# Patient Record
Sex: FEMALE | Race: WHITE | NOT HISPANIC OR LATINO | Employment: FULL TIME | URBAN - METROPOLITAN AREA
[De-identification: names, ages, dates, MRNs, and addresses within clinical notes are randomized per-mention and may not be internally consistent; named-entity substitution may affect disease eponyms.]

---

## 2017-01-11 ENCOUNTER — TELEPHONE (OUTPATIENT)
Dept: NURSING | Facility: CLINIC | Age: 42
End: 2017-01-11

## 2017-01-11 ENCOUNTER — APPOINTMENT (OUTPATIENT)
Dept: GENERAL RADIOLOGY | Facility: CLINIC | Age: 42
End: 2017-01-11
Attending: EMERGENCY MEDICINE
Payer: COMMERCIAL

## 2017-01-11 ENCOUNTER — HOSPITAL ENCOUNTER (EMERGENCY)
Facility: CLINIC | Age: 42
Discharge: HOME | End: 2017-01-12
Attending: EMERGENCY MEDICINE | Admitting: EMERGENCY MEDICINE
Payer: COMMERCIAL

## 2017-01-11 VITALS
TEMPERATURE: 98.3 F | DIASTOLIC BLOOD PRESSURE: 77 MMHG | RESPIRATION RATE: 17 BRPM | SYSTOLIC BLOOD PRESSURE: 137 MMHG | HEART RATE: 66 BPM | OXYGEN SATURATION: 95 %

## 2017-01-11 DIAGNOSIS — M54.6 ACUTE BILATERAL THORACIC BACK PAIN: ICD-10-CM

## 2017-01-11 LAB
ALBUMIN SERPL-MCNC: 3.7 G/DL (ref 3.4–5)
ALBUMIN UR-MCNC: NEGATIVE MG/DL
ALP SERPL-CCNC: 96 U/L (ref 40–150)
ALT SERPL W P-5'-P-CCNC: 59 U/L (ref 0–50)
ANION GAP SERPL CALCULATED.3IONS-SCNC: 10 MMOL/L (ref 3–14)
APPEARANCE UR: CLEAR
AST SERPL W P-5'-P-CCNC: 41 U/L (ref 0–45)
BACTERIA #/AREA URNS HPF: ABNORMAL /HPF
BASOPHILS # BLD AUTO: 0.1 10E9/L (ref 0–0.2)
BASOPHILS NFR BLD AUTO: 0.9 %
BILIRUB SERPL-MCNC: 0.3 MG/DL (ref 0.2–1.3)
BILIRUB UR QL STRIP: NEGATIVE
BUN SERPL-MCNC: 14 MG/DL (ref 7–30)
CALCIUM SERPL-MCNC: 9.8 MG/DL (ref 8.5–10.1)
CHLORIDE SERPL-SCNC: 105 MMOL/L (ref 94–109)
CO2 SERPL-SCNC: 26 MMOL/L (ref 20–32)
COLOR UR AUTO: ABNORMAL
CREAT SERPL-MCNC: 0.94 MG/DL (ref 0.52–1.04)
D DIMER PPP FEU-MCNC: NORMAL UG/ML FEU (ref 0–0.5)
DIFFERENTIAL METHOD BLD: ABNORMAL
EOSINOPHIL # BLD AUTO: 0.5 10E9/L (ref 0–0.7)
EOSINOPHIL NFR BLD AUTO: 3.6 %
ERYTHROCYTE [DISTWIDTH] IN BLOOD BY AUTOMATED COUNT: 13.1 % (ref 10–15)
GFR SERPL CREATININE-BSD FRML MDRD: 66 ML/MIN/1.7M2
GLUCOSE SERPL-MCNC: 80 MG/DL (ref 70–99)
GLUCOSE UR STRIP-MCNC: NEGATIVE MG/DL
HCG UR QL: NEGATIVE
HCT VFR BLD AUTO: 43.2 % (ref 35–47)
HGB BLD-MCNC: 14.6 G/DL (ref 11.7–15.7)
HGB UR QL STRIP: NEGATIVE
IMM GRANULOCYTES # BLD: 0 10E9/L (ref 0–0.4)
IMM GRANULOCYTES NFR BLD: 0.2 %
KETONES UR STRIP-MCNC: NEGATIVE MG/DL
LEUKOCYTE ESTERASE UR QL STRIP: NEGATIVE
LYMPHOCYTES # BLD AUTO: 4.6 10E9/L (ref 0.8–5.3)
LYMPHOCYTES NFR BLD AUTO: 34.3 %
MCH RBC QN AUTO: 30.3 PG (ref 26.5–33)
MCHC RBC AUTO-ENTMCNC: 33.8 G/DL (ref 31.5–36.5)
MCV RBC AUTO: 90 FL (ref 78–100)
MONOCYTES # BLD AUTO: 0.9 10E9/L (ref 0–1.3)
MONOCYTES NFR BLD AUTO: 6.4 %
NEUTROPHILS # BLD AUTO: 7.3 10E9/L (ref 1.6–8.3)
NEUTROPHILS NFR BLD AUTO: 54.6 %
NITRATE UR QL: NEGATIVE
NRBC # BLD AUTO: 0 10*3/UL
NRBC BLD AUTO-RTO: 0 /100
PH UR STRIP: 7 PH (ref 5–7)
PLATELET # BLD AUTO: 312 10E9/L (ref 150–450)
POTASSIUM SERPL-SCNC: 3.2 MMOL/L (ref 3.4–5.3)
PROT SERPL-MCNC: 7.7 G/DL (ref 6.8–8.8)
RBC # BLD AUTO: 4.82 10E12/L (ref 3.8–5.2)
RBC #/AREA URNS AUTO: 0 /HPF (ref 0–2)
SODIUM SERPL-SCNC: 141 MMOL/L (ref 133–144)
SP GR UR STRIP: 1 (ref 1–1.03)
SQUAMOUS #/AREA URNS AUTO: <1 /HPF (ref 0–1)
TROPONIN I SERPL-MCNC: NORMAL UG/L (ref 0–0.04)
URN SPEC COLLECT METH UR: ABNORMAL
UROBILINOGEN UR STRIP-MCNC: NORMAL MG/DL (ref 0–2)
WBC # BLD AUTO: 13.3 10E9/L (ref 4–11)
WBC #/AREA URNS AUTO: <1 /HPF (ref 0–2)

## 2017-01-11 PROCEDURE — 85025 COMPLETE CBC W/AUTO DIFF WBC: CPT | Performed by: EMERGENCY MEDICINE

## 2017-01-11 PROCEDURE — 25000125 ZZHC RX 250: Performed by: EMERGENCY MEDICINE

## 2017-01-11 PROCEDURE — 84484 ASSAY OF TROPONIN QUANT: CPT | Performed by: EMERGENCY MEDICINE

## 2017-01-11 PROCEDURE — 96361 HYDRATE IV INFUSION ADD-ON: CPT

## 2017-01-11 PROCEDURE — 99285 EMERGENCY DEPT VISIT HI MDM: CPT | Mod: 25

## 2017-01-11 PROCEDURE — 96375 TX/PRO/DX INJ NEW DRUG ADDON: CPT

## 2017-01-11 PROCEDURE — 85379 FIBRIN DEGRADATION QUANT: CPT | Performed by: EMERGENCY MEDICINE

## 2017-01-11 PROCEDURE — 25000128 H RX IP 250 OP 636: Performed by: EMERGENCY MEDICINE

## 2017-01-11 PROCEDURE — 71020 XR CHEST 2 VW: CPT

## 2017-01-11 PROCEDURE — 80053 COMPREHEN METABOLIC PANEL: CPT | Performed by: EMERGENCY MEDICINE

## 2017-01-11 PROCEDURE — 81025 URINE PREGNANCY TEST: CPT | Performed by: EMERGENCY MEDICINE

## 2017-01-11 PROCEDURE — 96374 THER/PROPH/DIAG INJ IV PUSH: CPT

## 2017-01-11 PROCEDURE — 93005 ELECTROCARDIOGRAM TRACING: CPT

## 2017-01-11 PROCEDURE — 81001 URINALYSIS AUTO W/SCOPE: CPT | Performed by: EMERGENCY MEDICINE

## 2017-01-11 RX ORDER — DIAZEPAM 10 MG/2ML
2.5 INJECTION, SOLUTION INTRAMUSCULAR; INTRAVENOUS EVERY 4 HOURS PRN
Status: DISCONTINUED | OUTPATIENT
Start: 2017-01-11 | End: 2017-01-12 | Stop reason: HOSPADM

## 2017-01-11 RX ORDER — HYDROCODONE BITARTRATE AND ACETAMINOPHEN 5; 325 MG/1; MG/1
1-2 TABLET ORAL EVERY 4 HOURS PRN
Qty: 15 TABLET | Refills: 0 | Status: SHIPPED | OUTPATIENT
Start: 2017-01-11 | End: 2017-11-20

## 2017-01-11 RX ORDER — KETOROLAC TROMETHAMINE 30 MG/ML
30 INJECTION, SOLUTION INTRAMUSCULAR; INTRAVENOUS ONCE
Status: COMPLETED | OUTPATIENT
Start: 2017-01-11 | End: 2017-01-11

## 2017-01-11 RX ORDER — CALCIUM CARBONATE 500 MG/1
1 TABLET, CHEWABLE ORAL DAILY
COMMUNITY
End: 2018-03-16

## 2017-01-11 RX ORDER — DIAZEPAM 5 MG
5 TABLET ORAL EVERY 6 HOURS PRN
Qty: 20 TABLET | Refills: 0 | Status: SHIPPED | OUTPATIENT
Start: 2017-01-11 | End: 2017-01-18

## 2017-01-11 RX ADMIN — KETOROLAC TROMETHAMINE 30 MG: 30 INJECTION, SOLUTION INTRAMUSCULAR at 21:42

## 2017-01-11 RX ADMIN — DIAZEPAM 2.5 MG: 5 INJECTION, SOLUTION INTRAMUSCULAR; INTRAVENOUS at 23:12

## 2017-01-11 RX ADMIN — SODIUM CHLORIDE 1000 ML: 9 INJECTION, SOLUTION INTRAVENOUS at 21:43

## 2017-01-11 ASSESSMENT — ENCOUNTER SYMPTOMS
FREQUENCY: 0
DIFFICULTY URINATING: 0
NUMBNESS: 0
DYSURIA: 0
HEMATURIA: 0
SHORTNESS OF BREATH: 1
BACK PAIN: 1

## 2017-01-11 NOTE — ED AVS SNAPSHOT
[facility] Emergency Department    [address]    [facility] [address]    Phone:  [phone]    Fax:  [phone]                                       Machelle Weiner   MRN: [id number]    Department:  [facility] Emergency Department   Date of Visit:  1/11/2017           After Visit Summary Signature Page     I have received my discharge instructions, and my questions have been answered. I have discussed any challenges I see with this plan with the nurse or doctor.    ..........................................................................................................................................  Patient/Patient Representative Signature      ..........................................................................................................................................  Patient Representative Print Name and Relationship to Patient    ..................................................               ................................................  Date                                            Time    ..........................................................................................................................................  Reviewed by Signature/Title    ...................................................              ..............................................  Date                                                            Time

## 2017-01-11 NOTE — ED AVS SNAPSHOT
[facility] Emergency Department    [address] [place] [address]    Phone:  [phone]    Fax:  [phone]                                       Machelle Weiner   MRN: [id number]    Department:  [facility] Emergency Department   Date of Visit:  1/11/2017           Patient Information     Date Of Birth          1975        Your diagnoses for this visit were:     Acute bilateral thoracic back pain        You were seen by Sarai Rodriguez MD.      Follow-up Information     Follow up with Jean Sauceda PA-C.    Specialty:  Physician Assistant    Contact information:    [facility]  [address]  [facility] [address] [phone]          Discharge Instructions         You will be sore!    Ice to the area.  Motrin (ie ibuprofen) or tylenol for mild pain.  Norco (ie tylenol with narcotic) for severe pain.  Followup with your doctor in the next few days.    Back Care Tips    Caring for your back  These are things you can do to prevent a recurrence of acute back pain and to reduce symptoms from chronic back pain:    Maintain a healthy weight. If you are overweight, losing weight will help most types of back pain.    Exercise is an important part of recovery from most types of back pain. The back is supported by the muscles behind and in front of the spine. This means both the back muscles and the abdominal muscles must be strengthened to provide better support for your spine.     Swimming and brisk walking are good overall exercises to improve your fitness level.    Practice safe lifting methods (below).    Practice good posture when sitting, standing and walking. Avoid prolonged sitting. This puts more stress on the lower back than standing or walking.    Wear quality shoes with sufficient arch support. Foot and ankle alignment can affect back symptoms. Women should avoid high heels.    Therapeutic massage can help  relax the back  muscles without stretching them.    During the first 24 to 72 hours after an acute injury or flare-up of chronic back pain, apply an ice pack to the painful area for 20 minutes and then remove it for 20 minutes over a period of 60 to 90 minutes or several times a day. As a safety precaution, do not use a heating pad at bedtime. Sleeping on a heating pad can lead to skin burns or tissue damage.    Ice and heat therapies can be alternated.  Medications  Talk to your doctor before using medications, especially if you have other medical problems or are taking other medicines.    You may use acetaminophen or ibuprofen to control pain, unless other pain medicine was prescribed. If you have chronic conditions like diabetes, liver or kidney disease, stomach ulcers or gastrointestinal bleeding, or are taking blood thinners, talk with your doctor before taking any meidcations.    Be careful if you are given prescription pain medicines, narcotics, or medication for muscle spasm. They can cause drowsiness, affect your coordination, reflexes and judgment. Do not drive or operate heavy machinery.  Lumbar stretch  Here is a simple stretching exercise that will help relax muscle spasm and keep your back more limber. If exercise makes your back pain worse, don t do it.    Lie on your back with your knees bent and both feet on the ground.    Slowly raise your left knee to your chest as you flatten your lower back against the floor. Hold for 5 seconds.    Relax and repeat the exercise with your right knee.    Do 10 of these exercises for each leg.  Safe lifting method    Don t bend over at the waist to lift an object off the floor.  Instead, bend your knees and hips in a squat.     Keep your back and head upright    Hold the object close to your body, directly in front of you.    Straighten your legs to lift the object.     Lower the object to the floor in the reverse fashion.    If you must slide something across the floor, push  it.  Posture tips  Sitting  Sit in chairs with straight backs or low-back support. rKeep your k nees lower than your hip, with your feet flat on the floor.  When driving, sit up straight. Adjust the seat forward so you are not leaning toward the steering wheel.  A small pillow or rolled towel behind your lower back may help if you are driving long distances.   Standing  When standing for long periods, shift most of your weight to one leg at a time. Alternate legs every few minutes.   Sleeping  The best way to sleep is on your side with your knees bent. Put a low pillow under your head to support your neck in a neutral spine position. Avoid thick pillows that bend your neck to one side. Put a pillow between your legs to further relax your lower back. If you sleep on your back, put pillows under your knees to support your legs in a slightly flexed position. Use a firm mattress. If your mattress sags, replace it, or use a 1/2-inch plywood board under the mattress to add support.  Follow-up care  Follow up with your health care provider or as directed by our staff.  If X-rays, a CT scan or an MRI scan were taken, they will be reviewed by a radiologist. You will be notified of any new findings that may affect your care.  Call 911  Seek emergency medical care if any of the following occur:    Trouble breathing    Confusion    Very drowsy or trouble breathing    Fainting or loss of consciousness    Rapid or very slow heart rate    Loss of  bwel or bladder control  When to seek medical care  Call your health care provider if any of the following occur:    Pain becomes worse or spreads to your arms or legs    Weakness or numbness in one or both arms or legs    Numbness in the groin area    [id number]. [address] [place] [address], [place] [address]. All rights reserved. This information is not intended as a substitute for professional medical care. Always follow your healthcare professional's  instructions.          Discharge References/Attachments     BACK SPASM, NO TRAUMA (ENGLISH)      24 Hour Appointment Hotline       To make an appointment at any [facility], call [phone] ([phone]). If you don't have a family doctor or clinic, we will help you find one. [place] clinics are conveniently located to serve the needs of you and your family.             Review of your medicines      START taking        Dose / Directions Last dose taken    diazepam 5 MG tablet   Commonly known as:  VALIUM   Dose:  5 mg   Quantity:  20 tablet        Take 1 tablet (5 mg) by mouth every 6 hours as needed for anxiety or sleep (MUSCLE SPASM)   Refills:  0        HYDROcodone-acetaminophen 5-325 MG per tablet   Commonly known as:  NORCO   Dose:  1-2 tablet   Quantity:  15 tablet        Take 1-2 tablets by mouth every 4 hours as needed for moderate to severe pain   Refills:  0          Our records show that you are taking the medicines listed below. If these are incorrect, please call your family doctor or clinic.        Dose / Directions Last dose taken    albuterol 108 (90 BASE) MCG/ACT Inhaler   Commonly known as:  PROAIR HFA/PROVENTIL HFA/VENTOLIN HFA   Dose:  2 puff   Quantity:  3 Inhaler        Inhale 2 puffs into the lungs every 6 hours as needed for shortness of breath / dyspnea   Refills:  1        calcium carbonate 500 MG chewable tablet   Commonly known as:  TUMS   Dose:  1 chew tab        Take 1 chew tab by mouth daily   Refills:  0        chlorthalidone 25 MG tablet   Commonly known as:  HYGROTON   Dose:  12.5 mg   Quantity:  45 tablet        Take 0.5 tablets (12.5 mg) by mouth daily   Refills:  1        ibuprofen 600 MG tablet   Commonly known as:  ADVIL/MOTRIN   Dose:  600 mg   Quantity:  30 tablet        Take 1 tablet (600 mg) by mouth every 6 hours as needed for moderate pain   Refills:  1        levothyroxine 50 MCG tablet   Commonly known as:  SYNTHROID/LEVOTHROID   Dose:  50 mcg   Quantity:   90 tablet        Take 1 tablet (50 mcg) by mouth daily   Refills:  2        priLOSEC 10 MG CR capsule   Dose:  10 mg   Generic drug:  omeprazole        Take 10 mg by mouth as needed   Refills:  0        PRILOSEC PO        Refills:  0                Prescriptions were sent or printed at these locations (2 Prescriptions)                   Other Prescriptions                Printed at Department/Unit printer (2 of 2)         HYDROcodone-acetaminophen (NORCO) 5-325 MG per tablet               diazepam (VALIUM) 5 MG tablet                Procedures and tests performed during your visit     CBC with platelets differential    Cardiac Continuous Monitoring    Chest XR,  PA & LAT    Comprehensive metabolic panel    D dimer quantitative    EKG 12-lead, tracing only    HCG qualitative urine    Troponin I    UA with Microscopic      Orders Needing Specimen Collection     None      Pending Results     No orders found from 1/10/2017 to 1/12/2017.            Pending Culture Results     No orders found from 1/10/2017 to 1/12/2017.       Test Results from your hospital stay           1/11/2017  9:27 PM - Interface, Flexilab Results      Component Results     Component Value Ref Range & Units Status    Color Urine Straw  Final    Appearance Urine Clear  Final    Glucose Urine Negative NEG mg/dL Final    Bilirubin Urine Negative NEG Final    Ketones Urine Negative NEG mg/dL Final    Specific Gravity Urine 1.002 (L) 1.003 - 1.035 Final    Blood Urine Negative NEG Final    pH Urine 7.0 5.0 - 7.0 pH Final    Protein Albumin Urine Negative NEG mg/dL Final    Urobilinogen mg/dL Normal 0.0 - 2.0 mg/dL Final    Nitrite Urine Negative NEG Final    Leukocyte Esterase Urine Negative NEG Final    Source Midstream Urine  Final    WBC Urine <1 0 - 2 /HPF Final    RBC Urine 0 0 - 2 /HPF Final    Bacteria Urine Few (A) NEG /HPF Final    Squamous Epithelial /HPF Urine <1 0 - 1 /HPF Final         1/11/2017  9:33 PM - Interface, Flexilab Results       Component Results     Component Value Ref Range & Units Status    HCG Qual Urine Negative NEG Final         1/11/2017  9:49 PM - Interface, Flexilab Results      Component Results     Component Value Ref Range & Units Status    WBC 13.3 (H) 4.0 - 11.0 10e9/L Final    RBC Count 4.82 3.8 - 5.2 10e12/L Final    Hemoglobin 14.6 11.7 - 15.7 g/dL Final    Hematocrit 43.2 35.0 - 47.0 % Final    MCV 90 78 - 100 fl Final    MCH 30.3 26.5 - 33.0 pg Final    MCHC 33.8 31.5 - 36.5 g/dL Final    RDW 13.1 10.0 - 15.0 % Final    Platelet Count 312 150 - 450 10e9/L Final    Diff Method Automated Method  Final    % Neutrophils 54.6 % Final    % Lymphocytes 34.3 % Final    % Monocytes 6.4 % Final    % Eosinophils 3.6 % Final    % Basophils 0.9 % Final    % Immature Granulocytes 0.2 % Final    Nucleated RBCs 0 0 /100 Final    Absolute Neutrophil 7.3 1.6 - 8.3 10e9/L Final    Absolute Lymphocytes 4.6 0.8 - 5.3 10e9/L Final    Absolute Monocytes 0.9 0.0 - 1.3 10e9/L Final    Absolute Eosinophils 0.5 0.0 - 0.7 10e9/L Final    Absolute Basophils 0.1 0.0 - 0.2 10e9/L Final    Abs Immature Granulocytes 0.0 0 - 0.4 10e9/L Final    Absolute Nucleated RBC 0.0  Final         1/11/2017 10:12 PM - Interface, Flexilab Results      Component Results     Component Value Ref Range & Units Status    Sodium 141 133 - 144 mmol/L Final    Potassium 3.2 (L) 3.4 - 5.3 mmol/L Final    Chloride 105 94 - 109 mmol/L Final    Carbon Dioxide 26 20 - 32 mmol/L Final    Anion Gap 10 3 - 14 mmol/L Final    Glucose 80 70 - 99 mg/dL Final    Urea Nitrogen 14 7 - 30 mg/dL Final    Creatinine 0.94 0.52 - 1.04 mg/dL Final    GFR Estimate 66 >60 mL/min/1.7m2 Final    Non  GFR Calc    GFR Estimate If Black 80 >60 mL/min/1.7m2 Final    African American GFR Calc    Calcium 9.8 8.5 - 10.1 mg/dL Final    Bilirubin Total 0.3 0.2 - 1.3 mg/dL Final    Albumin 3.7 3.4 - 5.0 g/dL Final    Protein Total 7.7 6.8 - 8.8 g/dL Final    Alkaline Phosphatase 96 40 - 150  U/L Final    ALT 59 (H) 0 - 50 U/L Final    AST 41 0 - 45 U/L Final         1/11/2017 10:04 PM - Interface, Flexilab Results      Component Results     Component Value Ref Range & Units Status    D Dimer  0.0 - 0.50 ug/ml FEU Final    <0.3  This D-dimer assay is intended for use in conjuntion with a clinical pretest   probability assessment model to exclude pulmonary embolism (PE) and as an aid   in the diagnosis of deep venous thrombosis (DVT) in outpatients suspected of PE   or DVT. The cut-off value is 0.5 g/mL FEU.           1/11/2017 10:13 PM - Interface, Flexilab Results      Component Results     Component Value Ref Range & Units Status    Troponin I ES  0.000 - 0.045 ug/L Final    <0.015  The 99th percentile for upper reference range is 0.045 ug/L.  Troponin values in   the range of 0.045 - 0.120 ug/L may be associated with risks of adverse   clinical events.           1/11/2017 10:51 PM - Interface, Radiant Ib      Narrative     XR CHEST 2 VW   1/11/2017 10:43 PM     HISTORY: cough    COMPARISON: None.    FINDINGS: The heart is negative.  The lungs are clear. The pulmonary  vasculature is normal.  The bones and soft tissues are unremarkable.        Impression     IMPRESSION: No active infiltrate.        RODNEY PELLETIER MD                Clinical Quality Measure: Blood Pressure Screening     Your blood pressure was checked while you were in the emergency department today. The last reading we obtained was  BP: 137/77 mmHg . Please read the guidelines below about what these numbers mean and what you should do about them.  If your systolic blood pressure (the top number) is less than 120 and your diastolic blood pressure (the bottom number) is less than 80, then your blood pressure is normal. There is nothing more that you need to do about it.  If your systolic blood pressure (the top number) is 120-139 or your diastolic blood pressure (the bottom number) is 80-89, your blood pressure may be higher than it should  be. You should have your blood pressure rechecked within a year by a primary care provider.  If your systolic blood pressure (the top number) is 140 or greater or your diastolic blood pressure (the bottom number) is 90 or greater, you may have high blood pressure. High blood pressure is treatable, but if left untreated over time it can put you at risk for heart attack, stroke, or kidney failure. You should have your blood pressure rechecked by a primary care provider within the next 4 weeks.  If your provider in the emergency department today gave you specific instructions to follow-up with your doctor or provider even sooner than that, you should follow that instruction and not wait for up to 4 weeks for your follow-up visit.        Thank you for choosing [facility]       Thank you for choosing [facility] for your care. Our goal is always to provide you with excellent care. Hearing back from our patients is one way we can continue to improve our services. Please take a few minutes to complete the written survey that you may receive in the mail after you visit with us. Thank you!        [employer] Information     [email or web address] gives you secure access to your electronic health record. If you see a primary care provider, you can also send messages to your care team and make appointments. If you have questions, please call your primary care clinic.  If you do not have a primary care provider, please call [phone] and they will assist you.        Care EveryWhere ID     This is your Care EveryWhere ID. This could be used by other organizations to access your [facility] medical records  [id number]        After Visit Summary       This is your record. Keep this with you and show to your community pharmacist(s) and doctor(s) at your next visit.

## 2017-01-12 LAB — INTERPRETATION ECG - MUSE: NORMAL

## 2017-01-12 NOTE — ED NOTES
"Patient with call light on. Patient states that she doesn't know what is going on and wants to know what is causing her pain. I did inform patient that in the ED, we rule out the \"bad\" things that can happen, assist patient in feeling better and recommend follow up with PCP. Patient informed that MRI's are not typically performed in the ED unless there is an indication that patient cannot wait to f/u with PCP. MD informed of patients concerns and agrees with this RNs reply to patients concerns. Patient is A&O x's4, does not appear to be in any distress at this time.   "

## 2017-01-12 NOTE — ED PROVIDER NOTES
History     Chief Complaint:  Back pain     HPI   Machelle Weiner is a 41 year old female who presents complaining of an acute onset of sharp left mid thoracic back pain associated with mild shortness of breath that started at 1920 today. She denies any history of back pain but states she has a strong family history of heart disease and her sister passed yesterday due to a pulmonary embolism. Her pain worsens with deep inspiration and movement. The patient does not report any chest pain, falls, trauma, numbness, tingling, dysuria, difficulty urinating, hematuria, frequency, or other related symptoms. She voices no other concerns at this time.  Pain is worse with movement but patient cannot identify any injury or recent heavy lifting.  No cough or fevers.  No nausea/vomiting.  No international travel.  No leg swelling.  No recent trips.    Allergies:  Codeine   Penicillin      Medications:    Prilosec   Hygroton   Synthroid   Prilosec   Proair       Past Medical History:    HTN  Hypothyroidism     Hyperlipidemia   GERD    Past Surgical History:    Gyn surgery   Cholecystectomy      Family History:    CAD (maternal grandmother)  Hyperlipidemia (maternal grandmother)     Social History:  The patient is a former smoker and quit in 2015. The patient does not use alcohol.   Marital Status:  Single [1]     Review of Systems   Respiratory: Positive for shortness of breath.    Cardiovascular: Negative for chest pain.   Genitourinary: Negative for dysuria, frequency, hematuria and difficulty urinating.   Musculoskeletal: Positive for back pain.   Neurological: Negative for numbness.   All other systems reviewed and are negative.  Pt c/o sharp pain in back moving to chest worse to breathing and moving.     Physical Exam     Patient Vitals for the past 24 hrs:   BP Temp Temp src Pulse Heart Rate Resp SpO2   01/11/17 2345 137/77 mmHg - - - - - 95 %   01/11/17 2330 - - - - - - 93 %   01/11/17 2315 153/88 mmHg - - - - - -    01/11/17 2312 - - - - - - 96 %   01/11/17 2311 144/90 mmHg - - - - - -   01/11/17 2310 - - - - - - 95 %   01/11/17 2230 (!) 150/91 mmHg - - - 57 17 94 %   01/11/17 2220 (!) 152/99 mmHg - - - 67 12 95 %   01/11/17 2210 (!) 182/104 mmHg - - - - - -   01/11/17 2200 (!) 172/101 mmHg - - - 67 17 95 %   01/11/17 2150 (!) 157/98 mmHg - - - 50 11 97 %   01/11/17 2145 - - - - 50 12 95 %   01/11/17 2140 (!) 162/98 mmHg - - - 54 17 93 %   01/11/17 2130 (!) 159/94 mmHg - - - - - -   01/11/17 2120 (!) 150/104 mmHg - - - 62 10 93 %   01/11/17 2115 - - - - 59 14 95 %   01/11/17 2024 (!) 155/107 mmHg 98.3  F (36.8  C) Temporal 66 - 16 97 %      Physical Exam   Musculoskeletal:        Back:      GEN: patient smiling, no distress  HEAD: atraumatic, normocephalic  EYES: pupils reactive, extraocular muscles intact, conjunctivae normal  ENT: TMs flat and white bilaterally, oropharynx normal with no erythema or exudate, mucus membranes dry  NECK: no cervical LAD  RESPIRATORY: no tachypnea, breath sounds clear to auscultation (no rales, wheezes, rhonchi), chest wall nontender, normal phonation  CVS: normal S1/S2, no murmurs/rubs/gallops  ABDOMEN: soft, nontender, no masses or organomegaly, no rebound, positive bowel sounds  BACK: no costovertebral angle tenderness, no spinal tenderness, left upper thoracic tenderness to palpation (spasm)  EXTREMITIES: intact pulses x 2 (radial pulses intact) no edema, negative Ulises's signs  MUSCULOSKELETAL: no deformities  SKIN: warm and dry, no acute rashes or ulceration, no erythema or fluctuance  NEURO: GCS 15, cranial nerves intact.  Motor- moves all 4 extremities with 5/5 strength,  5/5.  Arm adduction and abduction 5/5.  DF and PF 5/5.  Sensation- intact all dermatomes to pinprick and light touch.  Reflexes- DTRs 2plus at the knee.  Coordination- ambulatory.  Overall symmetrical exam  HEME: no bruising or petechiae/contusions  LYMPH: no lymphadenopathy      Emergency Department Course   ECG  (21:16:37):  Rate 56 bpm. VT interval 166. QRS duration 92. QT/QTc 426/411. P-R-T axes 38/26/34. Sinus bradycardia, inverted T wave in III otherwise normal ECG. Interpreted at 2115 by Sraai Rodriguez MD.   Normal axis.  No acute ST or T wave changes.    Imaging:  XR Chest PA and Lateral: No active infiltrate. Readings per radiology.     Laboratory:  UA: specific gravity 1.002 (L), bacteria few, otherwise within normal limits   HCG qualitative: negative   CBC: WNL (HGB 14.6, ), WBC 13.3 (H), otherwise within normal limits    CMP: WNL (Creatinine 0.94)  D-Dimer: <0.3 (WNL)  2110 Troponin: <0.015 (WNL)    Interventions:  2304 Valium 2.5 mg IV  Normal Saline Bolus 1L IV  2108 Toradol 30 mg IV  [place]  Cardiac/Sp02 monitoring  Oxygen by nasal cannula at 2L/min    Emergency Department Course:  Past medical records, nursing notes, and vitals reviewed. I performed an exam of the patient and obtained history, as documented above. A urine sample was provided. Blood was obtained. A peripheral IV was established.    10pm recheck     Findings and plan explained to the Patient. Patient discharged home with instructions regarding supportive care, medications, and reasons to return. The importance of close follow-up was reviewed.     11:47 PM  Recheck    /77 mmHg  Pulse 66  Temp(Src) 98.3  F (36.8  C) (Temporal)  Resp 17  SpO2 95%  LMP 02/01/2016 (Within Days)      12:17 AM Updated patient and gave copies of results.    Patient asking for MRI, but not emergent and will have to be done as outpatient    Discussed results with patient.  Gave patient copies of results (applicable labs, CT scans and/or ultrasound).  Answered questions.  Asked patient to followup with PCP.    /77 mmHg  Pulse 66  Temp(Src) 98.3  F (36.8  C) (Temporal)  Resp 17  SpO2 95%  LMP 02/01/2016 (Within Days)  Patient concerned for etiology of pain, so will try to get her into PCP ASAP.    Impression & Plan    The differential  diagnosis of chest pain is broad and includes life threatening etiologies such as acute coronary syndrome/angina, myocardial infarction, pulmonary embolism, acute aortic dissection, amongst others.  Other causes may include pneumoniae, pneumothorax, pericarditis, pleurisy, esophageal spasm, chest wall spasm.    PERC CRITERIA    [employer]line 2004 and 2008 (J Thromb Haemostat):    Age < 50 yrs  Pulse < 100  Sa02 > 94%  No unilateral leg swelling  No hemoptysis  No recent trauma or surgery  No prior PE or DVT  No hormone usage    If all of the above criteria are negative, do not need to assess risk by d dimer      Medical Decision Making:  Machelle Weiner is a very pleasant 41 year old female who presents with left thoracic back pain that is worse when she moves her arm (and with breathing). She has not had any neck pain and no heavy lifting. The patient does not have a history of blood clots but does have family members that are positive for blood clots/pulmonary embolism.  The patient was brought back immediately in case this was a cardiac issue.  The patient does not hve a history of cardiac disorder.. An EKG was obtained and demonstrates sinus rhythm. There are no concerning acute ischemic changes.  EKG did not show ST elevation myocardial infarction. Chest x-ray showed no evidence of an infiltrate or a pneumothorax. There was no widened mediastinum to suggest aortic dissection (ie no evidence of an acute cardiopulmonary process)  The initial D dimer was negative.  Initial troponin does not show any sign of ischemia.    Patient was placed on cardiac monitor and given oxygen by nasal cannula.    Patient was given IV toradol and valium, which helped her symptoms.  I suspect musculoskeletal etiology (tenderness and spasm to palpation).  Urinalysis is negative, pregnancy test is negative. White blood cell count is a little elevated at 13.3. Her potassium was also a little low at 3.2, as it has been in the past which is  chronic and otherwise electrolytes are normal. The patient's chest x-ray did not show any pneumonia or pneumothorax and her troponin was normal. D-Dimer was negative ruling out pulmonary embolism as the patient is at low risk by PERC criteria. She did not have any tachycardia or hypoxia. Her blood pressure was noted to be elevated but later it was re-checked and found to be 144/90.     We did try IV pain medicine and she has feeling significantly better.  Patient requests MRI, but will need close outpatient followup.    Diagnosis:    ICD-10-CM    1. Acute bilateral thoracic back pain M54.6     2. Chest pain    Disposition:  The patient was discharged to home     Instructions to patient:  You will be sore!  Ice to the area.  Motrin (ie ibuprofen) or tylenol for mild pain.  Norco (ie tylenol with narcotic) for severe pain.  Follow up with your doctor in the next 1-2 days.  Come back if worse.  Copies of studies.      I, Cheryl Jones, am serving as a scribe at 8:29 PM on 1/11/2017 to document services personally performed by Sarai Rodriguez MD based on my observations and the provider's statements to me.              Sarai Rodriguez MD  01/13/17 [id number]

## 2017-01-12 NOTE — ED NOTES
Pt c/o sharp pain in back moving to chest worse to breathing and moving.     Pt A&O x 3, CMS x 3, ABCD's adequate in triage

## 2017-01-12 NOTE — DISCHARGE INSTRUCTIONS
You will be sore!    Ice to the area.  Motrin (ie ibuprofen) or tylenol for mild pain.  Norco (ie tylenol with narcotic) for severe pain.  Followup with your doctor in the next few days.    Back Care Tips    Caring for your back  These are things you can do to prevent a recurrence of acute back pain and to reduce symptoms from chronic back pain:    Maintain a healthy weight. If you are overweight, losing weight will help most types of back pain.    Exercise is an important part of recovery from most types of back pain. The back is supported by the muscles behind and in front of the spine. This means both the back muscles and the abdominal muscles must be strengthened to provide better support for your spine.     Swimming and brisk walking are good overall exercises to improve your fitness level.    Practice safe lifting methods (below).    Practice good posture when sitting, standing and walking. Avoid prolonged sitting. This puts more stress on the lower back than standing or walking.    Wear quality shoes with sufficient arch support. Foot and ankle alignment can affect back symptoms. Women should avoid high heels.    Therapeutic massage can help  relax the back muscles without stretching them.    During the first 24 to 72 hours after an acute injury or flare-up of chronic back pain, apply an ice pack to the painful area for 20 minutes and then remove it for 20 minutes over a period of 60 to 90 minutes or several times a day. As a safety precaution, do not use a heating pad at bedtime. Sleeping on a heating pad can lead to skin burns or tissue damage.    Ice and heat therapies can be alternated.  Medications  Talk to your doctor before using medications, especially if you have other medical problems or are taking other medicines.    You may use acetaminophen or ibuprofen to control pain, unless other pain medicine was prescribed. If you have chronic conditions like diabetes, liver or kidney disease, stomach  ulcers or gastrointestinal bleeding, or are taking blood thinners, talk with your doctor before taking any meidcations.    Be careful if you are given prescription pain medicines, narcotics, or medication for muscle spasm. They can cause drowsiness, affect your coordination, reflexes and judgment. Do not drive or operate heavy machinery.  Lumbar stretch  Here is a simple stretching exercise that will help relax muscle spasm and keep your back more limber. If exercise makes your back pain worse, don t do it.    Lie on your back with your knees bent and both feet on the ground.    Slowly raise your left knee to your chest as you flatten your lower back against the floor. Hold for 5 seconds.    Relax and repeat the exercise with your right knee.    Do 10 of these exercises for each leg.  Safe lifting method    Don t bend over at the waist to lift an object off the floor.  Instead, bend your knees and hips in a squat.     Keep your back and head upright    Hold the object close to your body, directly in front of you.    Straighten your legs to lift the object.     Lower the object to the floor in the reverse fashion.    If you must slide something across the floor, push it.  Posture tips  Sitting  Sit in chairs with straight backs or low-back support. rKeep your k nees lower than your hip, with your feet flat on the floor.  When driving, sit up straight. Adjust the seat forward so you are not leaning toward the steering wheel.  A small pillow or rolled towel behind your lower back may help if you are driving long distances.   Standing  When standing for long periods, shift most of your weight to one leg at a time. Alternate legs every few minutes.   Sleeping  The best way to sleep is on your side with your knees bent. Put a low pillow under your head to support your neck in a neutral spine position. Avoid thick pillows that bend your neck to one side. Put a pillow between your legs to further relax your lower back. If  you sleep on your back, put pillows under your knees to support your legs in a slightly flexed position. Use a firm mattress. If your mattress sags, replace it, or use a 1/2-inch plywood board under the mattress to add support.  Follow-up care  Follow up with your health care provider or as directed by our staff.  If X-rays, a CT scan or an MRI scan were taken, they will be reviewed by a radiologist. You will be notified of any new findings that may affect your care.  Call 911  Seek emergency medical care if any of the following occur:    Trouble breathing    Confusion    Very drowsy or trouble breathing    Fainting or loss of consciousness    Rapid or very slow heart rate    Loss of  bwel or bladder control  When to seek medical care  Call your health care provider if any of the following occur:    Pain becomes worse or spreads to your arms or legs    Weakness or numbness in one or both arms or legs    Numbness in the groin area    [id number] [employer]. [address] [place] [address], [place] [address]. All rights reserved. This information is not intended as a substitute for professional medical care. Always follow your healthcare professional's instructions.

## 2017-01-12 NOTE — TELEPHONE ENCOUNTER
"Call Type: Triage Call    Presenting Problem: \"About 15 minutes ago I got a very sharp,  stabbing pain in my upper back, but below my shoulder blade. At the  same time I got really bad reflux. I took Tums. The pain has gotten  worse, comes around into the front when I lay down. I feel light  headed and some nausea. I can't take a full deep breathe in. I've  never had this before, I was just sitting here talking to my son and   and got the sharp pain.\" Denies other sx. rates pain 8/10.  Triaged and advised 911.  Triage Note:  Guideline Title: Chest Pain  Recommended Disposition: Activate   Original Inclination: Wanted to speak with a nurse  Override Disposition:  Intended Action: Follow advice given  Physician Contacted: No  Chest discomfort associated with shortness of breath, sweating, odd heartbeats or  different heart rate, nausea, vomiting, lightheadedness, or fainting lasting 5 or  more minutes now or within the last hour ?  YES  Loss of consciousness for any period of time ? NO  New or worsening signs and symptoms that may indicate shock ? NO  Chest pain spreading to the shoulders, neck, jaw, in one or both arms, stomach or  back lasting 5 or more minutes now or within the last hour. Pain is NOT  associated with taking a deep breath or a productive cough, movement, or touch to  a localized area. ? NO  Pressure, fullness, squeezing sensation or pain anywhere in the chest lasting 5 or  more minutes now or within the last hour. Pain is NOT associated with taking a  deep breath or a productive cough, movement, or touch to a localized area on the  chest. ? NO  Physician Instructions:  Care Advice: IMMEDIATE ACTION  After calling , have the person chew one aspirin tablet (325 mg), or  4 baby aspirin (81mg) with a small amount of water now if conscious, not  allergic to aspirin, or if has not been told to avoid taking aspirin by  their provider.  It is important to use aspirin, not " acetaminophen.

## 2017-01-13 ENCOUNTER — OFFICE VISIT (OUTPATIENT)
Dept: FAMILY MEDICINE | Facility: CLINIC | Age: 42
End: 2017-01-13
Payer: COMMERCIAL

## 2017-01-13 VITALS
OXYGEN SATURATION: 97 % | TEMPERATURE: 97.5 F | HEIGHT: 64 IN | BODY MASS INDEX: 38.58 KG/M2 | RESPIRATION RATE: 16 BRPM | DIASTOLIC BLOOD PRESSURE: 91 MMHG | WEIGHT: 226 LBS | HEART RATE: 59 BPM | SYSTOLIC BLOOD PRESSURE: 142 MMHG

## 2017-01-13 DIAGNOSIS — J06.9 VIRAL UPPER RESPIRATORY TRACT INFECTION: Primary | ICD-10-CM

## 2017-01-13 LAB
FLUAV+FLUBV AG SPEC QL: NORMAL
FLUAV+FLUBV AG SPEC QL: NORMAL
SPECIMEN SOURCE: NORMAL

## 2017-01-13 PROCEDURE — 87804 INFLUENZA ASSAY W/OPTIC: CPT | Performed by: FAMILY MEDICINE

## 2017-01-13 PROCEDURE — 99213 OFFICE O/P EST LOW 20 MIN: CPT | Performed by: FAMILY MEDICINE

## 2017-01-13 NOTE — PROGRESS NOTES
SUBJECTIVE:                                                    Machelle Weiner is a 41 year old female who presents to clinic today for the following health issues:      Acute Illness   Acute illness concerns: cough  Onset: 1 day     Fever: no    Chills/Sweats: no    Headache (location?): YES    Sinus Pressure:no    Conjunctivitis:  no    Ear Pain: no    Rhinorrhea: no    Congestion: YES    Sore Throat: YES- deep throat pain     Cough: YES-non-productive    Wheeze: no    Decreased Appetite: YES    Nausea: no    Vomiting: no    Diarrhea:  YES    Dysuria/Freq.: no    Fatigue/Achiness: YES    Sick/Strep Exposure: YES     Therapies Tried and outcome: none.          Problem list and histories reviewed & adjusted, as indicated.  Additional history: pt was exposed to influenza on Monday by her friend, and she is worried about getting it.    Patient Active Problem List   Diagnosis     Asthma, mild intermittent     Hyperlipidemia LDL goal <160     Essential hypertension, benign     Acquired hypothyroidism     S/P laparoscopic cholecystectomy     Past Surgical History   Procedure Laterality Date     Gyn surgery       cryosurgery of cervix at age 16     Laparoscopic cholecystectomy with cholangiograms N/A 10/14/2016     Procedure: LAPAROSCOPIC CHOLECYSTECTOMY WITH CHOLANGIOGRAMS;  Surgeon: Cornelius Mueller MD;  Location:  OR       Social History   Substance Use Topics     Smoking status: Former Smoker -- 0.30 packs/day     Types: Cigarettes     Quit date: 02/13/2015     Smokeless tobacco: Never Used     Alcohol Use: No     Family History   Problem Relation Age of Onset     Coronary Artery Disease Maternal Grandmother      Hyperlipidemia Maternal Grandmother      DIABETES No family hx of      Hypertension No family hx of      Hyperlipidemia Maternal Grandfather            ROS:      OBJECTIVE:                                                    /91 mmHg  Pulse 59  Temp(Src) 97.5  F (36.4  C) (Oral)  Resp 16  Ht  "5' 4\" (1.626 m)  Wt 226 lb (102.513 kg)  BMI 38.77 kg/m2  SpO2 97%  LMP 02/01/2016 (Within Days)  Body mass index is 38.77 kg/(m^2).  Head: Normocephalic, atraumatic.  Eyes: Conjunctiva clear, non icteric. PERRLA.  Ears: External ears nl, TM is nl   Nose: Septum midline, nasal mucosa congested. No discharge.  There is no tenderness over the maxillary sinuses, there is no tenderness over the frontal sinuses  Mouth / Throat: Normal dentition.  No oral lesions. Pharynx mild erythematous, tonsils no exudate/hypertrophy.  Neck: Supple, no enlarged LN, trachea midline.  LUNGS:  CTA B/L, no wheezing or crackles.  CVS : RRR, no murmur, no rub.             ASSESSMENT/PLAN:                                                            1. Viral upper respiratory tract infection, I don't think this is influenza.   Rest, fluids, OTC medications  - Influenza A/B antigen    Follow up in 7 days if symptoms persist, sooner if symptoms worsen or new ones develops, pt may contact us over the phone for any questions or concerns.      Jamar Flores MD  [facility]"

## 2017-01-13 NOTE — NURSING NOTE
"Chief Complaint   Patient presents with     Throat Problem     deep throat pain, cough, dizzy, body aches 1 day     Initial /91 mmHg  Pulse 59  Temp(Src) 97.5  F (36.4  C) (Oral)  Resp 16  Ht 5' 4\" (1.626 m)  Wt 226 lb (102.513 kg)  BMI 38.77 kg/m2  SpO2 97%  LMP 02/01/2016 (Within Days) Estimated body mass index is 38.77 kg/(m^2) as calculated from the following:    Height as of this encounter: 5' 4\" (1.626 m).    Weight as of this encounter: 226 lb (102.513 kg)..  BP completed using cuff size large  Brenna Tripathi CMA    "

## 2017-01-24 ENCOUNTER — OFFICE VISIT (OUTPATIENT)
Dept: FAMILY MEDICINE | Facility: CLINIC | Age: 42
End: 2017-01-24
Payer: COMMERCIAL

## 2017-01-24 VITALS
OXYGEN SATURATION: 98 % | WEIGHT: 227 LBS | DIASTOLIC BLOOD PRESSURE: 98 MMHG | BODY MASS INDEX: 38.76 KG/M2 | HEART RATE: 69 BPM | TEMPERATURE: 98.3 F | HEIGHT: 64 IN | SYSTOLIC BLOOD PRESSURE: 134 MMHG | RESPIRATION RATE: 16 BRPM

## 2017-01-24 DIAGNOSIS — J45.20 MILD INTERMITTENT ASTHMA WITHOUT COMPLICATION: ICD-10-CM

## 2017-01-24 DIAGNOSIS — J20.9 ACUTE BRONCHITIS, UNSPECIFIED ORGANISM: Primary | ICD-10-CM

## 2017-01-24 PROCEDURE — 99214 OFFICE O/P EST MOD 30 MIN: CPT | Performed by: FAMILY MEDICINE

## 2017-01-24 RX ORDER — AZITHROMYCIN 250 MG/1
TABLET, FILM COATED ORAL
Qty: 6 TABLET | Refills: 0 | Status: SHIPPED | OUTPATIENT
Start: 2017-01-24 | End: 2017-11-20

## 2017-01-24 RX ORDER — PREDNISONE 20 MG/1
40 TABLET ORAL DAILY
Qty: 10 TABLET | Refills: 0 | Status: SHIPPED | OUTPATIENT
Start: 2017-01-24 | End: 2017-01-29

## 2017-01-24 NOTE — MR AVS SNAPSHOT
"              After Visit Summary   1/24/2017    Machelle Weiner    MRN: [id number]           Patient Information     Date Of Birth          1975        Visit Information        Provider Department      1/24/2017 10:45 AM Jamar Flores MD [facility]        Today's Diagnoses     Acute bronchitis, unspecified organism    -  1     Mild intermittent asthma without complication            Follow-ups after your visit        Who to contact     If you have questions or need follow up information about today's clinic visit or your schedule please contact [facility] directly at [phone].  Normal or non-critical lab and imaging results will be communicated to you by [employer], letter or phone within 4 business days after the clinic has received the results. If you do not hear from us within 7 days, please contact the clinic through [employer] or phone. If you have a critical or abnormal lab result, we will notify you by phone as soon as possible.  Submit refill requests through [employer] or call your pharmacy and they will forward the refill request to us. Please allow 3 business days for your refill to be completed.          Additional Information About Your Visit        MyChart Information     [employer] gives you secure access to your electronic health record. If you see a primary care provider, you can also send messages to your care team and make appointments. If you have questions, please call your primary care clinic.  If you do not have a primary care provider, please call [phone] and they will assist you.        Care EveryWhere ID     This is your Care EveryWhere ID. This could be used by other organizations to access your [place] medical records  [id number]        Your Vitals Were     Pulse Temperature Respirations    69 98.3  F (36.8  C) (Oral) 16    Height BMI (Body Mass Index) Pulse Oximetry    5' 4\" (1.626 m) 38.95 kg/m2 98%    Last Period          02/01/2016 (Within " Days)         Blood Pressure from Last 3 Encounters:   01/24/17 134/98   01/13/17 142/91   01/11/17 137/77    Weight from Last 3 Encounters:   01/24/17 227 lb (102.967 kg)   01/13/17 226 lb (102.513 kg)   10/14/16 223 lb 12.8 oz (101.515 kg)              Today, you had the following     No orders found for display         Today's Medication Changes          These changes are accurate as of: 1/24/17 11:17 AM.  If you have any questions, ask your nurse or doctor.               Start taking these medicines.        Dose/Directions    azithromycin 250 MG tablet   Commonly known as:  ZITHROMAX   Used for:  Acute bronchitis, unspecified organism   Started by:  Jamar Flores MD        Take 2 pills today, then 1 pill for 4 days.   Quantity:  6 tablet   Refills:  0       predniSONE 20 MG tablet   Commonly known as:  DELTASONE   Used for:  Mild intermittent asthma without complication   Started by:  Jamar Flores MD        Dose:  40 mg   Take 2 tablets (40 mg) by mouth daily for 5 days   Quantity:  10 tablet   Refills:  0            Where to get your medicines      These medications were sent to [facility] - [address] [address]    Hours:  24-hours Phone:  [phone]    - azithromycin 250 MG tablet  - predniSONE 20 MG tablet             Primary Care Provider Office Phone # Fax #    Jean Sauceda PA-C [phone]       [facility] [address] [facility] [address]        Thank you!     Thank you for choosing [facility]  for your care. Our goal is always to provide you with excellent care. Hearing back from our patients is one way we can continue to improve our services. Please take a few minutes to complete the written survey that you may receive in the mail after your visit with us. Thank you!             Your Updated Medication List - Protect others around you:  Learn how to safely use, store and throw away your medicines at www.disposemymeds.org.          This list is accurate as of: 1/24/17 11:17 AM.  Always use your most recent med list.                   Brand Name Dispense Instructions for use    albuterol 108 (90 BASE) MCG/ACT Inhaler    PROAIR HFA/PROVENTIL HFA/VENTOLIN HFA    3 Inhaler    Inhale 2 puffs into the lungs every 6 hours as needed for shortness of breath / dyspnea       azithromycin 250 MG tablet    ZITHROMAX    6 tablet    Take 2 pills today, then 1 pill for 4 days.       calcium carbonate 500 MG chewable tablet    TUMS     Take 1 chew tab by mouth daily       chlorthalidone 25 MG tablet    HYGROTON    45 tablet    Take 0.5 tablets (12.5 mg) by mouth daily       HYDROcodone-acetaminophen 5-325 MG per tablet    NORCO    15 tablet    Take 1-2 tablets by mouth every 4 hours as needed for moderate to severe pain       ibuprofen 600 MG tablet    ADVIL/MOTRIN    30 tablet    Take 1 tablet (600 mg) by mouth every 6 hours as needed for moderate pain       levothyroxine 50 MCG tablet    SYNTHROID/LEVOTHROID    90 tablet    Take 1 tablet (50 mcg) by mouth daily       predniSONE 20 MG tablet    DELTASONE    10 tablet    Take 2 tablets (40 mg) by mouth daily for 5 days       priLOSEC 10 MG CR capsule   Generic drug:  omeprazole      Take 10 mg by mouth as needed       PRILOSEC PO

## 2017-01-24 NOTE — Clinical Note
My Asthma Action Plan  Name: Machelle Weiner   YOB: 1975  Date: 1/24/2017   My doctor: Jean Sauceda   My clinic: [facility]      My Control Medicine:         Dose:   My Rescue Medicine: Albuterol (Proair/Ventolin/Proventil) HFA        Dose:   My Oral Steroid Medicine: Prednisone My Asthma Severity: intermittent  Avoid your asthma triggers: smoke, upper respiratory infections, dust mites, pollens, animal dander, insects/rodents, mold, humidity, aspirin, strong odors and fumes, occupational exposure, exercise or sports, emotions, cold air and Gastric Reflux        GREEN ZONE   Good Control    I feel good    No cough or wheeze    Can work, sleep and play without asthma symptoms       Take your asthma control medicine every day.     1. If exercise triggers your asthma, take your rescue medication    15 minutes before exercise or sports, and    During exercise if you have asthma symptoms  2. Spacer to use with inhaler: If you have a spacer, make sure to use it with your inhaler             YELLOW ZONE Getting Worse  I have ANY of these:    I do not feel good    Cough or wheeze    Chest feels tight    Wake up at night   1. Keep taking your Green Zone medications  2. Start taking your rescue medicine:    every 20 minutes for up to 1 hour. Then every 4 hours for 24-48 hours.  3. If you stay in the Yellow Zone for more than 12-24 hours, contact your doctor.  4. If you do not return to the Green Zone in 12-24 hours or you get worse, start taking your oral steroid medicine if prescribed by your provider.           RED ZONE Medical Alert - Get Help  I have ANY of these:    I feel awful    Medicine is not helping    Breathing getting harder    Trouble walking or talking    Nose opens wide to breathe       1. Take your rescue medicine NOW  2. If your provider has prescribed an oral steroid medicine, start taking it NOW  3. Call your doctor NOW  4. If you are still in the Red Zone  after 20 minutes and you have not reached your doctor:    Take your rescue medicine again and    Call 911 or go to the emergency room right away    See your regular doctor within 2 weeks of an Emergency Room or Urgent Care visit for follow-up treatment.        The above medication may be given at school or day care?: N/A (Adult Patient)  Child can carry and use inhaler(s) at school with approval of school nurse?: N/A (Adult Patient)    Electronically signed by: Brenna Tripathi, January 24, 2017    Annual Reminders:  Meet with Asthma Educator,  Flu Shot in the Fall, consider Pneumonia Vaccination for patients with asthma (aged 19 and older).    Pharmacy: [facility] [address] [place] [address] AT [place] [address]                    Asthma Triggers  How To Control Things That Make Your Asthma Worse    Triggers are things that make your asthma worse.  Look at the list below to help you find your triggers and what you can do about them.  You can help prevent asthma flare-ups by staying away from your triggers.      Trigger                                                          What you can do   Cigarette Smoke  Tobacco smoke can make asthma worse. Do not allow smoking in your home, car or around you.  Be sure no one smokes at a child s day care or school.  If you smoke, ask your health care provider for ways to help you quit.  Ask family members to quit too.  Ask your health care provider for a referral to Quit Plan to help you quit smoking, or call 6-353-140-PLAN.     Colds, Flu, Bronchitis  These are common triggers of asthma. Wash your hands often.  Don t touch your eyes, nose or mouth.  Get a flu shot every year.     Dust Mites  These are tiny bugs that live in cloth or carpet. They are too small to see. Wash sheets and blankets in hot water every week.   Encase pillows and mattress in dust mite proof covers.  Avoid having carpet if you can. If you have carpet, vacuum weekly.    Use a dust mask and HEPA vacuum.   Pollen and Outdoor Mold  Some people are allergic to trees, grass, or weed pollen, or molds. Try to keep your windows closed.  Limit time out doors when pollen count is high.   Ask you health care provider about taking medicine during allergy season.     Animal Dander  Some people are allergic to skin flakes, urine or saliva from pets with fur or feathers. Keep pets with fur or feathers out of your home.    If you can t keep the pet outdoors, then keep the pet out of your bedroom.  Keep the bedroom door closed.  Keep pets off cloth furniture and away from stuffed toys.     Mice, Rats, and Cockroaches  Some people are allergic to the waste from these pests.   Cover food and garbage.  Clean up spills and food crumbs.  Store grease in the refrigerator.   Keep food out of the bedroom.   Indoor Mold  This can be a trigger if your home has high moisture. Fix leaking faucets, pipes, or other sources of water.   Clean moldy surfaces.  Dehumidify basement if it is damp and smelly.   Smoke, Strong Odors, and Sprays  These can reduce air quality. Stay away from strong odors and sprays, such as perfume, powder, hair spray, paints, smoke incense, paint, cleaning products, candles and new carpet.   Exercise or Sports  Some people with asthma have this trigger. Be active!  Ask your doctor about taking medicine before sports or exercise to prevent symptoms.    Warm up for 5-10 minutes before and after sports or exercise.     Other Triggers of Asthma  Cold air:  Cover your nose and mouth with a scarf.  Sometimes laughing or crying can be a trigger.  Some medicines and food can trigger asthma.

## 2017-01-24 NOTE — NURSING NOTE
"Chief Complaint   Patient presents with     RECHECK     uri - getting worse     Initial /98 mmHg  Pulse 69  Temp(Src) 98.3  F (36.8  C) (Oral)  Resp 16  Ht 5' 4\" (1.626 m)  Wt 227 lb (102.967 kg)  BMI 38.95 kg/m2  SpO2 98%  LMP 02/01/2016 (Within Days) Estimated body mass index is 38.95 kg/(m^2) as calculated from the following:    Height as of this encounter: 5' 4\" (1.626 m).    Weight as of this encounter: 227 lb (102.967 kg)..  BP completed using cuff size large  Brenna Tripathi CMA    "

## 2017-01-24 NOTE — PROGRESS NOTES
SUBJECTIVE:                                                    Machelle Weiner is a 41 year old female who presents to clinic today for the following health issues:      F/u - uri getting worse    RESPIRATORY SYMPTOMS      Duration: 10 days ago.    Description  High fever, at the beginning, cough that is productive, fatigue, body aches especially in the back. Sore throat, runny nose and decrease in appetite.    Severity: severe    Accompanying signs and symptoms: fatigue and body ache.    History (predisposing factors):  none    Precipitating or alleviating factors: OTC medicine.    Therapies tried and outcome:  rest and fluids OTC NSAID       Problem list and histories reviewed & adjusted, as indicated.  Additional history: as documented    Patient Active Problem List   Diagnosis     Asthma, mild intermittent     Hyperlipidemia LDL goal <160     Essential hypertension, benign     Acquired hypothyroidism     S/P laparoscopic cholecystectomy     Past Surgical History   Procedure Laterality Date     Gyn surgery       cryosurgery of cervix at age 16     Laparoscopic cholecystectomy with cholangiograms N/A 10/14/2016     Procedure: LAPAROSCOPIC CHOLECYSTECTOMY WITH CHOLANGIOGRAMS;  Surgeon: Cornelius Mueller MD;  Location:  OR       Social History   Substance Use Topics     Smoking status: Former Smoker -- 0.30 packs/day     Types: Cigarettes     Quit date: 02/13/2015     Smokeless tobacco: Never Used     Alcohol Use: No     Family History   Problem Relation Age of Onset     Coronary Artery Disease Maternal Grandmother      Hyperlipidemia Maternal Grandmother      DIABETES No family hx of      Hypertension No family hx of      Hyperlipidemia Maternal Grandfather          Current Outpatient Prescriptions   Medication Sig Dispense Refill     azithromycin (ZITHROMAX) 250 MG tablet Take 2 pills today, then 1 pill for 4 days. 6 tablet 0     predniSONE (DELTASONE) 20 MG tablet Take 2 tablets (40 mg) by mouth daily for 5  "days 10 tablet 0     Omeprazole (PRILOSEC PO)        calcium carbonate (TUMS) 500 MG chewable tablet Take 1 chew tab by mouth daily       HYDROcodone-acetaminophen (NORCO) 5-325 MG per tablet Take 1-2 tablets by mouth every 4 hours as needed for moderate to severe pain 15 tablet 0     chlorthalidone (HYGROTON) 25 MG tablet Take 0.5 tablets (12.5 mg) by mouth daily 45 tablet 1     levothyroxine (SYNTHROID, LEVOTHROID) 50 MCG tablet Take 1 tablet (50 mcg) by mouth daily 90 tablet 2     ibuprofen (ADVIL,MOTRIN) 600 MG tablet Take 1 tablet (600 mg) by mouth every 6 hours as needed for moderate pain 30 tablet 1     omeprazole (PRILOSEC) 10 MG capsule Take 10 mg by mouth as needed       albuterol (PROAIR HFA, PROVENTIL HFA, VENTOLIN HFA) 108 (90 BASE) MCG/ACT inhaler Inhale 2 puffs into the lungs every 6 hours as needed for shortness of breath / dyspnea 3 Inhaler 1     Allergies   Allergen Reactions     Codeine      Penicillins        ROS:  REVIEW OF SYSTEMS  General: fever, chills  Skin: negative  ENT: as above      OBJECTIVE:                                                    /98 mmHg  Pulse 69  Temp(Src) 98.3  F (36.8  C) (Oral)  Resp 16  Ht 5' 4\" (1.626 m)  Wt 227 lb (102.967 kg)  BMI 38.95 kg/m2  SpO2 98%  LMP 02/01/2016 (Within Days)  Body mass index is 38.95 kg/(m^2).  Head: Normocephalic, atraumatic.  Eyes: Conjunctiva clear, non icteric. PERRLA.  Ears: External ears nl, TM is nl   Nose: Septum midline, nasal mucosa congested. No discharge.  There is no tenderness over the maxillary sinuses, there is no tenderness over the frontal sinuses  Mouth / Throat: Normal dentition.  No oral lesions. Pharynx no erythematous, tonsils no exudate/hypertrophy.  Neck: Supple, no enlarged LN, trachea midline.  LUNGS:  CTA B/L, no wheezing or crackles.  CVS : RRR, no murmur, no rub.             ASSESSMENT/PLAN:                                                            1. Acute bronchitis, unspecified organism    - " azithromycin (ZITHROMAX) 250 MG tablet; Take 2 pills today, then 1 pill for 4 days.  Dispense: 6 tablet; Refill: 0    2. Mild intermittent asthma without complication  Given the severity of symptoms last night with SOB that required using albuterol, will start on   - predniSONE (DELTASONE) 20 MG tablet; Take 2 tablets (40 mg) by mouth daily for 5 days  Dispense: 10 tablet; Refill: 0  - Asthma Action Plan (AAP)        Jamar Flores MD  [facility]

## 2017-01-25 ASSESSMENT — ASTHMA QUESTIONNAIRES: ACT_TOTALSCORE: 25

## 2017-01-26 ENCOUNTER — TELEPHONE (OUTPATIENT)
Dept: FAMILY MEDICINE | Facility: CLINIC | Age: 42
End: 2017-01-26

## 2017-01-26 NOTE — Clinical Note
January 26, 2017      Machelle Weiner  [address] [place] [id number]        Dear Ms. Machelle Weiner,    Our records show that you are currently due for a PAP test to screen for cervical cancer. Please call our clinic at [phone] to schedule this appointment or to update your chart if you have already had this completed.    If you have any further questions or concerns please feel free to contact us via [employer] or by calling our clinic at [phone].    Thank you,  Ke King [facility]

## 2017-01-26 NOTE — TELEPHONE ENCOUNTER
Panel Management Review      Patient has the following on her problem list: None      Composite cancer screening  Chart review shows that this patient is due/due soon for the following Pap Smear  Summary:    Patient is due/failing the following:   PAP    Action needed:   Patient needs office visit for PAP.    Type of outreach:    Sent letter.    Questions for provider review:    None                                                                                                                                    Ke King [facility]       Chart routed to Care Team .

## 2017-02-09 ENCOUNTER — TELEPHONE (OUTPATIENT)
Dept: FAMILY MEDICINE | Facility: CLINIC | Age: 42
End: 2017-02-09

## 2017-02-09 DIAGNOSIS — R10.12 ABDOMINAL PAIN, LEFT UPPER QUADRANT: Primary | ICD-10-CM

## 2017-02-09 NOTE — TELEPHONE ENCOUNTER
"Patient c/o episodes of severe abdominal pain after eating some foods. Has happened several times since cholecystectomy. Pain lasts for 1 and 1/2 days. Has taken leftover Norco for episodes and it is effective. Ate steak last night and had another \"attack\" of severe ULQ pain. Took last Norco early am and it is \"wearing off\".     Huddled with DM.    Advised patient that provider would not prescribe that type of med for abdominal pain. Discussed use of Tylenol, avoiding NSAIDS, to try warmth to area. Follow up with pcp or GI. Patient does not have GI provider at this time.  Patient states understands not prescribing for Norco. States Tylenol does not help.    Patient states \"I'm just going to take a bunch of advil because Tylenol does not work\".     Advised on Advil dosage if decides to take and to take with food. May also use Tylenol.     Advised if pain is that severe may go to ER.  Patient states cannot afford another ER visit. Unable to go to clinic today as is at work.    Recommended that would be more comfortable at home and would not advise narcotic medication at work.    Patient requests referral to GI be faxed to [phone].    Verbal ok for GI referral.     Faxed referral to patient and [facility].    Cathy العلي RN        "

## 2017-04-28 ENCOUNTER — TELEPHONE (OUTPATIENT)
Dept: FAMILY MEDICINE | Facility: CLINIC | Age: 42
End: 2017-04-28

## 2017-04-28 NOTE — LETTER
[address] [address] [phone]  May 5, 2017    Machelle Weiner  [address] [facility] [address]    Dear Machelle,    I care about your health and have reviewed your health plan. I have reviewed your medical conditions, medication list, and lab results and am making recommendations based on this review, to better manage your health.    You are in particular need of attention regarding:  -High Blood Pressure  -Cervical Cancer Screening  -Wellness (Physical) Visit     I am recommending that you:  {recommendations:-schedule a WELLNESS (Physical) APPOINTMENT with me.   I will check fasting labs the same day - nothing to eat except water and meds for 8-10 hours prior.  -schedule a PAP SMEAR EXAM which is due.  Please disregard this reminder if you have had this exam elsewhere within the last year.  It would be helpful for us to have a copy of your recent pap smear report in our file so that we can best coordinate your care.    Here is a list of Health Maintenance topics that are due now or due soon:  Health Maintenance Due   Topic Date Due     PAP SCREENING Q3 YR (SYSTEM ASSIGNED)  01/25/2016       Please call us at [phone] (or use [employer]) to address the above recommendations.     Thank you for trusting [facility] and we appreciate the opportunity to serve you.  We look forward to supporting your healthcare needs in the future.    Healthy Regards,    Chaitanya Harvey PA-C

## 2017-04-28 NOTE — TELEPHONE ENCOUNTER
Panel Management Review      Patient has the following on her problem list:     Asthma review     ACT Total Scores 1/24/2017   ACT TOTAL SCORE -   ASTHMA ER VISITS -   ASTHMA HOSPITALIZATIONS -   ACT TOTAL SCORE (Goal Greater than or Equal to 20) 25   In the past 12 months, how many times did you visit the emergency room for your asthma without being admitted to the hospital? 0   In the past 12 months, how many times were you hospitalized overnight because of your asthma? 0      1. Is Asthma diagnosis on the Problem List? Yes    2. Is Asthma listed on Health Maintenance? Yes    3. Patient is due for: none    Hypertension   Last three blood pressure readings:  BP Readings from Last 3 Encounters:   01/24/17 (!) 134/98   01/13/17 (!) 142/91   01/11/17 137/77     Blood pressure: FAILED    HTN Guidelines:  Age 18-59 BP range:  Less than 140/90  Age 60-85 with Diabetes:  Less than 140/90  Age 60-85 without Diabetes:  less than 150/90      Composite cancer screening  Chart review shows that this patient is due/due soon for the following Pap Smear  Summary:    Patient is due/failing the following:   BP CHECK and PAP    Action needed:   Patient needs office visit for physical with pap and BP check.    Type of outreach:    Phone, left message for patient to call back.     Questions for provider review:    None                                                                                                                                    [facility]      Chart routed to Care Team .

## 2017-05-15 DIAGNOSIS — I10 ESSENTIAL HYPERTENSION, BENIGN: ICD-10-CM

## 2017-05-15 NOTE — TELEPHONE ENCOUNTER
chlorthalidone (HYGROTON) 25 MG      Last Written Prescription Date: 11/22/16  Last Fill Quantity: 45, # refills: 1  Last Office Visit with G, P or [facility] prescribing provider: 1/24/17       Potassium   Date Value Ref Range Status   01/11/2017 3.2 (L) 3.4 - 5.3 mmol/L Final     Creatinine   Date Value Ref Range Status   01/11/2017 0.94 0.52 - 1.04 mg/dL Final     BP Readings from Last 3 Encounters:   01/24/17 (!) 134/98   01/13/17 (!) 142/91   01/11/17 137/77

## 2017-05-17 RX ORDER — CHLORTHALIDONE 25 MG/1
12.5 TABLET ORAL DAILY
Qty: 10 TABLET | Refills: 0 | Status: SHIPPED | OUTPATIENT
Start: 2017-05-17 | End: 2017-11-20

## 2017-08-12 ENCOUNTER — HEALTH MAINTENANCE LETTER (OUTPATIENT)
Age: 42
End: 2017-08-12

## 2017-08-22 ENCOUNTER — TELEPHONE (OUTPATIENT)
Dept: FAMILY MEDICINE | Facility: CLINIC | Age: 42
End: 2017-08-22

## 2017-08-22 NOTE — TELEPHONE ENCOUNTER
Patient is calling to ask for lab tests to be ordered.  She is having more fatigue lately, and is also due for thyroid labs   At this time. Missed her appointment on Friday due to work conflict.  She is aware that she needs to be seen.   Lives near the [facility], and she will go there today to have labs done.  She is having work conflicts and may not be able to come in to see you.  She is also out of her blood pressure medications for a couple of months.  She can stop in for a blood pressure check as well.   Also needs a pap. Encouraged her to make an appointment to be seen.  She is scheduled for Friday.    Norma Bravo, RN  Triage Nurse

## 2017-11-20 ENCOUNTER — OFFICE VISIT (OUTPATIENT)
Dept: FAMILY MEDICINE | Facility: CLINIC | Age: 42
End: 2017-11-20
Payer: COMMERCIAL

## 2017-11-20 VITALS
SYSTOLIC BLOOD PRESSURE: 166 MMHG | HEART RATE: 66 BPM | DIASTOLIC BLOOD PRESSURE: 102 MMHG | HEIGHT: 64 IN | TEMPERATURE: 98.5 F | BODY MASS INDEX: 40.97 KG/M2 | WEIGHT: 240 LBS | OXYGEN SATURATION: 96 %

## 2017-11-20 DIAGNOSIS — I10 ESSENTIAL HYPERTENSION, BENIGN: ICD-10-CM

## 2017-11-20 DIAGNOSIS — E03.9 ACQUIRED HYPOTHYROIDISM: Primary | ICD-10-CM

## 2017-11-20 DIAGNOSIS — E66.01 MORBID OBESITY (H): ICD-10-CM

## 2017-11-20 DIAGNOSIS — N92.6 MENSTRUAL CHANGES: ICD-10-CM

## 2017-11-20 PROCEDURE — 99214 OFFICE O/P EST MOD 30 MIN: CPT | Performed by: PHYSICIAN ASSISTANT

## 2017-11-20 PROCEDURE — 84443 ASSAY THYROID STIM HORMONE: CPT | Performed by: PHYSICIAN ASSISTANT

## 2017-11-20 PROCEDURE — 36415 COLL VENOUS BLD VENIPUNCTURE: CPT | Performed by: PHYSICIAN ASSISTANT

## 2017-11-20 PROCEDURE — 84439 ASSAY OF FREE THYROXINE: CPT | Performed by: PHYSICIAN ASSISTANT

## 2017-11-20 PROCEDURE — 84146 ASSAY OF PROLACTIN: CPT | Performed by: PHYSICIAN ASSISTANT

## 2017-11-20 PROCEDURE — 83001 ASSAY OF GONADOTROPIN (FSH): CPT | Performed by: PHYSICIAN ASSISTANT

## 2017-11-20 PROCEDURE — 80048 BASIC METABOLIC PNL TOTAL CA: CPT | Performed by: PHYSICIAN ASSISTANT

## 2017-11-20 RX ORDER — LISINOPRIL 10 MG/1
10 TABLET ORAL DAILY
Qty: 30 TABLET | Refills: 2 | Status: SHIPPED | OUTPATIENT
Start: 2017-11-20 | End: 2018-02-24

## 2017-11-20 NOTE — PROGRESS NOTES
"  SUBJECTIVE:   Machelle Weiner is a 42 year old female who presents to clinic today for the following health issues:      Hypertension Follow-up      Outpatient blood pressures are being checked at home.  Results are 160/105   145/100.    Low Salt Diet: not monitoring salt    Amount of exercise or physical activity: 6-7 days/week for an average of greater than 60 minutes    Problems taking medications regularly: No    Medication side effects: none    Diet: regular (no restrictions)    -Patient presents to follow up on high blood pressure  -She has not been seen in around 1.5 years by me; off of medication for 6 months or so  -She has been working a lot more, difficulty with staffing; not able to come in or get labs  -she's had some headaches and felt off last week; felt like head was detached from her body  -feels like some of these are migraine-like; had a few migraines as a teenager but none since   -has now had improvement of these symptoms  -no monitoring salt, little exercise      Medication Followup of Levothyroxine, Chlorthalidone     Taking Medication as prescribed: yes    Side Effects:  None    Medication Helping Symptoms:  yes     -Patient notes that she ran out of medication for 2 days  -And she notes that \"she could really tell\" she hadnt had her supplement   -She has otherwise been taking everyday since last seen  -BMs are stable  -She is complaining of weight gain as well   -does not think she is eating that much; watching what she eats  -Wondering about if starting menopause?   -periods are very irregular, and have been  -Recently went 16 months, then had period twice    Problem list and histories reviewed & adjusted, as indicated.  Additional history: as documented    Patient Active Problem List   Diagnosis     Asthma, mild intermittent     Hyperlipidemia LDL goal <160     Essential hypertension, benign     Acquired hypothyroidism     S/P laparoscopic cholecystectomy     Past Surgical History: " "  Procedure Laterality Date     GYN SURGERY      cryosurgery of cervix at age 16     LAPAROSCOPIC CHOLECYSTECTOMY WITH CHOLANGIOGRAMS N/A 10/14/2016    Procedure: LAPAROSCOPIC CHOLECYSTECTOMY WITH CHOLANGIOGRAMS;  Surgeon: Cornelius Mueller MD;  Location:  OR       Social History   Substance Use Topics     Smoking status: Former Smoker     Packs/day: 0.30     Types: Cigarettes     Quit date: 2/13/2015     Smokeless tobacco: Never Used     Alcohol use No     Family History   Problem Relation Age of Onset     Coronary Artery Disease Maternal Grandmother      Hyperlipidemia Maternal Grandmother      Hyperlipidemia Maternal Grandfather      DIABETES No family hx of      Hypertension No family hx of              Reviewed and updated as needed this visit by clinical staffTobacco  Allergies  Med Hx  Surg Hx  Fam Hx  Soc Hx      Reviewed and updated as needed this visit by Provider         ROS:  Constitutional, HEENT, cardiovascular, pulmonary, gi and gu systems are negative, except as otherwise noted.      OBJECTIVE:   BP (!) 166/102 (BP Location: Right arm, Cuff Size: Adult Large)  Pulse 66  Temp 98.5  F (36.9  C) (Oral)  Ht 5' 4\" (1.626 m)  Wt 240 lb (108.9 kg)  LMP 02/01/2016 (Within Days)  SpO2 96%  BMI 41.2 kg/m2  Body mass index is 41.2 kg/(m^2).  GENERAL: healthy, alert and no distress  NECK: no carotid bruits  RESP: lungs clear to auscultation - no rales, rhonchi or wheezes  CV: regular rates and rhythm, normal S1 S2, no S3 or S4 and no murmur, click or rub  MS: no peripheral edema  NEURO: Normal strength and tone, mentation intact and speech normal  PSYCH: mentation appears normal, affect normal/bright    Diagnostic Test Results:  See A/P    ASSESSMENT/PLAN:   1. Acquired hypothyroidism  Rechecking labs. Update Rx per results.   - TSH with free T4 reflex    2. Essential hypertension, benign  Reviewed risks of poor control. She will really need close monitoring given her hx of poor compliance. Needs " return visits to recheck at pharmacy as well. Will need follow up in 3 months for recheck in clinic and repeat labs. Follow up if symptoms return.  - Basic metabolic panel  - lisinopril (PRINIVIL/ZESTRIL) 10 MG tablet; Take 1 tablet (10 mg) by mouth daily  Dispense: 30 tablet; Refill: 2    3. Menstrual changes  Patient with a hx of chronically abnormal menstrual cycles. Most recently went 16 months without, now two back to back within 6 months. 2/2 thyroid? Reviewed labs are often not fruitful for this but she did desire testing.   - Follicle stimulating hormone  - Prolactin    4. Morbid obesity (H)  Patient wondering if this is due to perimenopause? Thyroid? She tries to watch diet, but does not exercise. Is taking her thyroid supplement regularly. We reviewed diet and exercise recommendations today. We can consider see endocrine as well, something she'd be open too down the road.       RETURN FOR MABLE Sauceda PA-C  [facility]

## 2017-11-20 NOTE — NURSING NOTE
"Chief Complaint   Patient presents with     Hypertension     Recheck Medication       Initial BP (!) 166/102 (BP Location: Right arm, Cuff Size: Adult Large)  Pulse 66  Temp 98.5  F (36.9  C) (Oral)  Ht 5' 4\" (1.626 m)  Wt 240 lb (108.9 kg)  LMP 02/01/2016 (Within Days)  SpO2 96%  BMI 41.2 kg/m2 Estimated body mass index is 41.2 kg/(m^2) as calculated from the following:    Height as of this encounter: 5' 4\" (1.626 m).    Weight as of this encounter: 240 lb (108.9 kg).  Medication Reconciliation: complete   Ke King CMA      "

## 2017-11-20 NOTE — MR AVS SNAPSHOT
"              After Visit Summary   11/20/2017    Machelle Weiner    MRN: [id number]           Patient Information     Date Of Birth          1975        Visit Information        Provider Department      11/20/2017 3:20 PM Jean Sauceda PA-C [facility]        Today's Diagnoses     Acquired hypothyroidism    -  1    Essential hypertension, benign        Menstrual changes           Follow-ups after your visit        Who to contact     If you have questions or need follow up information about today's clinic visit or your schedule please contact [facility] directly at [phone].  Normal or non-critical lab and imaging results will be communicated to you by [employer], letter or phone within 4 business days after the clinic has received the results. If you do not hear from us within 7 days, please contact the clinic through [employer]Bettert or phone. If you have a critical or abnormal lab result, we will notify you by phone as soon as possible.  Submit refill requests through [employer]Coder or call your pharmacy and they will forward the refill request to us. Please allow 3 business days for your refill to be completed.          Additional Information About Your Visit        MyChart Information     [employer] gives you secure access to your electronic health record. If you see a primary care provider, you can also send messages to your care team and make appointments. If you have questions, please call your primary care clinic.  If you do not have a primary care provider, please call [phone] and they will assist you.        Care EveryWhere ID     This is your Care EveryWhere ID. This could be used by other organizations to access your [place] medical records  [id number]        Your Vitals Were     Pulse Temperature Height Last Period Pulse Oximetry BMI (Body Mass Index)    66 98.5  F (36.9  C) (Oral) 5' 4\" (1.626 m) 02/01/2016 (Within Days) 96% 41.2 kg/m2       Blood Pressure from " Last 3 Encounters:   11/20/17 (!) 166/102   01/24/17 (!) 134/98   01/13/17 (!) 142/91    Weight from Last 3 Encounters:   11/20/17 240 lb (108.9 kg)   01/24/17 227 lb (103 kg)   01/13/17 226 lb (102.5 kg)              We Performed the Following     Basic metabolic panel     Follicle stimulating hormone     Prolactin     TSH with free T4 reflex          Today's Medication Changes          These changes are accurate as of: 11/20/17  4:17 PM.  If you have any questions, ask your nurse or doctor.               Start taking these medicines.        Dose/Directions    lisinopril 10 MG tablet   Commonly known as:  PRINIVIL/ZESTRIL   Used for:  Essential hypertension, benign   Started by:  Jean Sauceda PA-C        Dose:  10 mg   Take 1 tablet (10 mg) by mouth daily   Quantity:  30 tablet   Refills:  2         Stop taking these medicines if you haven't already. Please contact your care team if you have questions.     azithromycin 250 MG tablet   Commonly known as:  ZITHROMAX   Stopped by:  Jean Sauceda PA-C           chlorthalidone 25 MG tablet   Commonly known as:  HYGROTON   Stopped by:  Jean Sauceda PA-C           HYDROcodone-acetaminophen 5-325 MG per tablet   Commonly known as:  NORCO   Stopped by:  Jean Sauceda PA-C                Where to get your medicines      These medications were sent to [facility] [address] [place] [address] [id number] [place] [address] AT [address] [address]    Hours:  24-hours Phone:  [phone]     lisinopril 10 MG tablet                Primary Care Provider Office Phone # Fax #    Jean Sauceda PA-C [phone] [address] [facility] [address]        Equal Access to Services     MORE MARROQUIN AH: Hadii aad ku hadchidio Sogayleali, waaxda luqadaha, qaybta kaalmada adeegyada, jean-claude stone. So [facility] [phone].    ATENCIÓN: reema Macias  a feliciano disposición servicios gratuitos de asistencia lingüística. Igor arrington [phone].    We comply with applicable federal civil rights laws and [place] laws. We do not discriminate on the basis of race, color, national origin, age, disability, sex, sexual orientation, or gender identity.            Thank you!     Thank you for choosing [facility]  for your care. Our goal is always to provide you with excellent care. Hearing back from our patients is one way we can continue to improve our services. Please take a few minutes to complete the written survey that you may receive in the mail after your visit with us. Thank you!             Your Updated Medication List - Protect others around you: Learn how to safely use, store and throw away your medicines at www.disposemymeds.org.          This list is accurate as of: 11/20/17  4:17 PM.  Always use your most recent med list.                   Brand Name Dispense Instructions for use Diagnosis    albuterol 108 (90 BASE) MCG/ACT Inhaler    PROAIR HFA/PROVENTIL HFA/VENTOLIN HFA    3 Inhaler    Inhale 2 puffs into the lungs every 6 hours as needed for shortness of breath / dyspnea    Asthma, mild intermittent, uncomplicated       calcium carbonate 500 MG chewable tablet    TUMS     Take 1 chew tab by mouth daily        ibuprofen 600 MG tablet    ADVIL/MOTRIN    30 tablet    Take 1 tablet (600 mg) by mouth every 6 hours as needed for moderate pain        levothyroxine 50 MCG tablet    SYNTHROID/LEVOTHROID    90 tablet    TAKE 1 TABLET(50 MCG) BY MOUTH DAILY    Acquired hypothyroidism       lisinopril 10 MG tablet    PRINIVIL/ZESTRIL    30 tablet    Take 1 tablet (10 mg) by mouth daily    Essential hypertension, benign       NEXIUM PO      Take 20 mg by mouth        priLOSEC 10 MG CR capsule   Generic drug:  omeprazole      Take 10 mg by mouth as needed        PRILOSEC PO

## 2017-11-21 DIAGNOSIS — E03.9 ACQUIRED HYPOTHYROIDISM: Primary | ICD-10-CM

## 2017-11-21 LAB
ANION GAP SERPL CALCULATED.3IONS-SCNC: 11 MMOL/L (ref 3–14)
BUN SERPL-MCNC: 9 MG/DL (ref 7–30)
CALCIUM SERPL-MCNC: 9 MG/DL (ref 8.5–10.1)
CHLORIDE SERPL-SCNC: 105 MMOL/L (ref 94–109)
CO2 SERPL-SCNC: 23 MMOL/L (ref 20–32)
CREAT SERPL-MCNC: 0.79 MG/DL (ref 0.52–1.04)
FSH SERPL-ACNC: 3.6 IU/L
GFR SERPL CREATININE-BSD FRML MDRD: 80 ML/MIN/1.7M2
GLUCOSE SERPL-MCNC: 75 MG/DL (ref 70–99)
POTASSIUM SERPL-SCNC: 3.9 MMOL/L (ref 3.4–5.3)
PROLACTIN SERPL-MCNC: 14 UG/L (ref 3–27)
SODIUM SERPL-SCNC: 139 MMOL/L (ref 133–144)
T4 FREE SERPL-MCNC: 0.78 NG/DL (ref 0.76–1.46)
TSH SERPL DL<=0.005 MIU/L-ACNC: 12.15 MU/L (ref 0.4–4)

## 2017-11-21 RX ORDER — LEVOTHYROXINE SODIUM 75 UG/1
75 TABLET ORAL DAILY
Qty: 60 TABLET | Refills: 0 | Status: SHIPPED | OUTPATIENT
Start: 2017-11-21 | End: 2018-01-19

## 2017-12-08 ENCOUNTER — ALLIED HEALTH/NURSE VISIT (OUTPATIENT)
Dept: FAMILY MEDICINE | Facility: CLINIC | Age: 42
End: 2017-12-08
Payer: COMMERCIAL

## 2017-12-08 VITALS — SYSTOLIC BLOOD PRESSURE: 150 MMHG | DIASTOLIC BLOOD PRESSURE: 100 MMHG

## 2017-12-08 DIAGNOSIS — Z01.30 BP CHECK: Primary | ICD-10-CM

## 2017-12-08 PROCEDURE — 99207 ZZC NO CHARGE NURSE ONLY: CPT | Performed by: FAMILY MEDICINE

## 2017-12-08 NOTE — MR AVS SNAPSHOT
After Visit Summary   12/8/2017    Machelle Weiner    MRN: [id number]           Patient Information     Date Of Birth          1975        Visit Information        Provider Department      12/8/2017 5:05 PM Jean Sauceda PA-C [facility]        Today's Diagnoses     BP check    -  1       Follow-ups after your visit        Who to contact     If you have questions or need follow up information about today's clinic visit or your schedule please contact [facility] directly at [phone].  Normal or non-critical lab and imaging results will be communicated to you by Curoversehart, letter or phone within 4 business days after the clinic has received the results. If you do not hear from us within 7 days, please contact the clinic through [employer] or phone. If you have a critical or abnormal lab result, we will notify you by phone as soon as possible.  Submit refill requests through Onefeat or call your pharmacy and they will forward the refill request to us. Please allow 3 business days for your refill to be completed.          Additional Information About Your Visit        MyChart Information     One[employer] gives you secure access to your electronic health record. If you see a primary care provider, you can also send messages to your care team and make appointments. If you have questions, please call your primary care clinic.  If you do not have a primary care provider, please call [phone] and they will assist you.        Care EveryWhere ID     This is your Care EveryWhere ID. This could be used by other organizations to access your [place] medical records  [id number]        Your Vitals Were     Last Period                   02/01/2016 (Within Days)            Blood Pressure from Last 3 Encounters:   12/16/17 117/76   12/08/17 (!) 150/100   11/20/17 (!) 166/102    Weight from Last 3 Encounters:   11/20/17 240 lb (108.9 kg)   01/24/17 227 lb (103 kg)    01/13/17 226 lb (102.5 kg)              Today, you had the following     No orders found for display       Primary Care Provider Office Phone # Fax #    Jean Sauceda PA-C [phone] [address] [place] [address]        Equal Access to Services     GUADALUPE MARROQUIN : Hadii aad ku hadasho Soomaali, waaxda luqadaha, qaybta kaalmada adeegyada, waxay idiin hayaan adeeg dusty lasonnyn ah. So [facility] [phone].    ATENCIÓN: Si habla español, tiene a feliciano disposición servicios gratuitos de asistencia lingüística. [facility] [phone].    We comply with applicable federal civil rights laws and [place] laws. We do not discriminate on the basis of race, color, national origin, age, disability, sex, sexual orientation, or gender identity.            Thank you!     Thank you for choosing [facility]  for your care. Our goal is always to provide you with excellent care. Hearing back from our patients is one way we can continue to improve our services. Please take a few minutes to complete the written survey that you may receive in the mail after your visit with us. Thank you!             Your Updated Medication List - Protect others around you: Learn how to safely use, store and throw away your medicines at www.disposemymeds.org.          This list is accurate as of: 12/8/17 11:59 PM.  Always use your most recent med list.                   Brand Name Dispense Instructions for use Diagnosis    albuterol 108 (90 BASE) MCG/ACT Inhaler    PROAIR HFA/PROVENTIL HFA/VENTOLIN HFA    3 Inhaler    Inhale 2 puffs into the lungs every 6 hours as needed for shortness of breath / dyspnea    Asthma, mild intermittent, uncomplicated       calcium carbonate 500 MG chewable tablet    TUMS     Take 1 chew tab by mouth daily        ibuprofen 600 MG tablet    ADVIL/MOTRIN    30 tablet    Take 1 tablet (600 mg) by mouth every 6 hours as needed for moderate pain        levothyroxine 75 MCG tablet     SYNTHROID/LEVOTHROID    60 tablet    Take 1 tablet (75 mcg) by mouth daily    Acquired hypothyroidism       lisinopril 10 MG tablet    PRINIVIL/ZESTRIL    30 tablet    Take 1 tablet (10 mg) by mouth daily    Essential hypertension, benign       NEXIUM PO      Take 20 mg by mouth        priLOSEC 10 MG CR capsule   Generic drug:  omeprazole      Take 10 mg by mouth as needed        PRILOSEC PO

## 2017-12-08 NOTE — PROGRESS NOTES
Machelle Weiner is enrolled/participating in the retail pharmacy Blood Pressure Goals Achievement Program (BPGAP).  Machelle Weiner was evaluated at [facility] on December 8, 2017 at which time her blood pressure was:    BP Readings from Last 3 Encounters:   12/08/17 (!) 150/100   11/20/17 (!) 166/102   01/24/17 (!) 134/98     Reviewed lifestyle modifications for blood pressure control and reduction: including making healthy food choices, managing weight, getting regular exercise, smoking cessation, reducing alcohol consumption, monitoring blood pressure regularly.     Machelle Weiner is not experiencing symptoms.    Follow-Up: BP is not at goal of < 140/90mmHg (patient 18+ years of age with or without diabetes), Recommended follow-up with PCP.  Routing to PCP for further review.    Recommendation to Provider: none    Machelle Weiner was evaluated for enrollment into the PGEN study today.    Patient eligible for enrollment:  No  Patient interested in enrollment:  No    Completed by: Cristian Lewis, PharmD  [facility] Pharmacist    [facility] pharmacist on behalf of: [facility].

## 2017-12-14 ENCOUNTER — TELEPHONE (OUTPATIENT)
Dept: FAMILY MEDICINE | Facility: CLINIC | Age: 42
End: 2017-12-14

## 2017-12-14 NOTE — TELEPHONE ENCOUNTER
Panel Management Review      Patient has the following on her problem list:     Asthma review     ACT Total Scores 11/21/2017   ACT TOTAL SCORE -   ASTHMA ER VISITS -   ASTHMA HOSPITALIZATIONS -   ACT TOTAL SCORE (Goal Greater than or Equal to 20) 23   In the past 12 months, how many times did you visit the emergency room for your asthma without being admitted to the hospital? 0   In the past 12 months, how many times were you hospitalized overnight because of your asthma? -      1. Is Asthma diagnosis on the Problem List? Yes    2. Is Asthma listed on Health Maintenance? Yes    3. Patient is due for:  ACT    Hypertension   Last three blood pressure readings:  BP Readings from Last 3 Encounters:   12/08/17 (!) 150/100   11/20/17 (!) 166/102   01/24/17 (!) 134/98     Blood pressure: FAILED    HTN Guidelines:  Age 18-59 BP range:  Less than 140/90  Age 60-85 with Diabetes:  Less than 140/90  Age 60-85 without Diabetes:  less than 150/90          Composite cancer screening  Chart review shows that this patient is due/due soon for the following Pap Smear  Summary:    Patient is due/failing the following:   ACT, BP CHECK and PAP    Action needed:   Patient needs to do ACT. and Patient needs nurse only appointment.    Type of outreach:    Pt was seen at pharmacy for BP check and it was elevated.  Per PCP, pt will f/u in 3 months    Questions for provider review:    None                                                                                                                                    Brenna Tripathi CMA       Chart routed to Care Team .

## 2017-12-16 ENCOUNTER — NURSE TRIAGE (OUTPATIENT)
Dept: NURSING | Facility: CLINIC | Age: 42
End: 2017-12-16

## 2017-12-16 ENCOUNTER — APPOINTMENT (OUTPATIENT)
Dept: CT IMAGING | Facility: CLINIC | Age: 42
End: 2017-12-16
Attending: EMERGENCY MEDICINE
Payer: COMMERCIAL

## 2017-12-16 ENCOUNTER — HOSPITAL ENCOUNTER (EMERGENCY)
Facility: CLINIC | Age: 42
Discharge: HOME | End: 2017-12-16
Attending: EMERGENCY MEDICINE | Admitting: EMERGENCY MEDICINE
Payer: COMMERCIAL

## 2017-12-16 VITALS
SYSTOLIC BLOOD PRESSURE: 117 MMHG | TEMPERATURE: 98.3 F | DIASTOLIC BLOOD PRESSURE: 76 MMHG | OXYGEN SATURATION: 98 % | RESPIRATION RATE: 16 BRPM

## 2017-12-16 DIAGNOSIS — H53.9 VISION CHANGES: ICD-10-CM

## 2017-12-16 DIAGNOSIS — R51.9 ACUTE NONINTRACTABLE HEADACHE, UNSPECIFIED HEADACHE TYPE: ICD-10-CM

## 2017-12-16 LAB
ANION GAP SERPL CALCULATED.3IONS-SCNC: 10 MMOL/L (ref 3–14)
BASOPHILS # BLD AUTO: 0.1 10E9/L (ref 0–0.2)
BASOPHILS NFR BLD AUTO: 0.8 %
BUN SERPL-MCNC: 12 MG/DL (ref 7–30)
CALCIUM SERPL-MCNC: 9 MG/DL (ref 8.5–10.1)
CHLORIDE SERPL-SCNC: 106 MMOL/L (ref 94–109)
CO2 SERPL-SCNC: 22 MMOL/L (ref 20–32)
CREAT SERPL-MCNC: 0.79 MG/DL (ref 0.52–1.04)
DIFFERENTIAL METHOD BLD: ABNORMAL
EOSINOPHIL # BLD AUTO: 0.4 10E9/L (ref 0–0.7)
EOSINOPHIL NFR BLD AUTO: 3.3 %
ERYTHROCYTE [DISTWIDTH] IN BLOOD BY AUTOMATED COUNT: 13.2 % (ref 10–15)
GFR SERPL CREATININE-BSD FRML MDRD: 80 ML/MIN/1.7M2
GLUCOSE SERPL-MCNC: 79 MG/DL (ref 70–99)
HCT VFR BLD AUTO: 41.9 % (ref 35–47)
HGB BLD-MCNC: 14 G/DL (ref 11.7–15.7)
IMM GRANULOCYTES # BLD: 0 10E9/L (ref 0–0.4)
IMM GRANULOCYTES NFR BLD: 0.3 %
INR PPP: 0.95 (ref 0.86–1.14)
LYMPHOCYTES # BLD AUTO: 3.5 10E9/L (ref 0.8–5.3)
LYMPHOCYTES NFR BLD AUTO: 29.3 %
MCH RBC QN AUTO: 30.3 PG (ref 26.5–33)
MCHC RBC AUTO-ENTMCNC: 33.4 G/DL (ref 31.5–36.5)
MCV RBC AUTO: 91 FL (ref 78–100)
MONOCYTES # BLD AUTO: 0.7 10E9/L (ref 0–1.3)
MONOCYTES NFR BLD AUTO: 6.2 %
NEUTROPHILS # BLD AUTO: 7.1 10E9/L (ref 1.6–8.3)
NEUTROPHILS NFR BLD AUTO: 60.1 %
NRBC # BLD AUTO: 0 10*3/UL
NRBC BLD AUTO-RTO: 0 /100
PLATELET # BLD AUTO: 283 10E9/L (ref 150–450)
POTASSIUM SERPL-SCNC: 4 MMOL/L (ref 3.4–5.3)
RBC # BLD AUTO: 4.62 10E12/L (ref 3.8–5.2)
SODIUM SERPL-SCNC: 138 MMOL/L (ref 133–144)
WBC # BLD AUTO: 11.8 10E9/L (ref 4–11)

## 2017-12-16 PROCEDURE — 80048 BASIC METABOLIC PNL TOTAL CA: CPT | Performed by: EMERGENCY MEDICINE

## 2017-12-16 PROCEDURE — 85610 PROTHROMBIN TIME: CPT | Performed by: EMERGENCY MEDICINE

## 2017-12-16 PROCEDURE — 70450 CT HEAD/BRAIN W/O DYE: CPT

## 2017-12-16 PROCEDURE — 96374 THER/PROPH/DIAG INJ IV PUSH: CPT

## 2017-12-16 PROCEDURE — 99284 EMERGENCY DEPT VISIT MOD MDM: CPT | Mod: 25

## 2017-12-16 PROCEDURE — 96375 TX/PRO/DX INJ NEW DRUG ADDON: CPT

## 2017-12-16 PROCEDURE — 96361 HYDRATE IV INFUSION ADD-ON: CPT

## 2017-12-16 PROCEDURE — 85025 COMPLETE CBC W/AUTO DIFF WBC: CPT | Performed by: EMERGENCY MEDICINE

## 2017-12-16 PROCEDURE — 25000128 H RX IP 250 OP 636: Performed by: EMERGENCY MEDICINE

## 2017-12-16 RX ORDER — DIPHENHYDRAMINE HYDROCHLORIDE 50 MG/ML
25 INJECTION INTRAMUSCULAR; INTRAVENOUS ONCE
Status: COMPLETED | OUTPATIENT
Start: 2017-12-16 | End: 2017-12-16

## 2017-12-16 RX ORDER — KETOROLAC TROMETHAMINE 30 MG/ML
30 INJECTION, SOLUTION INTRAMUSCULAR; INTRAVENOUS ONCE
Status: COMPLETED | OUTPATIENT
Start: 2017-12-16 | End: 2017-12-16

## 2017-12-16 RX ORDER — SODIUM CHLORIDE 9 MG/ML
1000 INJECTION, SOLUTION INTRAVENOUS CONTINUOUS
Status: DISCONTINUED | OUTPATIENT
Start: 2017-12-16 | End: 2017-12-16 | Stop reason: HOSPADM

## 2017-12-16 RX ORDER — DEXAMETHASONE SODIUM PHOSPHATE 10 MG/ML
10 INJECTION, SOLUTION INTRAMUSCULAR; INTRAVENOUS ONCE
Status: COMPLETED | OUTPATIENT
Start: 2017-12-16 | End: 2017-12-16

## 2017-12-16 RX ADMIN — SODIUM CHLORIDE 1000 ML: 9 INJECTION, SOLUTION INTRAVENOUS at 17:32

## 2017-12-16 RX ADMIN — KETOROLAC TROMETHAMINE 30 MG: 30 INJECTION, SOLUTION INTRAMUSCULAR at 17:36

## 2017-12-16 RX ADMIN — SODIUM CHLORIDE 1000 ML: 9 INJECTION, SOLUTION INTRAVENOUS at 18:36

## 2017-12-16 RX ADMIN — DEXAMETHASONE SODIUM PHOSPHATE 10 MG: 10 INJECTION, SOLUTION INTRAMUSCULAR; INTRAVENOUS at 17:32

## 2017-12-16 RX ADMIN — DIPHENHYDRAMINE HYDROCHLORIDE 25 MG: 50 INJECTION, SOLUTION INTRAMUSCULAR; INTRAVENOUS at 17:34

## 2017-12-16 ASSESSMENT — ENCOUNTER SYMPTOMS
SPEECH DIFFICULTY: 0
FEVER: 0
NUMBNESS: 0
NECK PAIN: 0
CHILLS: 0
VOMITING: 0
WEAKNESS: 0
DIZZINESS: 0
NAUSEA: 0
HEADACHES: 1
COUGH: 0

## 2017-12-16 NOTE — TELEPHONE ENCOUNTER
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 Took her BP and it was elevated slightly so she took BP medicine and now  Right eye is better but not returned to normal , as it it still has flashes.  Advised to proceed to nearest  ED  Kelley Meade RN  FNA      Reason for Disposition    [1] Blurred vision or visual changes AND [2] present now AND [3] sudden onset or new (e.g., minutes, hours, days)  (Exception: previously diagnosed migraine headaches with same symptoms)    Protocols used: VISION LOSS OR CHANGE-ADULT-  Kelley Meade RN  FNA

## 2017-12-16 NOTE — ED PROVIDER NOTES
History     Chief Complaint:  Vision Changes and Headache    HPI   Machelle Weiner is a 42 year old female with a history of hypertension and migraines who presents for evaluation of a headache and vision changes. The patient reports she was sitting in her car texting her friend at 3pm today, about 2 hours ago, when she developed sudden onset vision changes. She describes movement and blurry vision in her peripheral vision that made it difficult for her to read her phone. Of note, the patient is blind in her left eye and only has peripheral vision in her left eye at baseline. The patient also notes that she developed pressure pain in her forehead shortly after the blurry vision started. The patient has a history of migraines but has never had an aura with them before. She took Lisinopril because her blood pressure was high and symptoms improved slightly but were still persistent, prompting her to come to the ED for evaluation. On arrival, the patient notes blurry peripheral vision and a headache, but otherwise denies any fever, chills, nausea, vomiting, speech changes, focal numbness or weakness, or other concerns. She denies any vision loss or vision cuts. No head injury or trauma. She is not anticoagulated.     Allergies:  Codeine  Penicillins      Medications:    levothyroxine (SYNTHROID/LEVOTHROID) 75 MCG tablet  Esomeprazole Magnesium (NEXIUM PO)  lisinopril (PRINIVIL/ZESTRIL) 10 MG tablet  Omeprazole (PRILOSEC PO)  calcium carbonate (TUMS) 500 MG chewable tablet  omeprazole (PRILOSEC) 10 MG capsule  albuterol (PROAIR HFA, PROVENTIL HFA, VENTOLIN HFA) 108 (90 BASE) MCG/ACT inhaler  ibuprofen (ADVIL,MOTRIN) 600 MG tablet    Past Medical History:    Asthma  Hypertension  Thyroid disease  Migraines    Past Surgical History:    Cryosurgery of cervix  Cholecystectomy     Family History:    History reviewed. No pertinent family history.     Social History:  Smoking status: Former smoker, quit 2015  Alcohol use:  No  Marital Status:  Single [1]     Review of Systems   Constitutional: Negative for chills and fever.   Eyes: Positive for visual disturbance.   Respiratory: Negative for cough.    Gastrointestinal: Negative for nausea and vomiting.   Musculoskeletal: Negative for neck pain.   Neurological: Positive for headaches. Negative for dizziness, syncope, speech difficulty, weakness and numbness.   All other systems reviewed and are negative.      Physical Exam   Patient Vitals for the past 24 hrs:   BP Temp Temp src Heart Rate Resp SpO2   12/16/17 1746 131/90 - - - - -   12/16/17 1641 (!) 166/112 - - - - -   12/16/17 1638 - 98.3  F (36.8  C) Oral 75 16 98 %       Physical Exam  General: The patient is alert, in no respiratory distress.    HENT: Mucous membranes moist.    Cardiovascular: Regular rate and rhythm. Good pulses in all four extremities. Normal capillary refill and skin turgor.     Respiratory: Lungs are clear. No nasal flaring. No retractions. No wheezing, no crackles.    Gastrointestinal: Abdomen soft. No guarding, no rebound. No palpable hernias.     Musculoskeletal: No gross deformity.     Skin: No rashes or petechiae.     Neurologic:  Dysconjugate gaze and reports blurred vision at baseline in the left eye. Fundoscopic exam within normal limits in both eyes. The patient is alert and oriented x3. GCS 15.  Follows commands with clear and appropriate speech. Gives appropriate answers. Good strength in all extremities. No gross neurologic deficit. Gross sensation intact. Pupils are round and reactive. No meningismus.     Lymphatic: No cervical adenopathy. No lower extremity swelling.    Psychiatric: The patient is non-tearful.    Emergency Department Course   Imaging:  Radiographic findings were communicated with the patient who voiced understanding of the findings.    CT Head w/o contrast  In process    Laboratory:  CBC: WBC 11.8(H), o/w WNL (HGB 14.0, )   BMP: WNL (Creatinine 0.79)  INR:  0.95    Interventions:  1732: NS 1L IV Bolus  1732: Decadron 10 mg IV  1734: Benadryl 25 mg IV  1736: Toradol 30 mg IV    Emergency Department Course:  Past medical records, nursing notes, and vitals reviewed.  1649: I performed an exam of the patient and obtained history, as documented above.  IV inserted and blood drawn.  The patient was sent for a head CT while in the emergency department, findings above.    1800: I rechecked the patient. Explained findings to the patient.    1815: I discussed the patient with the oncoming physician, Dr. Kc, who will follow up with CT results. Assuming this is negative, I anticipate the patient being discharged to home.    Impression & Plan      Medical Decision Making:  The fact that the patient had abrupt onset of vision changes with movement and only positive symptoms would indicate this is unlikely to be a stroke. I did consider seizures as a possibility but this would not cause pain. The patient is quite generous in size but I did not feel that this indicated pseudotumor cerebri and a careful fundoscopic exam did not show any cupping of the optic disc. The patient otherwise here is stable with no neurologic deficit. I did reassure her that I think this is likely a migraine. She was treated with anti migrainous medication and assuming the CT scan of the head is negative, I do anticipate the patient going home. She did seem reassured and is aware of the need for follow up.     Disposition: Signed out to Dr. De Ferreira  12/16/2017   [facility] EMERGENCY DEPARTMENT    I, Angie Ferreira, am serving as a scribe at 4:49 PM on 12/16/2017 to document services personally performed by Luis Swain MD based on my observations and the provider's statements to me.        Luis Swain MD  12/17/17 1111

## 2017-12-16 NOTE — ED AVS SNAPSHOT
[facility]    [address]    [facility] [address]    Phone:  [phone]    Fax:  [phone]                                       Machelle Weiner   MRN: [id number]    Department:  [facility] Emergency [facility]   Date of Visit:  12/16/2017           Patient Information     Date Of Birth          1975        Your diagnoses for this visit were:     Vision changes     Acute nonintractable headache, unspecified headache type        You were seen by Luis Swain MD and Kylie Kc MD.      Follow-up Information     Follow up with Jean Sauceda PA-C.    Specialty:  Physician Assistant    Why:  within 3 days    Contact information:    [address] [place] [address]  [phone]          Follow up with Ophthalmologist.    Why:  within 1-2 days for reassessment        Follow up with [facility].    Specialty:  EMERGENCY MEDICINE    Why:  As needed, If symptoms worsen    Contact information:    [address] [facility] [id number] [phone]        Discharge Instructions       Discharge Instructions  Headache    You were seen today for a headache. Headaches may be caused by many different things such as muscle tension, sinus inflammation, anxiety and stress, having too little sleep, too much alcohol, some medical conditions or injury. You may have a migraine, which is caused by changes in the blood vessels in your head.  At this time your provider does not find that your headache is a sign of anything dangerous or life-threatening.  However, sometimes the signs of serious illness do not show up right away.      Generally, every Emergency Department visit should have a follow-up clinic visit with either a primary or a specialty clinic/provider. Please follow-up as instructed by your emergency provider today.    Return to the Emergency Department if:    You get a  new fever of 100.4 F or higher.    Your headache gets much worse.    You get a stiff neck with your headache.    You get a new headache that is significantly different or worse than headaches you have had before.    You are vomiting (throwing up) and cannot keep food or water down.    You have blurry or double vision or other problems with your eyes.    You have a new weakness on one side of your body.    You have difficulty with balance which is new.    You or your family thinks you are confused.    You have a seizure.    What can I do to help myself?    Pain medications - You may take a pain medication such as Tylenol  (acetaminophen), Advil , Motrin  (ibuprofen) or Aleve  (naproxen).    Take a pain reliever as soon as you notice symptoms.  Starting medications as soon as you start to have symptoms may lessen the amount of pain you have.    Relaxing in a quiet, dark room may help.    Get enough sleep and eat meals regularly.    You may need to watch for certain foods or other things which may trigger your headaches.  Keeping a journal of your headaches and possible triggers may help you and your primary provider to identify things which you should avoid which may be causing your headaches.  If you were given a prescription for medicine here today, be sure to read all of the information (including the package insert) that comes with your prescription.  This will include important information about the medicine, its side effects, and any warnings that you need to know about.  The pharmacist who fills the prescription can provide more information and answer questions you may have about the medicine.  If you have questions or concerns that the pharmacist cannot address, please call or return to the Emergency Department.   Remember that you can always come back to the Emergency Department if you are not able to see your regular provider in the amount of time listed above, if you get any new symptoms, or if there is  anything that worries you.          Discharge References/Attachments     VISION, BLURRED (ENGLISH)      24 Hour Appointment Hotline       To make an appointment at any [facility], call [phone] ([phone]). If you don't have a family doctor or clinic, we will help you find one. [place] clinics are conveniently located to serve the needs of you and your family.             Review of your medicines      Our records show that you are taking the medicines listed below. If these are incorrect, please call your family doctor or clinic.        Dose / Directions Last dose taken    albuterol 108 (90 BASE) MCG/ACT Inhaler   Commonly known as:  PROAIR HFA/PROVENTIL HFA/VENTOLIN HFA   Dose:  2 puff   Quantity:  3 Inhaler        Inhale 2 puffs into the lungs every 6 hours as needed for shortness of breath / dyspnea   Refills:  1        calcium carbonate 500 MG chewable tablet   Commonly known as:  TUMS   Dose:  1 chew tab        Take 1 chew tab by mouth daily   Refills:  0        ibuprofen 600 MG tablet   Commonly known as:  ADVIL/MOTRIN   Dose:  600 mg   Quantity:  30 tablet        Take 1 tablet (600 mg) by mouth every 6 hours as needed for moderate pain   Refills:  1        levothyroxine 75 MCG tablet   Commonly known as:  SYNTHROID/LEVOTHROID   Dose:  75 mcg   Quantity:  60 tablet        Take 1 tablet (75 mcg) by mouth daily   Refills:  0        lisinopril 10 MG tablet   Commonly known as:  PRINIVIL/ZESTRIL   Dose:  10 mg   Quantity:  30 tablet        Take 1 tablet (10 mg) by mouth daily   Refills:  2        NEXIUM PO   Dose:  20 mg        Take 20 mg by mouth   Refills:  0        priLOSEC 10 MG CR capsule   Dose:  10 mg   Generic drug:  omeprazole        Take 10 mg by mouth as needed   Refills:  0        PRILOSEC PO        Refills:  0                Procedures and tests performed during your visit     Basic metabolic panel    CBC with platelets differential    CT Head w/o Contrast    INR    Peripheral IV:  Standard    Pulse oximetry nursing    Vital signs      Orders Needing Specimen Collection     None      Pending Results     No orders found from 12/14/2017 to 12/17/2017.            Pending Culture Results     No orders found from 12/14/2017 to 12/17/2017.            Pending Results Instructions     If you had any lab results that were not finalized at the time of your Discharge, you can call the ED Lab Result RN at [phone]. You will be contacted by this team for any positive Lab results or changes in treatment. The nurses are available 7 days a week from 10A to 6:30P.  You can leave a message 24 hours per day and they will return your call.        Test Results From Your Hospital Stay        12/16/2017  5:53 PM      Component Results     Component Value Ref Range & Units Status    WBC 11.8 (H) 4.0 - 11.0 10e9/L Final    RBC Count 4.62 3.8 - 5.2 10e12/L Final    Hemoglobin 14.0 11.7 - 15.7 g/dL Final    Hematocrit 41.9 35.0 - 47.0 % Final    MCV 91 78 - 100 fl Final    MCH 30.3 26.5 - 33.0 pg Final    MCHC 33.4 31.5 - 36.5 g/dL Final    RDW 13.2 10.0 - 15.0 % Final    Platelet Count 283 150 - 450 10e9/L Final    Diff Method Automated Method  Final    % Neutrophils 60.1 % Final    % Lymphocytes 29.3 % Final    % Monocytes 6.2 % Final    % Eosinophils 3.3 % Final    % Basophils 0.8 % Final    % Immature Granulocytes 0.3 % Final    Nucleated RBCs 0 0 /100 Final    Absolute Neutrophil 7.1 1.6 - 8.3 10e9/L Final    Absolute Lymphocytes 3.5 0.8 - 5.3 10e9/L Final    Absolute Monocytes 0.7 0.0 - 1.3 10e9/L Final    Absolute Eosinophils 0.4 0.0 - 0.7 10e9/L Final    Absolute Basophils 0.1 0.0 - 0.2 10e9/L Final    Abs Immature Granulocytes 0.0 0 - 0.4 10e9/L Final    Absolute Nucleated RBC 0.0  Final         12/16/2017  6:08 PM      Component Results     Component Value Ref Range & Units Status    Sodium 138 133 - 144 mmol/L Final    Potassium 4.0 3.4 - 5.3 mmol/L Final    Chloride 106 94 - 109 mmol/L Final    Carbon  Dioxide 22 20 - 32 mmol/L Final    Anion Gap 10 3 - 14 mmol/L Final    Glucose 79 70 - 99 mg/dL Final    Urea Nitrogen 12 7 - 30 mg/dL Final    Creatinine 0.79 0.52 - 1.04 mg/dL Final    GFR Estimate 80 >60 mL/min/1.7m2 Final    Non  GFR Calc    GFR Estimate If Black >90 >60 mL/min/1.7m2 Final    African American GFR Calc    Calcium 9.0 8.5 - 10.1 mg/dL Final         12/16/2017  6:09 PM      Component Results     Component Value Ref Range & Units Status    INR 0.95 0.86 - 1.14 Final         12/16/2017  6:51 PM      Narrative     CT OF THE HEAD WITHOUT CONTRAST 12/16/2017 6:23 PM     COMPARISON: Brain MRI 8/19/2015.    HISTORY:  Headache.     TECHNIQUE: Axial CT images of the head from the skull base to the  vertex were acquired without IV contrast.    FINDINGS: The ventricles and basal cisterns are within normal limits  in configuration. There is no midline shift. There are no extra-axial  fluid collections.  Gray-white differentiation is well maintained.    No intracranial hemorrhage, mass or recent infarct.    The visualized paranasal sinuses are well-aerated. There is no  mastoiditis. There are no fractures of the visualized bones.        Impression     IMPRESSION:  Normal head CT.      Radiation dose for this scan was reduced using automated exposure  control, adjustment of the mA and/or kV according to patient size, or  iterative reconstruction technique    KATARINA JOHN MD                Clinical Quality Measure: Blood Pressure Screening     Your blood pressure was checked while you were in the emergency department today. The last reading we obtained was  BP: 117/76 . Please read the guidelines below about what these numbers mean and what you should do about them.  If your systolic blood pressure (the top number) is less than 120 and your diastolic blood pressure (the bottom number) is less than 80, then your blood pressure is normal. There is nothing more that you need to do about it.  If  your systolic blood pressure (the top number) is 120-139 or your diastolic blood pressure (the bottom number) is 80-89, your blood pressure may be higher than it should be. You should have your blood pressure rechecked within a year by a primary care provider.  If your systolic blood pressure (the top number) is 140 or greater or your diastolic blood pressure (the bottom number) is 90 or greater, you may have high blood pressure. High blood pressure is treatable, but if left untreated over time it can put you at risk for heart attack, stroke, or kidney failure. You should have your blood pressure rechecked by a primary care provider within the next 4 weeks.  If your provider in the emergency department today gave you specific instructions to follow-up with your doctor or provider even sooner than that, you should follow that instruction and not wait for up to 4 weeks for your follow-up visit.        Thank you for choosing [facility]       Thank you for choosing [facility] for your care. Our goal is always to provide you with excellent care. Hearing back from our patients is one way we can continue to improve our services. Please take a few minutes to complete the written survey that you may receive in the mail after you visit with us. Thank you!        [facility] Information     [facility] gives you secure access to your electronic health record. If you see a primary care provider, you can also send messages to your care team and make appointments. If you have questions, please call your primary care clinic.  If you do not have a primary care provider, please call [phone] and they will assist you.        Care EveryWhere ID     This is your Care EveryWhere ID. This could be used by other organizations to access your [facility] medical records  [id number]        Equal Access to Services     MORE MARROQUIN : Lisa Jones, eve kwong, jean-claude baltazar .  So [facility] [phone].    ATENCIÓN: Si habla español, tiene a feliciano disposición servicios gratuitos de asistencia lingüística. Llame al [phone].    We comply with applicable federal civil rights laws and [place] laws. We do not discriminate on the basis of race, color, national origin, age, disability, sex, sexual orientation, or gender identity.            After Visit Summary       This is your record. Keep this with you and show to your community pharmacist(s) and doctor(s) at your next visit.

## 2017-12-16 NOTE — ED AVS SNAPSHOT
[facility] Emergency Department    [address]    [facility] [address]    Phone:  [phone]    Fax:  [phone]                                       Machelle Weiner   MRN: [id number]    Department:  [facility] Emergency Department   Date of Visit:  12/16/2017           After Visit Summary Signature Page     I have received my discharge instructions, and my questions have been answered. I have discussed any challenges I see with this plan with the nurse or doctor.    ..........................................................................................................................................  Patient/Patient Representative Signature      ..........................................................................................................................................  Patient Representative Print Name and Relationship to Patient    ..................................................               ................................................  Date                                            Time    ..........................................................................................................................................  Reviewed by Signature/Title    ...................................................              ..............................................  Date                                                            Time

## 2017-12-17 NOTE — DISCHARGE INSTRUCTIONS
Discharge Instructions  Headache    You were seen today for a headache. Headaches may be caused by many different things such as muscle tension, sinus inflammation, anxiety and stress, having too little sleep, too much alcohol, some medical conditions or injury. You may have a migraine, which is caused by changes in the blood vessels in your head.  At this time your provider does not find that your headache is a sign of anything dangerous or life-threatening.  However, sometimes the signs of serious illness do not show up right away.      Generally, every Emergency Department visit should have a follow-up clinic visit with either a primary or a specialty clinic/provider. Please follow-up as instructed by your emergency provider today.    Return to the Emergency Department if:    You get a new fever of 100.4 F or higher.    Your headache gets much worse.    You get a stiff neck with your headache.    You get a new headache that is significantly different or worse than headaches you have had before.    You are vomiting (throwing up) and cannot keep food or water down.    You have blurry or double vision or other problems with your eyes.    You have a new weakness on one side of your body.    You have difficulty with balance which is new.    You or your family thinks you are confused.    You have a seizure.    What can I do to help myself?    Pain medications - You may take a pain medication such as Tylenol  (acetaminophen), Advil , Motrin  (ibuprofen) or Aleve  (naproxen).    Take a pain reliever as soon as you notice symptoms.  Starting medications as soon as you start to have symptoms may lessen the amount of pain you have.    Relaxing in a quiet, dark room may help.    Get enough sleep and eat meals regularly.    You may need to watch for certain foods or other things which may trigger your headaches.  Keeping a journal of your headaches and possible triggers may help you and your primary provider to identify  things which you should avoid which may be causing your headaches.  If you were given a prescription for medicine here today, be sure to read all of the information (including the package insert) that comes with your prescription.  This will include important information about the medicine, its side effects, and any warnings that you need to know about.  The pharmacist who fills the prescription can provide more information and answer questions you may have about the medicine.  If you have questions or concerns that the pharmacist cannot address, please call or return to the Emergency Department.   Remember that you can always come back to the Emergency Department if you are not able to see your regular provider in the amount of time listed above, if you get any new symptoms, or if there is anything that worries you.

## 2018-01-02 NOTE — ED PROVIDER NOTES
Patient signed out to me by Dr. Swain awaiting CT head results with plan for discharge home if improved and no worrisome findings on CT scan. Please see his note for details. On reassessment after reassuring CT scan, the patient had improvement in symptoms, was upright, well appearing, and felt comfortable with plan for discharge home. She is aware of the need for follow-up within 3 days, consideration of neurologist and ophthalmologist as well. Return immediately with worsening. All questions answered prior to discharge.     Kylie Kc MD  01/02/18 4129

## 2018-01-19 DIAGNOSIS — E03.9 ACQUIRED HYPOTHYROIDISM: ICD-10-CM

## 2018-01-20 NOTE — TELEPHONE ENCOUNTER
"Requested Prescriptions   Pending Prescriptions Disp Refills     levothyroxine (SYNTHROID/LEVOTHROID) 75 MCG tablet [Pharmacy Med Name: LEVOTHYROXINE 0.075MG (75MCG) TABS]  Last Written Prescription Date:  11/21/2017  Last Fill Quantity: 60,  # refills: 0   [place] [facility] or [facility] prescribing provider:  11/20/2017  Future Office Visit:      60 tablet 0     Sig: TAKE 1 TABLET(75 MCG) BY MOUTH DAILY    Thyroid Protocol Failed    1/19/2018  6:15 PM       Failed - Normal TSH on file in past 12 months    Recent Labs   Lab Test  11/20/17   1620   TSH  12.15*             Passed - Patient is 12 years or older       Passed - Recent or future visit with authorizing provider's specialty    Patient had office visit in the last year or has a visit in the next 30 days with authorizing provider.  See \"Patient Info\" tab in inbasket, or \"Choose Columns\" in Meds & Orders section of the refill encounter.            Passed - No active pregnancy on record    If patient is pregnant or has had a positive pregnancy test, please check TSH.         Passed - No positive pregnancy test in past 12 months    If patient is pregnant or has had a positive pregnancy test, please check TSH.            "

## 2018-01-22 ENCOUNTER — TELEPHONE (OUTPATIENT)
Dept: FAMILY MEDICINE | Facility: CLINIC | Age: 43
End: 2018-01-22

## 2018-01-23 RX ORDER — LEVOTHYROXINE SODIUM 75 UG/1
TABLET ORAL
Qty: 30 TABLET | Refills: 0 | Status: SHIPPED | OUTPATIENT
Start: 2018-01-23 | End: 2018-02-21

## 2018-01-24 NOTE — TELEPHONE ENCOUNTER
Medication is being filled for 1 time refill only due to:  Patient needs labs thyroid.  Please call to schedule.   Luh Gonzales RN

## 2018-02-21 ENCOUNTER — ALLIED HEALTH/NURSE VISIT (OUTPATIENT)
Dept: NURSING | Facility: CLINIC | Age: 43
End: 2018-02-21
Payer: COMMERCIAL

## 2018-02-21 VITALS — DIASTOLIC BLOOD PRESSURE: 84 MMHG | SYSTOLIC BLOOD PRESSURE: 122 MMHG

## 2018-02-21 DIAGNOSIS — E03.9 ACQUIRED HYPOTHYROIDISM: ICD-10-CM

## 2018-02-21 DIAGNOSIS — I10 ESSENTIAL HYPERTENSION, BENIGN: Primary | ICD-10-CM

## 2018-02-21 PROCEDURE — 36415 COLL VENOUS BLD VENIPUNCTURE: CPT | Performed by: FAMILY MEDICINE

## 2018-02-21 PROCEDURE — 84443 ASSAY THYROID STIM HORMONE: CPT | Performed by: FAMILY MEDICINE

## 2018-02-21 PROCEDURE — 99207 ZZC NO CHARGE NURSE ONLY: CPT

## 2018-02-21 NOTE — TELEPHONE ENCOUNTER
Pt calls.      She was just seen at [facility] for her labs and for her bp check.  She works over there.      She says she only has 2 days left of her thyroid med.  Labs not back yet.      She was given lisinopril in December.  She was to have her bp checked to see that it is effective.  The last time she was told to take 20 mg.  She is only taking 10 mg and would add 10 mg when she felt her blood pressure was high.  She is wondering if she can stay on 10 mg.  She says she gets a severe migraine when her bp is high, nauseas and dizzy.      She says it says not to take NSAIDS with the lisinopril on the sheet from the pharmacy.  When she gets migraines, it subsides with 600 mg with ibuprofen.  Otherwise she was taking 4 ES tylenol.  Advised not to do that as it is over the recommended amounts.  She is getting migraines about 3 times a week, but sometimes gets it daily.  Advised she should be seen for the migraines.       Advised she should be seen for the migraines and the lisinopril dosing.  Appt scheduled.      Will forward to the refill pool for her thyroid med.  Labs pending.

## 2018-02-22 LAB — TSH SERPL DL<=0.005 MIU/L-ACNC: 1.53 MU/L (ref 0.4–4)

## 2018-02-22 NOTE — TELEPHONE ENCOUNTER
"Requested Prescriptions   Pending Prescriptions Disp Refills     levothyroxine (SYNTHROID/LEVOTHROID) 75 MCG tablet  Last Written Prescription Date:  1/23/2018  Last Fill Quantity: 30 tablet,  # refills: 0   Last office visit: 11/20/2017 with prescribing provider:  Jean Sauceda   Future Office Visit:   Next 5 appointments (look out 90 days)     Mar 02, 2018 11:40 AM CST   Office Visit with Jean Sauceda PA-C   [place] [id number] [facility] [address]   [phone]                  30 tablet 0    Thyroid Protocol Failed    2/21/2018  3:51 PM       Failed - Normal TSH on file in past 12 months    Recent Labs   Lab Test  11/20/17   1620   TSH  12.15*             Passed - Patient is 12 years or older       Passed - Recent or future visit with authorizing provider's specialty    Patient had office visit in the last year or has a visit in the next 30 days with authorizing provider.  See \"Patient Info\" tab in inbasket, or \"Choose Columns\" in Meds & Orders section of the refill encounter.            Passed - No active pregnancy on record    If patient is pregnant or has had a positive pregnancy test, please check TSH.         Passed - No positive pregnancy test in past 12 months    If patient is pregnant or has had a positive pregnancy test, please check TSH.            "

## 2018-02-23 RX ORDER — LEVOTHYROXINE SODIUM 75 UG/1
TABLET ORAL
Qty: 90 TABLET | Refills: 3 | Status: SHIPPED | OUTPATIENT
Start: 2018-02-23 | End: 2018-11-22

## 2018-02-24 DIAGNOSIS — I10 ESSENTIAL HYPERTENSION, BENIGN: ICD-10-CM

## 2018-02-25 NOTE — TELEPHONE ENCOUNTER
"Requested Prescriptions   Pending Prescriptions Disp Refills     lisinopril (PRINIVIL/ZESTRIL) 10 MG tablet [Pharmacy Med Name: LISINOPRIL 10MG TABLETS]  Last Written Prescription Date:  11/20/2017  Last Fill Quantity: 30 tablet,  # refills: 2   Last office visit: 11/20/2017 with prescribing provider:  Jean Sauceda PA-C   Future Office Visit:   Next 5 appointments (look out 90 days)     Mar 02, 2018 11:40 AM CST   Office Visit with Jean Sauceda PA-C   [facility]    [address] [facility] [address] [phone]                  30 tablet 0     Sig: TAKE 1 TABLET(10 MG) BY MOUTH DAILY    ACE Inhibitors (Including Combos) Protocol Failed    2/24/2018  1:41 PM       Failed - No positive pregnancy test in past 12 months       Passed - Blood pressure under 140/90 in past 12 months    BP Readings from Last 3 Encounters:   02/21/18 122/84   12/16/17 117/76   12/08/17 (!) 150/100                Passed - Recent or future visit with authorizing provider's specialty    Patient had office visit in the last year or has a visit in the next 30 days with authorizing provider.  See \"Patient Info\" tab in inbasket, or \"Choose Columns\" in Meds & Orders section of the refill encounter.            Passed - Patient is age 18 or older       Passed - No active pregnancy on record       Passed - Normal serum creatinine on file in past 12 months    Recent Labs   Lab Test  12/16/17   1725   CR  0.79            Passed - Normal serum potassium on file in past 12 months    Recent Labs   Lab Test  12/16/17   1725   POTASSIUM  4.0               "

## 2018-02-27 RX ORDER — LISINOPRIL 10 MG/1
TABLET ORAL
Qty: 90 TABLET | Refills: 1 | Status: SHIPPED | OUTPATIENT
Start: 2018-02-27 | End: 2018-03-16

## 2018-02-28 ENCOUNTER — APPOINTMENT (OUTPATIENT)
Dept: ULTRASOUND IMAGING | Facility: CLINIC | Age: 43
End: 2018-02-28
Attending: EMERGENCY MEDICINE
Payer: COMMERCIAL

## 2018-02-28 ENCOUNTER — HOSPITAL ENCOUNTER (EMERGENCY)
Facility: CLINIC | Age: 43
Discharge: HOME | End: 2018-02-28
Attending: EMERGENCY MEDICINE | Admitting: EMERGENCY MEDICINE
Payer: COMMERCIAL

## 2018-02-28 ENCOUNTER — APPOINTMENT (OUTPATIENT)
Dept: GENERAL RADIOLOGY | Facility: CLINIC | Age: 43
End: 2018-02-28
Attending: EMERGENCY MEDICINE
Payer: COMMERCIAL

## 2018-02-28 VITALS
RESPIRATION RATE: 28 BRPM | BODY MASS INDEX: 40.97 KG/M2 | TEMPERATURE: 98.3 F | HEIGHT: 64 IN | WEIGHT: 240 LBS | SYSTOLIC BLOOD PRESSURE: 125 MMHG | HEART RATE: 75 BPM | DIASTOLIC BLOOD PRESSURE: 77 MMHG | OXYGEN SATURATION: 95 %

## 2018-02-28 DIAGNOSIS — R10.9 ABDOMINAL PAIN OF UNKNOWN ETIOLOGY: ICD-10-CM

## 2018-02-28 LAB
ALBUMIN SERPL-MCNC: 3.5 G/DL (ref 3.4–5)
ALP SERPL-CCNC: 127 U/L (ref 40–150)
ALT SERPL W P-5'-P-CCNC: 87 U/L (ref 0–50)
ANION GAP SERPL CALCULATED.3IONS-SCNC: 8 MMOL/L (ref 3–14)
AST SERPL W P-5'-P-CCNC: 60 U/L (ref 0–45)
BASOPHILS # BLD AUTO: 0.1 10E9/L (ref 0–0.2)
BASOPHILS NFR BLD AUTO: 0.9 %
BILIRUB SERPL-MCNC: 0.4 MG/DL (ref 0.2–1.3)
BUN SERPL-MCNC: 13 MG/DL (ref 7–30)
CALCIUM SERPL-MCNC: 9.4 MG/DL (ref 8.5–10.1)
CHLORIDE SERPL-SCNC: 106 MMOL/L (ref 94–109)
CO2 SERPL-SCNC: 25 MMOL/L (ref 20–32)
CREAT SERPL-MCNC: 0.83 MG/DL (ref 0.52–1.04)
DIFFERENTIAL METHOD BLD: ABNORMAL
EOSINOPHIL # BLD AUTO: 0.3 10E9/L (ref 0–0.7)
EOSINOPHIL NFR BLD AUTO: 2.7 %
ERYTHROCYTE [DISTWIDTH] IN BLOOD BY AUTOMATED COUNT: 13.1 % (ref 10–15)
GFR SERPL CREATININE-BSD FRML MDRD: 75 ML/MIN/1.7M2
GLUCOSE SERPL-MCNC: 82 MG/DL (ref 70–99)
HCG SERPL QL: NEGATIVE
HCT VFR BLD AUTO: 43.8 % (ref 35–47)
HGB BLD-MCNC: 14.2 G/DL (ref 11.7–15.7)
IMM GRANULOCYTES # BLD: 0.1 10E9/L (ref 0–0.4)
IMM GRANULOCYTES NFR BLD: 0.5 %
INTERPRETATION ECG - MUSE: NORMAL
LIPASE SERPL-CCNC: 192 U/L (ref 73–393)
LYMPHOCYTES # BLD AUTO: 2.2 10E9/L (ref 0.8–5.3)
LYMPHOCYTES NFR BLD AUTO: 18.9 %
MCH RBC QN AUTO: 29.7 PG (ref 26.5–33)
MCHC RBC AUTO-ENTMCNC: 32.4 G/DL (ref 31.5–36.5)
MCV RBC AUTO: 92 FL (ref 78–100)
MONOCYTES # BLD AUTO: 0.8 10E9/L (ref 0–1.3)
MONOCYTES NFR BLD AUTO: 6.6 %
NEUTROPHILS # BLD AUTO: 8.2 10E9/L (ref 1.6–8.3)
NEUTROPHILS NFR BLD AUTO: 70.4 %
NRBC # BLD AUTO: 0 10*3/UL
NRBC BLD AUTO-RTO: 0 /100
PLATELET # BLD AUTO: 294 10E9/L (ref 150–450)
POTASSIUM SERPL-SCNC: 4 MMOL/L (ref 3.4–5.3)
PROT SERPL-MCNC: 7.7 G/DL (ref 6.8–8.8)
RBC # BLD AUTO: 4.78 10E12/L (ref 3.8–5.2)
SODIUM SERPL-SCNC: 139 MMOL/L (ref 133–144)
WBC # BLD AUTO: 11.6 10E9/L (ref 4–11)

## 2018-02-28 PROCEDURE — 25000125 ZZHC RX 250: Performed by: EMERGENCY MEDICINE

## 2018-02-28 PROCEDURE — 85025 COMPLETE CBC W/AUTO DIFF WBC: CPT | Performed by: PHYSICIAN ASSISTANT

## 2018-02-28 PROCEDURE — 74019 RADEX ABDOMEN 2 VIEWS: CPT

## 2018-02-28 PROCEDURE — 83690 ASSAY OF LIPASE: CPT | Performed by: PHYSICIAN ASSISTANT

## 2018-02-28 PROCEDURE — 76705 ECHO EXAM OF ABDOMEN: CPT

## 2018-02-28 PROCEDURE — 80053 COMPREHEN METABOLIC PANEL: CPT | Performed by: PHYSICIAN ASSISTANT

## 2018-02-28 PROCEDURE — 93005 ELECTROCARDIOGRAM TRACING: CPT

## 2018-02-28 PROCEDURE — 84703 CHORIONIC GONADOTROPIN ASSAY: CPT | Performed by: PHYSICIAN ASSISTANT

## 2018-02-28 PROCEDURE — 99285 EMERGENCY DEPT VISIT HI MDM: CPT | Mod: 25

## 2018-02-28 RX ORDER — SUCRALFATE 1 G/1
1 TABLET ORAL 4 TIMES DAILY
Qty: 120 TABLET | Refills: 0 | Status: SHIPPED | OUTPATIENT
Start: 2018-02-28 | End: 2018-05-15

## 2018-02-28 RX ORDER — OXYCODONE HYDROCHLORIDE 5 MG/1
5 TABLET ORAL EVERY 6 HOURS PRN
Qty: 8 TABLET | Refills: 0 | Status: SHIPPED | OUTPATIENT
Start: 2018-02-28 | End: 2018-05-15

## 2018-02-28 RX ORDER — ONDANSETRON 4 MG/1
4 TABLET, ORALLY DISINTEGRATING ORAL ONCE
Status: COMPLETED | OUTPATIENT
Start: 2018-02-28 | End: 2018-02-28

## 2018-02-28 RX ADMIN — ONDANSETRON 4 MG: 4 TABLET, ORALLY DISINTEGRATING ORAL at 13:04

## 2018-02-28 ASSESSMENT — ENCOUNTER SYMPTOMS
ABDOMINAL PAIN: 1
NAUSEA: 1
DIZZINESS: 1
VOMITING: 0

## 2018-02-28 NOTE — DISCHARGE INSTRUCTIONS
*Get plenty of rest and avoid spicy or fatty foods.  *Take medications as prescribed.  Avoid NSAIDs.  Continue nexium.  Oxycodone for severe pain. Carafate.  *Follow-up with your doctor for a recheck as scheduled on Friday.   *Return to the ER if you develop fever, worsening pain, pain that moves to the right lower abdomen, faint or feel like you will faint or become worse in any way.      Gastritis or Ulcer (No Antibiotic Treatment)    Gastritis is irritation and inflammation of the stomach lining. This means the lining is red and swollen. An ulcer is an open sore in the lining of the stomach. It may also occur in the duodenum (first part of the small intestine). The causes and symptoms of gastritis and ulcers are very similar.  Causes and risk factors for both problems can include:    Long-term use of nonsteroidal anti-inflammatory drugs (NSAIDs), such as aspirin and ibuprofen    H. pylori bacteria infection    Tobacco use    Alcohol use  Symptoms for both problems can include:    Dull or burning pain in the upper part of the belly    Loss of appetite    Heartburn or upset stomach    Frequent burping    Bloated feeling    Nausea with or without vomiting  You likely had an evaluation to help determine the exact cause and extent of your problem. This may have included a health history, exam, and certain tests.  Results showed that your problem is not due to H. pylori infection. For this reason, no antibiotics are needed as part of your treatment.  Whether your problem is found to be gastritis or an ulcer, you will still need to take other medicines, however. You will also need to follow instructions to help reduce stomach irritation so your stomach can heal.   Home care    Take any medicines you re prescribed exactly as directed. Common medicines used to treat gastritis include:    Antacids. These help neutralize the normal acids in your stomach.    Proton pump inhibitors. These block your stomach from making any  acid.    H2 blockers.These reduce the amount of acid your stomach makes.    Bismuth subsalicylate. This helps protect the lining of your stomach from acid.    Avoid taking any NSAIDS during your treatment. If you take NSAID to help treat other health problems, tell your healthcare provider. He or she may need to adjust your medicine plan or change the dosage.    Don t use tobacco. Also don t drink alcohol. These products can increase the amount of acid your stomach makes. This can delay healing. It can also worsen symptoms.  Follow-up care  Follow up with your healthcare provider, or as advised. In some cases, further testing may be needed.  When to seek medical advice  Call your healthcare provider right away if any of these occur:    Fever of 100.4 F (38 C) or higher or as directed by your provider    Stomach pain that worsens or moves to the lower right part of abdomen    Extreme fatigue    Weakness or dizziness    Continued weight loss    Frequent vomiting, blood in your vomit, or coffee ground-like substance in your vomit    Black, tarry, or bloody stools  Call 911  Call 911 right away if any of these occur:    Chest pain appears or worsens, or spreads to the back, neck, shoulder, or arm    Unusually fast heart rate    Trouble breathing or swallowing    Confusion    Extreme drowsiness or trouble waking up    Fainting    Large amounts of blood present in vomit or stool  Date Last Reviewed: 6/16/2015 2000-2017 [employer]. [address] [place] [address]. All rights reserved. This information is not intended as a substitute for professional medical care. Always follow your healthcare professional's instructions.        Discharge Instructions  Abdominal Pain    Abdominal pain (belly pain) can be caused by many things. Your evaluation today does not show the exact cause for your pain. Your provider today has decided that it is unlikely your pain is due to a life threatening problem, or a  problem requiring surgery or hospital admission. Sometimes those problems cannot be found right away, so it is very important that you follow up as directed.  Sometimes only the changes which occur over time allow the cause of your pain to be found.    Generally, every Emergency Department visit should have a follow-up clinic visit with either a primary or a specialty clinic/provider. Please follow-up as instructed by your emergency provider today. With abdominal pain, we often recommend very close follow-up, such as the following day.    ADULTS:  Return to the Emergency Department right away if:      You get an oral temperature above 102oF or as directed by your provider.    You have blood in your stools. This may be bright red or appear as black, tarry stools.      You keep vomiting (throwing up) or cannot drink liquids.    You see blood when you vomit.     You cannot have a bowel movement or you cannot pass gas.    Your stomach gets bloated or bigger.    Your skin or the whites of your eyes look yellow.    You faint.    You have bloody, frequent or painful urination (peeing).    You have new symptoms or anything that worries you.    CHILDREN:  Return to the Emergency Department right away if your child has any of the above-listed symptoms or the following:      Pushes your hand away or screams/cries when his/her belly is touched.    You notice your child is very fussy or weak.    Your child is very tired and is too tired to eat or drink.    Your child is dehydrated.  Signs of dehydration can be:  o Significant change in the amount of wet diapers/urine.  o Your infant or child starts to have dry mouth and lips, or no saliva (spit) or tears.    PREGNANT WOMEN:  Return to the Emergency Department right away if you have any of the above-listed symptoms or the following:      You have bleeding, leaking fluid or passing tissue from the vagina.    You have worse pain or cramping, or pain in your shoulder or back.    You  have vomiting that will not stop.    You have a temperature of 100oF or more.    Your baby is not moving as much as usual.    You faint.    You get a bad headache with or without eye problems and abdominal pain.    You have a seizure.    You have unusual discharge from your vagina and abdominal pain.    Abdominal pain is pretty common during pregnancy.  Your pain may or may not be related to your pregnancy. You should follow-up closely with your OB provider so they can evaluate you and your baby.  Until you follow-up with your regular provider, do the following:       Avoid sex and do not put anything in your vagina.    Drink clear fluids.    Only take medications approved by your provider.    MORE INFORMATION:    Appendicitis:  A possible cause of abdominal pain in any person who still has their appendix is acute appendicitis. Appendicitis is often hard to diagnose.  Testing does not always rule out early appendicitis or other causes of abdominal pain. Close follow-up with your provider and re-evaluations may be needed to figure out the reason for your abdominal pain.    Follow-up:  It is very important that you make an appointment with your clinic and go to the appointment.  If you do not follow-up with your primary provider, it may result in missing an important development which could result in permanent injury or disability and/or lasting pain.  If there is any problem keeping your appointment, call your provider or return to the Emergency Department.    Medications:  Take your medications as directed by your provider today.  Before using over-the-counter medications, ask your provider and make sure to take the medications as directed.  If you have any questions about medications, ask your provider.    Diet:  Resume your normal diet as much as possible, but do not eat fried, fatty or spicy foods while you have pain.  Do not drink alcohol or have caffeine.  Do not smoke tobacco.    Probiotics: If you have been  "given an antibiotic, you may want to also take a probiotic pill or eat yogurt with live cultures. Probiotics have \"good bacteria\" to help your intestines stay healthy. Studies have shown that probiotics help prevent diarrhea (loose stools) and other intestine problems (including C. diff infection) when you take antibiotics. You can buy these without a prescription in the pharmacy section of the store.     If you were given a prescription for medicine here today, be sure to read all of the information (including the package insert) that comes with your prescription.  This will include important information about the medicine, its side effects, and any warnings that you need to know about.  The pharmacist who fills the prescription can provide more information and answer questions you may have about the medicine.  If you have questions or concerns that the pharmacist cannot address, please call or return to the Emergency Department.       Remember that you can always come back to the Emergency Department if you are not able to see your regular provider in the amount of time listed above, if you get any new symptoms, or if there is anything that worries you.    "

## 2018-02-28 NOTE — ED AVS SNAPSHOT
[facility] Emergency Department    [address]    [facility] [address]    Phone:  [phone]    Fax:  [phone]                                       Machelle Weiner   MRN: [id number]    Department:  [facility] Emergency Department   Date of Visit:  2/28/2018           After Visit Summary Signature Page     I have received my discharge instructions, and my questions have been answered. I have discussed any challenges I see with this plan with the nurse or doctor.    ..........................................................................................................................................  Patient/Patient Representative Signature      ..........................................................................................................................................  Patient Representative Print Name and Relationship to Patient    ..................................................               ................................................  Date                                            Time    ..........................................................................................................................................  Reviewed by Signature/Title    ...................................................              ..............................................  Date                                                            Time

## 2018-02-28 NOTE — ED NOTES
Patient complaining of mid upper abdominal pain.  States it feels the same as when she had her gallbladder symptoms but has had her gallbladder removed.  States she has also been vomiting.      Patient also hyperventilating, complaining of dizziness and tingling on arrival.  Instructed on slow regular breathing.      ABCs intact.  Alert and oriented x 3.

## 2018-02-28 NOTE — ED PROVIDER NOTES
History     Chief Complaint:  Abdominal Pain    HPI   Machelle Weiner is a 42 year old female, with history of asthma, hypertension, hyperlipidemia and thyroid disease, who presents alone to the emergency department for evaluation of mid abdominal pain radiating across her upper abdomen that began 30 minutes prior to arrival with associated emesis and nausea. She notes this pain feels similar to her gallbladder symptoms, but notes she has had a cholecystectomy here at [facility] in October of 2016. On arrival, patient was hyperventilating, secondary to the pain and began complaining of tingling, cramping and dizziness. On my arrival into the room, patient is no longer hyperventilating, but continues to endorse some dizziness which is improving.  No numbness, weakness, problems with speech or vision. She denies any vomiting. Of note, patient reports that since she started Lisinopril in December, it causes migraines, in which she has been treating NSAIDs and notes she tries to avoid taking more than 600 mg a day.   She does also note she had a bleeding ulcer when she was 12 and she does note she has had some similar episodes of pain since her gallbladder surgery and that her primary doctor has recommended she see GI but she has not yet been able.  She has an appointment with her doctor in 2 days to discuss her migraines.     Allergies:  Codeine  Penicillins     Medications:    Lisinopril  Levothyroxine  Omeprazole  Tums  Ibuprofen  Albuterol    Past Medical History:    Asthma, mild intermittent  Hypertension  Thyroid disease  Hyperlipidemia    Past Surgical History:    Gyn surgery - cryosurgery of cervix  Laparoscopic cholecystectomy with cholangiongrams    Family History:    The patient denies any relevant family medical history.     Social History:  The patient was accompanied to the ED alone.  Smoking Status: Former  Smokeless Tobacco: No  Alcohol Use: No   Marital Status:  Single [1]     Review of Systems  "  Gastrointestinal: Positive for abdominal pain and nausea. Negative for vomiting.   Neurological: Positive for dizziness.   All other systems reviewed and are negative.    Physical Exam   Vitals:  Patient Vitals for the past 24 hrs:   BP Temp Temp src Pulse Resp SpO2 Height Weight   02/28/18 1600 125/77 - - - - 95 % - -   02/28/18 1515 123/77 - - - - - - -   02/28/18 1500 114/62 - - - - 100 % - -   02/28/18 1445 125/81 - - - - 99 % - -   02/28/18 1430 - - - - - 98 % - -   02/28/18 1259 (!) 144/101 98.3  F (36.8  C) Temporal 75 28 100 % 1.626 m (5' 4\") 108.9 kg (240 lb)      Physical Exam  General: Well-nourished  Eyes: PERRL, conjunctivae pink no scleral icterus or conjunctival injection  ENT:  Moist mucus membranes, posterior oropharynx clear without erythema or exudates  Respiratory:  Lungs clear to auscultation bilaterally, no crackles/rubs/wheezes.  Good air movement  CV: Normal rate and rhythm, no murmurs/rubs/gallops  GI:  Abdomen soft and non-distended.  Normoactive BS.  Epigastric and RUQ tenderness, no guarding or rebound  Skin: Warm, dry.  No rashes or petechiae  Musculoskeletal: No peripheral edema or calf tenderness  Neuro: Alert and oriented to person/place/time.  PERRL, EOMI no nystagmus, no aphasia/facial droop/dysarthria, tongue midline, symmetric palatal elevation, normal strength at SCM/trapezius/BUE/BLE, normal coordination to FNF at BUE, gait deferred, negative romberg, sensation intact to LT over face/BUE/BLE  Psychiatric: Normal affect    Emergency Department Course     ECG:  ECG taken at 1418, ECG read at 1420  Normal sinus rhythm  Normal ECG  Rate 65 bpm. SC interval 158. QRS duration 88. QT/QTc 412/428. P-R-T axes 47 26 31.     Imaging:  Radiology findings were communicated with the patient who voiced understanding of the findings.  Abdomen US, limited (RUQ only):  Final result by Duane Fernandez MD (02/28/18 15:58:28)     Impression:     IMPRESSION:   1. Cholecystectomy.  2. Diffuse " fatty infiltration of the liver.       Reading per radiology.     Abdomen XR:  Final result by Kevin Beebe MD (02/28/18 16:32:33)     Impression:     IMPRESSION: The bowel gas pattern is unremarkable. No evidence of  obstruction. No pneumoperitoneum. The lung bases are clear.  Cholecystectomy clips in place.       Reading per radiology.     Laboratory:  Laboratory findings were communicated with the patient who voiced understanding of the findings.  CBC: WBC 11.6 (H) o/w WNL (HGB 14.2, )  CMP: Alt 87 (H), Ast 60 (H) o/w WNL (Creatinine 0.83)  Lipase: 192  HCG Qualitative Blood: Negative    Interventions:  1304 Zofran 4 mg PO     Emergency Department Course:  Nursing notes and vitals reviewed.  EKG obtained in the ED, see results above.   IV was inserted and blood was drawn for laboratory testing, results above.  The patient was sent for a Abdomen US, limited (RUQ only), Abdomen XR while in the emergency department, results above.      2:16 PM: I performed an exam of the patient as documented above. History was obtained.     I personally reviewed the laboratory results with the Patient and answered all related questions prior to transferring care and signing out to my partner, Dr. Kc.    Impression & Plan      Medical Decision Making:  Machelle Weiner is a 42 year old woman who comes here today with epigastric abdominal pain and is actually status-post cholecystectomy. Her gallbladder and liver enzymes show that she does have a mild elevation of her LFTs and so abdominal ultrasound was obtained and showed no biliary ductal dilatation.  It did show some fatty infiltration which I discussed with her and may contribute to her LFT elevations. Abdominal xryay showed no free air or obstruction. She clearly had hyperventilatory type symptoms on arrival. She did complain of some dizziness, but she has a completely normal neurologic examination and these symptoms seemed to resolve at this point. I suspect this  could be gastritis vs ulcer vs gastroparesis.  I'd like her to avoid NSAIDs, continue nexium and start carafate.  I made a referral to GI for further evaluation.  I gave her a small number of oxycodone to help her avoid NSAIDs.  She has follow-up already scheduled in 2 days with her PMD. She was in agreement with the plan and I answered her questions.    Diagnosis:    ICD-10-CM    1. Abdominal pain of unknown etiology R10.9 GASTROENTEROLOGY ADULT REF CONSULT ONLY        Disposition:   Signed out to Dr. Kc.    Scribe Disclosure:  I, Chelsea Browne, am serving as a scribe at 2:16 PM on 2/28/2018 to document services personally performed by Lisa Barbour MD, based on my observations and the provider's statements to me.  2/28/2018   [facility] EMERGENCY DEPARTMENT       Lisa Barbour MD  02/28/18 [id number]

## 2018-02-28 NOTE — ED AVS SNAPSHOT
[facility] Emergency Department    [address] [place] [address]    Phone:  [phone]    Fax:  [phone]                                       Machelle Weiner   MRN: [id number]    Department:  [facility] Emergency Department   Date of Visit:  2/28/2018           Patient Information     Date Of Birth          1975        Your diagnoses for this visit were:     Abdominal pain of unknown etiology        You were seen by Lisa Barbour MD.      Follow-up Information     Follow up with Jean Sauceda PA-C. Schedule an appointment as soon as possible for a visit in 2 days.    Specialty:  Physician Assistant    Contact information:    [address] [place] [address] [phone]          Discharge Instructions       *Get plenty of rest and avoid spicy or fatty foods.  *Take medications as prescribed.  Avoid NSAIDs.  Continue nexium.  Oxycodone for severe pain. Carafate.  *Follow-up with your doctor for a recheck as scheduled on Friday.   *Return to the ER if you develop fever, worsening pain, pain that moves to the right lower abdomen, faint or feel like you will faint or become worse in any way.      Gastritis or Ulcer (No Antibiotic Treatment)    Gastritis is irritation and inflammation of the stomach lining. This means the lining is red and swollen. An ulcer is an open sore in the lining of the stomach. It may also occur in the duodenum (first part of the small intestine). The causes and symptoms of gastritis and ulcers are very similar.  Causes and risk factors for both problems can include:    Long-term use of nonsteroidal anti-inflammatory drugs (NSAIDs), such as aspirin and ibuprofen    H. pylori bacteria infection    Tobacco use    Alcohol use  Symptoms for both problems can include:    Dull or burning pain in the upper part of the belly    Loss of appetite    Heartburn or upset stomach    Frequent burping    Bloated feeling    Nausea with or  without vomiting  You likely had an evaluation to help determine the exact cause and extent of your problem. This may have included a health history, exam, and certain tests.  Results showed that your problem is not due to H. pylori infection. For this reason, no antibiotics are needed as part of your treatment.  Whether your problem is found to be gastritis or an ulcer, you will still need to take other medicines, however. You will also need to follow instructions to help reduce stomach irritation so your stomach can heal.   Home care    Take any medicines you re prescribed exactly as directed. Common medicines used to treat gastritis include:    Antacids. These help neutralize the normal acids in your stomach.    Proton pump inhibitors. These block your stomach from making any acid.    H2 blockers.These reduce the amount of acid your stomach makes.    Bismuth subsalicylate. This helps protect the lining of your stomach from acid.    Avoid taking any NSAIDS during your treatment. If you take NSAID to help treat other health problems, tell your healthcare provider. He or she may need to adjust your medicine plan or change the dosage.    Don t use tobacco. Also don t drink alcohol. These products can increase the amount of acid your stomach makes. This can delay healing. It can also worsen symptoms.  Follow-up care  Follow up with your healthcare provider, or as advised. In some cases, further testing may be needed.  When to seek medical advice  Call your healthcare provider right away if any of these occur:    Fever of 100.4 F (38 C) or higher or as directed by your provider    Stomach pain that worsens or moves to the lower right part of abdomen    Extreme fatigue    Weakness or dizziness    Continued weight loss    Frequent vomiting, blood in your vomit, or coffee ground-like substance in your vomit    Black, tarry, or bloody stools  Call 911  Call 911 right away if any of these occur:    Chest pain appears or  worsens, or spreads to the back, neck, shoulder, or arm    Unusually fast heart rate    Trouble breathing or swallowing    Confusion    Extreme drowsiness or trouble waking up    Fainting    Large amounts of blood present in vomit or stool  Date Last Reviewed: 6/16/2015 2000-2017 [employer]. [address] [place] [address]. All rights reserved. This information is not intended as a substitute for professional medical care. Always follow your healthcare professional's instructions.        Discharge Instructions  Abdominal Pain    Abdominal pain (belly pain) can be caused by many things. Your evaluation today does not show the exact cause for your pain. Your provider today has decided that it is unlikely your pain is due to a life threatening problem, or a problem requiring surgery or hospital admission. Sometimes those problems cannot be found right away, so it is very important that you follow up as directed.  Sometimes only the changes which occur over time allow the cause of your pain to be found.    Generally, every Emergency Department visit should have a follow-up clinic visit with either a primary or a specialty clinic/provider. Please follow-up as instructed by your emergency provider today. With abdominal pain, we often recommend very close follow-up, such as the following day.    ADULTS:  Return to the Emergency Department right away if:      You get an oral temperature above 102oF or as directed by your provider.    You have blood in your stools. This may be bright red or appear as black, tarry stools.      You keep vomiting (throwing up) or cannot drink liquids.    You see blood when you vomit.     You cannot have a bowel movement or you cannot pass gas.    Your stomach gets bloated or bigger.    Your skin or the whites of your eyes look yellow.    You faint.    You have bloody, frequent or painful urination (peeing).    You have new symptoms or anything that worries  you.    CHILDREN:  Return to the Emergency Department right away if your child has any of the above-listed symptoms or the following:      Pushes your hand away or screams/cries when his/her belly is touched.    You notice your child is very fussy or weak.    Your child is very tired and is too tired to eat or drink.    Your child is dehydrated.  Signs of dehydration can be:  o Significant change in the amount of wet diapers/urine.  o Your infant or child starts to have dry mouth and lips, or no saliva (spit) or tears.    PREGNANT WOMEN:  Return to the Emergency Department right away if you have any of the above-listed symptoms or the following:      You have bleeding, leaking fluid or passing tissue from the vagina.    You have worse pain or cramping, or pain in your shoulder or back.    You have vomiting that will not stop.    You have a temperature of 100oF or more.    Your baby is not moving as much as usual.    You faint.    You get a bad headache with or without eye problems and abdominal pain.    You have a seizure.    You have unusual discharge from your vagina and abdominal pain.    Abdominal pain is pretty common during pregnancy.  Your pain may or may not be related to your pregnancy. You should follow-up closely with your OB provider so they can evaluate you and your baby.  Until you follow-up with your regular provider, do the following:       Avoid sex and do not put anything in your vagina.    Drink clear fluids.    Only take medications approved by your provider.    MORE INFORMATION:    Appendicitis:  A possible cause of abdominal pain in any person who still has their appendix is acute appendicitis. Appendicitis is often hard to diagnose.  Testing does not always rule out early appendicitis or other causes of abdominal pain. Close follow-up with your provider and re-evaluations may be needed to figure out the reason for your abdominal pain.    Follow-up:  It is very important that you make an  "appointment with your clinic and go to the appointment.  If you do not follow-up with your primary provider, it may result in missing an important development which could result in permanent injury or disability and/or lasting pain.  If there is any problem keeping your appointment, call your provider or return to the Emergency Department.    Medications:  Take your medications as directed by your provider today.  Before using over-the-counter medications, ask your provider and make sure to take the medications as directed.  If you have any questions about medications, ask your provider.    Diet:  Resume your normal diet as much as possible, but do not eat fried, fatty or spicy foods while you have pain.  Do not drink alcohol or have caffeine.  Do not smoke tobacco.    Probiotics: If you have been given an antibiotic, you may want to also take a probiotic pill or eat yogurt with live cultures. Probiotics have \"good bacteria\" to help your intestines stay healthy. Studies have shown that probiotics help prevent diarrhea (loose stools) and other intestine problems (including C. diff infection) when you take antibiotics. You can buy these without a prescription in the pharmacy section of the store.     If you were given a prescription for medicine here today, be sure to read all of the information (including the package insert) that comes with your prescription.  This will include important information about the medicine, its side effects, and any warnings that you need to know about.  The pharmacist who fills the prescription can provide more information and answer questions you may have about the medicine.  If you have questions or concerns that the pharmacist cannot address, please call or return to the Emergency Department.       Remember that you can always come back to the Emergency Department if you are not able to see your regular provider in the amount of time listed above, if you get any new symptoms, or if " there is anything that worries you.      Future Appointments        Provider Department Dept Phone Center    3/2/2018 11:40 AM Jean Sauceda PA-C [facility] [phone] [facility]      24 Hour Appointment Hotline       To make an appointment at any [facility], call [phone] ([phone]). If you don't have a family doctor or clinic, we will help you find one. [facility] are conveniently located to serve the needs of you and your family.          ED Discharge Orders     GASTROENTEROLOGY ADULT REF CONSULT ONLY       Preferred Location: [facility] [phone]      Please be aware that coverage of these services is subject to the terms and limitations of your health insurance plan.  Call member services at your health plan with any benefit or coverage questions.  Any procedures must be performed at a [facility] facility OR coordinated by your clinic's referral office.    Please bring the following with you to your appointment:    (1) Any X-Rays, CTs or MRIs which have been performed.  Contact the facility where they were done to arrange for  prior to your scheduled appointment.    (2) List of current medications   (3) This referral request   (4) Any documents/labs given to you for this referral                     Review of your medicines      START taking        Dose / Directions Last dose taken    oxyCODONE IR 5 MG tablet   Commonly known as:  ROXICODONE   Dose:  5 mg   Quantity:  8 tablet        Take 1 tablet (5 mg) by mouth every 6 hours as needed for pain   Refills:  0        sucralfate 1 GM tablet   Commonly known as:  CARAFATE   Dose:  1 g   Quantity:  120 tablet        Take 1 tablet (1 g) by mouth 4 times daily   Refills:  0          Our records show that you are taking the medicines listed below. If these are incorrect, please call your family doctor or clinic.        Dose / Directions Last dose taken    albuterol 108 (90 BASE) MCG/ACT Inhaler   Commonly known  as:  PROAIR HFA/PROVENTIL HFA/VENTOLIN HFA   Dose:  2 puff   Quantity:  3 Inhaler        Inhale 2 puffs into the lungs every 6 hours as needed for shortness of breath / dyspnea   Refills:  1        calcium carbonate 500 MG chewable tablet   Commonly known as:  TUMS   Dose:  1 chew tab        Take 1 chew tab by mouth daily   Refills:  0        ibuprofen 600 MG tablet   Commonly known as:  ADVIL/MOTRIN   Dose:  600 mg   Quantity:  30 tablet        Take 1 tablet (600 mg) by mouth every 6 hours as needed for moderate pain   Refills:  1        levothyroxine 75 MCG tablet   Commonly known as:  SYNTHROID/LEVOTHROID   Quantity:  90 tablet        TAKE 1 TABLET(75 MCG) BY MOUTH DAILY   Refills:  3        lisinopril 10 MG tablet   Commonly known as:  PRINIVIL/ZESTRIL   Quantity:  90 tablet        TAKE 1 TABLET(10 MG) BY MOUTH DAILY   Refills:  1        NEXIUM PO   Dose:  20 mg        Take 20 mg by mouth   Refills:  0        priLOSEC 10 MG CR capsule   Dose:  10 mg   Generic drug:  omeprazole        Take 10 mg by mouth as needed   Refills:  0        PRILOSEC PO        Refills:  0                Prescriptions were sent or printed at these locations (2 Prescriptions)                   Other Prescriptions                Printed at Department/Unit printer (2 of 2)         sucralfate (CARAFATE) 1 GM tablet               oxyCODONE IR (ROXICODONE) 5 MG tablet                Procedures and tests performed during your visit     Procedure/Test Number of Times Performed    Abdomen US, limited (RUQ only) 1    Abdomen XR, 2 vw, flat and upright 1    CBC with platelets differential 1    Comprehensive metabolic panel 1    EKG 12 lead 1    HCG qualitative Blood 1    Lipase 1    Peripheral IV catheter 2    Pulse oximetry nursing 1    Vital signs 1      Orders Needing Specimen Collection     None      Pending Results     Date and Time Order Name Status Description    2/28/2018 1512 Abdomen XR, 2 vw, flat and upright In process              Pending Culture Results     No orders found from 2/26/2018 to 3/1/2018.            Pending Results Instructions     If you had any lab results that were not finalized at the time of your Discharge, you can call the ED Lab Result RN at [phone]. You will be contacted by this team for any positive Lab results or changes in treatment. The nurses are available 7 days a week from 10A to 6:30P.  You can leave a message 24 hours per day and they will return your call.        Test Results From Your Hospital Stay        2/28/2018  2:43 PM      Component Results     Component Value Ref Range & Units Status    WBC 11.6 (H) 4.0 - 11.0 10e9/L Final    RBC Count 4.78 3.8 - 5.2 10e12/L Final    Hemoglobin 14.2 11.7 - 15.7 g/dL Final    Hematocrit 43.8 35.0 - 47.0 % Final    MCV 92 78 - 100 fl Final    MCH 29.7 26.5 - 33.0 pg Final    MCHC 32.4 31.5 - 36.5 g/dL Final    RDW 13.1 10.0 - 15.0 % Final    Platelet Count 294 150 - 450 10e9/L Final    Diff Method Automated Method  Final    % Neutrophils 70.4 % Final    % Lymphocytes 18.9 % Final    % Monocytes 6.6 % Final    % Eosinophils 2.7 % Final    % Basophils 0.9 % Final    % Immature Granulocytes 0.5 % Final    Nucleated RBCs 0 0 /100 Final    Absolute Neutrophil 8.2 1.6 - 8.3 10e9/L Final    Absolute Lymphocytes 2.2 0.8 - 5.3 10e9/L Final    Absolute Monocytes 0.8 0.0 - 1.3 10e9/L Final    Absolute Eosinophils 0.3 0.0 - 0.7 10e9/L Final    Absolute Basophils 0.1 0.0 - 0.2 10e9/L Final    Abs Immature Granulocytes 0.1 0 - 0.4 10e9/L Final    Absolute Nucleated RBC 0.0  Final         2/28/2018  3:01 PM      Component Results     Component Value Ref Range & Units Status    Sodium 139 133 - 144 mmol/L Final    Potassium 4.0 3.4 - 5.3 mmol/L Final    Chloride 106 94 - 109 mmol/L Final    Carbon Dioxide 25 20 - 32 mmol/L Final    Anion Gap 8 3 - 14 mmol/L Final    Glucose 82 70 - 99 mg/dL Final    Urea Nitrogen 13 7 - 30 mg/dL Final    Creatinine 0.83 0.52 - 1.04 mg/dL Final     GFR Estimate 75 >60 mL/min/1.7m2 Final    Non  GFR Calc    GFR Estimate If Black >90 >60 mL/min/1.7m2 Final    African American GFR Calc    Calcium 9.4 8.5 - 10.1 mg/dL Final    Bilirubin Total 0.4 0.2 - 1.3 mg/dL Final    Albumin 3.5 3.4 - 5.0 g/dL Final    Protein Total 7.7 6.8 - 8.8 g/dL Final    Alkaline Phosphatase 127 40 - 150 U/L Final    ALT 87 (H) 0 - 50 U/L Final    AST 60 (H) 0 - 45 U/L Final         2/28/2018  3:01 PM      Component Results     Component Value Ref Range & Units Status    Lipase 192 73 - 393 U/L Final         2/28/2018  3:13 PM      Component Results     Component Value Ref Range & Units Status    HCG Qualitative Serum Negative NEG^Negative Final    This test is for screening purposes.  Results should be interpreted along with   the clinical picture.  Confirmation testing is available if warranted by   ordering QSM023, HCG Quantitative Pregnancy.           2/28/2018  3:55 PM      Result not yet available     Exam Ended         2/28/2018  3:59 PM      Narrative     LIMITED ABDOMINAL (RIGHT UPPER QUADRANT) ULTRASOUND  2/28/2018 3:46 PM    HISTORY: Right upper quadrant pain.    COMPARISON: An ultrasound on 10/3/2016.    FINDINGS: The liver is normal in size. It demonstrates diffuse  increased echogenicity without focal lesions. There has been an  interval cholecystectomy. The common bile duct is normal measuring 0.3  cm in diameter. The visualized portion of the pancreas is normal. The  right kidney is normal with no hydronephrosis or abnormal mass.        Impression     IMPRESSION:   1. Cholecystectomy.  2. Diffuse fatty infiltration of the liver.    LEON WARE MD                Clinical Quality Measure: Blood Pressure Screening     Your blood pressure was checked while you were in the emergency department today. The last reading we obtained was  BP: 125/77 . Please read the guidelines below about what these numbers mean and what you should do about them.  If your  systolic blood pressure (the top number) is less than 120 and your diastolic blood pressure (the bottom number) is less than 80, then your blood pressure is normal. There is nothing more that you need to do about it.  If your systolic blood pressure (the top number) is 120-139 or your diastolic blood pressure (the bottom number) is 80-89, your blood pressure may be higher than it should be. You should have your blood pressure rechecked within a year by a primary care provider.  If your systolic blood pressure (the top number) is 140 or greater or your diastolic blood pressure (the bottom number) is 90 or greater, you may have high blood pressure. High blood pressure is treatable, but if left untreated over time it can put you at risk for heart attack, stroke, or kidney failure. You should have your blood pressure rechecked by a primary care provider within the next 4 weeks.  If your provider in the emergency department today gave you specific instructions to follow-up with your doctor or provider even sooner than that, you should follow that instruction and not wait for up to 4 weeks for your follow-up visit.        Thank you for choosing [facility]       Thank you for choosing [facility] for your care. Our goal is always to provide you with excellent care. Hearing back from our patients is one way we can continue to improve our services. Please take a few minutes to complete the written survey that you may receive in the mail after you visit with us. Thank you!        [employer] Information     [employer] gives you secure access to your electronic health record. If you see a primary care provider, you can also send messages to your care team and make appointments. If you have questions, please call your primary care clinic.  If you do not have a primary care provider, please call [phone] and they will assist you.        Care EveryWhere ID     This is your Care EveryWhere ID. This could be used by other organizations to  access your [facility] medical records  [id number]        Equal Access to Services     MORE MARROQUIN : Hadii last Jones, eve kwong, drea ribeiro, jean-claude stone. So [facility] [phone].    ATENCIÓN: Si habla español, tiene a feliciano disposición servicios gratuitos de asistencia lingüística. Llame al [phone].    We comply with applicable federal civil rights laws and [place] laws. We do not discriminate on the basis of race, color, national origin, age, disability, sex, sexual orientation, or gender identity.            After Visit Summary       This is your record. Keep this with you and show to your community pharmacist(s) and doctor(s) at your next visit.

## 2018-03-02 ENCOUNTER — OFFICE VISIT (OUTPATIENT)
Dept: FAMILY MEDICINE | Facility: CLINIC | Age: 43
End: 2018-03-02
Payer: COMMERCIAL

## 2018-03-02 VITALS
HEIGHT: 64 IN | OXYGEN SATURATION: 97 % | TEMPERATURE: 98.2 F | RESPIRATION RATE: 18 BRPM | BODY MASS INDEX: 42.12 KG/M2 | SYSTOLIC BLOOD PRESSURE: 122 MMHG | HEART RATE: 72 BPM | DIASTOLIC BLOOD PRESSURE: 84 MMHG | WEIGHT: 246.7 LBS

## 2018-03-02 DIAGNOSIS — J45.20 MILD INTERMITTENT ASTHMA WITHOUT COMPLICATION: ICD-10-CM

## 2018-03-02 DIAGNOSIS — R10.13 ABDOMINAL PAIN, EPIGASTRIC: Primary | ICD-10-CM

## 2018-03-02 DIAGNOSIS — I10 ESSENTIAL HYPERTENSION, BENIGN: ICD-10-CM

## 2018-03-02 DIAGNOSIS — G43.819 OTHER MIGRAINE WITHOUT STATUS MIGRAINOSUS, INTRACTABLE: ICD-10-CM

## 2018-03-02 PROBLEM — G43.909 MIGRAINE: Status: ACTIVE | Noted: 2018-03-02

## 2018-03-02 PROCEDURE — 99214 OFFICE O/P EST MOD 30 MIN: CPT | Performed by: PHYSICIAN ASSISTANT

## 2018-03-02 RX ORDER — LOSARTAN POTASSIUM 50 MG/1
50 TABLET ORAL DAILY
Qty: 30 TABLET | Refills: 2 | Status: SHIPPED | OUTPATIENT
Start: 2018-03-02 | End: 2018-06-04

## 2018-03-02 NOTE — MR AVS SNAPSHOT
After Visit Summary   3/2/2018    Machelle Weiner    MRN: [id number]           Patient Information     Date Of Birth          1975        Visit Information        Provider Department      3/2/2018 11:40 AM Jean Sauceda PA-C [facility]        Today's Diagnoses     Abdominal pain, epigastric    -  1    Essential hypertension, benign        Mild intermittent asthma without complication           Follow-ups after your visit        Additional Services     GASTROENTEROLOGY ADULT REF PROCEDURE ONLY       Last Lab Result: Creatinine (mg/dL)       Date                     Value                 02/28/2018               0.83             ----------  Body mass index is 42.35 kg/(m^2).     Needed:  No  Language:  English    Patient will be contacted to schedule procedure.     Please be aware that coverage of these services is subject to the terms and limitations of your health insurance plan.  Call member services at your health plan with any benefit or coverage questions.  Any procedures must be performed at a [facility] facility OR coordinated by your clinic's referral office.    Please bring the following with you to your appointment:    (1) Any X-Rays, CTs or MRIs which have been performed.  Contact the facility where they were done to arrange for  prior to your scheduled appointment.    (2) List of current medications   (3) This referral request   (4) Any documents/labs given to you for this referral                  Future tests that were ordered for you today     Open Future Orders        Priority Expected Expires Ordered    H Pylori antigen stool Routine  4/1/2018 3/2/2018            Who to contact     If you have questions or need follow up information about today's clinic visit or your schedule please contact [facility] directly at [phone].  Normal or non-critical lab and imaging results will be communicated to you by MyChart, letter or  "phone within 4 business days after the clinic has received the results. If you do not hear from us within 7 days, please contact the clinic through Gist or phone. If you have a critical or abnormal lab result, we will notify you by phone as soon as possible.  Submit refill requests through Gist or call your pharmacy and they will forward the refill request to us. Please allow 3 business days for your refill to be completed.          Additional Information About Your Visit        Happy Inspector[employer] Information     Gist gives you secure access to your electronic health record. If you see a primary care provider, you can also send messages to your care team and make appointments. If you have questions, please call your primary care clinic.  If you do not have a primary care provider, please call [phone] and they will assist you.        Care EveryWhere ID     This is your Care EveryWhere ID. This could be used by other organizations to access your [facility] medical records  [id number]        Your Vitals Were     Pulse Temperature Respirations Height Last Period Pulse Oximetry    72 98.2  F (36.8  C) (Tympanic) 18 5' 4\" (1.626 m) 02/01/2016 (Within Days) 97%    BMI (Body Mass Index)                   42.35 kg/m2            Blood Pressure from Last 3 Encounters:   03/02/18 122/84   02/28/18 125/77   02/21/18 122/84    Weight from Last 3 Encounters:   03/02/18 246 lb 11.2 oz (111.9 kg)   02/28/18 240 lb (108.9 kg)   11/20/17 240 lb (108.9 kg)              We Performed the Following     Asthma Action Plan (AAP)     GASTROENTEROLOGY ADULT REF PROCEDURE ONLY          Today's Medication Changes          These changes are accurate as of 3/2/18 12:26 PM.  If you have any questions, ask your nurse or doctor.               Start taking these medicines.        Dose/Directions    losartan 50 MG tablet   Commonly known as:  COZAAR   Used for:  Essential hypertension, benign   Started by:  Jean Sauceda PA-C        Dose:  " 50 mg   Take 1 tablet (50 mg) by mouth daily   Quantity:  30 tablet   Refills:  2            Where to get your medicines      [facility] [address] [place] [address] [id number] AT [place] [address]  [address] [facility] [address]     Phone:  [phone]     losartan 50 MG tablet                Primary Care Provider Office Phone # Fax #    Jean Sauceda PA-C [phone] [address]  [facility] [address]        Equal Access to Services     [facility]: Hadii aad ku hadasho Soomaali, waaxda luqadaha, qaybta kaalmada adeegyada, waxay idiin hayaan adedash pittman . So [facility] [phone].    ATENCIÓN: Si habla español, tiene a feliciano disposición servicios gratuitos de asistencia lingüística. [facility] [phone].    We comply with applicable federal civil rights laws and [place] laws. We do not discriminate on the basis of race, color, national origin, age, disability, sex, sexual orientation, or gender identity.            Thank you!     Thank you for choosing [facility]  for your care. Our goal is always to provide you with excellent care. Hearing back from our patients is one way we can continue to improve our services. Please take a few minutes to complete the written survey that you may receive in the mail after your visit with us. Thank you!             Your Updated Medication List - Protect others around you: Learn how to safely use, store and throw away your medicines at www.disposemymeds.org.          This list is accurate as of 3/2/18 12:26 PM.  Always use your most recent med list.                   Brand Name Dispense Instructions for use Diagnosis    albuterol 108 (90 BASE) MCG/ACT Inhaler    PROAIR HFA/PROVENTIL HFA/VENTOLIN HFA    3 Inhaler    Inhale 2 puffs into the lungs every 6 hours as needed for shortness of breath / dyspnea    Asthma, mild intermittent, uncomplicated        calcium carbonate 500 MG chewable tablet    TUMS     Take 1 chew tab by mouth daily        ibuprofen 600 MG tablet    ADVIL/MOTRIN    30 tablet    Take 1 tablet (600 mg) by mouth every 6 hours as needed for moderate pain        levothyroxine 75 MCG tablet    SYNTHROID/LEVOTHROID    90 tablet    TAKE 1 TABLET(75 MCG) BY MOUTH DAILY    Acquired hypothyroidism       lisinopril 10 MG tablet    PRINIVIL/ZESTRIL    90 tablet    TAKE 1 TABLET(10 MG) BY MOUTH DAILY    Essential hypertension, benign       losartan 50 MG tablet    COZAAR    30 tablet    Take 1 tablet (50 mg) by mouth daily    Essential hypertension, benign       NEXIUM PO      Take 20 mg by mouth        oxyCODONE IR 5 MG tablet    ROXICODONE    8 tablet    Take 1 tablet (5 mg) by mouth every 6 hours as needed for pain        priLOSEC 10 MG CR capsule   Generic drug:  omeprazole      Take 10 mg by mouth as needed        PRILOSEC PO           sucralfate 1 GM tablet    CARAFATE    120 tablet    Take 1 tablet (1 g) by mouth 4 times daily

## 2018-03-02 NOTE — PROGRESS NOTES
SUBJECTIVE:   Machelle Weiner is a 42 year old female who presents to clinic today for the following health issues:    ED/UC Followup:    Facility:  [facility]   Date of visit: 2/28/18  Reason for visit: Abdominal pain, migraines   Current Status: Patient states that she needs to discuss migraines, and F/U on abdominal pain.     Patient also needs to F/U on labs she recently had done, and for BP. Also states that she is having cold symptoms that started today.      -Patient presents to follow up after ED visit 2/28  -she presented initially with abd pain and HA    -ABD pain: this is chronic  -usually a dull irritating pain  -then can get exacerbations  -these are often up in the epigastric area, less so in the perumbilical/lower abdomen  -does note worsened with steak; unsure about other flares; eats spicy food which used to bother her until she started taking daily nexium  -has some nausea on occasion as well  -will also feel in the back  -she has also developed increased headaches/migraines   -taking more nsaids    -headaches have increased over the past few months  -does think this correlate with starting lisinopril  -causing her to use nsaids more for head pain        Problem list and histories reviewed & adjusted, as indicated.  Additional history: as documented    Patient Active Problem List   Diagnosis     Asthma, mild intermittent     Hyperlipidemia LDL goal <160     Essential hypertension, benign     Acquired hypothyroidism     S/P laparoscopic cholecystectomy     Morbid obesity (H)     Past Surgical History:   Procedure Laterality Date     GYN SURGERY      cryosurgery of cervix at age 16     LAPAROSCOPIC CHOLECYSTECTOMY WITH CHOLANGIOGRAMS N/A 10/14/2016    Procedure: LAPAROSCOPIC CHOLECYSTECTOMY WITH CHOLANGIOGRAMS;  Surgeon: Cornelius Mueller MD;  Location:  OR       Social History   Substance Use Topics     Smoking status: Former Smoker     Packs/day: 0.30     Types: Cigarettes     Quit date:  "2/13/2015     Smokeless tobacco: Never Used     Alcohol use No     Family History   Problem Relation Age of Onset     Coronary Artery Disease Maternal Grandmother      Hyperlipidemia Maternal Grandmother      Hyperlipidemia Maternal Grandfather      DIABETES No family hx of      Hypertension No family hx of            Reviewed and updated as needed this visit by clinical staff       Reviewed and updated as needed this visit by Provider         ROS:  Constitutional, HEENT, cardiovascular, pulmonary, gi and gu systems are negative, except as otherwise noted.    OBJECTIVE:     /84 (BP Location: Right arm, Patient Position: Chair, Cuff Size: Adult Large)  Pulse 72  Temp 98.2  F (36.8  C) (Tympanic)  Resp 18  Ht 5' 4\" (1.626 m)  Wt 246 lb 11.2 oz (111.9 kg)  LMP 02/01/2016 (Within Days)  SpO2 97%  BMI 42.35 kg/m2  Body mass index is 42.35 kg/(m^2).  GENERAL: healthy, alert and no distress  HENT: ear canals and TM's normal, nose and mouth without ulcers or lesions  NECK: no adenopathy, no asymmetry, masses, or scars and thyroid normal to palpation  RESP: lungs clear to auscultation - no rales, rhonchi or wheezes  CV: regular rates and rhythm, normal S1 S2, no S3 or S4 and no murmur, click or rub  ABDOMEN: tenderness epigastric and bowel sounds normal  NEURO: Normal strength and tone, mentation intact and speech normal  PSYCH: mentation appears normal, affect normal/bright    Diagnostic Test Results:  See A/P    ASSESSMENT/PLAN:   1. Abdominal pain, epigastric  This has been a chronic issue. U/S in ER showed no gross abnormality. Consider ulcer given her nsaid use and the hx. We'll screen h pylori and set up EGD  - GASTROENTEROLOGY ADULT REF PROCEDURE ONLY  - H Pylori antigen stool; Future    2. Essential hypertension, benign  Well controlled however she notes that since being on lisinopril she has had chronic HA. Trial of losartan. Needs BP check at our pharmacy to confirm continue control  - losartan " (COZAAR) 50 MG tablet; Take 1 tablet (50 mg) by mouth daily  Dispense: 30 tablet; Refill: 2    3. Other migraine without status migrainosus, intractable  See above. We'll need to consider BB if headaches continue despite Rx change for above.    4. Mild intermittent asthma without complication  Well controlled. ACT completed.   - Asthma Action Plan (AAP)    Jean Sauceda PA-C  [facility]

## 2018-03-02 NOTE — LETTER
My Asthma Action Plan  Name: Machelle Weiner   YOB: 1975  Date: 3/2/2018   My doctor: Jean Sauceda PA-C   My clinic: [facility]        My Control Medicine: None  My Rescue Medicine: Albuterol (Proair/Ventolin/Proventil) inhaler prn   My Asthma Severity: intermittent  Avoid your asthma triggers: upper respiratory infections               GREEN ZONE   Good Control    I feel good    No cough or wheeze    Can work, sleep and play without asthma symptoms       Take your asthma control medicine every day.     1. If exercise triggers your asthma, take your rescue medication    15 minutes before exercise or sports, and    During exercise if you have asthma symptoms  2. Spacer to use with inhaler: If you have a spacer, make sure to use it with your inhaler             YELLOW ZONE Getting Worse  I have ANY of these:    I do not feel good    Cough or wheeze    Chest feels tight    Wake up at night   1. Keep taking your Green Zone medications  2. Start taking your rescue medicine:    every 20 minutes for up to 1 hour. Then every 4 hours for 24-48 hours.  3. If you stay in the Yellow Zone for more than 12-24 hours, contact your doctor.  4. If you do not return to the Green Zone in 12-24 hours or you get worse, start taking your oral steroid medicine if prescribed by your provider.           RED ZONE Medical Alert - Get Help  I have ANY of these:    I feel awful    Medicine is not helping    Breathing getting harder    Trouble walking or talking    Nose opens wide to breathe       1. Take your rescue medicine NOW  2. If your provider has prescribed an oral steroid medicine, start taking it NOW  3. Call your doctor NOW  4. If you are still in the Red Zone after 20 minutes and you have not reached your doctor:    Take your rescue medicine again and    Call 911 or go to the emergency room right away    See your regular doctor within 2 weeks of an Emergency Room or Urgent Care visit for  follow-up treatment.        Electronically signed by: Jean Sauceda, March 2, 2018    Annual Reminders:  Meet with Asthma Educator,  Flu Shot in the Fall, consider Pneumonia Vaccination for patients with asthma (aged 19 and older).    [facility] [address] [place] [address] [id number] AT [place] [address]                    Asthma Triggers  How To Control Things That Make Your Asthma Worse    Triggers are things that make your asthma worse.  Look at the list below to help you find your triggers and what you can do about them.  You can help prevent asthma flare-ups by staying away from your triggers.      Trigger                                                          What you can do   Cigarette Smoke  Tobacco smoke can make asthma worse. Do not allow smoking in your home, car or around you.  Be sure no one smokes at a child s day care or school.  If you smoke, ask your health care provider for ways to help you quit.  Ask family members to quit too.  Ask your health care provider for a referral to Quit Plan to help you quit smoking, or call 6-518-960-PLAN.     Colds, Flu, Bronchitis  These are common triggers of asthma. Wash your hands often.  Don t touch your eyes, nose or mouth.  Get a flu shot every year.     Dust Mites  These are tiny bugs that live in cloth or carpet. They are too small to see. Wash sheets and blankets in hot water every week.   Encase pillows and mattress in dust mite proof covers.  Avoid having carpet if you can. If you have carpet, vacuum weekly.   Use a dust mask and HEPA vacuum.   Pollen and Outdoor Mold  Some people are allergic to trees, grass, or weed pollen, or molds. Try to keep your windows closed.  Limit time out doors when pollen count is high.   Ask you health care provider about taking medicine during allergy season.     Animal Dander  Some people are allergic to skin flakes, urine or saliva from pets with fur or feathers. Keep pets with  fur or feathers out of your home.    If you can t keep the pet outdoors, then keep the pet out of your bedroom.  Keep the bedroom door closed.  Keep pets off cloth furniture and away from stuffed toys.     Mice, Rats, and Cockroaches  Some people are allergic to the waste from these pests.   Cover food and garbage.  Clean up spills and food crumbs.  Store grease in the refrigerator.   Keep food out of the bedroom.   Indoor Mold  This can be a trigger if your home has high moisture. Fix leaking faucets, pipes, or other sources of water.   Clean moldy surfaces.  Dehumidify basement if it is damp and smelly.   Smoke, Strong Odors, and Sprays  These can reduce air quality. Stay away from strong odors and sprays, such as perfume, powder, hair spray, paints, smoke incense, paint, cleaning products, candles and new carpet.   Exercise or Sports  Some people with asthma have this trigger. Be active!  Ask your doctor about taking medicine before sports or exercise to prevent symptoms.    Warm up for 5-10 minutes before and after sports or exercise.     Other Triggers of Asthma  Cold air:  Cover your nose and mouth with a scarf.  Sometimes laughing or crying can be a trigger.  Some medicines and food can trigger asthma.

## 2018-03-02 NOTE — PROGRESS NOTES
"  SUBJECTIVE:   Machelle Weiner is a 42 year old female who presents to clinic today for the following health issues:    Medication Followup of Lisinopril     Taking Medication as prescribed: {.:761871::\"yes\"}    Side Effects:  {NONEORCHOOSE:820734::\"None\"}    Medication Helping Symptoms:  {.:192140::\"yes\"}     Headaches      Duration: ***    Description  Location: {headaceh description:226998}   Character: {headaceh description:309760}  Frequency:  ***  Duration:  ***    Intensity:  {mild,moderate,severe:521908}    Accompanying signs and symptoms:    Precipitating or Alleviating factors:  Nausea/vomiting: {YES NO SOMETIMES:215899::\"no\"}  Dizziness: {YES NO SOMETIMES:027541::\"no\"}  Weakness or numbness: {YES NO SOMETIMES:061822::\"no\"}  Visual changes: {VISUAL STIGMATA:622135::\"none\"}  Fever: { :949019}  Sinus or URI symptoms {please list if present:833085}    History  Head trauma: { :148362::\"no\"}  Family history of migraines: { :755643::\"no\"}  Previous tests for headaches: { :812326::\"no\"}  Neurologist evaluations: { :802455::\"no\"}  Able to do daily activities when headache present: { :100727::\"no\"}  Wake with headaches: { :805256::\"no\"}  Daily pain medication use: { :274874::\"no\"}  Any changes in: {STRESSOR:792255}    Precipitating or Alleviating factors (light/sound/sleep/caffeine): ***    Therapies tried and outcome: {.:832845}    Outcome - {HELPFUL:002657}  Frequent/daily pain medication use: { :668615::\"no\"}        {additional problems for provider to add:437950}    Problem list and histories reviewed & adjusted, as indicated.  Additional history: {NONE - AS DOCUMENTED:329991::\"as documented\"}    {HIST REVIEW/ LINKS 2:735250}    Reviewed and updated as needed this visit by clinical staff       Reviewed and updated as needed this visit by Provider         {PROVIDER CHARTING PREFERENCE:103203}  "

## 2018-03-03 ASSESSMENT — ASTHMA QUESTIONNAIRES: ACT_TOTALSCORE: 21

## 2018-03-09 DIAGNOSIS — R10.13 ABDOMINAL PAIN, EPIGASTRIC: ICD-10-CM

## 2018-03-09 PROCEDURE — 87338 HPYLORI STOOL AG IA: CPT | Performed by: PHYSICIAN ASSISTANT

## 2018-03-12 LAB
H PYLORI AG STL QL IA: NORMAL
SPECIMEN SOURCE: NORMAL

## 2018-03-16 ENCOUNTER — OFFICE VISIT (OUTPATIENT)
Dept: FAMILY MEDICINE | Facility: CLINIC | Age: 43
End: 2018-03-16
Payer: COMMERCIAL

## 2018-03-16 VITALS
TEMPERATURE: 99.3 F | SYSTOLIC BLOOD PRESSURE: 127 MMHG | OXYGEN SATURATION: 97 % | DIASTOLIC BLOOD PRESSURE: 77 MMHG | HEART RATE: 65 BPM | RESPIRATION RATE: 16 BRPM | WEIGHT: 248.2 LBS | BODY MASS INDEX: 42.6 KG/M2

## 2018-03-16 DIAGNOSIS — J45.20 MILD INTERMITTENT ASTHMA WITHOUT COMPLICATION: Primary | ICD-10-CM

## 2018-03-16 DIAGNOSIS — J01.00 ACUTE NON-RECURRENT MAXILLARY SINUSITIS: ICD-10-CM

## 2018-03-16 PROCEDURE — 99214 OFFICE O/P EST MOD 30 MIN: CPT | Performed by: PHYSICIAN ASSISTANT

## 2018-03-16 RX ORDER — ALBUTEROL SULFATE 0.83 MG/ML
1 SOLUTION RESPIRATORY (INHALATION) EVERY 6 HOURS PRN
Qty: 25 VIAL | Refills: 0 | Status: SHIPPED | OUTPATIENT
Start: 2018-03-16 | End: 2022-04-19

## 2018-03-16 RX ORDER — ALBUTEROL SULFATE 90 UG/1
2 AEROSOL, METERED RESPIRATORY (INHALATION) EVERY 6 HOURS PRN
Qty: 2 INHALER | Refills: 1 | Status: SHIPPED | OUTPATIENT
Start: 2018-03-16 | End: 2019-04-05

## 2018-03-16 RX ORDER — DOXYCYCLINE 100 MG/1
100 CAPSULE ORAL 2 TIMES DAILY
Qty: 20 CAPSULE | Refills: 0 | Status: SHIPPED | OUTPATIENT
Start: 2018-03-16 | End: 2018-03-26

## 2018-03-16 NOTE — PROGRESS NOTES
HPI    SUBJECTIVE:   Machelle Weiner is a 42 year old female who presents to clinic today for the following health issues:    Acute Illness   Acute illness concerns: URI  Onset: ongoing for the past week; has increased in severity since Wednesday    Fever: no    Chills/Sweats: no    Headache (location?): YES- Frontal    Sinus Pressure:YES    Conjunctivitis:  no    Ear Pain: no    Rhinorrhea: YES    Congestion: YES    Sore Throat: YES     Cough: YES-non-productive    Wheeze: YES- minimal    Decreased Appetite: YES    Nausea: YES    Vomiting: no    Diarrhea:  no    Dysuria/Freq.: no    Fatigue/Achiness: YES- fatigue    Sick/Strep Exposure: YES- child has been sick     Therapies Tried and outcome: Albuterol inhaler, ibuprofen      Asthma Follow-Up    Was ACT completed today?    Yes    ACT Total Scores 3/2/2018   ACT TOTAL SCORE -   ASTHMA ER VISITS -   ASTHMA HOSPITALIZATIONS -   ACT TOTAL SCORE (Goal Greater than or Equal to 20) 21   In the past 12 months, how many times did you visit the emergency room for your asthma without being admitted to the hospital? 0   In the past 12 months, how many times were you hospitalized overnight because of your asthma? 0       Recent asthma triggers that patient is dealing with: upper respiratory infections        Problem list and histories reviewed & adjusted, as indicated.  Additional history: as documented    Patient Active Problem List   Diagnosis     Asthma, mild intermittent     Hyperlipidemia LDL goal <160     Essential hypertension, benign     Acquired hypothyroidism     S/P laparoscopic cholecystectomy     Morbid obesity (H)     Migraine     Past Surgical History:   Procedure Laterality Date     GYN SURGERY      cryosurgery of cervix at age 16     LAPAROSCOPIC CHOLECYSTECTOMY WITH CHOLANGIOGRAMS N/A 10/14/2016    Procedure: LAPAROSCOPIC CHOLECYSTECTOMY WITH CHOLANGIOGRAMS;  Surgeon: Cornelius Mueller MD;  Location:  OR       Social History   Substance Use Topics      Smoking status: Former Smoker     Packs/day: 0.30     Types: Cigarettes     Quit date: 2/13/2015     Smokeless tobacco: Never Used     Alcohol use No     Family History   Problem Relation Age of Onset     Coronary Artery Disease Maternal Grandmother      Hyperlipidemia Maternal Grandmother      Hyperlipidemia Maternal Grandfather      DIABETES No family hx of      Hypertension No family hx of            Reviewed and updated as needed this visit by clinical staff       Reviewed and updated as needed this visit by Provider         ROS:  Constitutional, HEENT, cardiovascular, pulmonary, gi and gu systems are negative, except as otherwise noted.    OBJECTIVE:     /77 (BP Location: Right arm, Patient Position: Chair, Cuff Size: Adult Large)  Pulse 65  Temp 99.3  F (37.4  C) (Oral)  Resp 16  Wt 248 lb 3.2 oz (112.6 kg)  LMP 02/01/2016 (Within Days)  SpO2 97%  Breastfeeding? No  BMI 42.6 kg/m2  Body mass index is 42.6 kg/(m^2).  GENERAL APPEARANCE: healthy, alert and no distress  HENT: ear canals and TM's normal, nasal mucosa edematous without rhinorrhea and maxillary sinus tenderness bilateral  RESP: lungs clear to auscultation - no rales, rhonchi or wheezes  CV: regular rates and rhythm, normal S1 S2, no S3 or S4 and no murmur, click or rub        ASSESSMENT/PLAN:             1. Mild intermittent asthma without complication  Stable. Use inhalers/nebs as needed   - albuterol (PROAIR HFA/PROVENTIL HFA/VENTOLIN HFA) 108 (90 BASE) MCG/ACT Inhaler; Inhale 2 puffs into the lungs every 6 hours as needed for shortness of breath / dyspnea  Dispense: 2 Inhaler; Refill: 1  - albuterol (2.5 MG/3ML) 0.083% neb solution; Take 1 vial (2.5 mg) by nebulization every 6 hours as needed for shortness of breath / dyspnea or wheezing  Dispense: 25 vial; Refill: 0    2. Acute non-recurrent maxillary sinusitis  Nasal saline spray.    - doxycycline monohydrate 100 MG capsule; Take 1 capsule (100 mg) by mouth 2 times daily for 10  days  Dispense: 20 capsule; Refill: 0    F/u if symptoms persist or worsening for sinus.  Every 6 months for asthma    Annie Chris PA-C  [facility]      Physical Exam

## 2018-03-16 NOTE — NURSING NOTE
"Chief Complaint   Patient presents with     URI       Initial /77 (BP Location: Right arm, Patient Position: Chair, Cuff Size: Adult Large)  Pulse 65  Temp 99.3  F (37.4  C) (Oral)  Resp 16  Wt 248 lb 3.2 oz (112.6 kg)  LMP 02/01/2016 (Within Days)  SpO2 97%  Breastfeeding? No  BMI 42.6 kg/m2 Estimated body mass index is 42.6 kg/(m^2) as calculated from the following:    Height as of 3/2/18: 5' 4\" (1.626 m).    Weight as of this encounter: 248 lb 3.2 oz (112.6 kg).  Medication Reconciliation: complete   Valerie Herrera CMA (AAMA)      "

## 2018-03-16 NOTE — MR AVS SNAPSHOT
After Visit Summary   3/16/2018    Machelle Weiner    MRN: [id number]           Patient Information     Date Of Birth          1975        Visit Information        Provider Department      3/16/2018 11:00 AM Annie Chris PA-C [facility]        Today's Diagnoses     Mild intermittent asthma without complication    -  1    Acute non-recurrent maxillary sinusitis           Follow-ups after your visit        Follow-up notes from your care team     Return in about 6 months (around 9/16/2018).      Who to contact     If you have questions or need follow up information about today's clinic visit or your schedule please contact [facility] directly at [phone].  Normal or non-critical lab and imaging results will be communicated to you by MyChart, letter or phone within 4 business days after the clinic has received the results. If you do not hear from us within 7 days, please contact the clinic through Eventohart or phone. If you have a critical or abnormal lab result, we will notify you by phone as soon as possible.  Submit refill requests through StyleTread or call your pharmacy and they will forward the refill request to us. Please allow 3 business days for your refill to be completed.          Additional Information About Your Visit        MyChart Information     StyleTread gives you secure access to your electronic health record. If you see a primary care provider, you can also send messages to your care team and make appointments. If you have questions, please call your primary care clinic.  If you do not have a primary care provider, please call [phone] and they will assist you.        Care EveryWhere ID     This is your Care EveryWhere ID. This could be used by other organizations to access your [place] medical records  [id number]        Your Vitals Were     Pulse Temperature Respirations Last Period Pulse Oximetry Breastfeeding?    65 99.3  F (37.4   C) (Oral) 16 02/01/2016 (Within Days) 97% No    BMI (Body Mass Index)                   42.6 kg/m2            Blood Pressure from Last 3 Encounters:   03/16/18 127/77   03/02/18 122/84   02/28/18 125/77    Weight from Last 3 Encounters:   03/16/18 248 lb 3.2 oz (112.6 kg)   03/02/18 246 lb 11.2 oz (111.9 kg)   02/28/18 240 lb (108.9 kg)              Today, you had the following     No orders found for display         Today's Medication Changes          These changes are accurate as of 3/16/18 12:10 PM.  If you have any questions, ask your nurse or doctor.               Start taking these medicines.        Dose/Directions    doxycycline monohydrate 100 MG capsule   Used for:  Acute non-recurrent maxillary sinusitis   Started by:  Annie Chris PA-C        Dose:  100 mg   Take 1 capsule (100 mg) by mouth 2 times daily for 10 days   Quantity:  20 capsule   Refills:  0         These medicines have changed or have updated prescriptions.        Dose/Directions    * albuterol 108 (90 BASE) MCG/ACT Inhaler   Commonly known as:  PROAIR HFA/PROVENTIL HFA/VENTOLIN HFA   This may have changed:  Another medication with the same name was added. Make sure you understand how and when to take each.   Used for:  Mild intermittent asthma without complication   Changed by:  Annie Chris PA-C        Dose:  2 puff   Inhale 2 puffs into the lungs every 6 hours as needed for shortness of breath / dyspnea   Quantity:  2 Inhaler   Refills:  1       * albuterol (2.5 MG/3ML) 0.083% neb solution   This may have changed:  You were already taking a medication with the same name, and this prescription was added. Make sure you understand how and when to take each.   Used for:  Mild intermittent asthma without complication   Changed by:  Annie Chris PA-C        Dose:  1 vial   Take 1 vial (2.5 mg) by nebulization every 6 hours as needed for shortness of breath / dyspnea or wheezing   Quantity:  25 vial   Refills:  0       *  Notice:  This list has 2 medication(s) that are the same as other medications prescribed for you. Read the directions carefully, and ask your doctor or other care provider to review them with you.      Stop taking these medicines if you haven't already. Please contact your care team if you have questions.     lisinopril 10 MG tablet   Commonly known as:  PRINIVIL/ZESTRIL   Stopped by:  Annie Chris PA-C                Where to get your medicines      These medications were sent to [facility] [address] [place] [address] [address] AT [place] [address]  [address] [facility] [address]    Hours:  24-hours Phone:  [phone]     albuterol (2.5 MG/3ML) 0.083% neb solution    albuterol 108 (90 BASE) MCG/ACT Inhaler    doxycycline monohydrate 100 MG capsule                Primary Care Provider Office Phone # Fax #    Jean Sauceda PA-C [phone]       [address] [facility] [address]        Equal Access to Services     [facility]: Hadii aad ku hadasho Soomaali, waaxda luqadaha, qaybta kaalmada adeegyada, waxay sigrid pittman . So [facility] [phone].    ATENCIÓN: Si habla español, tiene a feliciano disposición servicios gratuitos de asistencia lingüística. [facility] [phone].    We comply with applicable federal civil rights laws and [place] laws. We do not discriminate on the basis of race, color, national origin, age, disability, sex, sexual orientation, or gender identity.            Thank you!     Thank you for choosing [facility]  for your care. Our goal is always to provide you with excellent care. Hearing back from our patients is one way we can continue to improve our services. Please take a few minutes to complete the written survey that you may receive in the mail after your visit with us. Thank you!             Your Updated Medication List - Protect others around you: Learn how to safely use, store  and throw away your medicines at www.disposemymeds.org.          This list is accurate as of 3/16/18 12:10 PM.  Always use your most recent med list.                   Brand Name Dispense Instructions for use Diagnosis    * albuterol 108 (90 BASE) MCG/ACT Inhaler    PROAIR HFA/PROVENTIL HFA/VENTOLIN HFA    2 Inhaler    Inhale 2 puffs into the lungs every 6 hours as needed for shortness of breath / dyspnea    Mild intermittent asthma without complication       * albuterol (2.5 MG/3ML) 0.083% neb solution     25 vial    Take 1 vial (2.5 mg) by nebulization every 6 hours as needed for shortness of breath / dyspnea or wheezing    Mild intermittent asthma without complication       doxycycline monohydrate 100 MG capsule     20 capsule    Take 1 capsule (100 mg) by mouth 2 times daily for 10 days    Acute non-recurrent maxillary sinusitis       ibuprofen 600 MG tablet    ADVIL/MOTRIN    30 tablet    Take 1 tablet (600 mg) by mouth every 6 hours as needed for moderate pain        levothyroxine 75 MCG tablet    SYNTHROID/LEVOTHROID    90 tablet    TAKE 1 TABLET(75 MCG) BY MOUTH DAILY    Acquired hypothyroidism       losartan 50 MG tablet    COZAAR    30 tablet    Take 1 tablet (50 mg) by mouth daily    Essential hypertension, benign       NEXIUM PO      Take 20 mg by mouth        oxyCODONE IR 5 MG tablet    ROXICODONE    8 tablet    Take 1 tablet (5 mg) by mouth every 6 hours as needed for pain        sucralfate 1 GM tablet    CARAFATE    120 tablet    Take 1 tablet (1 g) by mouth 4 times daily        * Notice:  This list has 2 medication(s) that are the same as other medications prescribed for you. Read the directions carefully, and ask your doctor or other care provider to review them with you.

## 2018-05-03 DIAGNOSIS — I10 ESSENTIAL HYPERTENSION, BENIGN: ICD-10-CM

## 2018-05-03 RX ORDER — LOSARTAN POTASSIUM 50 MG/1
50 TABLET ORAL DAILY
Qty: 30 TABLET | Refills: 2 | Status: CANCELLED | OUTPATIENT
Start: 2018-05-03

## 2018-05-15 ENCOUNTER — OFFICE VISIT (OUTPATIENT)
Dept: FAMILY MEDICINE | Facility: CLINIC | Age: 43
End: 2018-05-15
Payer: COMMERCIAL

## 2018-05-15 VITALS
SYSTOLIC BLOOD PRESSURE: 130 MMHG | TEMPERATURE: 99 F | OXYGEN SATURATION: 98 % | BODY MASS INDEX: 42.74 KG/M2 | DIASTOLIC BLOOD PRESSURE: 80 MMHG | WEIGHT: 249 LBS | HEART RATE: 70 BPM | RESPIRATION RATE: 12 BRPM

## 2018-05-15 DIAGNOSIS — R07.0 THROAT PAIN: Primary | ICD-10-CM

## 2018-05-15 DIAGNOSIS — J45.20 MILD INTERMITTENT ASTHMA WITHOUT COMPLICATION: ICD-10-CM

## 2018-05-15 LAB
DEPRECATED S PYO AG THROAT QL EIA: NORMAL
SPECIMEN SOURCE: NORMAL

## 2018-05-15 PROCEDURE — 87880 STREP A ASSAY W/OPTIC: CPT | Performed by: NURSE PRACTITIONER

## 2018-05-15 PROCEDURE — 87081 CULTURE SCREEN ONLY: CPT | Performed by: NURSE PRACTITIONER

## 2018-05-15 PROCEDURE — 99213 OFFICE O/P EST LOW 20 MIN: CPT | Performed by: NURSE PRACTITIONER

## 2018-05-15 RX ORDER — OXYCODONE HYDROCHLORIDE 5 MG/1
TABLET ORAL
Refills: 0 | COMMUNITY
Start: 2018-02-28 | End: 2019-04-29

## 2018-05-15 NOTE — PATIENT INSTRUCTIONS
Self-Care for Sore Throats    Sore throats happen for many reasons, such as colds, allergies, and infections caused by viruses or bacteria. In any case, your throat becomes red and sore. Your goal for self-care is to reduce your discomfort while giving your throat a chance to heal.  Moisten and soothe your throat  Tips include the following:    Try a sip of water first thing after waking up.    Keep your throat moist by drinking 6 or more glasses of clear liquids every day.    Run a cool-air humidifier in your room overnight.    Avoid cigarette smoke.     Suck on throat lozenges, cough drops, hard candy, ice chips, or frozen fruit-juice bars. Use the sugar-free versions if your diet or medical condition requires them.  Gargle to ease irritation  Gargling every hour or 2 can ease irritation. Try gargling with 1 of these solutions:    1/4 teaspoon of salt in 1/2 cup of warm water    An over-the-counter anesthetic gargle  Use medicine for more relief  Over-the-counter medicine can reduce sore throat symptoms. Ask your pharmacist if you have questions about which medicine to use:    Ease pain with anesthetic sprays. Aspirin or an aspirin substitute also helps. Remember, never give aspirin to anyone 18 or younger, or if you are already taking blood thinners.     For sore throats caused by allergies, try antihistamines to block the allergic reaction.    Remember: unless a sore throat is caused by a bacterial infection, antibiotics won t help you.  Prevent future sore throats  Prevention tips include the following:    Stop smoking or reduce contact with secondhand smoke. Smoke irritates the tender throat lining.    Limit contact with pets and with allergy-causing substances, such as pollen and mold.    When you re around someone with a sore throat or cold, wash your hands often to keep viruses or bacteria from spreading.    Don t strain your vocal cords.  Call your healthcare provider  Contact your healthcare provider if  you have:    A temperature over 101 F (38.3 C)    White spots on the throat    Great difficulty swallowing    Trouble breathing    A skin rash    Recent exposure to someone else with strep bacteria    Severe hoarseness and swollen glands in the neck or jaw   Date Last Reviewed: 8/1/2016 2000-2017 [employer]. [address] [place], [place] [address]. All rights reserved. This information is not intended as a substitute for professional medical care. Always follow your healthcare professional's instructions.

## 2018-05-15 NOTE — PROGRESS NOTES
SUBJECTIVE:   Machelle Weiner is a 43 year old female who presents to clinic today for the following health issues:    RESPIRATORY SYMPTOMS      Duration: 1 day    Description  sore throat    Severity: mild    Accompanying signs and symptoms: throat feels tight    History (predisposing factors):  strep exposure and asthma    Precipitating or alleviating factors: None    Therapies tried and outcome:  none     Machelle reports that she noticed a sore throat yesterday. She does not think that she has a fever but she has a headache and feels like she is getting sick. Her son was diagnosed with strep throat on 5/11/2018. She has not taken any ibuprofen or tylenol. History of asthma, denies symptoms.       Problem list and histories reviewed & adjusted, as indicated.  Additional history: as documented    Reviewed and updated as needed this visit by clinical staff  Tobacco  Allergies  Meds  Med Hx  Surg Hx  Fam Hx  Soc Hx      Reviewed and updated as needed this visit by Provider       ROS:  Constitutional, HEENT, cardiovascular, pulmonary and psych systems are negative, except as otherwise noted.    This document serves as a record of the services and decisions personally performed and made by Susan Haase, CNP. It was created on her behalf by Martin Mario, a trained medical scribe. The creation of this document is based the provider's statements to the medical scribe.  Martin Mario May 15, 2018 1:14 PM      OBJECTIVE:   /80 (BP Location: Left arm, Patient Position: Chair, Cuff Size: Adult Large)  Pulse 70  Temp 99  F (37.2  C) (Oral)  Resp 12  Wt 112.9 kg (249 lb)  LMP 02/01/2016 (Within Days)  SpO2 98%  BMI 42.74 kg/m2  Body mass index is 42.74 kg/(m^2).  GENERAL: healthy, alert and no distress  HENT: erythema to posterior oropharynx, no difficulty swallowing, uvula midline, ear canals and TM's normal, nose and mouth without ulcers or lesions  NECK: no adenopathy, no asymmetry, masses, or scars and  thyroid normal to palpation  RESP: lungs clear to auscultation - no rales, rhonchi or wheezes  CV: regular rate and rhythm, normal S1 S2, no S3 or S4, no murmur, click or rub, no peripheral edema   PSYCH: mentation appears normal, affect normal/bright    Diagnostic Test Results:  Results for orders placed or performed in visit on 05/15/18 (from the past 24 hour(s))   Strep, Rapid Screen   Result Value Ref Range    Specimen Description Throat     Rapid Strep A Screen       NEGATIVE: No Group A streptococcal antigen detected by immunoassay, await culture report.       ASSESSMENT/PLAN:   Machelle was seen today for pharyngitis.    Diagnoses and all orders for this visit:    Throat pain: rapid strep negative, patient informed. Discussed gargling with warm salt water at least tid, may take tylenol on a prn basis for pain.    -     Strep, Rapid Screen  -     Beta strep group A culture    Mild intermittent asthma without complication:well controlled, asymptomatic.     Follow up in 9/2018 with PCP, sooner as needed.   The information in this document, created by the medical scribe for me, accurately reflects the services I personally performed and the decisions made by me. I have reviewed and approved this document for accuracy.   Susan Haase, CNP Susan Haase, BLANCA [facility]

## 2018-05-15 NOTE — MR AVS SNAPSHOT
After Visit Summary   5/15/2018    Machelle Weiner    MRN: [id number]           Patient Information     Date Of Birth          1975        Visit Information        Provider Department      5/15/2018 1:00 PM Haase, Susan Rachele, APRN [facility]        Today's Diagnoses     Throat pain    -  1      Care Instructions      Self-Care for Sore Throats    Sore throats happen for many reasons, such as colds, allergies, and infections caused by viruses or bacteria. In any case, your throat becomes red and sore. Your goal for self-care is to reduce your discomfort while giving your throat a chance to heal.  Moisten and soothe your throat  Tips include the following:    Try a sip of water first thing after waking up.    Keep your throat moist by drinking 6 or more glasses of clear liquids every day.    Run a cool-air humidifier in your room overnight.    Avoid cigarette smoke.     Suck on throat lozenges, cough drops, hard candy, ice chips, or frozen fruit-juice bars. Use the sugar-free versions if your diet or medical condition requires them.  Gargle to ease irritation  Gargling every hour or 2 can ease irritation. Try gargling with 1 of these solutions:    1/4 teaspoon of salt in 1/2 cup of warm water    An over-the-counter anesthetic gargle  Use medicine for more relief  Over-the-counter medicine can reduce sore throat symptoms. Ask your pharmacist if you have questions about which medicine to use:    Ease pain with anesthetic sprays. Aspirin or an aspirin substitute also helps. Remember, never give aspirin to anyone 18 or younger, or if you are already taking blood thinners.     For sore throats caused by allergies, try antihistamines to block the allergic reaction.    Remember: unless a sore throat is caused by a bacterial infection, antibiotics won t help you.  Prevent future sore throats  Prevention tips include the following:    Stop smoking or reduce contact with  secondhand smoke. Smoke irritates the tender throat lining.    Limit contact with pets and with allergy-causing substances, such as pollen and mold.    When you re around someone with a sore throat or cold, wash your hands often to keep viruses or bacteria from spreading.    Don t strain your vocal cords.  Call your healthcare provider  Contact your healthcare provider if you have:    A temperature over 101 F (38.3 C)    White spots on the throat    Great difficulty swallowing    Trouble breathing    A skin rash    Recent exposure to someone else with strep bacteria    Severe hoarseness and swollen glands in the neck or jaw   Date Last Reviewed: 8/1/2016 2000-2017 [employer]. [address] [place], [place] [address]. All rights reserved. This information is not intended as a substitute for professional medical care. Always follow your healthcare professional's instructions.                Follow-ups after your visit        Who to contact     If you have questions or need follow up information about today's clinic visit or your schedule please contact [facility] directly at [phone].  Normal or non-critical lab and imaging results will be communicated to you by Filtr8hart, letter or phone within 4 business days after the clinic has received the results. If you do not hear from us within 7 days, please contact the clinic through Filtr8hart or phone. If you have a critical or abnormal lab result, we will notify you by phone as soon as possible.  Submit refill requests through eBioscience or call your pharmacy and they will forward the refill request to us. Please allow 3 business days for your refill to be completed.          Additional Information About Your Visit        eBioscience Information     eBioscience gives you secure access to your electronic health record. If you see a primary care provider, you can also send messages to your care team and make appointments. If you have questions,  please call your primary care clinic.  If you do not have a primary care provider, please call [phone] and they will assist you.        Care EveryWhere ID     This is your Care EveryWhere ID. This could be used by other organizations to access your [place] medical records  [id number]        Your Vitals Were     Pulse Temperature Respirations Last Period Pulse Oximetry BMI (Body Mass Index)    70 99  F (37.2  C) (Oral) 12 02/01/2016 (Within Days) 98% 42.74 kg/m2       Blood Pressure from Last 3 Encounters:   05/15/18 130/80   03/16/18 127/77   03/02/18 122/84    Weight from Last 3 Encounters:   05/15/18 249 lb (112.9 kg)   03/16/18 248 lb 3.2 oz (112.6 kg)   03/02/18 246 lb 11.2 oz (111.9 kg)              We Performed the Following     Beta strep group A culture     Strep, Rapid Screen          Today's Medication Changes          These changes are accurate as of 5/15/18  1:26 PM.  If you have any questions, ask your nurse or doctor.               Stop taking these medicines if you haven't already. Please contact your care team if you have questions.     sucralfate 1 GM tablet   Commonly known as:  CARAFATE   Stopped by:  Haase, Susan Rachele, APRN CNP                   Information about OPIOIDS     PRESCRIPTION OPIOIDS: WHAT YOU NEED TO KNOW   You have a prescription for an opioid (narcotic) pain medicine. Opioids can cause addiction. If you have a history of chemical dependency of any type, you are at a higher risk of becoming addicted to opioids. Only take this medicine after all other options have been tried. Take it for as short a time and as few doses as possible.     Do not:    Drive. If you drive while taking these medicines, you could be arrested for driving under the influence (DUI).    Operate heavy machinery    Do any other dangerous activities while taking these medicines.     Drink any alcohol while taking these medicines.      Take with any other medicines that contain acetaminophen. Read all  labels carefully. Look for the word  acetaminophen  or  Tylenol.  Ask your pharmacist if you have questions or are unsure.    Store your pills in a secure place, locked if possible. We will not replace any lost or stolen medicine. If you don t finish your medicine, please throw away (dispose) as directed by your pharmacist. The [place] [employer]ution [employer] Agency has more information about safe disposal: https://www.pca.[facility].[place]/living-green/managing-unwanted-medications    All opioids tend to cause constipation. Drink plenty of water and eat foods that have a lot of fiber, such as fruits, vegetables, prune juice, apple juice and high-fiber cereal. Take a laxative (Miralax, milk of magnesia, Colace, Senna) if you don t move your bowels at least every other day.          Primary Care Provider Office Phone # Fax #    Jean Sauceda PA-C [phone] [address]  [facility] [address]        Equal Access to Services     [facility] : Hadii aad ku hadasho Soomaali, waaxda luqadaha, qaybta kaalmada adeegyada, waxay idiin hayaan karmen macearamaye pittman . So [facility] [phone].    ATENCIÓN: Si habla español, tiene a feliciano disposición servicios gratuitos de asistencia lingüística. Llame al [phone].    We comply with applicable federal civil rights laws and [place] laws. We do not discriminate on the basis of race, color, national origin, age, disability, sex, sexual orientation, or gender identity.            Thank you!     Thank you for choosing [facility]  for your care. Our goal is always to provide you with excellent care. Hearing back from our patients is one way we can continue to improve our services. Please take a few minutes to complete the written survey that you may receive in the mail after your visit with us. Thank you!             Your Updated Medication List - Protect others around you: Learn how to safely use, store and throw away your medicines at  www.disposemymeds.org.          This list is accurate as of 5/15/18  1:26 PM.  Always use your most recent med list.                   Brand Name Dispense Instructions for use Diagnosis    * albuterol 108 (90 Base) MCG/ACT Inhaler    PROAIR HFA/PROVENTIL HFA/VENTOLIN HFA    2 Inhaler    Inhale 2 puffs into the lungs every 6 hours as needed for shortness of breath / dyspnea    Mild intermittent asthma without complication       * albuterol (2.5 MG/3ML) 0.083% neb solution     25 vial    Take 1 vial (2.5 mg) by nebulization every 6 hours as needed for shortness of breath / dyspnea or wheezing    Mild intermittent asthma without complication       ibuprofen 600 MG tablet    ADVIL/MOTRIN    30 tablet    Take 1 tablet (600 mg) by mouth every 6 hours as needed for moderate pain        levothyroxine 75 MCG tablet    SYNTHROID/LEVOTHROID    90 tablet    TAKE 1 TABLET(75 MCG) BY MOUTH DAILY    Acquired hypothyroidism       losartan 50 MG tablet    COZAAR    30 tablet    Take 1 tablet (50 mg) by mouth daily    Essential hypertension, benign       NEXIUM PO      Take 20 mg by mouth        oxyCODONE IR 5 MG tablet    ROXICODONE     TK 1 T PO  Q 6 H PRN FOR PAIN        * Notice:  This list has 2 medication(s) that are the same as other medications prescribed for you. Read the directions carefully, and ask your doctor or other care provider to review them with you.

## 2018-05-16 LAB
BACTERIA SPEC CULT: NORMAL
SPECIMEN SOURCE: NORMAL

## 2018-06-04 DIAGNOSIS — I10 ESSENTIAL HYPERTENSION, BENIGN: ICD-10-CM

## 2018-06-04 NOTE — TELEPHONE ENCOUNTER
"Requested Prescriptions   Pending Prescriptions Disp Refills     losartan (COZAAR) 50 MG tablet [Pharmacy Med Name: LOSARTAN 50MG TABLETS]  Last Written Prescription Date:  3/2/18  Last Fill Quantity: 30,  # refills: 2   Last office visit: 5/15/2018 with prescribing provider:  5/15/2018     Future Office Visit:     30 tablet 0     Sig: TAKE 1 TABLET(50 MG) BY MOUTH DAILY    Angiotensin-II Receptors Passed    6/4/2018 11:18 AM       Passed - Blood pressure under 140/90 in past 12 months    BP Readings from Last 3 Encounters:   05/15/18 130/80   03/16/18 127/77   03/02/18 122/84                Passed - Recent (12 mo) or future (30 days) visit within the authorizing provider's specialty    Patient had office visit in the last 12 months or has a visit in the next 30 days with authorizing provider or within the authorizing provider's specialty.  See \"Patient Info\" tab in inbasket, or \"Choose Columns\" in Meds & Orders section of the refill encounter.           Passed - Patient is age 18 or older       Passed - No active pregnancy on record       Passed - Normal serum creatinine on file in past 12 months    Recent Labs   Lab Test  02/28/18   1415   CR  0.83            Passed - Normal serum potassium on file in past 12 months    Recent Labs   Lab Test  02/28/18   1415   POTASSIUM  4.0                   Passed - No positive pregnancy test in past 12 months          "

## 2018-06-06 RX ORDER — LOSARTAN POTASSIUM 50 MG/1
TABLET ORAL
Qty: 90 TABLET | Refills: 0 | Status: SHIPPED | OUTPATIENT
Start: 2018-06-06 | End: 2018-08-28

## 2018-08-28 DIAGNOSIS — I10 ESSENTIAL HYPERTENSION, BENIGN: ICD-10-CM

## 2018-08-28 NOTE — TELEPHONE ENCOUNTER
"Requested Prescriptions   Pending Prescriptions Disp Refills     losartan (COZAAR) 50 MG tablet [Pharmacy Med Name: LOSARTAN 50MG TABLETS]  Last Written Prescription Date:  6/6/18  Last Fill Quantity: 90,  # refills: 0   Last office visit: 5/15/2018 with prescribing provider:  Haase, Susan Rachele, APRN CNP    Future Office Visit:     90 tablet 0     Sig: TAKE 1 TABLET(50 MG) BY MOUTH DAILY    Angiotensin-II Receptors Passed    8/28/2018  9:14 AM       Passed - Blood pressure under 140/90 in past 12 months    BP Readings from Last 3 Encounters:   05/15/18 130/80   03/16/18 127/77   03/02/18 122/84                Passed - Recent (12 mo) or future (30 days) visit within the authorizing provider's specialty    Patient had office visit in the last 12 months or has a visit in the next 30 days with authorizing provider or within the authorizing provider's specialty.  See \"Patient Info\" tab in inbasket, or \"Choose Columns\" in Meds & Orders section of the refill encounter.           Passed - Patient is age 18 or older       Passed - No active pregnancy on record       Passed - Normal serum creatinine on file in past 12 months    Recent Labs   Lab Test  02/28/18   1415   CR  0.83            Passed - Normal serum potassium on file in past 12 months    Recent Labs   Lab Test  02/28/18   1415   POTASSIUM  4.0                   Passed - No positive pregnancy test in past 12 months          "

## 2018-08-29 RX ORDER — LOSARTAN POTASSIUM 50 MG/1
TABLET ORAL
Qty: 90 TABLET | Refills: 2 | Status: SHIPPED | OUTPATIENT
Start: 2018-08-29 | End: 2019-04-29

## 2018-09-27 ENCOUNTER — TELEPHONE (OUTPATIENT)
Dept: FAMILY MEDICINE | Facility: CLINIC | Age: 43
End: 2018-09-27

## 2018-09-27 NOTE — TELEPHONE ENCOUNTER
Type of outreach:  Sent [employer] message.  Health Maintenance Due   Topic Date Due     HIV SCREEN (SYSTEM ASSIGNED)  04/11/1994     PAP SCREENING Q3 YR (SYSTEM ASSIGNED)  01/25/2016     INFLUENZA VACCINE (1) 09/01/2018     ASTHMA CONTROL TEST Q6 MOS  09/02/2018     Due for physical and pap.  Nirmala Osborne MA

## 2018-11-21 ENCOUNTER — TELEPHONE (OUTPATIENT)
Dept: FAMILY MEDICINE | Facility: CLINIC | Age: 43
End: 2018-11-21

## 2018-11-21 DIAGNOSIS — E03.9 ACQUIRED HYPOTHYROIDISM: ICD-10-CM

## 2018-11-21 NOTE — TELEPHONE ENCOUNTER
Patient's prescription for levothyroxine good until 2/23. Recently lost insurance and will be starting new job in a few weeks. Insurance benefits start in April. Labs and OV due in March. Is asking if can have 90 day refill to cover until benefits kick in and will schedule appt. Not due for refill until 2/2019.    Did you want to send in additional refill now or have patient contact clinic when needed.    Cathy العلي RN

## 2018-11-22 RX ORDER — LEVOTHYROXINE SODIUM 75 UG/1
TABLET ORAL
Qty: 90 TABLET | Refills: 0 | Status: SHIPPED | OUTPATIENT
Start: 2018-11-22 | End: 2019-02-18

## 2019-02-18 DIAGNOSIS — E03.9 ACQUIRED HYPOTHYROIDISM: ICD-10-CM

## 2019-02-18 RX ORDER — LEVOTHYROXINE SODIUM 75 UG/1
TABLET ORAL
Qty: 90 TABLET | Refills: 0 | Status: SHIPPED | OUTPATIENT
Start: 2019-02-18 | End: 2019-04-30

## 2019-02-18 NOTE — TELEPHONE ENCOUNTER
"Requested Prescriptions   Pending Prescriptions Disp Refills     levothyroxine (SYNTHROID/LEVOTHROID) 75 MCG tablet [Pharmacy Med Name: LEVOTHYROXINE 0.075MG (75MCG) TABS]  Last Written Prescription Date:  11/22/18  Last Fill Quantity: 90,  # refills: 0   Last office visit: 5/15/2018 with prescribing provider:  Haase, Susan Rachele, APRN CNP    Future Office Visit:     90 tablet 0     Sig: TAKE ONE TABLET BY MOUTH EVERY DAY    Thyroid Protocol Passed - 2/18/2019  1:17 PM       Passed - Patient is 12 years or older       Passed - Recent (12 mo) or future (30 days) visit within the authorizing provider's specialty    Patient had office visit in the last 12 months or has a visit in the next 30 days with authorizing provider or within the authorizing provider's specialty.  See \"Patient Info\" tab in inbasket, or \"Choose Columns\" in Meds & Orders section of the refill encounter.             Passed - Medication is active on med list       Passed - Normal TSH on file in past 12 months    Recent Labs   Lab Test 02/21/18  1501   TSH 1.53             Passed - No active pregnancy on record    If patient is pregnant or has had a positive pregnancy test, please check TSH.         Passed - No positive pregnancy test in past 12 months    If patient is pregnant or has had a positive pregnancy test, please check TSH.            "

## 2019-04-05 DIAGNOSIS — J45.20 MILD INTERMITTENT ASTHMA WITHOUT COMPLICATION: ICD-10-CM

## 2019-04-05 RX ORDER — ALBUTEROL SULFATE 90 UG/1
AEROSOL, METERED RESPIRATORY (INHALATION)
Qty: 36 G | Refills: 0 | Status: SHIPPED | OUTPATIENT
Start: 2019-04-05 | End: 2019-05-30

## 2019-04-05 NOTE — TELEPHONE ENCOUNTER
Medication is being filled for 1 time refill only due to:  Pt over due for office visit.  Pt informed.     Janette Castanon RN

## 2019-04-18 ENCOUNTER — TELEPHONE (OUTPATIENT)
Dept: FAMILY MEDICINE | Facility: CLINIC | Age: 44
End: 2019-04-18

## 2019-04-18 NOTE — TELEPHONE ENCOUNTER
Reason for call:  Other   Patient called regarding (reason for call): call back  Additional comments:Would like to know if she should stop taking BP medication due to weight loss and at times low BP.     Refusing call back.  Sent to nurse triage.

## 2019-04-18 NOTE — TELEPHONE ENCOUNTER
Patient calling: Originally had appt set up with Juice for tomorrow. Was without insurance for a brief period of time d/t changing jobs. LOV: 5/15/18 for respiratory symptoms    Patient states she needs to have BP med refilled. Took last one today. (checked med list - notified she should have refills at pharmacy)     Patient questions whether or not she should still take med at this dose - she has lost a significant amount of weight and sometimes she feels BP is too low. Sometimes she feels dizzy or lightheaded (random times) - these feelings started last few months. Total weight loss since starting keto diet: about 74 lbs.     Patient reschedule appt for morning of 29th d/t new work schedule. She is wondering if she should go this next week without the losartan to see what bp will be when she comes in on 29th.     BP Readings from Last 3 Encounters:   05/15/18 130/80   03/16/18 127/77   03/02/18 122/84     Has been taking losartan as prescribed except for a short time a couple of months ago. Also wondering if thyroid med is not high enough as she has been feeling foggy and extra tired lately.     There has also been a handful of times when out of nowhere she experiences anxiety - noticed last year but getting more frequent. No noticable triggers. Has a family history of depression/bipolar disorder.     Asked patient is she would have time to come into any [facility] to have BP checked as walk in, provided hours of [facility] and [facility]. Patient stated that would not work for her. Advised patient there could be any number of reasons for her symptoms as she has not had a clinic visit/labs for over 1 year and does not know what her BP actually is, she has drastically changed her diet, has lost weight and now the anxiety is ramping up. Advised to continue with medications as prescribed until her appt on the 29th with her PCP, unless she could come in earlier (patient states she cannot). Also informed of UC hours  and locations if she feels she needs evaluation sooner or symptoms worsen. Patient stated understanding and is in agreement with plan - she feels she can wait to see Juice on 29th and will  prescription refill at pharmacy.

## 2019-04-29 ENCOUNTER — OFFICE VISIT (OUTPATIENT)
Dept: FAMILY MEDICINE | Facility: CLINIC | Age: 44
End: 2019-04-29
Payer: COMMERCIAL

## 2019-04-29 ENCOUNTER — TELEPHONE (OUTPATIENT)
Dept: FAMILY MEDICINE | Facility: CLINIC | Age: 44
End: 2019-04-29

## 2019-04-29 VITALS
WEIGHT: 193 LBS | BODY MASS INDEX: 32.95 KG/M2 | SYSTOLIC BLOOD PRESSURE: 142 MMHG | HEART RATE: 70 BPM | HEIGHT: 64 IN | TEMPERATURE: 97.8 F | DIASTOLIC BLOOD PRESSURE: 90 MMHG | OXYGEN SATURATION: 99 % | RESPIRATION RATE: 16 BRPM

## 2019-04-29 DIAGNOSIS — L71.9 ROSACEA: ICD-10-CM

## 2019-04-29 DIAGNOSIS — I10 ESSENTIAL HYPERTENSION, BENIGN: Primary | ICD-10-CM

## 2019-04-29 DIAGNOSIS — J45.20 MILD INTERMITTENT ASTHMA WITHOUT COMPLICATION: ICD-10-CM

## 2019-04-29 DIAGNOSIS — E03.9 ACQUIRED HYPOTHYROIDISM: ICD-10-CM

## 2019-04-29 DIAGNOSIS — Z12.31 VISIT FOR SCREENING MAMMOGRAM: ICD-10-CM

## 2019-04-29 LAB
ERYTHROCYTE [DISTWIDTH] IN BLOOD BY AUTOMATED COUNT: 14.4 % (ref 10–15)
HCT VFR BLD AUTO: 44.3 % (ref 35–47)
HGB BLD-MCNC: 14.5 G/DL (ref 11.7–15.7)
IRON SATN MFR SERPL: 14 % (ref 15–46)
IRON SERPL-MCNC: 47 UG/DL (ref 35–180)
MCH RBC QN AUTO: 30.1 PG (ref 26.5–33)
MCHC RBC AUTO-ENTMCNC: 32.7 G/DL (ref 31.5–36.5)
MCV RBC AUTO: 92 FL (ref 78–100)
PLATELET # BLD AUTO: 281 10E9/L (ref 150–450)
RBC # BLD AUTO: 4.81 10E12/L (ref 3.8–5.2)
T4 FREE SERPL-MCNC: 0.94 NG/DL (ref 0.76–1.46)
TIBC SERPL-MCNC: 343 UG/DL (ref 240–430)
TSH SERPL DL<=0.005 MIU/L-ACNC: 4.21 MU/L (ref 0.4–4)
WBC # BLD AUTO: 10.4 10E9/L (ref 4–11)

## 2019-04-29 PROCEDURE — 85027 COMPLETE CBC AUTOMATED: CPT | Performed by: PHYSICIAN ASSISTANT

## 2019-04-29 PROCEDURE — 99214 OFFICE O/P EST MOD 30 MIN: CPT | Performed by: PHYSICIAN ASSISTANT

## 2019-04-29 PROCEDURE — 84443 ASSAY THYROID STIM HORMONE: CPT | Performed by: PHYSICIAN ASSISTANT

## 2019-04-29 PROCEDURE — 83540 ASSAY OF IRON: CPT | Performed by: PHYSICIAN ASSISTANT

## 2019-04-29 PROCEDURE — 83550 IRON BINDING TEST: CPT | Performed by: PHYSICIAN ASSISTANT

## 2019-04-29 PROCEDURE — 84439 ASSAY OF FREE THYROXINE: CPT | Performed by: PHYSICIAN ASSISTANT

## 2019-04-29 PROCEDURE — 36415 COLL VENOUS BLD VENIPUNCTURE: CPT | Performed by: PHYSICIAN ASSISTANT

## 2019-04-29 RX ORDER — CLINDAMYCIN PHOSPHATE 10 MG/G
GEL TOPICAL 2 TIMES DAILY
Qty: 30 G | Refills: 2 | Status: ON HOLD | OUTPATIENT
Start: 2019-04-29 | End: 2024-04-19

## 2019-04-29 RX ORDER — LOSARTAN POTASSIUM 50 MG/1
50 TABLET ORAL DAILY
Qty: 90 TABLET | Refills: 0 | Status: SHIPPED | OUTPATIENT
Start: 2019-04-29 | End: 2019-09-18

## 2019-04-29 ASSESSMENT — MIFFLIN-ST. JEOR: SCORE: 1511.47

## 2019-04-29 NOTE — PROGRESS NOTES
"  SUBJECTIVE:   Machelle Weiner is a 44 year old female who presents to clinic today for the following   health issues:    Patient is here with multiple concerns. Patient has concerns of weight loss and would also like to follow up on blood pressure and thyroid.     {additional problems for provider to add:160393}    Additional history: {NONE - AS DOCUMENTED:555156::\"as documented\"}    Reviewed  and updated as needed this visit by clinical staff         Reviewed and updated as needed this visit by Provider         {HIST REVIEW/ LINKS 2:710079}    {PROVIDER CHARTING PREFERENCE:101160}      "

## 2019-04-29 NOTE — PROGRESS NOTES
SUBJECTIVE:   Machelle Weiner is a 44 year old female who presents to clinic today for the following   health issues:    Hypertension Follow-up    Outpatient blood pressures are not being checked.    Low Salt Diet: no added salt    Machelle notes that she has had a few episodes of feeling like her blood pressure was low with weight loss  She has not had checked regularly  Today it is high, but she is irritated and on her period and feeling gross  Does have a cuff  Continues to take meds      Hypothyroidism Follow-up    Since last visit, patient describes the following symptoms: Weight stable, no hair loss, no skin changes, no constipation, no loose stools and weight loss due to Keto diet.     Amount of exercise or physical activity: Some    Problems taking medications regularly: No    Medication side effects: none    Diet: regular (no restrictions)  She has been compliant overall with medications  Does note at times that she feels a little slower (cognitively)  Recalls similar symptoms when thyroid was abnormal in the past  -Occasionally noting changes of sleep with period, but sleeping is worse overall   -both initiation and maintenance      -Machelle presents for check in on BP and thyroid  -She has lost a significant amount of weight since last seen via keto diet   -feeling much of the benefits   -more energy  -no true exercise regiment; some resistance training  -her periods have been much more regular in the past 6 months      Additional history: as documented    Reviewed  and updated as needed this visit by clinical staff         Reviewed and updated as needed this visit by Provider         Patient Active Problem List   Diagnosis     Asthma, mild intermittent     Hyperlipidemia LDL goal <160     Essential hypertension, benign     Acquired hypothyroidism     S/P laparoscopic cholecystectomy     Morbid obesity (H)     Migraine     Past Surgical History:   Procedure Laterality Date     GYN SURGERY       "cryosurgery of cervix at age 16     LAPAROSCOPIC CHOLECYSTECTOMY WITH CHOLANGIOGRAMS N/A 10/14/2016    Procedure: LAPAROSCOPIC CHOLECYSTECTOMY WITH CHOLANGIOGRAMS;  Surgeon: Cornelius Mueller MD;  Location:  OR       Social History     Tobacco Use     Smoking status: Former Smoker     Packs/day: 0.30     Types: Cigarettes     Last attempt to quit: 2015     Years since quittin.2     Smokeless tobacco: Never Used   Substance Use Topics     Alcohol use: No     Alcohol/week: 0.0 oz     Family History   Problem Relation Age of Onset     Coronary Artery Disease Maternal Grandmother      Hyperlipidemia Maternal Grandmother      Hyperlipidemia Maternal Grandfather      Diabetes No family hx of      Hypertension No family hx of            ROS:  Constitutional, HEENT, cardiovascular, pulmonary, gi and gu systems are negative, except as otherwise noted.    OBJECTIVE:     BP (!) 148/100   Pulse 70   Temp 97.8  F (36.6  C) (Tympanic)   Resp 16   Ht 1.619 m (5' 3.75\")   Wt 87.5 kg (193 lb)   LMP 2016 (Within Days)   SpO2 99%   BMI 33.39 kg/m    Body mass index is 33.39 kg/m .  GENERAL: healthy, alert and no distress  NECK: thyroid normal to palpation/mild enlargement  RESP: lungs clear to auscultation - no rales, rhonchi or wheezes  CV: regular rate and rhythm, normal S1 S2, no S3 or S4, no murmur, click or rub, no peripheral edema and peripheral pulses strong  PSYCH: mentation appears normal, affect normal/bright    Diagnostic Test Results:  See a/p    ASSESSMENT/PLAN:   1. Essential hypertension, benign  Uncontrolled today. Patient was hoping to get off of medication with the weight loss. We'll plan to recheck at her annual, and with nurse/pharmacy visits. For now, Continue present management.   - Iron and iron binding capacity  - CBC with platelets  - losartan (COZAAR) 50 MG tablet; Take 1 tablet (50 mg) by mouth daily  Dispense: 90 tablet; Refill: 0    2. Acquired hypothyroidism  Needs updated labs. " Given symptoms above will screen for iron status as well. Update dose per results  - Iron and iron binding capacity  - CBC with platelets  - TSH with free T4 reflex    3. Mild intermittent asthma without complication  Well controlled. Continue present management.     4. Visit for screening mammogram  - *MA Screening Digital Bilateral; Future    5. Rosacea  Trial of below. Has had success with this in the past  - clindamycin (CLINDAMAX) 1 % external gel; Apply topically 2 times daily  Dispense: 30 g; Refill: 2      Jean Sauceda PA-C  [facility]

## 2019-04-29 NOTE — TELEPHONE ENCOUNTER
Type of outreach:  Discussed with patient at OV on 4/29/19.  Health Maintenance Due   Topic Date Due     PREVENTIVE CARE VISIT  1975     HIV SCREEN (SYSTEM ASSIGNED)  04/11/1994     DTAP/TDAP/TD IMMUNIZATION (1 - Tdap) 04/11/2001     PAP SCREENING Q3 YR (SYSTEM ASSIGNED)  01/25/2016     INFLUENZA VACCINE (1) 09/01/2018     ASTHMA CONTROL TEST Q6 MOS  09/02/2018     ASTHMA ACTION PLAN Q1 YR  03/02/2019     Pt is unsure of when last pap was, knows that she needs to schedule.   Nirmala Osborne MA

## 2019-04-30 ENCOUNTER — TELEPHONE (OUTPATIENT)
Dept: FAMILY MEDICINE | Facility: CLINIC | Age: 44
End: 2019-04-30

## 2019-04-30 DIAGNOSIS — E03.9 ACQUIRED HYPOTHYROIDISM: ICD-10-CM

## 2019-04-30 RX ORDER — LEVOTHYROXINE SODIUM 75 UG/1
TABLET ORAL
Qty: 90 TABLET | Refills: 1 | Status: SHIPPED | OUTPATIENT
Start: 2019-04-30 | End: 2019-11-21

## 2019-04-30 ASSESSMENT — ANXIETY QUESTIONNAIRES
IF YOU CHECKED OFF ANY PROBLEMS ON THIS QUESTIONNAIRE, HOW DIFFICULT HAVE THESE PROBLEMS MADE IT FOR YOU TO DO YOUR WORK, TAKE CARE OF THINGS AT HOME, OR GET ALONG WITH OTHER PEOPLE: SOMEWHAT DIFFICULT
5. BEING SO RESTLESS THAT IT IS HARD TO SIT STILL: NOT AT ALL
GAD7 TOTAL SCORE: 4
3. WORRYING TOO MUCH ABOUT DIFFERENT THINGS: SEVERAL DAYS
2. NOT BEING ABLE TO STOP OR CONTROL WORRYING: SEVERAL DAYS
1. FEELING NERVOUS, ANXIOUS, OR ON EDGE: SEVERAL DAYS
7. FEELING AFRAID AS IF SOMETHING AWFUL MIGHT HAPPEN: NOT AT ALL
6. BECOMING EASILY ANNOYED OR IRRITABLE: SEVERAL DAYS

## 2019-04-30 ASSESSMENT — ASTHMA QUESTIONNAIRES: ACT_TOTALSCORE: 24

## 2019-04-30 ASSESSMENT — PATIENT HEALTH QUESTIONNAIRE - PHQ9
SUM OF ALL RESPONSES TO PHQ QUESTIONS 1-9: 12
5. POOR APPETITE OR OVEREATING: NOT AT ALL

## 2019-04-30 NOTE — TELEPHONE ENCOUNTER
Patient called and per her request made an in person visit at 8am. First available 5/16/19.      Pt expressed understanding and acceptance of the plan.  Pt had no further questions at this time.  Advised if symptoms worsen that she call clinic back. 24/7 nurse triage line.     Marah Cordoba RN Flex

## 2019-04-30 NOTE — TELEPHONE ENCOUNTER
Sounds like she should have a visit to discuss.   Again, the thyroid levels are fine; not the cause of these symptoms.

## 2019-04-30 NOTE — TELEPHONE ENCOUNTER
"Patient called anxious. Talking fast and interrupting during conversation. Total time on phone call 25 min.       Patient stated that at appointment yesterday she forgot to tell you that she is having anxiety.     She has questions on her lab result note.   \"TSH was high. T4 was where it should be. He said to stay on same thyroid dose. That doesn't help the symptoms i'm having. It is very difficult to function.   Tired. Foggy headed. Can't get words out of mouth. Anxiety. But not constantly. ight now is bad time because of the period has started\" (hormones?)     \"If don't need a increase in medication then what do I do for symptoms? Where do I go from here? \"    \"I didn't mention: I have loss a significant amount of hair. 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 I have had to cut 10 inches off hair ponytail is 1/2 inch thinner in diameter.\"    \"I'm frustrated with situation. I'm stressed.\"     PHQ9 and GAD7 done over the phone.   Has a family history of anxiety, depression, suicidal thoughts, bipolar disease of mother.     Patient stated \" What I'm feeling lately has felt like anxiety not depression. I'm down and tired but not hopeless. I'm more lethargic.\"    Patient has not taken meds in the past for depression or anxiety. Stated she has not needed them.     Please review and advise.     Marah Cordoba RN Flex    "

## 2019-05-01 ASSESSMENT — ANXIETY QUESTIONNAIRES: GAD7 TOTAL SCORE: 4

## 2019-05-07 ENCOUNTER — TELEPHONE (OUTPATIENT)
Dept: FAMILY MEDICINE | Facility: CLINIC | Age: 44
End: 2019-05-07
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
## 2019-05-14 NOTE — PROGRESS NOTES
"  SUBJECTIVE:   Machelle Weiner is a 44 year old female who presents to clinic today for the following   health issues:    Abnormal Mood Symptoms    Duration: Ongoing     Description:  Depression: No  Anxiety: YES  Panic attacks: Unsure      Accompanying signs and symptoms: see PHQ-9 and TIMMY scores    History (similar episodes/previous evaluation): ongoing, similar episodes in the past     Precipitating or alleviating factors: Diazepam     Therapies tried and outcome: Diazepam - rx from a while ago from ER, effective     Machelle presents to discuss mood symptoms increasing over the past few years, worse over the past 8-9 months  She notes there is NO explanation for her symptoms - seem to come out of nowhere even on the days she is feeling great  Will have sudden \"nervous breakdown\"  Heart is pounding out of chest  Increased dread  She denies any ongoing anxiety symptoms  Sleeping normally  Does not think related to her KETO diet  Mostly symptoms will last just a few hours  May corollate with menstruation; this is completely normal  Never experienced symptoms like this previously  Has not been avoiding things because of the symptoms  There is a strong family hx of anxiety, depression  Has used diazepam previously    Additional history: as documented    Reviewed  and updated as needed this visit by clinical staff         Reviewed and updated as needed this visit by Provider         Patient Active Problem List   Diagnosis     Asthma, mild intermittent     Hyperlipidemia LDL goal <160     Essential hypertension, benign     Acquired hypothyroidism     S/P laparoscopic cholecystectomy     Morbid obesity (H)     Migraine     Past Surgical History:   Procedure Laterality Date     GYN SURGERY      cryosurgery of cervix at age 16     LAPAROSCOPIC CHOLECYSTECTOMY WITH CHOLANGIOGRAMS N/A 10/14/2016    Procedure: LAPAROSCOPIC CHOLECYSTECTOMY WITH CHOLANGIOGRAMS;  Surgeon: Cornelius Mueller MD;  Location:  OR       Social " "History     Tobacco Use     Smoking status: Former Smoker     Packs/day: 0.30     Types: Cigarettes     Last attempt to quit: 2015     Years since quittin.2     Smokeless tobacco: Never Used   Substance Use Topics     Alcohol use: No     Alcohol/week: 0.0 oz     Family History   Problem Relation Age of Onset     Coronary Artery Disease Maternal Grandmother      Hyperlipidemia Maternal Grandmother      Hyperlipidemia Maternal Grandfather      Diabetes No family hx of      Hypertension No family hx of            ROS:  See above    OBJECTIVE:     /86   Pulse 77   Temp 97.9  F (36.6  C) (Tympanic)   Resp 16   Ht 1.619 m (5' 3.75\")   Wt 85.5 kg (188 lb 9.6 oz)   LMP 2019   SpO2 99%   BMI 32.63 kg/m    Body mass index is 32.63 kg/m .  GENERAL: healthy, alert and no distress  NECK: mild enlargement without palpable abnormality  RESP: lungs clear to auscultation - no rales, rhonchi or wheezes  CV: regular rate and rhythm, normal S1 S2, no S3 or S4, no murmur, click or rub, no peripheral edema and peripheral pulses strong  PSYCH: mentation appears normal, affect normal/bright    Diagnostic Test Results:  none     ASSESSMENT/PLAN:   1. Panic attacks  2. Acquired hypothyroidism  New onset symptoms. T4 wnl but with ongoing symptoms and newer panic like symptoms will screen thyroid U/S. Do not recommend benzo at this time. Discussed therapy. She prefers to hold off at this time. Ok to trial below prn. Follow-up in about 3 months (sooner as needed) to discuss symptoms. Will journal to ID any event triggers  - US Thyroid; Future  - hydrOXYzine (ATARAX) 25 MG tablet; Take 1-2 tablets (25-50 mg) by mouth 3 times daily as needed for anxiety  Dispense: 30 tablet; Refill: 0    Jean Sauceda PA-C  [facility]      "

## 2019-05-16 ENCOUNTER — OFFICE VISIT (OUTPATIENT)
Dept: FAMILY MEDICINE | Facility: CLINIC | Age: 44
End: 2019-05-16
Payer: COMMERCIAL

## 2019-05-16 VITALS
WEIGHT: 188.6 LBS | RESPIRATION RATE: 16 BRPM | SYSTOLIC BLOOD PRESSURE: 124 MMHG | TEMPERATURE: 97.9 F | BODY MASS INDEX: 32.2 KG/M2 | OXYGEN SATURATION: 99 % | HEIGHT: 64 IN | HEART RATE: 77 BPM | DIASTOLIC BLOOD PRESSURE: 86 MMHG

## 2019-05-16 DIAGNOSIS — F41.0 PANIC ATTACKS: Primary | ICD-10-CM

## 2019-05-16 DIAGNOSIS — E03.9 ACQUIRED HYPOTHYROIDISM: ICD-10-CM

## 2019-05-16 PROCEDURE — 99213 OFFICE O/P EST LOW 20 MIN: CPT | Performed by: PHYSICIAN ASSISTANT

## 2019-05-16 RX ORDER — HYDROXYZINE HYDROCHLORIDE 25 MG/1
25-50 TABLET, FILM COATED ORAL 3 TIMES DAILY PRN
Qty: 30 TABLET | Refills: 0 | Status: SHIPPED | OUTPATIENT
Start: 2019-05-16 | End: 2020-08-21

## 2019-05-16 ASSESSMENT — ANXIETY QUESTIONNAIRES
6. BECOMING EASILY ANNOYED OR IRRITABLE: NOT AT ALL
3. WORRYING TOO MUCH ABOUT DIFFERENT THINGS: NOT AT ALL
GAD7 TOTAL SCORE: 1
1. FEELING NERVOUS, ANXIOUS, OR ON EDGE: SEVERAL DAYS
7. FEELING AFRAID AS IF SOMETHING AWFUL MIGHT HAPPEN: NOT AT ALL
5. BEING SO RESTLESS THAT IT IS HARD TO SIT STILL: NOT AT ALL
2. NOT BEING ABLE TO STOP OR CONTROL WORRYING: NOT AT ALL

## 2019-05-16 ASSESSMENT — MIFFLIN-ST. JEOR: SCORE: 1491.51

## 2019-05-16 ASSESSMENT — PATIENT HEALTH QUESTIONNAIRE - PHQ9
SUM OF ALL RESPONSES TO PHQ QUESTIONS 1-9: 4
5. POOR APPETITE OR OVEREATING: NOT AT ALL

## 2019-05-16 NOTE — PATIENT INSTRUCTIONS
You can call [phone] to schedule your imaging at [facility].

## 2019-05-17 ASSESSMENT — ANXIETY QUESTIONNAIRES: GAD7 TOTAL SCORE: 1

## 2019-05-30 DIAGNOSIS — J45.20 MILD INTERMITTENT ASTHMA WITHOUT COMPLICATION: ICD-10-CM

## 2019-05-30 RX ORDER — ALBUTEROL SULFATE 90 UG/1
AEROSOL, METERED RESPIRATORY (INHALATION)
Qty: 36 G | Refills: 3 | Status: SHIPPED | OUTPATIENT
Start: 2019-05-30 | End: 2020-07-21

## 2019-05-30 NOTE — TELEPHONE ENCOUNTER
"Requested Prescriptions   Pending Prescriptions Disp Refills     VENTOLIN  (90 Base) MCG/ACT inhaler [Pharmacy Med Name: VENTOLIN HFA INH W/DOS CTR [id number]]    Last Written Prescription Date:  4/5/2019  Last Fill Quantity: 36 g,  # refills: 0   Last office visit: 5/16/2019 with prescribing provider:  Jean Sauceda     Future Office Visit:     36 g 0     Sig: INHALE 2 PUFFS BY MOUTH EVERY 6 HOURS AS NEEDED FOR SHORTNESS OF BREATH OR DIFFICULT BREATHING       Asthma Maintenance Inhalers - Anticholinergics Passed - 5/30/2019  6:06 AM        Passed - Patient is age 12 years or older        Passed - Asthma control assessment score within normal limits in last 6 months     Please review ACT score.     ACT Total Scores 11/21/2017 3/2/2018 4/29/2019   ACT TOTAL SCORE - - -   ASTHMA ER VISITS - - -   ASTHMA HOSPITALIZATIONS - - -   ACT TOTAL SCORE (Goal Greater than or Equal to 20) 23 21 24   In the past 12 months, how many times did you visit the emergency room for your asthma without being admitted to the hospital? 0 0 0   In the past 12 months, how many times were you hospitalized overnight because of your asthma? - 0 0             Passed - Medication is active on med list        Passed - Recent (6 mo) or future (30 days) visit within the authorizing provider's specialty     Patient had office visit in the last 6 months or has a visit in the next 30 days with authorizing provider or within the authorizing provider's specialty.  See \"Patient Info\" tab in inbasket, or \"Choose Columns\" in Meds & Orders section of the refill encounter.          Prescription approved per [facility] Refill Protocol.  Layla Grimes RN      "

## 2019-09-18 DIAGNOSIS — I10 ESSENTIAL HYPERTENSION, BENIGN: ICD-10-CM

## 2019-09-18 RX ORDER — LOSARTAN POTASSIUM 50 MG/1
TABLET ORAL
Qty: 90 TABLET | Refills: 0 | Status: SHIPPED | OUTPATIENT
Start: 2019-09-18 | End: 2019-12-19

## 2019-09-18 NOTE — TELEPHONE ENCOUNTER
Refilled will check with PCP as to when wants back in for recheck. Seen 4/29/19 BP, 5/16/19 Mental Health.

## 2019-09-18 NOTE — TELEPHONE ENCOUNTER
"Routing refill request to provider for review/approval because:  Protocol failed: LABS  Will route to TC's to assist in scheduling. Due 8/2019    Losartan 50 mg  Last Written Prescription Date:  4/29/19  Last Fill Quantity: 90,  # refills: 0   Last office visit: 5/16/2019 with prescribing provider:  DESIRAE   Future Office Visit:    Requested Prescriptions   Pending Prescriptions Disp Refills     losartan (COZAAR) 50 MG tablet [Pharmacy Med Name: LOSARTAN 50MG TABLETS] 90 tablet 0     Sig: TAKE 1 TABLET BY MOUTH EVERY DAY       Angiotensin-II Receptors Failed - 9/18/2019  3:44 PM        Failed - Normal serum creatinine on file in past 12 months     Recent Labs   Lab Test 02/28/18  1415   CR 0.83             Failed - Normal serum potassium on file in past 12 months     Recent Labs   Lab Test 02/28/18  1415   POTASSIUM 4.0                    Passed - Last blood pressure under 140/90 in past 12 months     BP Readings from Last 3 Encounters:   05/16/19 124/86   04/29/19 142/90   05/15/18 130/80                 Passed - Recent (12 mo) or future (30 days) visit within the authorizing provider's specialty     Patient had office visit in the last 12 months or has a visit in the next 30 days with authorizing provider or within the authorizing provider's specialty.  See \"Patient Info\" tab in inbasket, or \"Choose Columns\" in Meds & Orders section of the refill encounter.              Passed - Medication is active on med list        Passed - Patient is age 18 or older        Passed - No active pregnancy on record        Passed - No positive pregnancy test in past 12 months          "

## 2019-09-30 ENCOUNTER — TELEPHONE (OUTPATIENT)
Dept: FAMILY MEDICINE | Facility: CLINIC | Age: 44
End: 2019-09-30

## 2019-11-06 ENCOUNTER — HEALTH MAINTENANCE LETTER (OUTPATIENT)
Age: 44
End: 2019-11-06

## 2019-11-21 DIAGNOSIS — E03.9 ACQUIRED HYPOTHYROIDISM: ICD-10-CM

## 2019-11-21 RX ORDER — LEVOTHYROXINE SODIUM 75 UG/1
TABLET ORAL
Qty: 90 TABLET | Refills: 1 | Status: SHIPPED | OUTPATIENT
Start: 2019-11-21 | End: 2020-05-20

## 2019-11-21 NOTE — TELEPHONE ENCOUNTER
"Requested Prescriptions   Pending Prescriptions Disp Refills     levothyroxine (SYNTHROID/LEVOTHROID) 75 MCG tablet [Pharmacy Med Name: LEVOTHYROXINE 0.075MG (75MCG) TABS] 90 tablet 0     Sig: TAKE 1 TABLET BY MOUTH EVERY DAY  Last Written Prescription Date:  4/30/19  Last Fill Quantity: 90,  # refills: 1   Last office visit: 5/16/2019 with prescribing provider:  Sohail  Instructions         Return in about 3 months (around 8/16/2019) for Check up on symptoms.      Future Office Visit:           Thyroid Protocol Failed - 11/21/2019 12:14 PM        Failed - Normal TSH on file in past 12 months     Recent Labs   Lab Test 04/29/19  0853   TSH 4.21*              Passed - Patient is 12 years or older        Passed - Recent (12 mo) or future (30 days) visit within the authorizing provider's specialty     Patient has had an office visit with the authorizing provider or a provider within the authorizing providers department within the previous 12 mos or has a future within next 30 days. See \"Patient Info\" tab in inbasket, or \"Choose Columns\" in Meds & Orders section of the refill encounter.              Passed - Medication is active on med list        Passed - No active pregnancy on record     If patient is pregnant or has had a positive pregnancy test, please check TSH.          Passed - No positive pregnancy test in past 12 months     If patient is pregnant or has had a positive pregnancy test, please check TSH.          Ankit Singleton , [facility]  11/21/19 1:51 PM     "

## 2019-11-21 NOTE — TELEPHONE ENCOUNTER
Prescription approved per [facility], [facility] or [facility] refill protocol.  Ivelisse SHEA - Registered Nurse  [facility]  Acute and Diagnostic Services

## 2020-04-10 ENCOUNTER — NURSE TRIAGE (OUTPATIENT)
Dept: NURSING | Facility: CLINIC | Age: 45
End: 2020-04-10

## 2020-04-10 NOTE — TELEPHONE ENCOUNTER
"Lower right abdominal stabbing pain started last night.  Has had constant mild pain today with intermittent stabbing pain.  Also diarrhea. Also a SLIGHT cough which is left over from an illness/flu that she got on March 5, 2020, and she has been afebril.       Per guidelines should see provider within four hours.  [facility] hours still open so warm transferred to schedulers.  [facility] is finished with appointments today but  will see if [facility] can do a phone visit.    Ana Cristina Blount RN  [facility]            Reason for Disposition    [1] MILD-MODERATE pain AND [2] constant AND [3] present > 2 hours    Additional Information    Negative: Shock suspected (e.g., cold/pale/clammy skin, too weak to stand, low BP, rapid pulse)    Negative: Difficult to awaken or acting confused (e.g., disoriented, slurred speech)    Negative: Passed out (i.e., lost consciousness, collapsed and was not responding)    Negative: Sounds like a life-threatening emergency to the triager    Negative: [1] SEVERE pain (e.g., excruciating) AND [2] present > 1 hour    Negative: [1] SEVERE pain AND [2] age > 60    Negative: [1] Vomiting AND [2] contains red blood or black (\"coffee ground\") material  (Exception: few red streaks in vomit that only happened once)    Negative: Blood in bowel movements   (Exception: blood on surface of BM with constipation)    Negative: Black or tarry bowel movements  (Exception: chronic-unchanged  black-grey bowel movements AND is taking iron pills or Pepto-bismol)    Negative: Patient sounds very sick or weak to the triager    Protocols used: ABDOMINAL PAIN - FEMALE-A-AH      "

## 2020-04-13 ENCOUNTER — VIRTUAL VISIT (OUTPATIENT)
Dept: FAMILY MEDICINE | Facility: CLINIC | Age: 45
End: 2020-04-13
Payer: COMMERCIAL

## 2020-04-13 DIAGNOSIS — R10.31 RLQ ABDOMINAL PAIN: Primary | ICD-10-CM

## 2020-04-13 PROCEDURE — 99214 OFFICE O/P EST MOD 30 MIN: CPT | Mod: TEL | Performed by: PHYSICIAN ASSISTANT

## 2020-04-13 NOTE — PROGRESS NOTES
"Machelle Weiner is a 45 year old female who is being evaluated via a billable telephone visit.      The patient has been notified of following:     \"This telephone visit will be conducted via a call between you and your physician/provider. We have found that certain health care needs can be provided without the need for a physical exam.  This service lets us provide the care you need with a short phone conversation.  If a prescription is necessary we can send it directly to your pharmacy.  If lab work is needed we can place an order for that and you can then stop by our lab to have the test done at a later time.    Telephone visits are billed at different rates depending on your insurance coverage. During this emergency period, for some insurers they may be billed the same as an in-person visit.  Please reach out to your insurance provider with any questions.    If during the course of the call the physician/provider feels a telephone visit is not appropriate, you will not be charged for this service.\"    Patient has given verbal consent for Telephone visit?  Yes    How would you like to obtain your AVS? E-Mail (inform patient AVS not encrypted)    Subjective     Machelle Weiner is a 45 year old female who presents to clinic today for the following health issues:    Abdominal Pain      Duration: started last Thursday and stopped om Saturday, started period heavily today and has pain today.    Description (location/character/radiation):  Stabbing pain lower right abd. And extends to entire abd.       Associated flank pain:     Intensity:  Severe initially, now a dull menstrual cramp, but feels it more on the right side.    Accompanying signs and symptoms:        Fever/Chills: no        Gas/Bloating: no        Nausea/vomitting: YES, Mild Nausea, no vomiting, soft stools for 4 days.       Diarrhea: YES, mainly soft stools       Dysuria or Hematuria: no     History (previous similar pain/trauma/previous testing): " "no    Precipitating or alleviating factors:       Pain worse with eating/BM/urination: No       Pain relieved by BM: no     Therapies tried and outcome:no None  LMP: 20    Machelle Weiner is a 45 year old female who calls today with recent onset abd pain  \"jolted awake\" on Thursday with sharp RLQ pain; she notes this was fairly significant  Lasted around 10 seconds and subsided but then remained a dull pain for awhile until a bit later a second round of more intense pain  Consistent pain throoughout the night   Throughout the day Friday, constant dull pain with occasional sharp shooting pain  Did call nurse line; but sharp pains stopped  Dull pain persisted through the weekend until started her period this morning   -bleeding was normal this morning for her   There has been NO blood in the urine or change in urination  Has had some softer stools of late; but NOT diarrhea  Denies any pelvic pain  Did not feel febrile; no fevers on thermometer  She did notice a newer cough; no shortness of breath       Patient Active Problem List   Diagnosis     Asthma, mild intermittent     Hyperlipidemia LDL goal <160     Essential hypertension, benign     Acquired hypothyroidism     S/P laparoscopic cholecystectomy     Morbid obesity (H)     Migraine     Past Surgical History:   Procedure Laterality Date     GYN SURGERY      cryosurgery of cervix at age 16     LAPAROSCOPIC CHOLECYSTECTOMY WITH CHOLANGIOGRAMS N/A 10/14/2016    Procedure: LAPAROSCOPIC CHOLECYSTECTOMY WITH CHOLANGIOGRAMS;  Surgeon: Cornelius Mueller MD;  Location:  OR       Social History     Tobacco Use     Smoking status: Former Smoker     Packs/day: 0.30     Types: Cigarettes     Last attempt to quit: 2015     Years since quittin.1     Smokeless tobacco: Never Used   Substance Use Topics     Alcohol use: No     Alcohol/week: 0.0 standard drinks     Family History   Problem Relation Age of Onset     Coronary Artery Disease Maternal Grandmother  "     Hyperlipidemia Maternal Grandmother      Hyperlipidemia Maternal Grandfather      Diabetes No family hx of      Hypertension No family hx of            Reviewed and updated as needed this visit by Provider         Review of Systems   ROS COMP: Constitutional, HEENT, cardiovascular, pulmonary, gi and gu systems are negative, except as otherwise noted.       Objective   Reported vitals:  There were no vitals taken for this visit.   alert and no distress  PSYCH: Alert and oriented times 3; coherent speech, normal   rate and volume, able to articulate logical thoughts, able   to abstract reason, no tangential thoughts, no hallucinations   or delusions  Her affect is normal  RESP: No cough, no audible wheezing, able to talk in full sentences  Remainder of exam unable to be completed due to telephone visits    Diagnostic Test Results:  none         Assessment/Plan:  1. RLQ abdominal pain  Pain starting last week, sharp and dull intermittent. Now improved and onset of menstruation this morning. No fevers. No vomiting. No diarrhea, some looser stools. Hx of cholecystectomy. No urinary symptoms, doubtful uti or stone. Less likely appendix with improved symptoms. Wonder about menstrual related and consider fibroid or ovarian cyst given pain. Will monitor for now with persistent improvement and if again worsening may need to conisder imaging.      No follow-ups on file.      Phone call duration:  11-20 minutes    Jean Sauceda PA-C

## 2020-05-20 DIAGNOSIS — E03.9 ACQUIRED HYPOTHYROIDISM: ICD-10-CM

## 2020-05-20 RX ORDER — LEVOTHYROXINE SODIUM 75 UG/1
TABLET ORAL
Qty: 90 TABLET | Refills: 0 | Status: SHIPPED | OUTPATIENT
Start: 2020-05-20 | End: 2020-08-24

## 2020-05-20 NOTE — TELEPHONE ENCOUNTER
1st attempt - left message to advise of need for appointment for further refills (given 90 day supply on 5/20/2020) so can wait until July/August to be seen.  -Ivett Borrego

## 2020-07-15 ENCOUNTER — MYC MEDICAL ADVICE (OUTPATIENT)
Dept: FAMILY MEDICINE | Facility: CLINIC | Age: 45
End: 2020-07-15

## 2020-07-15 DIAGNOSIS — I10 ESSENTIAL HYPERTENSION, BENIGN: ICD-10-CM

## 2020-07-15 RX ORDER — LOSARTAN POTASSIUM 50 MG/1
TABLET ORAL
Qty: 30 TABLET | Refills: 0 | Status: SHIPPED | OUTPATIENT
Start: 2020-07-15 | End: 2020-08-21

## 2020-07-15 NOTE — TELEPHONE ENCOUNTER
I filled one month - she is well overdue for in clinic appt/labs for thyroid and BP. Please help schedule

## 2020-07-15 NOTE — TELEPHONE ENCOUNTER
Routing refill request to provider for review/approval because:  Labs out of range:  BP, creat, K    Ben MORLEY RN, BSN

## 2020-07-20 DIAGNOSIS — J45.20 MILD INTERMITTENT ASTHMA WITHOUT COMPLICATION: ICD-10-CM

## 2020-07-20 NOTE — TELEPHONE ENCOUNTER
Reason for call:  Other   Patient called regarding (reason for call): prescription  Additional comments: Pt called stating the humidity has been causing issues with her breathing, and would like this Rx filled as soon as possible. Pt was informed by the pharmacy that the Rx  in April. Offered to schedule a virtual Med check with PCP to quicken the process, but pt declined at this time. Please contact to discuss and advise the next step of care in order to get the refill. Pt states they are very concerned about getting this taken care of in a timely manner.    Phone number to reach patient:  Home number on file [phone] (home)    Best Time:  Any    Can we leave a detailed message on this number?  YES    Travel screening: Not Applicable

## 2020-07-21 ENCOUNTER — TELEPHONE (OUTPATIENT)
Dept: FAMILY MEDICINE | Facility: CLINIC | Age: 45
End: 2020-07-21

## 2020-07-21 RX ORDER — ALBUTEROL SULFATE 90 UG/1
AEROSOL, METERED RESPIRATORY (INHALATION)
Qty: 36 G | Refills: 0 | Status: SHIPPED | OUTPATIENT
Start: 2020-07-21 | End: 2020-08-21

## 2020-07-21 NOTE — TELEPHONE ENCOUNTER
Reason for call:  Other   Patient called regarding (reason for call): prescription  Additional comments: Pt called stating the humidity has been causing issues with her breathing, and would like this Rx filled as soon as possible. Pt was informed by the pharmacy that the Rx  in April. Offered to schedule a virtual Med check with PCP to quicken the process, but pt declined at this time. Please contact to discuss and advise the next step of care in order to get the refill. Pt states they are very concerned about getting this taken care of in a timely manner.    Phone number to reach patient:  Home number on file [phone] (home)    Best Time:  Any    Can we leave a detailed message on this number?  YES    Travel screening: Not Applicable

## 2020-07-21 NOTE — TELEPHONE ENCOUNTER
Routing to triage to discuss breathing issues.     Татьяна Chamberlain RN on 7/21/2020 at 11:32 AM

## 2020-07-21 NOTE — TELEPHONE ENCOUNTER
She is asking for a ventolin inhaler - last 5/30/19.       Her last ACT was April 29, 2019.      Last virtual visit was 4/13/2020 for ab pain.  LOV was 5/16/19.      Called and left a message to call us back.  Pt needs an appt - also please assess message below on breathing.

## 2020-07-21 NOTE — TELEPHONE ENCOUNTER
Patient called back next day very upset her inhaler still hadn't been sent to pharmacy. Explained the reasons for delay, helped patient schedule med check with her PCP, sent manav refill.  Patient was able to carry on heated discussion with RN about her disappointment in the refill system without any shortness of breath, coughing, or broken sentences to catch her breath. She stated she rarely uses her inhaler and thought she had refills left on her prescription but it had apparently . The hot humid makes her feel like she needs the inhaler more. She stated she is not in any current distress.    Ivelisse VÁZQUEZ, Triage RN

## 2020-07-23 NOTE — TELEPHONE ENCOUNTER
Medication was filled by [facility]. - appt has been scheduled. Will close encounter.     Татьяна Chamberlain RN on 7/23/2020 at 8:57 AM

## 2020-07-28 NOTE — TELEPHONE ENCOUNTER
Sent Crypt[employer] message back to patient informing that I made her appt for a longer time per patient request.   Nirmala Osborne MA

## 2020-08-04 DIAGNOSIS — Z20.822 SUSPECTED COVID-19 VIRUS INFECTION: Primary | ICD-10-CM

## 2020-08-04 NOTE — PROGRESS NOTES
"Date: 2020 14:54:49  Clinician: Horacio Herrera  Clinician NPI: [id number]  Patient: Machelle Weiner  Patient : 1975  Patient Address: [address] [place], [place] [id number]  Patient Phone: [phone]  Visit Protocol: ZHANGI  Patient Summary:  Machelle is a 45 year old ( : 1975 ) female who initiated a Visit for COVID-19 (Coronavirus) evaluation and screening. When asked the question \"Please sign me up to receive news, health information and promotions. \", Machelle responded \"No\".    Machelle states her symptoms started suddenly 3-4 days ago.   Her symptoms consist of wheezing, nausea, diarrhea, myalgia, a sore throat, a cough, malaise, a headache, and chills. She is experiencing mild difficulty breathing with activities but can speak normally in full sentences.   Symptom details     Cough: Machelle coughs every 5-10 minutes and her cough is not more bothersome at night. Phlegm does not come into her throat when she coughs. She does not believe her cough is caused by post-nasal drip.     Sore throat: Machelle reports having mild throat pain (1-3 on a 10 point pain scale), does not have exudate on her tonsils, and can swallow liquids. The lymph nodes in her neck are not enlarged. A rash has not appeared on the skin since the sore throat started.     Wheezing: Machelle has been diagnosed with asthma. Additional wheezing details as reported by the patient (free text): I have asthma and am used to occasional wheezing or shortness of breath that is resolved by using my albuterol inhaler. I currently have mild to moderate wheezing when I exhale that is only temporarily (5 minutes or less) resolved after using 3 puffs of my inhaler. I have a constant mild to moderate feeling that my airway is obstructed which is exacerbated by normal activities like showering. Showering made the wheezing significantly worse and it took 30 minutes for my breathing to feel ok again       Headache: She states the headache is mild " (1-3 on a 10 point pain scale).      Machelle denies having teeth pain, ageusia, anosmia, facial pain or pressure, fever, nasal congestion, vomiting, rhinitis, ear pain, and enlarged lymph nodes. She also denies having recent facial or sinus surgery in the past 60 days, double sickening (worsening symptoms after initial improvement), and taking antibiotic medication in the past month.   Precipitating events  Within the past week, Machelle has not been exposed to someone with strep throat. She has not recently been exposed to someone with influenza. Machelle has been in close contact with the following high risk individuals: people with asthma, heart disease or diabetes.   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   She does not work or volunteer as healthcare worker or a  and does not work or volunteer in a healthcare facility.   Machelle has not lived in a congregate living setting in the past 14 days. She does not live with a healthcare worker.   Machelle has not had a close contact with a laboratory-confirmed COVID-19 patient within 14 days of symptom onset.   Pertinent medical history  Machelle does not get yeast infections when she takes antibiotics.   Machelle does not need a return to work/school note.   Weight: 235 lbs   Machelle does not smoke or use smokeless tobacco.   She denies pregnancy and denies breastfeeding. She has menstruated in the past month.   Additional information as reported by the patient (free text): I'm sure you could tell based on the medications, but along with asthma I also have hypertension and hypothyroidism.   Weight: 235 lbs    MEDICATIONS: levothyroxine oral, losartan oral, albuterol sulfate inhalation, ALLERGIES: Penicillins, codeine  Clinician Response:  Dear Machelle,   Your symptoms show that you may have coronavirus (COVID-19). This illness can cause fever, cough and trouble breathing. Many people get a  "mild case and get better on their own. Some people can get very sick.  What should I do?  We would like to test you for this virus.   1. Please call [phone] to schedule your visit. Explain that you were referred by [facility] to have a COVID-19 test. Be ready to share your [facility] visit ID number.  The following will serve as your written order for this COVID Test, ordered by me, for the indication of suspected COVID [Z20.828]: The test will be ordered in [employer], our electronic health record, after you are scheduled. It will show as ordered and authorized by Arsen Cagle MD.  Order: COVID-19 (Coronavirus) PCR for SYMPTOMATIC testing from Pending sale to [facility].      2. When it's time for your COVID test:  Stay at least 6 feet away from others. (If someone will drive you to your test, stay in the backseat, as far away from the  as you can.)   Cover your mouth and nose with a mask, tissue or washcloth.  Go straight to the testing site. Don't make any stops on the way there or back.      3.Starting now: Stay home and away from others (self-isolate) until:   You've had no fever---and no medicine that reduces fever---for 3 full days (72 hours). And...   Your other symptoms have gotten better. For example, your cough or breathing has improved. And...   At least 10 days have passed since your symptoms started.       During this time, don't leave the house except for testing or medical care.   Stay in your own room, even for meals. Use your own bathroom if you can.   Stay away from others in your home. No hugging, kissing or shaking hands. No visitors.  Don't go to work, school or anywhere else.    Clean \"high touch\" surfaces often (doorknobs, counters, handles, etc.). Use a household cleaning spray or wipes. You'll find a full list of  on the EPA website: www.epa.gov/pesticide-registration/list-n-disinfectants-use-against-sars-cov-2.   Cover your mouth and nose with a mask, tissue or washcloth to avoid spreading germs.  Wash " your hands and face often. Use soap and water.  Caregivers in these groups are at risk for severe illness due to COVID-19:  o People 65 years and older  o People who live in a nursing home or long-term care facility  o People with chronic disease (lung, heart, cancer, diabetes, kidney, liver, immunologic)  o People who have a weakened immune system, including those who:   Are in cancer treatment  Take medicine that weakens the immune system, such as corticosteroids  Had a bone marrow or organ transplant  Have an immune deficiency  Have poorly controlled HIV or AIDS  Are obese (body mass index of 40 or higher)  Smoke regularly   o Caregivers should wear gloves while washing dishes, handling laundry and cleaning bedrooms and bathrooms.  o Use caution when washing and drying laundry: Don't shake dirty laundry, and use the warmest water setting that you can.  o For more tips, go to www.cdc.gov/coronavirus/2019-ncov/downloads/10Things.pdf.    4.Sign up for [employer]. We know it's scary to hear that you might have COVID-19. We want to track your symptoms to make sure you're okay over the next 2 weeks. Please look for an email from [employer]---this is a free, online program that we'll use to keep in touch. To sign up, follow the link in the email. Learn more at [email or web address]  How can I take care of myself?   Get lots of rest. Drink extra fluids (unless a doctor has told you not to).   Take Tylenol (acetaminophen) for fever or pain. If you have liver or kidney problems, ask your family doctor if it's okay to take Tylenol.   Adults can take either:    650 mg (two 325 mg pills) every 4 to 6 hours, or...   1,000 mg (two 500 mg pills) every 8 hours as needed.    Note: Don't take more than 3,000 mg in one day. Acetaminophen is found in many medicines (both prescribed and over-the-counter medicines). Read all labels to be sure you don't take too much.   For children, check the Tylenol bottle for the  right dose. The dose is based on the child's age or weight.    If you have other health problems (like cancer, heart failure, an organ transplant or severe kidney disease): Call your specialty clinic if you don't feel better in the next 2 days.       Know when to call 911. Emergency warning signs include:    Trouble breathing or shortness of breath Pain or pressure in the chest that doesn't go away Feeling confused like you haven't felt before, or not being able to wake up Bluish-colored lips or face.  Where can I get more information?   [facility] -- About COVID-19: [email or web address]   CDC -- What to Do If You're Sick: www.cdc.gov/coronavirus/2019-ncov/about/steps-when-sick.html   [facility] -- Ending Home Isolation: www.cdc.gov/coronavirus/2019-ncov/hcp/disposition-in-home-patients.html   [facility] -- Caring for Someone: www.cdc.gov/coronavirus/2019-ncov/if-you-are-sick/care-for-someone.html   [facility] -- Interim Guidance for Hospital Discharge to Home: www.[facility].mn./diseases/coronavirus/hcp/hospdischarge.pdf   [facility] clinical trials (COVID-19 research studies): clinicalaffairs.[facility]-clinical-trials    Below are the COVID-19 hotlines at the [place] Department of Health ([facility]). Interpreters are available.    For health questions: Call 250-838-7671 or 1-165.736.9380 (7 a.m. to 7 p.m.) For questions about schools and childcare: Call 805-096-1544 or 1-237.859.4066 (7 a.m. to 7 p.m.)       Rapid Strep Test - [place]    I am sorry you are not feeling well. Based on the information provided, it is possible you could have strep throat. When left untreated, strep can spread to other areas of the body and cause a more serious infection.  A strep test is the only way to determine if a bacterial infection or a virus is causing your sore throat. This is done in a lab where a swab of the back of your throat is collected and tested for the bacteria that causes strep.  Since you chose not to  use the lab order, please be seen:     In a clinic or urgent care    Within 24 hours     Call 911 or go to the emergency room if you suddenly develop a rash, are drooling or unable to swallow fluids, if you are having difficulty breathing, or feel that your throat is closing off.   Diagnosis: Pain in throat  Diagnosis ICD: R07.0  [facility] Results: Rapid Strep Test - [place] - Declined  [facility] Secondary Results: null

## 2020-08-06 DIAGNOSIS — Z20.822 SUSPECTED COVID-19 VIRUS INFECTION: ICD-10-CM

## 2020-08-06 PROCEDURE — U0003 INFECTIOUS AGENT DETECTION BY NUCLEIC ACID (DNA OR RNA); SEVERE ACUTE RESPIRATORY SYNDROME CORONAVIRUS 2 (SARS-COV-2) (CORONAVIRUS DISEASE [COVID-19]), AMPLIFIED PROBE TECHNIQUE, MAKING USE OF HIGH THROUGHPUT TECHNOLOGIES AS DESCRIBED BY CMS-2020-01-R: HCPCS | Performed by: FAMILY MEDICINE

## 2020-08-07 LAB
SARS-COV-2 RNA SPEC QL NAA+PROBE: NOT DETECTED
SPECIMEN SOURCE: NORMAL

## 2020-08-20 NOTE — PROGRESS NOTES
"Subjective     Machelle Weiner is a 45 year old female who presents to clinic today for the following health issues:    HPI       Hypertension Follow-up      Do you check your blood pressure regularly outside of the clinic? { :891711}     Are you following a low salt diet? { :813917}    Are your blood pressures ever more than 140 on the top number (systolic) OR more   than 90 on the bottom number (diastolic), for example 140/90? { :980157}    Asthma Follow-Up    Was ACT completed today?    Yes    ACT Total Scores 4/29/2019   ACT TOTAL SCORE -   ASTHMA ER VISITS -   ASTHMA HOSPITALIZATIONS -   ACT TOTAL SCORE (Goal Greater than or Equal to 20) 24   In the past 12 months, how many times did you visit the emergency room for your asthma without being admitted to the hospital? 0   In the past 12 months, how many times were you hospitalized overnight because of your asthma? 0         How many days per week do you miss taking your asthma controller medication?  {ASTHMA MED COMPLIANCE FREQUENCY:171064}    Please describe any recent triggers for your asthma: { :651304::\"None\"}    Have you had any Emergency Room Visits, Urgent Care Visits, or Hospital Admissions since your last office visit?  {ASTHMA VISIT LIST:296382}    How many servings of fruits and vegetables do you eat daily?  { :359252}    On average, how many sweetened beverages do you drink each day (Examples: soda, juice, sweet tea, etc.  Do NOT count diet or artificially sweetened beverages)?   { 1-11:180700}    How many days per week do you exercise enough to make your heart beat faster? { :233605}    How many minutes a day do you exercise enough to make your heart beat faster? { :728015}    How many days per week do you miss taking your medication? {0-7 :998535}    {additonal problems for provider to add (Optional):749687}    Review of Systems   {ROS COMP (Optional):977113}      Objective    There were no vitals taken for this visit.  There is no height or " weight on file to calculate BMI.  Physical Exam   {Exam List (Optional):169740}    {Diagnostic Test Results (Optional):114781}        {PROVIDER CHARTING PREFERENCE:816166}

## 2020-08-21 ENCOUNTER — OFFICE VISIT (OUTPATIENT)
Dept: FAMILY MEDICINE | Facility: CLINIC | Age: 45
End: 2020-08-21
Payer: COMMERCIAL

## 2020-08-21 VITALS
OXYGEN SATURATION: 97 % | TEMPERATURE: 98.9 F | RESPIRATION RATE: 16 BRPM | DIASTOLIC BLOOD PRESSURE: 88 MMHG | SYSTOLIC BLOOD PRESSURE: 136 MMHG | HEART RATE: 80 BPM

## 2020-08-21 DIAGNOSIS — N92.0 MENORRHAGIA WITH REGULAR CYCLE: ICD-10-CM

## 2020-08-21 DIAGNOSIS — J45.20 MILD INTERMITTENT ASTHMA WITHOUT COMPLICATION: ICD-10-CM

## 2020-08-21 DIAGNOSIS — R19.8 CHANGE IN BOWEL MOVEMENT: ICD-10-CM

## 2020-08-21 DIAGNOSIS — R53.83 LOW ENERGY: ICD-10-CM

## 2020-08-21 DIAGNOSIS — R10.31 RLQ ABDOMINAL PAIN: Primary | ICD-10-CM

## 2020-08-21 DIAGNOSIS — E03.9 ACQUIRED HYPOTHYROIDISM: ICD-10-CM

## 2020-08-21 DIAGNOSIS — I10 ESSENTIAL HYPERTENSION, BENIGN: ICD-10-CM

## 2020-08-21 LAB
BASOPHILS # BLD AUTO: 0.1 10E9/L (ref 0–0.2)
BASOPHILS NFR BLD AUTO: 0.9 %
DIFFERENTIAL METHOD BLD: NORMAL
EOSINOPHIL # BLD AUTO: 0.4 10E9/L (ref 0–0.7)
EOSINOPHIL NFR BLD AUTO: 3.9 %
ERYTHROCYTE [DISTWIDTH] IN BLOOD BY AUTOMATED COUNT: 13.6 % (ref 10–15)
HCT VFR BLD AUTO: 41.6 % (ref 35–47)
HGB BLD-MCNC: 13.1 G/DL (ref 11.7–15.7)
LYMPHOCYTES # BLD AUTO: 2.7 10E9/L (ref 0.8–5.3)
LYMPHOCYTES NFR BLD AUTO: 28.5 %
MCH RBC QN AUTO: 28.2 PG (ref 26.5–33)
MCHC RBC AUTO-ENTMCNC: 31.5 G/DL (ref 31.5–36.5)
MCV RBC AUTO: 90 FL (ref 78–100)
MONOCYTES # BLD AUTO: 0.6 10E9/L (ref 0–1.3)
MONOCYTES NFR BLD AUTO: 6.2 %
NEUTROPHILS # BLD AUTO: 5.7 10E9/L (ref 1.6–8.3)
NEUTROPHILS NFR BLD AUTO: 60.5 %
PLATELET # BLD AUTO: 285 10E9/L (ref 150–450)
RBC # BLD AUTO: 4.65 10E12/L (ref 3.8–5.2)
WBC # BLD AUTO: 9.4 10E9/L (ref 4–11)

## 2020-08-21 PROCEDURE — 83550 IRON BINDING TEST: CPT | Performed by: PHYSICIAN ASSISTANT

## 2020-08-21 PROCEDURE — 36415 COLL VENOUS BLD VENIPUNCTURE: CPT | Performed by: PHYSICIAN ASSISTANT

## 2020-08-21 PROCEDURE — 99214 OFFICE O/P EST MOD 30 MIN: CPT | Performed by: PHYSICIAN ASSISTANT

## 2020-08-21 PROCEDURE — 83540 ASSAY OF IRON: CPT | Performed by: PHYSICIAN ASSISTANT

## 2020-08-21 PROCEDURE — 84439 ASSAY OF FREE THYROXINE: CPT | Performed by: PHYSICIAN ASSISTANT

## 2020-08-21 PROCEDURE — 82728 ASSAY OF FERRITIN: CPT | Performed by: PHYSICIAN ASSISTANT

## 2020-08-21 PROCEDURE — 82306 VITAMIN D 25 HYDROXY: CPT | Performed by: PHYSICIAN ASSISTANT

## 2020-08-21 PROCEDURE — 80050 GENERAL HEALTH PANEL: CPT | Performed by: PHYSICIAN ASSISTANT

## 2020-08-21 RX ORDER — LOSARTAN POTASSIUM 50 MG/1
50 TABLET ORAL DAILY
Qty: 90 TABLET | Refills: 1 | Status: SHIPPED | OUTPATIENT
Start: 2020-08-21 | End: 2021-02-18

## 2020-08-21 RX ORDER — ALBUTEROL SULFATE 90 UG/1
AEROSOL, METERED RESPIRATORY (INHALATION)
Qty: 36 G | Refills: 1 | Status: SHIPPED | OUTPATIENT
Start: 2020-08-21 | End: 2022-04-19

## 2020-08-21 ASSESSMENT — ANXIETY QUESTIONNAIRES
7. FEELING AFRAID AS IF SOMETHING AWFUL MIGHT HAPPEN: NOT AT ALL
1. FEELING NERVOUS, ANXIOUS, OR ON EDGE: SEVERAL DAYS
5. BEING SO RESTLESS THAT IT IS HARD TO SIT STILL: SEVERAL DAYS
GAD7 TOTAL SCORE: 5
2. NOT BEING ABLE TO STOP OR CONTROL WORRYING: SEVERAL DAYS
3. WORRYING TOO MUCH ABOUT DIFFERENT THINGS: SEVERAL DAYS
6. BECOMING EASILY ANNOYED OR IRRITABLE: SEVERAL DAYS
IF YOU CHECKED OFF ANY PROBLEMS ON THIS QUESTIONNAIRE, HOW DIFFICULT HAVE THESE PROBLEMS MADE IT FOR YOU TO DO YOUR WORK, TAKE CARE OF THINGS AT HOME, OR GET ALONG WITH OTHER PEOPLE: SOMEWHAT DIFFICULT

## 2020-08-21 ASSESSMENT — PATIENT HEALTH QUESTIONNAIRE - PHQ9
5. POOR APPETITE OR OVEREATING: NOT AT ALL
SUM OF ALL RESPONSES TO PHQ QUESTIONS 1-9: 7

## 2020-08-21 NOTE — LETTER
"My Asthma Action Plan    Name: Machelle Weiner   YOB: 1975  Date: 8/21/2020   My doctor: Jean Sauceda PA-C   My clinic: [facility]        My Rescue Medicine:   Albuterol inhaler (Proair/Ventolin/Proventil HFA)  2-4 puffs EVERY 4 HOURS as needed. Use a spacer if recommended by your provider.   My Asthma Severity:   { :106901::\"Intermittent / Exercise Induced\"}  Know your asthma triggers: { :148778}  upper respiratory infections          GREEN ZONE   Good Control    I feel good    No cough or wheeze    Can work, sleep and play without asthma symptoms       Take your asthma control medicine every day.     1. If exercise triggers your asthma, take your rescue medication    15 minutes before exercise or sports, and    During exercise if you have asthma symptoms  2. Spacer to use with inhaler: If you have a spacer, make sure to use it with your inhaler             YELLOW ZONE Getting Worse  I have ANY of these:    I do not feel good    Cough or wheeze    Chest feels tight    Wake up at night   1. Keep taking your Green Zone medications  2. Start taking your rescue medicine:    every 20 minutes for up to 1 hour. Then every 4 hours for 24-48 hours.  3. If you stay in the Yellow Zone for more than 12-24 hours, contact your doctor.  4. If you do not return to the Green Zone in 12-24 hours or you get worse, start taking your oral steroid medicine if prescribed by your provider.           RED ZONE Medical Alert - Get Help  I have ANY of these:    I feel awful    Medicine is not helping    Breathing getting harder    Trouble walking or talking    Nose opens wide to breathe       1. Take your rescue medicine NOW  2. If your provider has prescribed an oral steroid medicine, start taking it NOW  3. Call your doctor NOW  4. If you are still in the Red Zone after 20 minutes and you have not reached your doctor:    Take your rescue medicine again and    Call 911 or go to the emergency room " right away    See your regular doctor within 2 weeks of an Emergency Room or Urgent Care visit for follow-up treatment.          Annual Reminders:  Meet with Asthma Educator,  Flu Shot in the Fall, consider Pneumonia Vaccination for patients with asthma (aged 19 and older).    [id number] [id number] AT [place] [id number]    Electronically signed by Jean Sauceda PA-C   Date: 08/21/20                    Asthma Triggers  How To Control Things That Make Your Asthma Worse    Triggers are things that make your asthma worse.  Look at the list below to help you find your triggers and   what you can do about them. You can help prevent asthma flare-ups by staying away from your triggers.      Trigger                                                          What you can do   Cigarette Smoke  Tobacco smoke can make asthma worse. Do not allow smoking in your home, car or around you.  Be sure no one smokes at a child s day care or school.  If you smoke, ask your health care provider for ways to help you quit.  Ask family members to quit too.  Ask your health care provider for a referral to Quit Plan to help you quit smoking, or call 2-902-023-PLAN.     Colds, Flu, Bronchitis  These are common triggers of asthma. Wash your hands often.  Don t touch your eyes, nose or mouth.  Get a flu shot every year.     Dust Mites  These are tiny bugs that live in cloth or carpet. They are too small to see. Wash sheets and blankets in hot water every week.   Encase pillows and mattress in dust mite proof covers.  Avoid having carpet if you can. If you have carpet, vacuum weekly.   Use a dust mask and HEPA vacuum.   Pollen and Outdoor Mold  Some people are allergic to trees, grass, or weed pollen, or molds. Try to keep your windows closed.  Limit time out doors when pollen count is high.   Ask you health care provider about taking medicine during allergy season.     Animal  Dander  Some people are allergic to skin flakes, urine or saliva from pets with fur or feathers. Keep pets with fur or feathers out of your home.    If you can t keep the pet outdoors, then keep the pet out of your bedroom.  Keep the bedroom door closed.  Keep pets off cloth furniture and away from stuffed toys.     Mice, Rats, and Cockroaches  Some people are allergic to the waste from these pests.   Cover food and garbage.  Clean up spills and food crumbs.  Store grease in the refrigerator.   Keep food out of the bedroom.   Indoor Mold  This can be a trigger if your home has high moisture. Fix leaking faucets, pipes, or other sources of water.   Clean moldy surfaces.  Dehumidify basement if it is damp and smelly.   Smoke, Strong Odors, and Sprays  These can reduce air quality. Stay away from strong odors and sprays, such as perfume, powder, hair spray, paints, smoke incense, paint, cleaning products, candles and new carpet.   Exercise or Sports  Some people with asthma have this trigger. Be active!  Ask your doctor about taking medicine before sports or exercise to prevent symptoms.    Warm up for 5-10 minutes before and after sports or exercise.     Other Triggers of Asthma  Cold air:  Cover your nose and mouth with a scarf.  Sometimes laughing or crying can be a trigger.  Some medicines and food can trigger asthma.

## 2020-08-21 NOTE — PROGRESS NOTES
Subjective     Machelle Weiner is a 45 year old female who presents to clinic today for the following health issues:    HPI       Hypertension Follow-up    Do you check your blood pressure regularly outside of the clinic? No     Are you following a low salt diet? No    Are your blood pressures ever more than 140 on the top number (systolic) OR more   than 90 on the bottom number (diastolic), for example 140/90? No    Chceking at home on occasion  Overall mostly controlled  Has had some times where anxiety increases; may be increased then  Tolerating medications    Asthma Follow-Up    Was ACT completed today?    Yes    ACT Total Scores 8/21/2020   ACT TOTAL SCORE -   ASTHMA ER VISITS -   ASTHMA HOSPITALIZATIONS -   ACT TOTAL SCORE (Goal Greater than or Equal to 20) 9   In the past 12 months, how many times did you visit the emergency room for your asthma without being admitted to the hospital? 0   In the past 12 months, how many times were you hospitalized overnight because of your asthma? 0         How many days per week do you miss taking your asthma controller medication?  I do not have an asthma controller medication    Please describe any recent triggers for your asthma: Recent sickness and allergies     Have you had any Emergency Room Visits, Urgent Care Visits, or Hospital Admissions since your last office visit?  No      How many servings of fruits and vegetables do you eat daily?  4 or more    On average, how many sweetened beverages do you drink each day (Examples: soda, juice, sweet tea, etc.  Do NOT count diet or artificially sweetened beverages)? On occasion does drink pop     How many days per week do you exercise enough to make your heart beat faster? 3 or less    How many minutes a day do you exercise enough to make your heart beat faster? 9 or less    How many days per week do you miss taking your medication? 0    She notes ongoing upper respiratory symptoms/sob   COVID19 testing was negative -  "feels copmpletely improved since then but mild cough remains and some shortness of breath with humidity/steam  Had been shortness of breath, coughing, mild fever initially  Energy was low      ANXIETY:  Has had some increased anxiety symptoms  Started CBD oil and thinks this does help rather quickly  Using around 1-2 drops max  Thinks this helps drop BP as well      F/U on pelvic pain/RLQ pain  By phone visit had improved but then started to come and go  Now more common, almost daily  Can be sharp/stabbing and other times is dull/constant  More severe with ovulation  No painful BMs;   Increased menstrual cramping pain  Heavier flow  Fairly regular monthly    has had increased \"diarrhea\" - can be softer to explosive diarrhea  Coffee worsens as well as spicy foods  No abdominal pain/cramping otherwise    The TWO symptoms above do not relate - RLQ pain does not follow or precede diarrhea/BM changes    No recent abx        Review of Systems   Constitutional, HEENT, cardiovascular, pulmonary, gi and gu systems are negative, except as otherwise noted.      Objective    /88   Pulse 80   Temp 98.9  F (37.2  C)   Resp 16   SpO2 97%   There is no height or weight on file to calculate BMI.  Physical Exam   GENERAL: healthy, alert and no distress  EYES: Eyes grossly normal to inspection, PERRL and conjunctivae and sclerae normal  HENT: ear canals and TM's normal, nose and mouth without ulcers or lesions  NECK: no adenopathy, no asymmetry, masses, or scars and thyroid normal to palpation  RESP: lungs clear to auscultation - no rales, rhonchi or wheezes  CV: regular rate and rhythm, normal S1 S2, no S3 or S4, no murmur, click or rub, no peripheral edema and peripheral pulses strong  ABDOMEN/GYN: there is ttp in the very low right pelvic quadrant  MS: No peripheral edema   PSYCH: mentation appears normal, affect normal/bright    No results found for this or any previous visit (from the past 24 hour(s)).    See A/P for " justin entered    Assessment & Plan     1. RLQ abdominal pain  With continued symptoms, now more regularly. Highly recommend imaging to r/o fibroid/ovarian cyst or other pelvic pathology. NEEDS PAP. She will plan to complete at later date; stressed importance. Follow-up per results  - US Pelvic Complete w Transvaginal; Future  - T4 free    2. Menorrhagia with regular cycle  - CBC with platelets differential  - Ferritin  - Iron and iron binding capacity    3. Low energy  Recommend screening for deficiencies and low stores  - CBC with platelets differential  - Ferritin  - Iron and iron binding capacity  - Vitamin D Deficiency    4. Essential hypertension, benign  Controlled. Continue present management.   - losartan (COZAAR) 50 MG tablet; Take 1 tablet (50 mg) by mouth daily  Dispense: 90 tablet; Refill: 1  - Comprehensive metabolic panel (BMP + Alb, Alk Phos, ALT, AST, Total. Bili, TP)    5. Mild intermittent asthma without complication  Poor control of late on ACT but symptoms have improved since her episode earlier and she attests is much better now. Continue present management.   - albuterol (PROAIR HFA/PROVENTIL HFA/VENTOLIN HFA) 108 (90 Base) MCG/ACT inhaler; INHALE 2 PUFFS BY MOUTH EVERY 6 HOURS AS NEEDED FOR SHORTNESS OF BREATH OR DIFFICULT BREATHING  Dispense: 36 g; Refill: 1    6. Acquired hypothyroidism  Checking today. Refills per results. See #7?  - TSH with free T4 reflex    7. Change in bowel movement  Unclear if related to pelvic/abdominal symptoms. May be related to dietary change. Monitor for now - no significant weight loss. Non bloody. No recent abx.       Return in about 3 months (around 11/21/2020) for .dpm.    Jean Sauceda PA-C  [facility]

## 2020-08-22 LAB
ALBUMIN SERPL-MCNC: 3.6 G/DL (ref 3.4–5)
ALP SERPL-CCNC: 98 U/L (ref 40–150)
ALT SERPL W P-5'-P-CCNC: 31 U/L (ref 0–50)
ANION GAP SERPL CALCULATED.3IONS-SCNC: 9 MMOL/L (ref 3–14)
AST SERPL W P-5'-P-CCNC: 25 U/L (ref 0–45)
BILIRUB SERPL-MCNC: 0.3 MG/DL (ref 0.2–1.3)
BUN SERPL-MCNC: 11 MG/DL (ref 7–30)
CALCIUM SERPL-MCNC: 9.3 MG/DL (ref 8.5–10.1)
CHLORIDE SERPL-SCNC: 109 MMOL/L (ref 94–109)
CO2 SERPL-SCNC: 21 MMOL/L (ref 20–32)
CREAT SERPL-MCNC: 1.03 MG/DL (ref 0.52–1.04)
FERRITIN SERPL-MCNC: 10 NG/ML (ref 12–150)
GFR SERPL CREATININE-BSD FRML MDRD: 66 ML/MIN/{1.73_M2}
GLUCOSE SERPL-MCNC: 80 MG/DL (ref 70–99)
IRON SATN MFR SERPL: 17 % (ref 15–46)
IRON SERPL-MCNC: 60 UG/DL (ref 35–180)
POTASSIUM SERPL-SCNC: 4.4 MMOL/L (ref 3.4–5.3)
PROT SERPL-MCNC: 7.3 G/DL (ref 6.8–8.8)
SODIUM SERPL-SCNC: 139 MMOL/L (ref 133–144)
T4 FREE SERPL-MCNC: 0.91 NG/DL (ref 0.76–1.46)
TIBC SERPL-MCNC: 360 UG/DL (ref 240–430)
TSH SERPL DL<=0.005 MIU/L-ACNC: 4.36 MU/L (ref 0.4–4)

## 2020-08-22 ASSESSMENT — ANXIETY QUESTIONNAIRES: GAD7 TOTAL SCORE: 5

## 2020-08-22 ASSESSMENT — ASTHMA QUESTIONNAIRES: ACT_TOTALSCORE: 9

## 2020-08-23 LAB — DEPRECATED CALCIDIOL+CALCIFEROL SERPL-MC: 34 UG/L (ref 20–75)

## 2020-08-24 ENCOUNTER — HOSPITAL ENCOUNTER (OUTPATIENT)
Dept: ULTRASOUND IMAGING | Facility: CLINIC | Age: 45
Discharge: HOME | End: 2020-08-24
Attending: PHYSICIAN ASSISTANT | Admitting: PHYSICIAN ASSISTANT
Payer: COMMERCIAL

## 2020-08-24 DIAGNOSIS — R10.31 RLQ ABDOMINAL PAIN: ICD-10-CM

## 2020-08-24 PROCEDURE — 76856 US EXAM PELVIC COMPLETE: CPT

## 2020-08-24 RX ORDER — LEVOTHYROXINE SODIUM 75 UG/1
75 TABLET ORAL DAILY
Qty: 90 TABLET | Refills: 3 | Status: SHIPPED | OUTPATIENT
Start: 2020-08-24 | End: 2021-08-20

## 2020-08-25 ENCOUNTER — MYC MEDICAL ADVICE (OUTPATIENT)
Dept: FAMILY MEDICINE | Facility: CLINIC | Age: 45
End: 2020-08-25

## 2020-08-25 DIAGNOSIS — N92.0 MENORRHAGIA WITH REGULAR CYCLE: ICD-10-CM

## 2020-08-25 DIAGNOSIS — R10.31 RLQ ABDOMINAL PAIN: Primary | ICD-10-CM

## 2020-11-29 ENCOUNTER — HEALTH MAINTENANCE LETTER (OUTPATIENT)
Age: 45
End: 2020-11-29

## 2021-02-16 DIAGNOSIS — I10 ESSENTIAL HYPERTENSION, BENIGN: ICD-10-CM

## 2021-02-18 RX ORDER — LOSARTAN POTASSIUM 50 MG/1
TABLET ORAL
Qty: 90 TABLET | Refills: 1 | Status: SHIPPED | OUTPATIENT
Start: 2021-02-18 | End: 2021-08-20

## 2021-03-16 ENCOUNTER — NURSE TRIAGE (OUTPATIENT)
Dept: FAMILY MEDICINE | Facility: CLINIC | Age: 46
End: 2021-03-16

## 2021-03-16 NOTE — TELEPHONE ENCOUNTER
LOV 8/21/2021    SEE TODAY IN OFFICE: - pt stated that she cannot come in today or tomorrow due to work and sons schooling RN reviewed symptoms with pt and why she should be seen sooner than her original  scheduled appointment on 3/24/2021 - pt stated she just cannot get in this week but maybe she can try for Monday     RN reviewed worsening symptoms with pt and if they were to happen she needs to be seen in  that day.     Patient stated an understanding and agreed with plan.    Nilda Verdugo RN, BSN  [facility] [place] T[facility]    Additional Information    Negative: Shock suspected (e.g., cold/pale/clammy skin, too weak to stand, low BP, rapid pulse)    Negative: Similar pain previously and it was from 'heart attack'    Negative: Similar pain previously and it was from 'angina' and not relieved by nitroglycerin    Negative: Sounds like a life-threatening emergency to the triager    Negative: Chest pain    Negative: Followed an injury to shoulder    Negative: Difficulty breathing or unusual sweating (e.g., sweating without exertion)    Negative: Pain lasting > 5 minutes and pain also present in chest (Exception: pain is clearly made worse by movement)    Negative: Age > 40 and no obvious cause and pain even when not moving the arm (Exception: pain is clearly made worse by moving arm or bending neck)    Negative: Red area or streak and fever    Negative: Swollen joint and fever    Negative: Entire arm is swollen    Negative: Patient sounds very sick or weak to the triager    Negative: SEVERE pain (e.g., excruciating, unable to do any normal activities)    Negative: Shoulder pains with exertion (e.g., walking) and pain goes away on resting and not present now    Negative: Painful rash with multiple small blisters grouped together (i.e., dermatomal distribution or 'band' or 'stripe')    Negative: Localized rash is very painful (no fever)    Negative: Looks like a boil, infected sore, deep ulcer or other  "infected rash (spreading redness, pus)    Negative: Weakness (i.e., loss of strength) in hand or fingers (Exception: not truly weak; hand feels weak because of pain)    Numbness (i.e., loss of sensation) in hand or fingers    Answer Assessment - Initial Assessment Questions  1. ONSET: \"When did the pain start?\"      Few weeks  -      2. LOCATION: \"Where is the pain located?\"      Left arm and neck pain in the upper left arm and into left outer side of the arm - it will get numb and tingling       3. PAIN: \"How bad is the pain?\" (Scale 1-10; or mild, moderate, severe)    - MILD (1-3): doesn't interfere with normal activities    - MODERATE (4-7): interferes with normal activities (e.g., work or school) or awakens from sleep    - SEVERE (8-10): excruciating pain, unable to do any normal activities, unable to move arm at all due to pain      MILD to MODERATE     4. WORK OR EXERCISE: \"Has there been any recent work or exercise that involved this part of the body?\"      None     5. CAUSE: \"What do you think is causing the shoulder pain?\"      Unknown - possibly pinched a nerve     6. OTHER SYMPTOMS: \"Do you have any other symptoms?\" (e.g., neck pain, swelling, rash, fever, numbness, weakness)      Pain, numbness and tingling.     Denies: swelling , rash, fevers, CP, SOB     7. PREGNANCY: \"Is there any chance you are pregnant?\" \"When was your last menstrual period?\"      None - LMP - unknown - she knows she just ovulated    Protocols used: SHOULDER PAIN-A-OH      "

## 2021-03-22 ENCOUNTER — OFFICE VISIT (OUTPATIENT)
Dept: FAMILY MEDICINE | Facility: CLINIC | Age: 46
End: 2021-03-22
Payer: COMMERCIAL

## 2021-03-22 ENCOUNTER — ANCILLARY PROCEDURE (OUTPATIENT)
Dept: GENERAL RADIOLOGY | Facility: CLINIC | Age: 46
End: 2021-03-22
Attending: INTERNAL MEDICINE
Payer: COMMERCIAL

## 2021-03-22 VITALS
TEMPERATURE: 98.6 F | SYSTOLIC BLOOD PRESSURE: 128 MMHG | RESPIRATION RATE: 17 BRPM | OXYGEN SATURATION: 97 % | BODY MASS INDEX: 44.43 KG/M2 | HEART RATE: 78 BPM | DIASTOLIC BLOOD PRESSURE: 78 MMHG | WEIGHT: 256.8 LBS

## 2021-03-22 DIAGNOSIS — N92.0 MENORRHAGIA WITH REGULAR CYCLE: ICD-10-CM

## 2021-03-22 DIAGNOSIS — R10.11 RUQ ABDOMINAL PAIN: ICD-10-CM

## 2021-03-22 DIAGNOSIS — M54.12 CERVICAL RADICULOPATHY: ICD-10-CM

## 2021-03-22 DIAGNOSIS — E03.9 ACQUIRED HYPOTHYROIDISM: ICD-10-CM

## 2021-03-22 DIAGNOSIS — M54.12 CERVICAL RADICULOPATHY: Primary | ICD-10-CM

## 2021-03-22 LAB
ERYTHROCYTE [DISTWIDTH] IN BLOOD BY AUTOMATED COUNT: 14.3 % (ref 10–15)
HCT VFR BLD AUTO: 41.9 % (ref 35–47)
HGB BLD-MCNC: 12.9 G/DL (ref 11.7–15.7)
IRON STUDY JIC TUBE: ABNORMAL
MCH RBC QN AUTO: 28.5 PG (ref 26.5–33)
MCHC RBC AUTO-ENTMCNC: 30.8 G/DL (ref 31.5–36.5)
MCV RBC AUTO: 93 FL (ref 78–100)
PLATELET # BLD AUTO: 293 10E9/L (ref 150–450)
RBC # BLD AUTO: 4.53 10E12/L (ref 3.8–5.2)
WBC # BLD AUTO: 11.4 10E9/L (ref 4–11)

## 2021-03-22 PROCEDURE — 72040 X-RAY EXAM NECK SPINE 2-3 VW: CPT | Mod: FY | Performed by: RADIOLOGY

## 2021-03-22 PROCEDURE — 80053 COMPREHEN METABOLIC PANEL: CPT | Performed by: INTERNAL MEDICINE

## 2021-03-22 PROCEDURE — 84443 ASSAY THYROID STIM HORMONE: CPT | Performed by: INTERNAL MEDICINE

## 2021-03-22 PROCEDURE — 84439 ASSAY OF FREE THYROXINE: CPT | Performed by: INTERNAL MEDICINE

## 2021-03-22 PROCEDURE — 82728 ASSAY OF FERRITIN: CPT | Performed by: INTERNAL MEDICINE

## 2021-03-22 PROCEDURE — 83550 IRON BINDING TEST: CPT | Performed by: INTERNAL MEDICINE

## 2021-03-22 PROCEDURE — 99214 OFFICE O/P EST MOD 30 MIN: CPT | Performed by: INTERNAL MEDICINE

## 2021-03-22 PROCEDURE — 36415 COLL VENOUS BLD VENIPUNCTURE: CPT | Performed by: INTERNAL MEDICINE

## 2021-03-22 PROCEDURE — 85027 COMPLETE CBC AUTOMATED: CPT | Performed by: INTERNAL MEDICINE

## 2021-03-22 PROCEDURE — 83540 ASSAY OF IRON: CPT | Performed by: INTERNAL MEDICINE

## 2021-03-22 RX ORDER — CYCLOBENZAPRINE HCL 10 MG
10 TABLET ORAL 3 TIMES DAILY PRN
Qty: 30 TABLET | Refills: 1 | Status: SHIPPED | OUTPATIENT
Start: 2021-03-22 | End: 2022-04-19

## 2021-03-22 NOTE — PROGRESS NOTES
Assessment & Plan     (M54.12) Cervical radiculopathy  (primary encounter diagnosis)  Comment: tight upper back and neck; exam otherwise OK; C-Spine XRay -reviewed; ortho referral. Given her radicular symptoms, proceed with MRI  Plan: XR Cervical Spine 2/3 Views, MR Cervical Spine         w/o Contrast, cyclobenzaprine (FLEXERIL) 10 MG         tablet, Orthopedic & Spine  Referral,      (N92.0) Menorrhagia with regular cycle  Comment: consider addressing GYN bleeding; consider intervention; ?ablation?  Plan: Ferritin, CBC with Platelets and Reflex to Iron        Studies;      Iron and iron binding capacity,           (E03.9) Acquired hypothyroidism  Comment: Clinically euthyroid on Levothyroxine 75 mcg per day; she desires updating lab; labs today, consider dose adjustment if labs warrant   Plan: TSH with free T4 reflex; T4 free          (R10.11) RUQ abdominal pain  Comment: no masses or acute abdomen on exam today; lab assessment; if LFTs elevated, consider Abdominal US.    Plan: Comprehensive metabolic panel (BMP + Alb, Alk         Phos, ALT, AST, Total. Bili, TP)            35 minutes spent on the date of the encounter doing chart review, review of test results, interpretation of tests, patient visit and documentation        MEDICATIONS:   Orders Placed This Encounter   Medications     cyclobenzaprine (FLEXERIL) 10 MG tablet     Sig: Take 1 tablet (10 mg) by mouth 3 times daily as needed for muscle spasms     Dispense:  30 tablet     Refill:  1          - Continue other medications without change  Labs as ordered    Return in about 9 days (around 3/31/2021) for ortho referral provided..    Damaris Apple MD  Internal Medicine   [facility] GUILLERMO Carty is a 45 year old who presents for the following health issues     HPI     Musculoskeletal problem/pain    Left arm pain that is noted in the upper back and shoulder. persistent pain on the under side  of the left arm and is very sensitive. pain is down into the elbow. in the past 2 days numbness and tingling in the lower arm hand and fingers are numb.Today having a sensation of pins and needles in the outer upper arm. notes numbness in the left side of her face yesterday for about 30-45 minutes discomfort in great toe of the left foot yesterday and today.     Onset/Duration: for the past 2-4 weeks  Description  Location: arm, elbow, forearm and in her back along the shoulder blade - left  Joint Swelling: no  Redness: no  Pain: YES  Warmth: no  Intensity:  moderate  Progression of Symptoms:  worsening, constant and waxing and waning  Accompanying signs and symptoms:   Fevers: no  Numbness/tingling/weakness: YES- left arm, has dropped some things due to the numbness  History  Trauma to the area: no  Recent illness:  no  Previous similar problem: YES- a few months ago noted some of the same symptoms but was not this severe.  Lasted for about 10 days and then resolved.   Previous evaluation:  no  Precipitating or alleviating factors:  Aggravating factors include: none  Therapies tried and outcome: acetaminophen, Ibuprofen  Did not help at all with the symptoms.  and NSAID - Aleve       Aleve only helped a little and the symptoms continued       Review of Systems     General: no weight changes.   Respiratory: No shortness of breath, dyspnea on exertion, cough, or hemoptysis  Cardiovascular: denies palpitations  Gastrointestinal: hx of cholecystectomy; pt reporting RUQ pain, no change in bowels  Genitourinary: negative  GYN: menorrhagia  Musculoskeletal: left UE - see above.   Neurologic: pt reporting left radicular symtoms.   Psychiatric: negative  Hematologic/Lymphatic/Immunologic: hx of anemia; known GYN loss- Menorrhagia.  Endocrine: thyroid replacement; she would like labs done.        Objective    /78   Pulse 78   Temp 98.6  F (37  C) (Oral)   Resp 17   Wt 116.5 kg (256 lb 12.8 oz)   LMP 03/06/2021  (Approximate)   SpO2 97%   BMI 44.43 kg/m    Body mass index is 44.43 kg/m .  Physical Exam   GENERAL: healthy, alert and no distress  NECK: no adenopathy, no asymmetry, masses, or scars and thyroid normal to palpation  RESP: lungs clear to auscultation - no rales, rhonchi or wheezes  CV: regular rate and rhythm, normal S1 S2, no S3 or S4, no murmur, click or rub, no peripheral edema and peripheral pulses strong  ABDOMEN: soft, nontender, no hepatosplenomegaly, no masses and bowel sounds normal  MS: left upper trapezius with increased tone; Devonte UEs- equal strength; reflexes symmetric; neck with full rotation  NEURO: Normal strength and tone, sensory exam grossly normal, mentation intact and reflexes symmetric.  PSYCH: mentation appears normal, affect normal/bright    Xray - Reviewed and interpreted by me.  Mild degenerative changes.

## 2021-03-23 LAB
ALBUMIN SERPL-MCNC: 3.6 G/DL (ref 3.4–5)
ALP SERPL-CCNC: 103 U/L (ref 40–150)
ALT SERPL W P-5'-P-CCNC: 31 U/L (ref 0–50)
ANION GAP SERPL CALCULATED.3IONS-SCNC: 1 MMOL/L (ref 3–14)
AST SERPL W P-5'-P-CCNC: 27 U/L (ref 0–45)
BILIRUB SERPL-MCNC: 0.2 MG/DL (ref 0.2–1.3)
BUN SERPL-MCNC: 11 MG/DL (ref 7–30)
CALCIUM SERPL-MCNC: 9.1 MG/DL (ref 8.5–10.1)
CHLORIDE SERPL-SCNC: 108 MMOL/L (ref 94–109)
CO2 SERPL-SCNC: 28 MMOL/L (ref 20–32)
CREAT SERPL-MCNC: 0.94 MG/DL (ref 0.52–1.04)
FERRITIN SERPL-MCNC: 14 NG/ML (ref 12–150)
GFR SERPL CREATININE-BSD FRML MDRD: 74 ML/MIN/{1.73_M2}
GLUCOSE SERPL-MCNC: 68 MG/DL (ref 70–99)
IRON SATN MFR SERPL: 14 % (ref 15–46)
IRON SERPL-MCNC: 51 UG/DL (ref 35–180)
POTASSIUM SERPL-SCNC: 4.1 MMOL/L (ref 3.4–5.3)
PROT SERPL-MCNC: 7.8 G/DL (ref 6.8–8.8)
SODIUM SERPL-SCNC: 137 MMOL/L (ref 133–144)
T4 FREE SERPL-MCNC: 0.8 NG/DL (ref 0.76–1.46)
TIBC SERPL-MCNC: 357 UG/DL (ref 240–430)
TSH SERPL DL<=0.005 MIU/L-ACNC: 5.9 MU/L (ref 0.4–4)

## 2021-03-27 ENCOUNTER — HOSPITAL ENCOUNTER (OUTPATIENT)
Dept: MRI IMAGING | Facility: CLINIC | Age: 46
Discharge: HOME | End: 2021-03-27
Attending: INTERNAL MEDICINE | Admitting: INTERNAL MEDICINE
Payer: COMMERCIAL

## 2021-03-27 DIAGNOSIS — M54.12 CERVICAL RADICULOPATHY: ICD-10-CM

## 2021-03-27 PROCEDURE — 72141 MRI NECK SPINE W/O DYE: CPT

## 2021-03-31 ENCOUNTER — TELEPHONE (OUTPATIENT)
Dept: FAMILY MEDICINE | Facility: CLINIC | Age: 46
End: 2021-03-31

## 2021-03-31 NOTE — TELEPHONE ENCOUNTER
Reason for Call:  Request for results:    Name of test or procedure: LAB & MRI    Date of test of procedure: 03/22/21    Location of the test or procedure:  [facility]    OK to leave the result message on voice mail or with a family member? YES    Phone number Patient can be reached at:  Home number on file [phone] (home)    Additional comments: any    Call taken on 3/31/2021 at 3:13 PM by Shiresa H. Ormond

## 2021-04-03 NOTE — TELEPHONE ENCOUNTER
Damaris Apple MD   3/31/2021  1:46 PM CDT      DDD noted on scan; XRay changes also at C5-C6.  Clinical symptoms noted and scheduled to see Neurosurgery 4/6/21  Pt advised via My Chart.        Results sent via [employer] on 3/31/2021

## 2021-04-05 NOTE — TELEPHONE ENCOUNTER
Patient calling back to check on status of labs and MRI report, she knows about the Xray report.    Advised will note again and forward to the provider for review.

## 2021-04-06 ENCOUNTER — OFFICE VISIT (OUTPATIENT)
Dept: NEUROSURGERY | Facility: CLINIC | Age: 46
End: 2021-04-06
Attending: PHYSICIAN ASSISTANT
Payer: COMMERCIAL

## 2021-04-06 VITALS
HEIGHT: 64 IN | OXYGEN SATURATION: 96 % | SYSTOLIC BLOOD PRESSURE: 128 MMHG | DIASTOLIC BLOOD PRESSURE: 86 MMHG | TEMPERATURE: 99 F | HEART RATE: 81 BPM | WEIGHT: 254 LBS | BODY MASS INDEX: 43.36 KG/M2

## 2021-04-06 DIAGNOSIS — M50.223 HERNIATED NUCLEUS PULPOSUS, C6-7 LEFT: Primary | ICD-10-CM

## 2021-04-06 PROCEDURE — 99203 OFFICE O/P NEW LOW 30 MIN: CPT | Performed by: PHYSICIAN ASSISTANT

## 2021-04-06 PROCEDURE — G0463 HOSPITAL OUTPT CLINIC VISIT: HCPCS

## 2021-04-06 RX ORDER — METHYLPREDNISOLONE 4 MG
TABLET, DOSE PACK ORAL
Qty: 21 TABLET | Refills: 0 | Status: SHIPPED | OUTPATIENT
Start: 2021-04-06 | End: 2021-07-30

## 2021-04-06 ASSESSMENT — PAIN SCALES - GENERAL: PAINLEVEL: SEVERE PAIN (6)

## 2021-04-06 ASSESSMENT — MIFFLIN-ST. JEOR: SCORE: 1787.14

## 2021-04-06 NOTE — NURSING NOTE
"Machelle Weiner is a 45 year old female who presents for:  Chief Complaint   Patient presents with     Consult     Cervical radiculopathy / MRI- 03/27/21, No PT, INJ, or prev surg.        Initial Vitals:  /86   Pulse 81   Temp 99  F (37.2  C)   Ht 5' 4\" (1.626 m)   Wt 254 lb (115.2 kg)   SpO2 96%   BMI 43.60 kg/m   Estimated body mass index is 43.6 kg/m  as calculated from the following:    Height as of this encounter: 5' 4\" (1.626 m).    Weight as of this encounter: 254 lb (115.2 kg).. Body surface area is 2.28 meters squared. BP completed using cuff size: large  Severe Pain (6)    Nursing Comments: Patient presents for Cervical radiculopathy / MRI- 03/27/21, No PT, INJ, or prev surg.    Jass Luke MA  "

## 2021-04-06 NOTE — PROGRESS NOTES
NEUROSURGERY CLINIC CONSULT NOTE     DATE OF VISIT: 4/6/2021     SUBJECTIVE:     Machelle Weiner is a pleasant 45 year old female who presents to the clinic today for consultation on cervical spine pain with left upper extremity radiculopathy. She is referred to the Neurosurgery Clinic by Dr. Apple in Primary Care.   Today, she reports a 6-week history of symptoms. She describes constant, sharp, burning pain that initiates in the midline cervical region and radiates distally in what sounds like the left C7 distribution. This pain is accompanied by paresthesia, numbness and perceived weakness in the same distribution. She really cannot appreciate any aggravating or alleviating factors. No mechanism of injury such as trauma or a fall is associated with the onset of the pain. There are no bowel or bladder changes. She denies changes in gait, instability, or falling episodes. There has been no significant change in her handwriting or hand dexterity, although she has been dropping objects lately.   She has not participated in any conservative therapies.          Current Outpatient Medications:      albuterol (2.5 MG/3ML) 0.083% neb solution, Take 1 vial (2.5 mg) by nebulization every 6 hours as needed for shortness of breath / dyspnea or wheezing, Disp: 25 vial, Rfl: 0     albuterol (PROAIR HFA/PROVENTIL HFA/VENTOLIN HFA) 108 (90 Base) MCG/ACT inhaler, INHALE 2 PUFFS BY MOUTH EVERY 6 HOURS AS NEEDED FOR SHORTNESS OF BREATH OR DIFFICULT BREATHING, Disp: 36 g, Rfl: 1     clindamycin (CLINDAMAX) 1 % external gel, Apply topically 2 times daily, Disp: 30 g, Rfl: 2     cyclobenzaprine (FLEXERIL) 10 MG tablet, Take 1 tablet (10 mg) by mouth 3 times daily as needed for muscle spasms, Disp: 30 tablet, Rfl: 1     Esomeprazole Magnesium (NEXIUM PO), Take 20 mg by mouth, Disp: , Rfl:      ibuprofen (ADVIL,MOTRIN) 600 MG tablet, Take 1 tablet (600 mg) by mouth every 6 hours as needed for moderate pain, Disp: 30 tablet, Rfl: 1      "levothyroxine (SYNTHROID/LEVOTHROID) 75 MCG tablet, Take 1 tablet (75 mcg) by mouth daily, Disp: 90 tablet, Rfl: 3     losartan (COZAAR) 50 MG tablet, TAKE 1 TABLET(50 MG) BY MOUTH DAILY, Disp: 90 tablet, Rfl: 1     methylPREDNISolone (MEDROL DOSEPAK) 4 MG tablet therapy pack, Follow Package Directions, Disp: 21 tablet, Rfl: 0     Allergies   Allergen Reactions     Codeine      Penicillins         Past Medical History:   Diagnosis Date     Asthma, mild intermittent      Hypertension      Thyroid disease         ROS: 10 point review of symptoms are negative other than the symptoms noted above in the HPI.     Family History has been reviewed with the patient, there are no pertinent findings to presenting concern.     Past Surgical History:   Procedure Laterality Date     GYN SURGERY      cryosurgery of cervix at age 16     LAPAROSCOPIC CHOLECYSTECTOMY WITH CHOLANGIOGRAMS N/A 10/14/2016    Procedure: LAPAROSCOPIC CHOLECYSTECTOMY WITH CHOLANGIOGRAMS;  Surgeon: Cornelius Mueller MD;  Location:  OR        Social History     Tobacco Use     Smoking status: Former Smoker     Packs/day: 0.30     Types: Cigarettes     Quit date: 2015     Years since quittin.1     Smokeless tobacco: Never Used   Substance Use Topics     Alcohol use: No     Alcohol/week: 0.0 standard drinks     Drug use: No        OBJECTIVE:   /86   Pulse 81   Temp 99  F (37.2  C)   Ht 5' 4\" (1.626 m)   Wt 254 lb (115.2 kg)   SpO2 96%   BMI 43.60 kg/m     Body mass index is 43.6 kg/m .     Imaging:     MR CERVICAL SPINE W/O CONTRAST - 3/27/2021 7:30 PM    Findings, per radiologist read, notable for:      Craniovertebral junction and C1-C2: Normal.  C2-C3: Normal disc height. No herniation. Normal facets. No spinal canal or neural foraminal stenosis.   C3-C4: Normal disc height. No herniation. Normal facets. No spinal canal or neural foraminal stenosis.   C4-C5: Small central/right paracentral disc protrusion causes subtle contact of the " anterior/right aspect of the cervical cord. CSF spaces lateral and posterior to the cord remain well-preserved. Mild bilateral facet degeneration foramen are patent.   C5-C6: Broad-based central/left paracentral ventral osteophyte/disc complex which attenuates the anterior/left subarachnoid space with bilateral uncovertebral joint hypertrophic change. Foramen are patent.   C6-C7: Broad-based central/left paracentral ventral osteophyte/disc complex attenuates the anterior/left subarachnoid space. Foramen are patent.   C7-T1: Normal disc height. No herniation. Normal facets. No spinal canal or neural foraminal stenosis.    Full radiological report in chart. Imaging was reviewed with with patient today.     Exam:     Patient appears comfortable, conversational, and in no apparent distress.   Head: Normocephalic, without obvious abnormality, atraumatic, no facial asymmetry.   Eyes: conjunctivae/corneas clear. PERRL, EOM's intact.   Throat: lips, mucosa, and tongue normal; teeth and gums normal.   Neck: supple, symmetrical, trachea midline, no adenopathy and thyroid: not enlarged, symmetric, no tenderness/mass/nodules.   Lungs: clear to auscultation bilaterally.   Heart: regular rate and rhythm.   Abdomen: soft, non-tender; bowel sounds normal; no masses, no organomegaly.   Pulses: 2+ and symmetric.   Skin: Skin color, texture, turgor normal. No rashes or lesions.     CN II-XII grossly intact, alert and appropriate with conversation and following commands.   Gait is non-antalgic. Able to tandem walk. Able to walk on toes and heels without difficulty.   Cervical spine is non tender to palpation. Appropriate range of motion of neck, not concerning for lhermitte's phenomenon.   Bilateral bicep 2/4 and tricep reflexes 1/4. Sensation intact throughout upper extremities.     UE muscle strength  Right  Left    Deltoid  5/5  5/5    Biceps  5/5  5/5    Triceps  5/5  4+/5    Hand intrinsics  5/5  5/5    Hand grasp  5/5  5/5     Camp signs  neg  neg      Lumbar spine is non tender to palpation.  Intact sensation throughout lower extremities.   Bilateral patellar 2/4 and achilles reflex 1/4. Negative for pain with straight leg raise.     LE muscle strength  Right  Left    Iliopsoas (hip flexion)  5/5  5/5    Quad (knee extension)  5/5  5/5    Hamstring (knee flexion)  5/5  5/5    Gastrocnemius (PF)  5/5  5/5    Tibialis Ant. (DF)  5/5  5/5    EHL  5/5  5/5      Negative Babinski bilaterally. Negative for clonus.   Calves are soft and non-tender bilaterally.     ASSESSMENT/PLAN:     Machelle Weiner is a 45 year old female who presents to the clinic for consultation on  constant, sharp, burning pain that initiates in the midline cervical region and radiates distally in what sounds like the left C7 distribution. This pain is accompanied by paresthesia, numbness and perceived weakness in the same distribution. The patient's most recent imaging was reviewed with her today. It was explained that images show DDD change at C4-5, C5-6 and C6-7, which correlates to today's clinical examination. On exam, she is noted to have diminished left triceps strength and diminished sensation in the left C7 distribution. She does have appropriate range of motion. She has not attempted conservative management.     Based on her physical exam, imaging review and lack of past treatments, we feel that it would be in Ms. Weiner's best interest to try a conservative approach by participating in a program initiated by our colleagues at the [facility]. She did inquire about possible treatment options that MARI may consider so we briefly discussed stretching and strengthening exercises in conjunction with cervical traction as possibilities. If the traction proves to provide alleviation, it would be appropriate to issue a home device. We also provided her with a medrol Dosepak. We would like to see her back as needed to further discuss  possible surgical interventions. In the event that patient's symptoms worsen or change we would like to see her sooner.     We did review the images together and we explained her condition and the recommended treatment. We also discussed signs of a worsening problem that she should seek being evaluated.         Respectfully,     MAXIMILIANO Montelongo, PASHAY  [facility]surgery  [facility]  Tel: [phone]    Exam, imaging, and plan reviewed by Dr. Amor.

## 2021-04-06 NOTE — LETTER
4/6/2021         RE: Machelle Weiner  [address] [facility] [id number]        Dear Colleague,    Thank you for referring your patient, Machelle Weiner, to the [facility] [place]. Please see a copy of my visit note below.    NEUROSURGERY CLINIC CONSULT NOTE     DATE OF VISIT: 4/6/2021     SUBJECTIVE:     Machelle Weiner is a pleasant 45 year old female who presents to the clinic today for consultation on cervical spine pain with left upper extremity radiculopathy. She is referred to the Neurosurgery Clinic by Dr. Apple in Primary Care.   Today, she reports a 6-week history of symptoms. She describes constant, sharp, burning pain that initiates in the midline cervical region and radiates distally in what sounds like the left C7 distribution. This pain is accompanied by paresthesia, numbness and perceived weakness in the same distribution. She really cannot appreciate any aggravating or alleviating factors. No mechanism of injury such as trauma or a fall is associated with the onset of the pain. There are no bowel or bladder changes. She denies changes in gait, instability, or falling episodes. There has been no significant change in her handwriting or hand dexterity, although she has been dropping objects lately.   She has not participated in any conservative therapies.          Current Outpatient Medications:      albuterol (2.5 MG/3ML) 0.083% neb solution, Take 1 vial (2.5 mg) by nebulization every 6 hours as needed for shortness of breath / dyspnea or wheezing, Disp: 25 vial, Rfl: 0     albuterol (PROAIR HFA/PROVENTIL HFA/VENTOLIN HFA) 108 (90 Base) MCG/ACT inhaler, INHALE 2 PUFFS BY MOUTH EVERY 6 HOURS AS NEEDED FOR SHORTNESS OF BREATH OR DIFFICULT BREATHING, Disp: 36 g, Rfl: 1     clindamycin (CLINDAMAX) 1 % external gel, Apply topically 2 times daily, Disp: 30 g, Rfl: 2     cyclobenzaprine (FLEXERIL) 10 MG tablet, Take 1 tablet (10 mg) by mouth 3  "times daily as needed for muscle spasms, Disp: 30 tablet, Rfl: 1     Esomeprazole Magnesium (NEXIUM PO), Take 20 mg by mouth, Disp: , Rfl:      ibuprofen (ADVIL,MOTRIN) 600 MG tablet, Take 1 tablet (600 mg) by mouth every 6 hours as needed for moderate pain, Disp: 30 tablet, Rfl: 1     levothyroxine (SYNTHROID/LEVOTHROID) 75 MCG tablet, Take 1 tablet (75 mcg) by mouth daily, Disp: 90 tablet, Rfl: 3     losartan (COZAAR) 50 MG tablet, TAKE 1 TABLET(50 MG) BY MOUTH DAILY, Disp: 90 tablet, Rfl: 1     methylPREDNISolone (MEDROL DOSEPAK) 4 MG tablet therapy pack, Follow Package Directions, Disp: 21 tablet, Rfl: 0     Allergies   Allergen Reactions     Codeine      Penicillins         Past Medical History:   Diagnosis Date     Asthma, mild intermittent      Hypertension      Thyroid disease         ROS: 10 point review of symptoms are negative other than the symptoms noted above in the HPI.     Family History has been reviewed with the patient, there are no pertinent findings to presenting concern.     Past Surgical History:   Procedure Laterality Date     GYN SURGERY      cryosurgery of cervix at age 16     LAPAROSCOPIC CHOLECYSTECTOMY WITH CHOLANGIOGRAMS N/A 10/14/2016    Procedure: LAPAROSCOPIC CHOLECYSTECTOMY WITH CHOLANGIOGRAMS;  Surgeon: Cornelius Mueller MD;  Location:  OR        Social History     Tobacco Use     Smoking status: Former Smoker     Packs/day: 0.30     Types: Cigarettes     Quit date: 2015     Years since quittin.1     Smokeless tobacco: Never Used   Substance Use Topics     Alcohol use: No     Alcohol/week: 0.0 standard drinks     Drug use: No        OBJECTIVE:   /86   Pulse 81   Temp 99  F (37.2  C)   Ht 5' 4\" (1.626 m)   Wt 254 lb (115.2 kg)   SpO2 96%   BMI 43.60 kg/m     Body mass index is 43.6 kg/m .     Imaging:     MR CERVICAL SPINE W/O CONTRAST - 3/27/2021 7:30 PM    Findings, per radiologist read, notable for:      Craniovertebral junction and C1-C2: Normal.  C2-C3: " Normal disc height. No herniation. Normal facets. No spinal canal or neural foraminal stenosis.   C3-C4: Normal disc height. No herniation. Normal facets. No spinal canal or neural foraminal stenosis.   C4-C5: Small central/right paracentral disc protrusion causes subtle contact of the anterior/right aspect of the cervical cord. CSF spaces lateral and posterior to the cord remain well-preserved. Mild bilateral facet degeneration foramen are patent.   C5-C6: Broad-based central/left paracentral ventral osteophyte/disc complex which attenuates the anterior/left subarachnoid space with bilateral uncovertebral joint hypertrophic change. Foramen are patent.   C6-C7: Broad-based central/left paracentral ventral osteophyte/disc complex attenuates the anterior/left subarachnoid space. Foramen are patent.   C7-T1: Normal disc height. No herniation. Normal facets. No spinal canal or neural foraminal stenosis.    Full radiological report in chart. Imaging was reviewed with with patient today.     Exam:     Patient appears comfortable, conversational, and in no apparent distress.   Head: Normocephalic, without obvious abnormality, atraumatic, no facial asymmetry.   Eyes: conjunctivae/corneas clear. PERRL, EOM's intact.   Throat: lips, mucosa, and tongue normal; teeth and gums normal.   Neck: supple, symmetrical, trachea midline, no adenopathy and thyroid: not enlarged, symmetric, no tenderness/mass/nodules.   Lungs: clear to auscultation bilaterally.   Heart: regular rate and rhythm.   Abdomen: soft, non-tender; bowel sounds normal; no masses, no organomegaly.   Pulses: 2+ and symmetric.   Skin: Skin color, texture, turgor normal. No rashes or lesions.     CN II-XII grossly intact, alert and appropriate with conversation and following commands.   Gait is non-antalgic. Able to tandem walk. Able to walk on toes and heels without difficulty.   Cervical spine is non tender to palpation. Appropriate range of motion of neck, not  concerning for lhermitte's phenomenon.   Bilateral bicep 2/4 and tricep reflexes 1/4. Sensation intact throughout upper extremities.     UE muscle strength  Right  Left    Deltoid  5/5  5/5    Biceps  5/5  5/5    Triceps  5/5  4+/5    Hand intrinsics  5/5  5/5    Hand grasp  5/5  5/5    Camp signs  neg  neg      Lumbar spine is non tender to palpation.  Intact sensation throughout lower extremities.   Bilateral patellar 2/4 and achilles reflex 1/4. Negative for pain with straight leg raise.     LE muscle strength  Right  Left    Iliopsoas (hip flexion)  5/5  5/5    Quad (knee extension)  5/5  5/5    Hamstring (knee flexion)  5/5  5/5    Gastrocnemius (PF)  5/5  5/5    Tibialis Ant. (DF)  5/5  5/5    EHL  5/5  5/5      Negative Babinski bilaterally. Negative for clonus.   Calves are soft and non-tender bilaterally.     ASSESSMENT/PLAN:     Machelle Weiner is a 45 year old female who presents to the clinic for consultation on  constant, sharp, burning pain that initiates in the midline cervical region and radiates distally in what sounds like the left C7 distribution. This pain is accompanied by paresthesia, numbness and perceived weakness in the same distribution. The patient's most recent imaging was reviewed with her today. It was explained that images show DDD change at C4-5, C5-6 and C6-7, which correlates to today's clinical examination. On exam, she is noted to have diminished left triceps strength and diminished sensation in the left C7 distribution. She does have appropriate range of motion. She has not attempted conservative management.     Based on her physical exam, imaging review and lack of past treatments, we feel that it would be in Ms. Weiner's best interest to try a conservative approach by participating in a program initiated by our colleagues at the [facility]. She did inquire about possible treatment options that MARI may consider so we briefly discussed stretching and  strengthening exercises in conjunction with cervical traction as possibilities. If the traction proves to provide alleviation, it would be appropriate to issue a home device. We also provided her with a medrol Dosepak. We would like to see her back as needed to further discuss possible surgical interventions. In the event that patient's symptoms worsen or change we would like to see her sooner.     We did review the images together and we explained her condition and the recommended treatment. We also discussed signs of a worsening problem that she should seek being evaluated.         Respectfully,     MAXIMILIANO Montelongo PA-C  [facility] Neurosurgery  [facility]  Tel: [phone]    Exam, imaging, and plan reviewed by Dr. Amor.       Again, thank you for allowing me to participate in the care of your patient.        Sincerely,        Torrey Puentes PA-C

## 2021-04-15 ENCOUNTER — THERAPY VISIT (OUTPATIENT)
Dept: PHYSICAL THERAPY | Facility: CLINIC | Age: 46
End: 2021-04-15
Attending: PHYSICIAN ASSISTANT
Payer: COMMERCIAL

## 2021-04-15 DIAGNOSIS — M50.223 HERNIATED NUCLEUS PULPOSUS, C6-7 LEFT: ICD-10-CM

## 2021-04-15 DIAGNOSIS — M54.2 NECK PAIN: ICD-10-CM

## 2021-04-15 PROCEDURE — 97161 PT EVAL LOW COMPLEX 20 MIN: CPT | Mod: GP | Performed by: PHYSICAL THERAPIST

## 2021-04-15 PROCEDURE — 97110 THERAPEUTIC EXERCISES: CPT | Mod: GP | Performed by: PHYSICAL THERAPIST

## 2021-04-15 PROCEDURE — 97012 MECHANICAL TRACTION THERAPY: CPT | Mod: GP | Performed by: PHYSICAL THERAPIST

## 2021-04-15 NOTE — PROGRESS NOTES
Physical Therapy Initial Evaluation  Subjective:  The history is provided by the patient. No  was used.   Patient Health History  Machelle Weiner being seen for Cervical traction.     Date of Onset: 2 month ago.   Problem occurred: There was no injury, I woke up one morning with symptoms that got progressively worse.   Pain is reported as 5/10 on pain scale.  General health as reported by patient is fair.  Pertinent medical history includes: anemia, asthma, concussions/dizziness, high blood pressure, migraines/headaches, numbness/tingling, overweight, pain at night/rest, thyroid problems and weakness.   Red flags:  None as reported by patient.  Medical allergies: adhesives and other. Other medical allergies details: Codeine and Penicillin.   Surgeries include:  Other. Other surgery history details: Cholecystectomy.    Current medications:  Anti-inflammatory, high blood pressure medication, muscle relaxants, pain medication and thyroid medication. Other medications details: Pain/anti-inflammatory meds are only Advil and Aleve. Muscle relaxant recently prescribed but not actively taking it since it isn t very effective..    Current occupation is .   Primary job tasks include:  Computer work, prolonged sitting, repetitive tasks and other.   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                Therapist Generated HPI Evaluation  Problem details: Pt. complains of neck pain with L radiculopathy that has been present for 2 months.  No known trauma.  PT order dated 4/6/21.      .         Type of problem:  Cervical spine.    This is a chronic condition.  Condition occurred with:  Insidious onset.  Where condition occurred: for unknown reasons.  Patient reports pain:  Central cervical spine.  Pain is described as aching and is constant.  Pain radiates to:  Upper arm left, elbow left and hand left. Pain is the same all the time.  Since onset symptoms are  unchanged.  Associated symptoms:  Loss of motion/stiffness. Symptoms are exacerbated by sitting    Special tests included:  MRI (See EPIC).    Restrictions due to condition include:  Working in normal job without restrictions.  Barriers include:  None as reported by patient.                        Objective:  Standing Alignment:    Cervical/Thoracic:  Forward head  Shoulder/UE:  Rounded shoulders                  Flexibility/Screens:   Positive screens:  CervicalNegative screens: Shoulder                         Cervical/Thoracic Evaluation    AROM:  AROM Cervical:    Flexion:            100%  Extension:       100% with increased sx's and radiculopathy  Rotation:         Left: 100% with increased radiculopathy     Right: 100%  Side Bend:      Left: 100% with increased radiculopathy     Right:  100%      Headaches: cervical  Cervical Myotomes:    C1-2 (Neck Flex): Left:  5    Right: 5  C3 (neck side bend): Left: 5    Right: 5  C4 (shrug):  Left: 5    Right: 5  C5 (Deltoid):  Left: 4    Right: 5  C6 (Biceps):  Left: 4    Right: 5  C7 (Triceps):  Left: 4    Right: 5  C8 (Thumb Ext): Left: 4    Right: 5          Cervical Palpation:  normal                                                      General     ROS    Assessment/Plan:    Patient is a 46 year old female with cervical complaints.    Patient has the following significant findings with corresponding treatment plan.                Diagnosis 1:  Neck pain left radiculopathy  Pain -  self management, education, directional preference exercise and home program  Decreased ROM/flexibility - manual therapy and therapeutic exercise  Decreased strength - therapeutic exercise and therapeutic activities  Impaired muscle performance - neuro re-education  Decreased function - therapeutic activities  Impaired posture - neuro re-education  Instability -  Therapeutic Activity  Therapeutic Exercise    Therapy Evaluation Codes:   1) Clinical presentation characteristics  are:   Stable/Uncomplicated.  2) Decision-Making    Low complexity using standardized patient assessment instrument and/or measureable assessment of functional outcome.  Cumulative Therapy Evaluation is: Low complexity.    Previous and current functional limitations:  (See Goal Flow Sheet for this information)    Short term and Long term goals: (See Goal Flow Sheet for this information)     Communication ability:  Patient appears to be able to clearly communicate and understand verbal and written communication and follow directions correctly.  Treatment Explanation - The following has been discussed with the patient:   RX ordered/plan of care  Anticipated outcomes  Possible risks and side effects  This patient would benefit from PT intervention to resume normal activities.   Rehab potential is good.    Frequency:  2 X week, once daily  Duration:  for 6 weeks  Discharge Plan:  Achieve all LTG.  Independent in home treatment program.  Reach maximal therapeutic benefit.      Please refer to the daily flowsheet for treatment today, total treatment time and time spent performing 1:1 timed codes.

## 2021-04-20 ENCOUNTER — THERAPY VISIT (OUTPATIENT)
Dept: PHYSICAL THERAPY | Facility: CLINIC | Age: 46
End: 2021-04-20
Payer: COMMERCIAL

## 2021-04-20 DIAGNOSIS — M54.2 NECK PAIN: ICD-10-CM

## 2021-04-20 PROCEDURE — 97012 MECHANICAL TRACTION THERAPY: CPT | Mod: GP | Performed by: PHYSICAL THERAPIST

## 2021-04-20 PROCEDURE — 97110 THERAPEUTIC EXERCISES: CPT | Mod: GP | Performed by: PHYSICAL THERAPIST

## 2021-04-22 ENCOUNTER — THERAPY VISIT (OUTPATIENT)
Dept: PHYSICAL THERAPY | Facility: CLINIC | Age: 46
End: 2021-04-22
Payer: COMMERCIAL

## 2021-04-22 DIAGNOSIS — M54.2 NECK PAIN: ICD-10-CM

## 2021-04-22 PROCEDURE — 97012 MECHANICAL TRACTION THERAPY: CPT | Mod: GP | Performed by: PHYSICAL THERAPIST

## 2021-04-22 PROCEDURE — 97110 THERAPEUTIC EXERCISES: CPT | Mod: GP | Performed by: PHYSICAL THERAPIST

## 2021-04-26 ENCOUNTER — TELEPHONE (OUTPATIENT)
Dept: NEUROSURGERY | Facility: CLINIC | Age: 46
End: 2021-04-26

## 2021-04-26 ENCOUNTER — THERAPY VISIT (OUTPATIENT)
Dept: PHYSICAL THERAPY | Facility: CLINIC | Age: 46
End: 2021-04-26
Payer: COMMERCIAL

## 2021-04-26 DIAGNOSIS — M48.02 CERVICAL STENOSIS OF SPINAL CANAL: Primary | ICD-10-CM

## 2021-04-26 DIAGNOSIS — M54.2 NECK PAIN: ICD-10-CM

## 2021-04-26 PROCEDURE — 97110 THERAPEUTIC EXERCISES: CPT | Mod: GP | Performed by: PHYSICAL THERAPIST

## 2021-04-26 PROCEDURE — 97012 MECHANICAL TRACTION THERAPY: CPT | Mod: GP | Performed by: PHYSICAL THERAPIST

## 2021-04-26 NOTE — TELEPHONE ENCOUNTER
Reason for call: Pt called to discuss injection treatment. Please call her back at [phone]. She will not be available at 12:30 due to a traction appointment. Thank you.

## 2021-04-26 NOTE — TELEPHONE ENCOUNTER
"Last Visit: 4/6     Per Ruben Puentes PA-C :\"45 year old female who presents to the clinic for consultation on  constant, sharp, burning pain that initiates in the midline cervical region and radiates distally in what sounds like the left C7 distribution. This pain is accompanied by paresthesia, numbness and perceived weakness in the same distribution. The patient's most recent imaging was reviewed with her today. It was explained that images show DDD change at C4-5, C5-6 and C6-7\"    Next Visit:   None    Name of Provider:  Ruben Puentes PA-C       Assessment:    The patient has called about possibly having an injection. She has completed 3 PT treatments that included traction.  Last Thursday she did note a few seconds of relief in the last few minutes of the 20 min traction session. No other improvement noted.  She has therapy again today but thought if the YAMILEX was added in it would offer better pain management. She did complete  the MDP.    Action needed from provider: Will ask the care team if an injection would be beneficial.          "

## 2021-04-26 NOTE — TELEPHONE ENCOUNTER
"Per Ruben Puentes PA-C: \"She should be doing physical therapy. Would be happy to order a ILESI at [facility].\"    Called the patient with the above information.  Pt verbalized understanding.   "

## 2021-04-27 NOTE — PROGRESS NOTES
[facility] - Procedure Note    Date of Visit: 4/28/2021    Procedure performed: C7-T1 interlaminar epidural steroid injection with fluoroscopic guidance  Diagnosis: Cervical spondylosis; Cervical radiculitis/radiculopathy  : Betito Liu DO & Maricel Wells MD (Fellow)   Anesthesia: none  Complications: patient had a vasovagal reaction without any syncopal episode during the procedure. Her vasovagal response ended without any interventions. She as stable without any side effects prior to discharge.    Indications: Machelle Weiner is a 46 year old female who is seen at the request of Torrey Puentes PA-C for cervical epidural steroid injection. The patient describes pain in the neck that radiates into the left upper extremity in a C7 distribution. The patient has been exhibiting symptoms consistent with cervical intraspinal inflammation and radiculopathy. Symptoms have been persistent, disabling, and intermittently severe. The patient reports minimal improvement with conservative treatment, including oral medications and physical therapy.    Cervical MRI was done on 3/27/21 which showed   C4-C5: Small central/right paracentral disc protrusion causes subtle contact of the anterior/right aspect of the cervical cord. CSF spaces lateral and posterior to the cord remain well-preserved. Mild bilateral facet degeneration foramen are patent.      C5-C6: Broad-based central/left paracentral ventral osteophyte/disc complex which attenuates the anterior/left subarachnoid space with bilateral uncovertebral joint hypertrophic change. Foramen are patent.      C6-C7: Broad-based central/left paracentral ventral osteophyte/disc complex attenuates the anterior/left subarachnoid space. Foramen are patent.      C7-T1: Normal disc height. No herniation. Normal facets. No spinal canal or neural foraminal stenosis.       Allergies:      Allergies   Allergen Reactions     Codeine       Penicillins         Vitals:  /89 (BP Location: Left arm)   Pulse 62   SpO2 95%     Review of Systems: The patient denies recent fever, chills, illness, use of antibiotics or anticoagulants. All other 10-point review of systems negative.     Procedure: The procedure and risks were explained, and informed written consent was obtained from the patient. Risks include but are not limited to: infection, bleeding, increased pain, and damage to soft tissue, nerve, muscle, and vasculature structures. After getting informed consent, patient was brought into the procedure suite and was placed in a prone position on the procedure table. A Pause for the Cause was performed. Patient was prepped and draped in sterile fashion.     The C7-T1 interspace was identified with use of fluoroscopy in AP view. A 25-gauge, 1.5 inch needle was used to anesthetize the skin and subcutaneous tissue entry site with a total of 2 ml of 1% lidocaine. Under fluoroscopic visualization, a 22-gauge, 3.5 inch Tuohy epidural needle was slowly advanced towards the epidural space a few millimeters left-sided of midline. The latter part of the needle advancement was guided with fluoroscopy in the lateral view. The epidural space was identified using loss of resistance technique. After negative aspiration for heme and cerebrospinal fluid, a total of 1 mL of non-ionic contrast was injected to confirm needle placement. 9 mL of contrast was wasted. Epidurogram confirmed spread within the posterior epidural space. A solution containing 1ml of 40mg/ml of Triamcinolone and 2 ml of preservative free saline was injected. The needle was removed.  Images were saved to PACS.    The patient tolerated the procedure well, and there was no evidence of procedural complications. No new sensory or motor deficits were noted following the procedure. The patient was stable and able to ambulate on discharge home. Post-procedure instructions were provided.     Pre-procedure  pain score: 5/10 in the neck, 6/10 in the arm  Post-procedure pain score: 4/10 in the neck, 5/10 in the arm    Assessment/Plan: Machelle Weiner is a 46 year old female s/p cervical interlaminar epidural steroid injection today for cervical spondylosis and radiculitis/radiculopathy.     1. Following today's procedure, the patient was advised to contact the [facility] for any of the following:   Fever, chills, or night sweats   New onset of pain, numbness, or weakness   Any questions/concerns regarding the procedure  If unable to contact the Pain Center, the patient was instructed to go to a local Emergency Room for any complications.   2. The patient will receive a follow-up call in 1 week.   3. Follow-up with the referring provider in 2 weeks for post-procedure evaluation.    Betito Liu DO  [facility] [place]

## 2021-04-28 ENCOUNTER — RADIOLOGY INJECTION OFFICE VISIT (OUTPATIENT)
Dept: PALLIATIVE MEDICINE | Facility: CLINIC | Age: 46
End: 2021-04-28
Attending: PHYSICIAN ASSISTANT
Payer: COMMERCIAL

## 2021-04-28 VITALS — HEART RATE: 62 BPM | OXYGEN SATURATION: 95 % | SYSTOLIC BLOOD PRESSURE: 131 MMHG | DIASTOLIC BLOOD PRESSURE: 89 MMHG

## 2021-04-28 DIAGNOSIS — M48.02 CERVICAL STENOSIS OF SPINAL CANAL: Primary | ICD-10-CM

## 2021-04-28 PROCEDURE — 62321 NJX INTERLAMINAR CRV/THRC: CPT | Performed by: PHYSICAL MEDICINE & REHABILITATION

## 2021-04-28 NOTE — NURSING NOTE
Discharge Information    IV Discontiued Time:  NA    Amount of Fluid Infused:  NA    Discharge Criteria = When patient returns to baseline or as per MD order    Consciousness:  Pt is fully awake    Circulation:  BP +/- 20% of pre-procedure level    Respiration:  Patient is able to breathe deeply    O2 Sat:  Patient is able to maintain O2 Sat >92% on room air    Activity:  Moves 4 extremities on command    Ambulation:  Patient is able to stand and walk or stand and pivot into wheelchair    Dressing:  Clean/dry or No Dressing    Notes:   Discharge instructions and AVS given to patient    Patient meets criteria for discharge?  YES    Admitted to PCU?  No    Responsible adult present to accompany patient home?  Yes    Signature/Title:    Leigh Snowden RN Care Coordinator  [facility] [place]

## 2021-04-28 NOTE — NURSING NOTE
Pre-procedure Intake    Have you been fasting? NA    If yes, for how long?     Are you taking a prescribed blood thinner such as coumadin, Plavix, Xarelto?    No    If yes, when did you take your last dose?     Do you take aspirin?  No    If cervical procedure, have you held aspirin for 6 days?   NA    Do you have any allergies to contrast dye, iodine, steroid and/or numbing medications?  NO    Are you currently taking antibiotics or have an active infection?  NO    Have you had a fever/elevated temperature within the past week? NO    Are you currently taking oral steroids? NO    Do you have a ? Yes       Are you pregnant or breastfeeding?  Not Applicable    Have you received the COVID-19 vaccine? Yes       If yes, was it your 1st, 2nd or only dose needed? 1st    Date of most recent vaccine: 04/07/21 (estimated date)    Second dose scheduled 05/06/21 (estimated date)     Are the vital signs normal?  No: B/P 148/86      Latisha Thomas MA  [facility] [place]

## 2021-04-28 NOTE — PATIENT INSTRUCTIONS
[facility] Procedure Discharge Instructions    Today you saw:     Dr. Betito Liu    Your procedure:  Epidural steroid injection       Medications used:  Lidocaine (anesthetic)     Dexamethasone (steroid)     Omnipaque (contrast)    Normal Saline             Be cautious when walking as numbness and/or weakness in the legs may occur up to 6-8 hours after the procedure due to effect of the local anesthetic    Do not drive for 6 hours. The effect of the local anesthetic could slow your reflexes.     Avoid strenuous activity for the first 24 hours. You may resume your regular activities after that.     You may shower, however avoid swimming, tub baths or hot tubs for 24 hours following your procedure    You may have a mild to moderate increase in pain for several days following the injection.      You may use ice packs for 10-15 minutes, 3 to 4 times a day at the injection site for comfort    Do not use heat to painful areas for 6 to 8 hours. This will give the local anesthetic time to wear off and prevent you from accidentally burning your skin.    Unless you have been directed to avoid the use of anti-inflammatory medications (NSAIDS-ibuprofen, Aleve, Motrin), you may use these medications or Tylenol for pain control if needed.     With diabetes, check your blood sugar more frequently than usual as your blood sugar may be higher than normal for 10-14 days following a steroid injection. Contact your doctor who manages your diabetes if your blood sugar is higher than usual    Possible side effects of steroids that you may experience include flushing, elevated blood pressure, increased appetite, mild headaches and restlessness.  All of these symptoms will get better with time.    It may take up to 14 days for the steroid medication to start working although you may feel the effect as early as a few days after the procedure.     Follow up with your referring provider in 2-3 weeks      If you  experience any of the following, call the pain center line during work hours at [phone] or on-call physician after hours at [phone]:  -Fever over 100 degree F  -Swelling, bleeding, redness, drainage, warmth at the injection site  -Progressive weakness or numbness in your legs or arms  -Loss of bowel or bladder function  -Unusual headache that is not relieved by Tylenol or your regular headache medication  -Unusual new onset of pain that is not improving

## 2021-05-04 ENCOUNTER — THERAPY VISIT (OUTPATIENT)
Dept: PHYSICAL THERAPY | Facility: CLINIC | Age: 46
End: 2021-05-04
Payer: COMMERCIAL

## 2021-05-04 DIAGNOSIS — M54.2 NECK PAIN: ICD-10-CM

## 2021-05-04 PROCEDURE — 97012 MECHANICAL TRACTION THERAPY: CPT | Mod: GP | Performed by: PHYSICAL THERAPIST

## 2021-05-04 PROCEDURE — 97110 THERAPEUTIC EXERCISES: CPT | Mod: GP | Performed by: PHYSICAL THERAPIST

## 2021-05-30 ENCOUNTER — HEALTH MAINTENANCE LETTER (OUTPATIENT)
Age: 46
End: 2021-05-30

## 2021-05-30 ENCOUNTER — RECORDS - HEALTHEAST (OUTPATIENT)
Dept: ADMINISTRATIVE | Facility: CLINIC | Age: 46
End: 2021-05-30

## 2021-06-01 ENCOUNTER — TELEPHONE (OUTPATIENT)
Dept: FAMILY MEDICINE | Facility: CLINIC | Age: 46
End: 2021-06-01

## 2021-06-01 NOTE — LETTER
[facility]  [address] [address] [address] [phone]       Ester 15, 2021    Machelle Weiner  [address] [place] [id number]    Dear Machelle,    We care about your health and have reviewed your health plan and are making recommendations based on this review, to optimize your health.    You are in particular need of attention regarding:  -Breast Cancer Screening  -Cervical Cancer Screening    We are recommending that you:                                                                                                -schedule a MAMMOGRAM which is due.         1 in 8 women will develop invasive breast cancer during her lifetime and it is the most common non-skin cancer in [place] women.  EARLY detection, new treatments, and a better understanding of the disease have increased survival rates - the 5 year survival rate in the 1960s was 63% and today it is close to 90%.         If you are under/uninsured, we recommend you contact the [facility]. They offer mammograms at no charge or on a sliding fee charge. You can schedule with them at [phone]. Please have them send us the results.           Please disregard this reminder if you have had this exam elsewhere within the last year.  It would be helpful for us to have a copy of your mammogram report in our file so that we can best coordinate your care - please contact us with when your test was done so we can update your record.                                                                                                                      -schedule a PAP SMEAR EXAM which is due.  Please disregard this reminder if you have had this exam elsewhere within the last year.  It would be helpful for us to have a copy of your recent pap smear report in our file so that we can best coordinate your care.    If you are under/uninsured, we recommend you contact the [facility]. They offer pap smears at no charge  or on a sliding fee charge. You can schedule with them at [phone]. Please have them send us the results.                                                                                                In addition, here is a list of due or overdue Health Maintenance reminders.    Health Maintenance Due   Topic Date Due     Preventive Care Visit  Never done     ANNUAL REVIEW OF HM ORDERS  Never done     Mammogram  Never done     Pneumococcal Vaccine (1 of 2 - PPSV23) Never done     HIV Screening  Never done     Hepatitis C Screening  Never done     Diptheria Tetanus Pertussis (DTAP/TDAP/TD) Vaccine (1 - Tdap) Never done     PAP Smear  01/25/2016     Asthma Action Plan - yearly  04/29/2020     Asthma Control Test  02/21/2021     Cholesterol Lab  Never done       To address the above recommendations, we encourage you to contact us at [phone], via [employer] or by contacting Central Scheduling toll free at [phone] 24 hours a day. They will assist you with finding the most convenient time and location.    Thank you for trusting [facility] and we appreciate the opportunity to serve you.  We look forward to supporting your healthcare needs in the future.    Healthy Regards,    Your [facility] Team

## 2021-06-01 NOTE — TELEPHONE ENCOUNTER
Patient Quality Outreach      Summary:    Patient has the following on her problem list/HM:   Asthma review       ACT Total Scores 8/21/2020   ACT TOTAL SCORE -   ASTHMA ER VISITS -   ASTHMA HOSPITALIZATIONS -   ACT TOTAL SCORE (Goal Greater than or Equal to 20) 9   In the past 12 months, how many times did you visit the emergency room for your asthma without being admitted to the hospital? 0   In the past 12 months, how many times were you hospitalized overnight because of your asthma? 0          Patient is due/failing the following:   ACT needed, Breast Cancer Screening - Mammogram and Cervical Cancer Screening - PAP Needed    Type of outreach:    Sent [employer] message.    Questions for provider review:    None                                                                                                                                     Susan Reynaga CMA       Chart routed to Care Team.

## 2021-06-15 NOTE — TELEPHONE ENCOUNTER
Patient Quality Outreach 2nd Attempt      Summary:    Type of outreach:    Sent letter.    Next Steps:  Reach out within 90 days via Phone.    Max number of attempts reached: Yes. Will try again in 90 days if patient still on fail list.    Questions for provider review:    None                                                                                                                    Susan Reynaga CMA       Chart routed to closed.

## 2021-06-18 ENCOUNTER — OFFICE VISIT (OUTPATIENT)
Dept: NEUROSURGERY | Facility: CLINIC | Age: 46
End: 2021-06-18
Attending: PHYSICIAN ASSISTANT
Payer: COMMERCIAL

## 2021-06-18 VITALS
OXYGEN SATURATION: 99 % | SYSTOLIC BLOOD PRESSURE: 127 MMHG | HEART RATE: 80 BPM | RESPIRATION RATE: 20 BRPM | BODY MASS INDEX: 43.36 KG/M2 | HEIGHT: 64 IN | WEIGHT: 254 LBS | TEMPERATURE: 98.2 F | DIASTOLIC BLOOD PRESSURE: 85 MMHG

## 2021-06-18 DIAGNOSIS — M50.223 HERNIATED NUCLEUS PULPOSUS, C6-7 LEFT: Primary | ICD-10-CM

## 2021-06-18 PROCEDURE — G0463 HOSPITAL OUTPT CLINIC VISIT: HCPCS

## 2021-06-18 PROCEDURE — 99213 OFFICE O/P EST LOW 20 MIN: CPT | Performed by: PHYSICIAN ASSISTANT

## 2021-06-18 ASSESSMENT — PAIN SCALES - GENERAL: PAINLEVEL: MODERATE PAIN (4)

## 2021-06-18 ASSESSMENT — MIFFLIN-ST. JEOR: SCORE: 1782.14

## 2021-06-18 NOTE — LETTER
6/18/2021         RE: Machelle Weiner  [address] [id number]        Dear Colleague,    Thank you for referring your patient, Machelle Weiner, to the [facility] [place]. Please see a copy of my visit note below.    NEUROSURGERY CLINIC PROGRESS NOTE    DATE OF VISIT: 6/18/2021    HPI:     Machelle Weiner is a pleasant 46 year old female who I last evaluated on 04/06/2021 for cervical spine pain with left upper extremity radiculopathy that she describes as a constant, sharp, burning pain that initiates in the midline cervical region and radiates distally in what sounds like the left C7 distribution. This pain is accompanied by paresthesia, numbness and perceived weakness in the same distribution. There has been no significant change in her handwriting or hand dexterity, although she has been dropping objects lately.   At the conclusion of that visit we felt that it would be advantageous to attempt conservative options. Since then she has worked with our colleagues at the [facility]. She states that the traction exacerbated her symptoms. We also provided her with a medrol Dosepak. She has had a cervical YAMILEX.   Today her left arm symptoms have significantly improved, albeit not resolved. Unfortunately she continues to have daily cervical spine pain in conjunction with daily headaches. She would like to avoid surgical intervention.     Current Outpatient Medications   Medication     albuterol (2.5 MG/3ML) 0.083% neb solution     albuterol (PROAIR HFA/PROVENTIL HFA/VENTOLIN HFA) 108 (90 Base) MCG/ACT inhaler     cyclobenzaprine (FLEXERIL) 10 MG tablet     Esomeprazole Magnesium (NEXIUM PO)     ibuprofen (ADVIL,MOTRIN) 600 MG tablet     levothyroxine (SYNTHROID/LEVOTHROID) 75 MCG tablet     losartan (COZAAR) 50 MG tablet     clindamycin (CLINDAMAX) 1 % external gel     methylPREDNISolone (MEDROL DOSEPAK) 4 MG tablet therapy pack  "    No current facility-administered medications for this visit.        Allergies   Allergen Reactions     Codeine      Penicillins        Past Medical History:   Diagnosis Date     Asthma, mild intermittent      Hypertension      Thyroid disease        Review Of Systems    Skin: negative  Eyes: negative  Ears/Nose/Throat: negative  Respiratory: No shortness of breath, dyspnea on exertion, cough, or hemoptysis  Cardiovascular: negative  Gastrointestinal: negative  Musculoskeletal: neck pain  Neurologic: numbness or tingling of hands  Psychiatric: negative  Hematologic/Lymphatic/Immunologic: negative  Endocrine: negative    OBJECTIVE:    /85 (BP Location: Left arm)   Pulse 80   Temp 98.2  F (36.8  C)   Resp 20   Ht 5' 4\" (1.626 m)   Wt 254 lb (115.2 kg)   SpO2 99%   BMI 43.60 kg/m      Imaging:    MR CERVICAL SPINE W/O CONTRAST - 3/27/2021 7:30 PM    Findings notable for:  Craniovertebral junction and C1-C2: Normal.  C2-C3: Normal disc height. No herniation. Normal facets. No spinal canal or neural foraminal stenosis.   C3-C4: Normal disc height. No herniation. Normal facets. No spinal canal or neural foraminal stenosis.   C4-C5: Small central/right paracentral disc protrusion causes subtle contact of the anterior/right aspect of the cervical cord. CSF spaces lateral and posterior to the cord remain well-preserved. Mild bilateral facet degeneration foramen are patent.   C5-C6: Broad-based central/left paracentral ventral osteophyte/disc complex which attenuates the anterior/left subarachnoid space with bilateral uncovertebral joint hypertrophic change. Foramen are patent.   C6-C7: Broad-based central/left paracentral ventral osteophyte/disc complex attenuates the anterior/left subarachnoid space. Foramen are patent.   C7-T1: Normal disc height. No herniation. Normal facets. No spinal canal or neural foraminal stenosis.    Radiographic Findings: Full radiological report in chart. I personally reviewed the " images with the patient today.    Exam:    Patient appears comfortable and in no apparent distress. Moving all extremities.  Gait is non-antalgic.  CN II-XII grossly intact, alert and appropriate with conversation and following  commands  Bilateral upper extremities with full strength including hand intrinsics and grasp with the exception of some minor left triceps weakness, 4+/5.  Sensation intact throughout.  Bilateral lower extremities 5/5 strength including plantar and dorsiflexion.  Normal sensation throughout bilaterally.    PLAN:    Today we discussed non operative options such as acupuncture and pain management referral for consideration of performing TPI, RFA, facet injections or any other modalities as indicated.     We would like to see her back as needed to further discuss possible surgical interventions or other conservative therapies. Next time we see her she should be evaluated by Dr. Amor. We also discussed signs of a worsening problem that she should seek being evaluated.     The patient gave verbal understanding and is in agreement with the above plan. She  will call or return to the clinic for any worsening or changes in symptoms.    Respectfully,     MAXIMILIANO Hughes PA-C      Again, thank you for allowing me to participate in the care of your patient.        Sincerely,        Torrey Puentes PA-C

## 2021-06-18 NOTE — PROGRESS NOTES
NEUROSURGERY CLINIC PROGRESS NOTE    DATE OF VISIT: 6/18/2021    HPI:     Machelle Weiner is a pleasant 46 year old female who I last evaluated on 04/06/2021 for cervical spine pain with left upper extremity radiculopathy that she describes as a constant, sharp, burning pain that initiates in the midline cervical region and radiates distally in what sounds like the left C7 distribution. This pain is accompanied by paresthesia, numbness and perceived weakness in the same distribution. There has been no significant change in her handwriting or hand dexterity, although she has been dropping objects lately.   At the conclusion of that visit we felt that it would be advantageous to attempt conservative options. Since then she has worked with our colleagues at the [facility]. She states that the traction exacerbated her symptoms. We also provided her with a medrol Dosepak. She has had a cervical YAMILEX.   Today her left arm symptoms have significantly improved, albeit not resolved. Unfortunately she continues to have daily cervical spine pain in conjunction with daily headaches. She would like to avoid surgical intervention.     Current Outpatient Medications   Medication     albuterol (2.5 MG/3ML) 0.083% neb solution     albuterol (PROAIR HFA/PROVENTIL HFA/VENTOLIN HFA) 108 (90 Base) MCG/ACT inhaler     cyclobenzaprine (FLEXERIL) 10 MG tablet     Esomeprazole Magnesium (NEXIUM PO)     ibuprofen (ADVIL,MOTRIN) 600 MG tablet     levothyroxine (SYNTHROID/LEVOTHROID) 75 MCG tablet     losartan (COZAAR) 50 MG tablet     clindamycin (CLINDAMAX) 1 % external gel     methylPREDNISolone (MEDROL DOSEPAK) 4 MG tablet therapy pack     No current facility-administered medications for this visit.        Allergies   Allergen Reactions     Codeine      Penicillins        Past Medical History:   Diagnosis Date     Asthma, mild intermittent      Hypertension      Thyroid disease        Review Of Systems    Skin:  "negative  Eyes: negative  Ears/Nose/Throat: negative  Respiratory: No shortness of breath, dyspnea on exertion, cough, or hemoptysis  Cardiovascular: negative  Gastrointestinal: negative  Musculoskeletal: neck pain  Neurologic: numbness or tingling of hands  Psychiatric: negative  Hematologic/Lymphatic/Immunologic: negative  Endocrine: negative    OBJECTIVE:    /85 (BP Location: Left arm)   Pulse 80   Temp 98.2  F (36.8  C)   Resp 20   Ht 5' 4\" (1.626 m)   Wt 254 lb (115.2 kg)   SpO2 99%   BMI 43.60 kg/m      Imaging:    MR CERVICAL SPINE W/O CONTRAST - 3/27/2021 7:30 PM    Findings notable for:  Craniovertebral junction and C1-C2: Normal.  C2-C3: Normal disc height. No herniation. Normal facets. No spinal canal or neural foraminal stenosis.   C3-C4: Normal disc height. No herniation. Normal facets. No spinal canal or neural foraminal stenosis.   C4-C5: Small central/right paracentral disc protrusion causes subtle contact of the anterior/right aspect of the cervical cord. CSF spaces lateral and posterior to the cord remain well-preserved. Mild bilateral facet degeneration foramen are patent.   C5-C6: Broad-based central/left paracentral ventral osteophyte/disc complex which attenuates the anterior/left subarachnoid space with bilateral uncovertebral joint hypertrophic change. Foramen are patent.   C6-C7: Broad-based central/left paracentral ventral osteophyte/disc complex attenuates the anterior/left subarachnoid space. Foramen are patent.   C7-T1: Normal disc height. No herniation. Normal facets. No spinal canal or neural foraminal stenosis.    Radiographic Findings: Full radiological report in chart. I personally reviewed the images with the patient today.    Exam:    Patient appears comfortable and in no apparent distress. Moving all extremities.  Gait is non-antalgic.  CN II-XII grossly intact, alert and appropriate with conversation and following  commands  Bilateral upper extremities with full " strength including hand intrinsics and grasp with the exception of some minor left triceps weakness, 4+/5.  Sensation intact throughout.  Bilateral lower extremities 5/5 strength including plantar and dorsiflexion.  Normal sensation throughout bilaterally.    PLAN:    Today we discussed non operative options such as acupuncture and pain management referral for consideration of performing TPI, RFA, facet injections or any other modalities as indicated.     We would like to see her back as needed to further discuss possible surgical interventions or other conservative therapies. Next time we see her she should be evaluated by Dr. Amor. We also discussed signs of a worsening problem that she should seek being evaluated.     The patient gave verbal understanding and is in agreement with the above plan. She  will call or return to the clinic for any worsening or changes in symptoms.    Respectfully,     MAXIMILIANO Hughes, GEOVANNA

## 2021-06-18 NOTE — NURSING NOTE
"Machelle Weiner is a 46 year old female who presents for:  No chief complaint on file.       Initial Vitals:  /85 (BP Location: Left arm)   Pulse 80   Temp 98.2  F (36.8  C)   Resp 20   Ht 5' 4\" (1.626 m)   Wt 254 lb (115.2 kg)   SpO2 99%   BMI 43.60 kg/m   Estimated body mass index is 43.6 kg/m  as calculated from the following:    Height as of this encounter: 5' 4\" (1.626 m).    Weight as of this encounter: 254 lb (115.2 kg).. Body surface area is 2.28 meters squared. BP completed using cuff size: large  Moderate Pain (4)            Mary Perez, RN, RN   "

## 2021-07-01 ENCOUNTER — TELEPHONE (OUTPATIENT)
Facility: CLINIC | Age: 46
End: 2021-07-01

## 2021-07-01 DIAGNOSIS — M48.02 CERVICAL STENOSIS OF SPINAL CANAL: Primary | ICD-10-CM

## 2021-07-01 NOTE — TELEPHONE ENCOUNTER
Message sent to provider to determine if pain management referral can be placed or if patient needs to be seen in clinic by per provider as per note on 6/18/21.

## 2021-07-01 NOTE — TELEPHONE ENCOUNTER
Patient was instructed to call Dr. Puentes back after reviewing information regarding additional pain management procedures.  Patient had injections 2 months ago, and wants to pursue other options with the pain management department and needs an additional referral to go back.

## 2021-07-05 PROBLEM — M54.2 NECK PAIN: Status: RESOLVED | Noted: 2021-04-15 | Resolved: 2021-07-05

## 2021-07-05 NOTE — PROGRESS NOTES
Discharge Note    Progress reporting period is from initial eval to May 4, 2021.     Machelle failed to return for next follow up visit and current status is unknown.  Please see information below for last relevant information on current status.  Patient seen for Rxs Used: 5 visits.  SUBJECTIVE  Subjective changes noted by patient:  Subjective: PT reports having injection on 4/28/21 with slight improvement.  She reports less numbness in left hand.  Neck pain is increased on right side.  .  Current pain level is  .     Previous pain level was  Initial Pain level: 5/10.   Changes in function:  Yes (See Goal flowsheet attached for changes in current functional level)  Adverse reaction to treatment or activity: None    OBJECTIVE  Changes noted in objective findings: Objective: L UE sx persist with ext and L Sbing     ASSESSMENT/PLAN  Diagnosis: neck pain with left radiculopathy   DIAGP:  The encounter diagnosis was Neck pain.  Updated problem list and treatment plan:   Decreased function - HEP  STG/LTGs have been met or progress has been made towards goals:  Yes, please see goal flowsheet for most current information  Assessment of Progress: current status is unknown.  Last current status: Assessment of progress: Pt is progressing slower than anticipated   Self Management Plans:  HEP  I have re-evaluated this patient and find that the nature, scope, duration and intensity of the therapy is appropriate for the medical condition of the patient.  Machelle continues to require the following intervention to meet STG and LTG's:  HEP.    Recommendations:  Discharge with current home program.  Patient to follow up with MD as needed.    Please refer to the daily flowsheet for treatment today, total treatment time and time spent performing 1:1 timed codes.

## 2021-07-07 NOTE — TELEPHONE ENCOUNTER
Pt called back to see if the order/referral has  Been placed yet. Please call her  Back at [phone]. Thank you

## 2021-07-21 ENCOUNTER — OFFICE VISIT (OUTPATIENT)
Dept: PALLIATIVE MEDICINE | Facility: CLINIC | Age: 46
End: 2021-07-21
Payer: COMMERCIAL

## 2021-07-21 VITALS — DIASTOLIC BLOOD PRESSURE: 85 MMHG | SYSTOLIC BLOOD PRESSURE: 130 MMHG | OXYGEN SATURATION: 100 % | HEART RATE: 74 BPM

## 2021-07-21 DIAGNOSIS — M48.02 CERVICAL STENOSIS OF SPINAL CANAL: ICD-10-CM

## 2021-07-21 DIAGNOSIS — M79.18 MYOFASCIAL PAIN: Primary | ICD-10-CM

## 2021-07-21 PROCEDURE — 99203 OFFICE O/P NEW LOW 30 MIN: CPT | Performed by: PHYSICAL MEDICINE & REHABILITATION

## 2021-07-21 ASSESSMENT — PAIN SCALES - GENERAL: PAINLEVEL: MILD PAIN (3)

## 2021-07-21 NOTE — PATIENT INSTRUCTIONS
1. Order placed for trigger point injections. You will be called to schedule.     Kamini Menard MD  [facility] Pain Management     ----------------------------------------------------------------  Clinic Number:  [phone]     Call with any questions about your care and for scheduling assistance.     Calls are returned Monday through Friday between 8 AM and 4:30 PM. We usually get back to you within 2 business days depending on the issue/request.    If we are prescribing your medications:    For opioid medication refills, call the clinic or send a [employer] message 7 days in advance.  Please include:    Name of requested medication    Name of the pharmacy.    For non-opioid medications, call your pharmacy directly to request a refill. Please allow 3-4 days to be processed.     Per [place] State Law:    All controlled substance prescriptions must be filled within 30 days of being written.      For those controlled substances allowing refills, pickup must occur within 30 days of last fill.      We believe regular attendance is key to your success in our program!      Any time you are unable to keep your appointment we ask that you call us at least 24 hours in advance to cancel.This will allow us to offer the appointment time to another patient.     Multiple missed appointments may lead to dismissal from the clinic.

## 2021-07-21 NOTE — PROGRESS NOTES
[facility] Interventional Consultation    Date of visit: 7/21/2021    Assessment:  Machelle Weiner is a 46 year old female with a past medical history significant for migraines, asthma, morb who presents with complaints of:    1. Neck pain: Etiology most consistent with myofascial pain.    Recommendations:  1. Order placed for trigger point injections.       Reason for consultation:    Primary Care Provider is Jean Sauceda.    Machelle Weiner is a 46 year old female who I was asked to see in consultation by Torrey Puentes for interventional evaluation of neck pain.      Consultation and Evaluation for: neck pain    Review of Electronic Chart: Today I have also reviewed available medical information in the patient's medical record at [facility] ([facility]), including relevant provider notes, laboratory work, and imaging.     Chief Complaint:    Chief Complaint   Patient presents with     Pain       Pain history:  Machelle Weiner is a 46 year old female who presents for initial evaluation of chief pain complaint of neck pain.  She was having neck pain with radiation into the left arm and hand. Initially in 4th and 5th and then spread to other fingers. She had a cervical YAMILEX on 4/28/2021. Arm pain was worse than neck pain at that time. Now arm pain is improved. N/t significantly less often now. Most days not even aware of arm symptoms.     What is bothering her now is the neck pain. Causing daily constant headaches. In mornings neck is stiff and sore. stiffnes imporves but the more active she is the pain gets worse. Any activity worsens the pain. The pain is located in left > right neck. Goes into the traps bilaterally. Goes posteriorly into the occipital region. And into the forehead. The more active with arms the worse the pain becomes.     She tried flexeril. Tried taking it but doesn't help. Describes the pain as dull and aching. Every once in a  while the pain is sharp.     Severity/Intensity: 8/10 at worst, 2/10 at best, 4-5/10 on average    Medications:       Current pain medications:  Cyclobenzaprine 10 mg TID prn - [facility]  CBD cream - temporary benefit         Previous pain medications:  Medrol dose pack - [facility]  CBD oil - NH for pain  Aleve - NH  advil - NH  Tylenol - NH    Past Pain Treatments:  Injection - H for arm pain  PT - [facility], made pain worse    Diagnostic tests:  Cervical MRI completed on 3/27/2021:    FINDINGS:   Normal vertebral body heights, alignment and marrow signal. No abnormal cord signal. Epidural spaces clear. No prevertebral soft tissue swelling. Pontomedullary junction is clear.     Craniovertebral junction and C1-C2: Normal.     C2-C3: Normal disc height. No herniation. Normal facets. No spinal canal or neural foraminal stenosis.      C3-C4: Normal disc height. No herniation. Normal facets. No spinal canal or neural foraminal stenosis.      C4-C5: Small central/right paracentral disc protrusion causes subtle contact of the anterior/right aspect of the cervical cord. CSF spaces lateral and posterior to the cord remain well-preserved. Mild bilateral facet degeneration foramen are patent.      C5-C6: Broad-based central/left paracentral ventral osteophyte/disc complex which attenuates the anterior/left subarachnoid space with bilateral uncovertebral joint hypertrophic change. Foramen are patent.      C6-C7: Broad-based central/left paracentral ventral osteophyte/disc complex attenuates the anterior/left subarachnoid space. Foramen are patent.      C7-T1: Normal disc height. No herniation. Normal facets. No spinal canal or neural foraminal stenosis.                                                                      IMPRESSION:  1.  DDD change at C4-5, C5-6 and C6-7. Please see body of report for details at these levels.    Past Medical History:  Past Medical History:   Diagnosis Date     Asthma, mild intermittent      Hypertension       Thyroid disease        Past Surgical History:  Past Surgical History:   Procedure Laterality Date     GYN SURGERY      cryosurgery of cervix at age 16     LAPAROSCOPIC CHOLECYSTECTOMY WITH CHOLANGIOGRAMS N/A 10/14/2016    Procedure: LAPAROSCOPIC CHOLECYSTECTOMY WITH CHOLANGIOGRAMS;  Surgeon: Cornelius Mueller MD;  Location:  OR       Medications:  Current Outpatient Medications   Medication Sig Dispense Refill     albuterol (2.5 MG/3ML) 0.083% neb solution Take 1 vial (2.5 mg) by nebulization every 6 hours as needed for shortness of breath / dyspnea or wheezing 25 vial 0     albuterol (PROAIR HFA/PROVENTIL HFA/VENTOLIN HFA) 108 (90 Base) MCG/ACT inhaler INHALE 2 PUFFS BY MOUTH EVERY 6 HOURS AS NEEDED FOR SHORTNESS OF BREATH OR DIFFICULT BREATHING 36 g 1     clindamycin (CLINDAMAX) 1 % external gel Apply topically 2 times daily 30 g 2     cyclobenzaprine (FLEXERIL) 10 MG tablet Take 1 tablet (10 mg) by mouth 3 times daily as needed for muscle spasms 30 tablet 1     Esomeprazole Magnesium (NEXIUM PO) Take 20 mg by mouth       ibuprofen (ADVIL,MOTRIN) 600 MG tablet Take 1 tablet (600 mg) by mouth every 6 hours as needed for moderate pain 30 tablet 1     levothyroxine (SYNTHROID/LEVOTHROID) 75 MCG tablet Take 1 tablet (75 mcg) by mouth daily 90 tablet 3     losartan (COZAAR) 50 MG tablet TAKE 1 TABLET(50 MG) BY MOUTH DAILY 90 tablet 1     methylPREDNISolone (MEDROL DOSEPAK) 4 MG tablet therapy pack Follow Package Directions (Patient not taking: Reported on 6/18/2021) 21 tablet 0       Allergies:     Allergies   Allergen Reactions     Codeine      Penicillins        Family history:  Family History   Problem Relation Age of Onset     Coronary Artery Disease Maternal Grandmother      Hyperlipidemia Maternal Grandmother      Hyperlipidemia Maternal Grandfather      Diabetes No family hx of      Hypertension No family hx of        Social History:  Home situation: Lives in [place], [place] with her fiance and teenage son.    Occupation/Schooling:   Tobacco use: former use  Drug use: marijuana as a teenager, meth in the distant past  Alcohol use: none    Physical Exam:  Vitals:    07/21/21 1439   BP: 130/85   Pulse: 74   SpO2: 100%     Exam:  Constitutional: Well developed, well nourished, appears stated age.  HEENT: Head atraumatic, normocephalic. Eyes without conjunctival injection or jaundice. Neck supple. No obvious neck masses.  Respiratory: breathing unlabored on room air  Skin: No rash, lesions of exposed skin.   Psychiatric/mental status: Alert, without lethargy or stupor. Speech fluent. Appropriate affect. Mood normal. Able to follow commands without difficulty.     Musculoskeletal exam:  Gait/Station/Posture:   Normal stance, arm swing, and stride; no antalgia   Normal bulk and tone.     Cervical spine:  Range of motion within normal limits   Myofascial tenderness: tender to palpation in bilateral cervical paraspinals and trapezius  No focal spinous process tenderness.   Spurling's negative bilaterally.      Shoulder exam: Shoulder range of motion within normal limits.     Neurologic exam:  CN:  Cranial nerves 2-12 are grossly intact  Motor Strength:  5/5 symmetric UE strength    Reflexes:     Biceps C5:     R:  2/4 L: 2/4   Brachioradialis  C6: R:  2/4 L: 2/4   Triceps C7:  R:  2/4 L: 2/4     Sensory:  (upper extremities):   Light touch: normal    Allodynia: absent    Hyperalgesia: absent     Kamini Menard MD  [facility]       BILLING TIME DOCUMENTATION:   The total TIME spent on this patient on the date of the encounter/appointment was 35 minutes.      TOTAL TIME includes:   Time spent preparing to see the patient (reviewing records and tests)   Time spent face to face (or over the phone) with the patient   Time spent ordering tests, medications, procedures and referrals  Time spent documenting clinical information in Epic

## 2021-07-30 ENCOUNTER — OFFICE VISIT (OUTPATIENT)
Dept: PALLIATIVE MEDICINE | Facility: CLINIC | Age: 46
End: 2021-07-30
Attending: PHYSICAL MEDICINE & REHABILITATION
Payer: COMMERCIAL

## 2021-07-30 VITALS — OXYGEN SATURATION: 98 % | HEART RATE: 57 BPM | DIASTOLIC BLOOD PRESSURE: 90 MMHG | SYSTOLIC BLOOD PRESSURE: 140 MMHG

## 2021-07-30 DIAGNOSIS — M79.18 MYOFASCIAL PAIN: Primary | ICD-10-CM

## 2021-07-30 PROCEDURE — 20552 NJX 1/MLT TRIGGER POINT 1/2: CPT | Performed by: PHYSICAL MEDICINE & REHABILITATION

## 2021-07-30 RX ORDER — BUPIVACAINE HYDROCHLORIDE 5 MG/ML
2 INJECTION, SOLUTION EPIDURAL; INTRACAUDAL ONCE
Status: COMPLETED | OUTPATIENT
Start: 2021-07-30 | End: 2021-07-30

## 2021-07-30 RX ORDER — TRIAMCINOLONE ACETONIDE 40 MG/ML
20 INJECTION, SUSPENSION INTRA-ARTICULAR; INTRAMUSCULAR ONCE
Status: COMPLETED | OUTPATIENT
Start: 2021-07-30 | End: 2021-07-30

## 2021-07-30 RX ADMIN — TRIAMCINOLONE ACETONIDE 20 MG: 40 INJECTION, SUSPENSION INTRA-ARTICULAR; INTRAMUSCULAR at 13:45

## 2021-07-30 RX ADMIN — BUPIVACAINE HYDROCHLORIDE 10 MG: 5 INJECTION, SOLUTION EPIDURAL; INTRACAUDAL at 13:45

## 2021-07-30 ASSESSMENT — PAIN SCALES - GENERAL: PAINLEVEL: SEVERE PAIN (6)

## 2021-07-30 NOTE — PATIENT INSTRUCTIONS
[facility]   Post Procedure Instructions    Today you had:  trigger point injections         Medications used:  lidocaine   bupivicaine   kenolog         Go to the emergency room if you develop any shortness of breath    Monitor the injection sites for signs and symptoms of infection-fever, chills, redness, swelling, warmth, or drainage to areas.    You may have soreness at injection sites for up to 24 hours.    You may apply ice to the painful areas to help minimize the discomfort of the needle pokes.    Do not apply heat to sites for at least 12 hours.    You may use anti-inflammatory medications or Tylenol for pain control if necessary    Pain Clinic phone number during work hours Monday-Friday:  [phone]    After hours provider line: [phone]

## 2021-07-30 NOTE — PROGRESS NOTES
Pre procedure Diagnosis: Myofascial pain   Post procedure Diagnosis: Same  Procedure performed: Trigger point injections  Anesthesia: None  Complications: None  Operators: Kamini Menard MD     Indications:   Machelle Weiner is a 46 year old female with a history of neck pain.  Exam shows myofascial pain of the muscle groups listed below and they have tried conservative treatment including medications, YAMILEX, PT.    Options/alternatives, benefits and risks were discussed with the patient including bleeding, infection, tissue trauma and pnuemothorax.  Questions were answered to her satisfaction and she agrees to proceed. Voluntary informed consent was obtained and signed.     Vitals were reviewed: Yes  Allergies were reviewed:  Yes   Medications were reviewed:  Yes   Pre-procedure pain score: 6/10    Procedure:  After getting informed consent, a Pause for the Cause was performed.    Trigger points were identified by patient, and marked when appropriate.  The area was prepped with Chloroprep.    Using clean technique, injections were completed using a 25G, 1.5 inch needle.  After negative aspiration, injection was completed.  A total of 4 locations were injected.  When possible, tissue was retracted from the chest wall to avoid lung injury.    Muscle groups injected:  Left cervical paraspinals x 1 site  Right trapezius x 3 sites    Injection solution contained:  5 ml of 1% lidocaine and 5 ml of 0.25% bupivacaine and 20 mg Kenalog. 1 ml injected at each site.     Hemostasis was achieved, the area was cleaned, and bandaids were placed when appropriate.  The patient tolerated the procedure well.    Post-procedure pain score: 3/10    Follow-up includes:   - Follow up PRN for repeat injections if helpful.     Kamini Menard MD  [facility]

## 2021-07-30 NOTE — Clinical Note
Hi there,   I saw Machelle for trigger point injections. She mentioned she wanted an evaluation for low back pain. I told her I would mention to you that she may benefit from a [facility] referral.     -Kamini

## 2021-08-10 ENCOUNTER — TELEPHONE (OUTPATIENT)
Dept: NURSING | Facility: CLINIC | Age: 46
End: 2021-08-10

## 2021-08-10 DIAGNOSIS — M54.9 ACUTE MIDLINE BACK PAIN, UNSPECIFIED BACK LOCATION: Primary | ICD-10-CM

## 2021-08-10 NOTE — TELEPHONE ENCOUNTER
Patient calling reporting she has seen Dr. Menard for her neck issues and there was talk regarding the patient's mid, lower back and spine. Reports providers were to discuss a referral regarding the spine for evaluation and treatment options. Patient reporting the spine is becoming a major interference in day to day life and affecting her mental health as well. Requesting message to both providers to discuss this situation and treatment moving forward.     Grace Gonzalez RN 08/10/21 1:44 PM    Health Triage Nurse Advisor

## 2021-08-10 NOTE — TELEPHONE ENCOUNTER
Please call Machelle. Dr. Menard did forward me a note about this last week but I thought Machelle was going to be setting up an appt to discuss as I have not actually discussed this with her so am unclear as to what to put in the referral notes (and have not seen her in a year). I can see what diagnosis Dr. Menard would recommend for the referral. Regardless, she will need an appt with me soon for med check, OVERDUE PAP, etc

## 2021-08-11 ENCOUNTER — TELEPHONE (OUTPATIENT)
Dept: FAMILY MEDICINE | Facility: CLINIC | Age: 46
End: 2021-08-11

## 2021-08-11 NOTE — TELEPHONE ENCOUNTER
Patient Quality Outreach      Summary:    Patient has the following on her problem list/HM:   Asthma review       ACT Total Scores 8/21/2020   ACT TOTAL SCORE -   ASTHMA ER VISITS -   ASTHMA HOSPITALIZATIONS -   ACT TOTAL SCORE (Goal Greater than or Equal to 20) 9   In the past 12 months, how many times did you visit the emergency room for your asthma without being admitted to the hospital? 0   In the past 12 months, how many times were you hospitalized overnight because of your asthma? 0          Patient is due/failing the following:   ACT needed    Type of outreach:    [facility] in Reach message.    Questions for provider review:    None                                                                                                                                     Susan Reynaga CMA       Chart routed to Care Team.

## 2021-08-18 DIAGNOSIS — E03.9 ACQUIRED HYPOTHYROIDISM: ICD-10-CM

## 2021-08-18 DIAGNOSIS — I10 ESSENTIAL HYPERTENSION, BENIGN: ICD-10-CM

## 2021-08-19 NOTE — TELEPHONE ENCOUNTER
Losartan 50 mg    Routing refill request to provider for review/approval because:  Elevated BP at visit for trigger point injections 7/30/2021    Did you want patient to have nurse only BP ck?        Levothyroxine 75 mcg    Routing refill request to provider for review/approval because:  Labs out of range:    TSH   Date Value Ref Range Status   03/22/2021 5.90 (H) 0.40 - 4.00 mU/L Final     Last visit for medications 3/22/2021 with MELECIO.    Cathy العلي RN

## 2021-08-19 NOTE — TELEPHONE ENCOUNTER
"See other message regarding pain with Dr. Menard.  Patient does NOT want to come in for another appointment.  She states she already has too many medical bills and already saw Dr. Apple for her \"medication check\".    Although, she is wondering about her thyroid dosing.  She is stating that she is symptomatic.  Very tired all the time and is wondering if she could increase her dose.  Please review and advise further.    Caro Santo RN    "

## 2021-08-19 NOTE — TELEPHONE ENCOUNTER
Spoke with patient.  She states that she does NOT want to schedule another appointment with the clinic.  She had her medication check up with Dr. Apple on 3/22/2021 because Juice Sauceda was not available.  She is wanting Juice to work with Dr. Menard and place the appropriate referrals to help evaluate and treat her mid-low back pain further.  She states that her Mid-back pain is worse than the low back pain.  Please review and advise further.  Patient is getting frustrated that it has been taking so long and her back just continue to keep on hurting.    Caro Santo RN

## 2021-08-20 RX ORDER — LEVOTHYROXINE SODIUM 75 UG/1
TABLET ORAL
Qty: 90 TABLET | Refills: 1 | Status: SHIPPED | OUTPATIENT
Start: 2021-08-20 | End: 2022-02-11

## 2021-08-20 RX ORDER — LOSARTAN POTASSIUM 50 MG/1
TABLET ORAL
Qty: 90 TABLET | Refills: 1 | Status: SHIPPED | OUTPATIENT
Start: 2021-08-20 | End: 2022-02-11

## 2021-08-20 NOTE — TELEPHONE ENCOUNTER
Patient Quality Outreach 2nd Attempt      Summary:    Type of outreach:    ACT entered    Next Steps:  Reach out within 90 days via None.    Max number of attempts reached: Yes. Will try again in 90 days if patient still on fail list.    Questions for provider review:    None                                                                                                                    Susan Reynaga CMA       Chart routed to closed.

## 2021-08-20 NOTE — TELEPHONE ENCOUNTER
I will put in a generic order. Again I have not seen the patient for this, or in over one year, so cannot comment on the actual nature of the pain for the referral.

## 2021-09-07 ENCOUNTER — ANCILLARY PROCEDURE (OUTPATIENT)
Dept: GENERAL RADIOLOGY | Facility: CLINIC | Age: 46
End: 2021-09-07
Attending: PHYSICIAN ASSISTANT
Payer: COMMERCIAL

## 2021-09-07 ENCOUNTER — OFFICE VISIT (OUTPATIENT)
Dept: NEUROSURGERY | Facility: CLINIC | Age: 46
End: 2021-09-07
Attending: PHYSICIAN ASSISTANT
Payer: COMMERCIAL

## 2021-09-07 VITALS
DIASTOLIC BLOOD PRESSURE: 89 MMHG | HEIGHT: 64 IN | HEART RATE: 73 BPM | WEIGHT: 241 LBS | BODY MASS INDEX: 41.15 KG/M2 | OXYGEN SATURATION: 98 % | SYSTOLIC BLOOD PRESSURE: 143 MMHG

## 2021-09-07 DIAGNOSIS — M54.9 ACUTE MIDLINE BACK PAIN, UNSPECIFIED BACK LOCATION: ICD-10-CM

## 2021-09-07 PROCEDURE — 99213 OFFICE O/P EST LOW 20 MIN: CPT | Performed by: PHYSICIAN ASSISTANT

## 2021-09-07 PROCEDURE — 72070 X-RAY EXAM THORAC SPINE 2VWS: CPT | Performed by: RADIOLOGY

## 2021-09-07 PROCEDURE — G0463 HOSPITAL OUTPT CLINIC VISIT: HCPCS

## 2021-09-07 PROCEDURE — 72110 X-RAY EXAM L-2 SPINE 4/>VWS: CPT | Performed by: RADIOLOGY

## 2021-09-07 ASSESSMENT — MIFFLIN-ST. JEOR: SCORE: 1723.17

## 2021-09-07 ASSESSMENT — PAIN SCALES - GENERAL: PAINLEVEL: MODERATE PAIN (5)

## 2021-09-07 NOTE — LETTER
"    9/7/2021         RE: Machelle Weiner  [address] [facility] [place] [address]        Dear Colleague,    Thank you for referring your patient, Machelle Weiner, to the [facility] [place]. Please see a copy of my visit note below.    Machelle Weiner is a 46 year old female who presents for:  Chief Complaint   Patient presents with     Back Pain        Vitals:    Vitals:    09/07/21 1110   BP: (!) 143/89   Pulse: 73   SpO2: 98%   Weight: 241 lb (109.3 kg)   Height: 5' 4\" (1.626 m)       BMI:  Estimated body mass index is 41.37 kg/m  as calculated from the following:    Height as of this encounter: 5' 4\" (1.626 m).    Weight as of this encounter: 241 lb (109.3 kg).    Pain Score:  Moderate Pain (5)        [facility]        NEUROSURGERY CLINIC PROGRESS NOTE    DATE OF VISIT: 9/7/2021    HPI:     Machelle Weiner is a pleasant 46 year old female who I last evaluated on 06/18 for cervical spine pain.   Today, she presents to clinic for a concern of thoracolumbar spine pain without any specific radiculopathy. She describes the symptoms as constant with fluctuating intensities, sharp, pain that seems to be focused on the thoracolumbar musculature that will somewhat radiate more to the right, but not in a dermatome. There is no proximal or distal radiation nor is there any accompanied paresthesia or numbness. Neither a traumatic or provocative mechanism can be appreciated and she really cannot appreciate any aggravating or alleviation factors other than prolonged standing. There are no bowel or bladder changes. No other concerns are voiced.      Current Outpatient Medications   Medication     albuterol (2.5 MG/3ML) 0.083% neb solution     albuterol (PROAIR HFA/PROVENTIL HFA/VENTOLIN HFA) 108 (90 Base) MCG/ACT inhaler     clindamycin (CLINDAMAX) 1 % external gel     cyclobenzaprine (FLEXERIL) 10 MG tablet     Esomeprazole Magnesium (NEXIUM PO)     ibuprofen " "(ADVIL,MOTRIN) 600 MG tablet     levothyroxine (SYNTHROID/LEVOTHROID) 75 MCG tablet     losartan (COZAAR) 50 MG tablet     No current facility-administered medications for this visit.       Allergies   Allergen Reactions     Codeine      Penicillins        Past Medical History:   Diagnosis Date     Asthma, mild intermittent      Hypertension      Thyroid disease        Review Of Systems    Skin: negative  Eyes: negative  Ears/Nose/Throat: negative  Respiratory: No shortness of breath, dyspnea on exertion, cough, or hemoptysis  Cardiovascular: negative  Gastrointestinal: negative  Musculoskeletal: back pain  Neurologic: negative  Psychiatric: negative  Hematologic/Lymphatic/Immunologic: negative  Endocrine: negative    OBJECTIVE:    BP (!) 143/89   Pulse 73   Ht 5' 4\" (1.626 m)   Wt 241 lb (109.3 kg)   SpO2 98%   BMI 41.37 kg/m      Exam:    Patient appears comfortable and in no apparent distress. Moving all extremities.  Gait is non-antalgic.  CN II-XII grossly intact, alert and appropriate with conversation and following  commands  Bilateral upper extremities with full strength including hand intrinsics and grasp.  Sensation intact throughout.  Bilateral lower extremities 5/5 strength including plantar and dorsiflexion.  Normal sensation throughout bilaterally.    ASSESSMENT:    1. Acute midline back pain, unspecified back location        PLAN:    Based on her physical exam, we do feel that it would be in her best interest to obtain thoracic and lumbar x-rays. We also feel that it would be in her best interest to try a conservative approach by participating in a program initiated by our colleague, Dr. Rice of [facility].     We would like to see her back as needed to further discuss other conservative options or possible surgical interventions.     We also discussed signs of a worsening problem that she should seek being evaluated.        Respectfully,     MAXIMILIANO Hughes, PA-C      Again, thank you for " allowing me to participate in the care of your patient.        Sincerely,        Torrey Puentes PA-C

## 2021-09-07 NOTE — PROGRESS NOTES
"Machelle Weiner is a 46 year old female who presents for:  Chief Complaint   Patient presents with     Back Pain        Vitals:    Vitals:    09/07/21 1110   BP: (!) 143/89   Pulse: 73   SpO2: 98%   Weight: 241 lb (109.3 kg)   Height: 5' 4\" (1.626 m)       BMI:  Estimated body mass index is 41.37 kg/m  as calculated from the following:    Height as of this encounter: 5' 4\" (1.626 m).    Weight as of this encounter: 241 lb (109.3 kg).    Pain Score:  Moderate Pain (5)        Amendo Phorn      "

## 2021-09-07 NOTE — PROGRESS NOTES
"NEUROSURGERY CLINIC PROGRESS NOTE    DATE OF VISIT: 9/7/2021    HPI:     Machelle Weiner is a pleasant 46 year old female who I last evaluated on 06/18 for cervical spine pain.   Today, she presents to clinic for a concern of thoracolumbar spine pain without any specific radiculopathy. She describes the symptoms as constant with fluctuating intensities, sharp, pain that seems to be focused on the thoracolumbar musculature that will somewhat radiate more to the right, but not in a dermatome. There is no proximal or distal radiation nor is there any accompanied paresthesia or numbness. Neither a traumatic or provocative mechanism can be appreciated and she really cannot appreciate any aggravating or alleviation factors other than prolonged standing. There are no bowel or bladder changes. No other concerns are voiced.      Current Outpatient Medications   Medication     albuterol (2.5 MG/3ML) 0.083% neb solution     albuterol (PROAIR HFA/PROVENTIL HFA/VENTOLIN HFA) 108 (90 Base) MCG/ACT inhaler     clindamycin (CLINDAMAX) 1 % external gel     cyclobenzaprine (FLEXERIL) 10 MG tablet     Esomeprazole Magnesium (NEXIUM PO)     ibuprofen (ADVIL,MOTRIN) 600 MG tablet     levothyroxine (SYNTHROID/LEVOTHROID) 75 MCG tablet     losartan (COZAAR) 50 MG tablet     No current facility-administered medications for this visit.       Allergies   Allergen Reactions     Codeine      Penicillins        Past Medical History:   Diagnosis Date     Asthma, mild intermittent      Hypertension      Thyroid disease        Review Of Systems    Skin: negative  Eyes: negative  Ears/Nose/Throat: negative  Respiratory: No shortness of breath, dyspnea on exertion, cough, or hemoptysis  Cardiovascular: negative  Gastrointestinal: negative  Musculoskeletal: back pain  Neurologic: negative  Psychiatric: negative  Hematologic/Lymphatic/Immunologic: negative  Endocrine: negative    OBJECTIVE:    BP (!) 143/89   Pulse 73   Ht 5' 4\" (1.626 m)   Wt " 241 lb (109.3 kg)   SpO2 98%   BMI 41.37 kg/m      Exam:    Patient appears comfortable and in no apparent distress. Moving all extremities.  Gait is non-antalgic.  CN II-XII grossly intact, alert and appropriate with conversation and following  commands  Bilateral upper extremities with full strength including hand intrinsics and grasp.  Sensation intact throughout.  Bilateral lower extremities 5/5 strength including plantar and dorsiflexion.  Normal sensation throughout bilaterally.    ASSESSMENT:    1. Acute midline back pain, unspecified back location        PLAN:    Based on her physical exam, we do feel that it would be in her best interest to obtain thoracic and lumbar x-rays. We also feel that it would be in her best interest to try a conservative approach by participating in a program initiated by our colleague, Dr. Rice of [facility].     We would like to see her back as needed to further discuss other conservative options or possible surgical interventions.     We also discussed signs of a worsening problem that she should seek being evaluated.        Respectfully,     MAXIMILIANO Hughes, PAAnupamaC

## 2021-09-19 ENCOUNTER — HEALTH MAINTENANCE LETTER (OUTPATIENT)
Age: 46
End: 2021-09-19

## 2021-12-10 ENCOUNTER — OFFICE VISIT (OUTPATIENT)
Dept: ORTHOPEDICS | Facility: CLINIC | Age: 46
End: 2021-12-10
Attending: PHYSICIAN ASSISTANT
Payer: COMMERCIAL

## 2021-12-10 ENCOUNTER — ANCILLARY PROCEDURE (OUTPATIENT)
Dept: GENERAL RADIOLOGY | Facility: CLINIC | Age: 46
End: 2021-12-10
Attending: PHYSICAL MEDICINE & REHABILITATION
Payer: COMMERCIAL

## 2021-12-10 VITALS
DIASTOLIC BLOOD PRESSURE: 95 MMHG | SYSTOLIC BLOOD PRESSURE: 148 MMHG | OXYGEN SATURATION: 96 % | WEIGHT: 252.3 LBS | BODY MASS INDEX: 43.07 KG/M2 | HEART RATE: 85 BPM | HEIGHT: 64 IN

## 2021-12-10 DIAGNOSIS — M21.70 LEG LENGTH DISCREPANCY: ICD-10-CM

## 2021-12-10 DIAGNOSIS — M54.9 ACUTE MIDLINE BACK PAIN, UNSPECIFIED BACK LOCATION: ICD-10-CM

## 2021-12-10 DIAGNOSIS — M47.817 FACET ARTHROPATHY, LUMBOSACRAL: Primary | ICD-10-CM

## 2021-12-10 DIAGNOSIS — M51.379 DDD (DEGENERATIVE DISC DISEASE), LUMBOSACRAL: ICD-10-CM

## 2021-12-10 PROCEDURE — 99204 OFFICE O/P NEW MOD 45 MIN: CPT | Performed by: PHYSICAL MEDICINE & REHABILITATION

## 2021-12-10 PROCEDURE — 77073 BONE LENGTH STUDIES: CPT | Performed by: RADIOLOGY

## 2021-12-10 ASSESSMENT — MIFFLIN-ST. JEOR: SCORE: 1774.43

## 2021-12-10 ASSESSMENT — PAIN SCALES - GENERAL: PAINLEVEL: SEVERE PAIN (6)

## 2021-12-10 NOTE — PROGRESS NOTES
"PHYSICAL MEDICINE & REHABILITATION / MEDICAL SPINE        Date:  Dec 10, 2021    Name:  Machelle Weiner  YOB: 1975  MRN:  [id number]          REASON FOR CONSULTATION:  Acute midline back pain, unspecified back location.        REFERRING PROVIDER:  [facility]        CHART REVIEW:  Reviewed 09/07/2021 note from [facility] (neurosurgery).  Ms. Machelle Weiner was having thoracolumbar spine pain without any specific radiculopathy.  Pain was constant but had fluctuating intensities.  Pain was more dominant on the right side.  There was no proximal or distal radiation.  There was no inciting event.  Prolonged standing was the only aggravating factor.  There were no bowel or bladder changes.  Thoracic and lumbar spine x-rays were ordered.  Referral to medical spine was placed.        HISTORY OF PRESENT ILLNESS:  Ms. Machelle Weiner is a 46-year-old, right-hand-dominant, female.  She works as a .  With the COVID-19 pandemic, she states she has been doing very little in terms of entertainment and fun.  Ms. Machelle Weiner would like to be more active but feels limited currently by her low back pain.    Ms. Machelle Weiner denies any prior or recent injuries of her low back, pelvis, hips, thighs, knees, legs, ankles, feet, toes.    Ms. Machelle Weiner has noted pain in her bilateral low back for many years.  She states that her right leg is longer than her left leg and she feels that this might be impacting her low back pain.  Ms. Machelle Weiner states that her low back pain has really worsened over the last year.  Ms. Machelle Weiner states that her low back pain actually began in her late teens.    Ms. Machelle Weiner currently rates her low back pain is 6-7/10.  Her low back pain varies from a 4/10 to a 9/10.  Low back pain is constant and is described as \"burning, aching.\"  There is no radiation of her low back pain.  Ms. Machelle JACOB" Trisha low back pain is worse with activity and standing.  She cannot identify any alleviating factors.  Sometimes, Ms. Machelle Rosens low back pain will keep her awake at night and wake her up at night.  Ms. Machelle Weiner notes worsening of her low back pain with coughing, sneezing, and driving and hitting a pothole.    Ms. Machelle Weiner has not tried acupuncture, chiropractic treatments, injections, massage, or physical therapy for her low back pain.  She has tried acetaminophen, ibuprofen, naproxen, and cyclobenzaprine for low back pain.  None of these medications provided any pain relief.    Ms. Machelle Weiner denies any weakness.  She denies any give way episodes of her lower extremities.  Ms. Machelle Weiner denies any tripping, stumbling, falling.  She is not using any assistive devices for ambulation.  Ms. Machelle Weiner notes some numbness, tingling, pins-and-needles intermittently in her left second and third fingers.  She denies any other numbness, tingling, pins-and-needles.  Ms. Machelle Weiner denies any saddle anesthesia, bowel incontinence, bladder incontinence.    Ms. Machelle Weiner denies that her lower extremities become weak and tired with walking.  Ms. Machelle Weiner states that some of her maternal aunts and uncles have rheumatoid arthritis.  She denies any personal history of autoimmune diseases, rheumatologic diseases, gout, pseudogout.  Ms. Machelle Weiner denies any personal or family history of neurologic diseases.        REVIEW OF SYSTEMS:  Review of Systems     Constitutional:  Positive for weight gain and fatigue. Negative for fever and weight loss.   HENT:  Negative for tinnitus, trouble swallowing and hoarse voice.    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 Negative for pain and eye pain.   Respiratory:   Positive for cough (asthma), shortness of breath and wheezing (asthma). Cough disturbing sleep: asthma.     Cardiovascular:  Negative for chest pain, palpitations and leg swelling.   Gastrointestinal:  Positive for heartburn, nausea, abdominal pain and diarrhea. Negative for vomiting, constipation, blood in stool and bowel incontinence.   Genitourinary:  Negative for bladder incontinence, dysuria, hematuria and difficulty urinating.   Musculoskeletal:  Positive for myalgias and arthralgias.   Skin:  Negative for itching, poor wound healing and poor wound healing.   Neurological:  Negative for dizziness, seizures, loss of consciousness, headaches and difficulty walking.   Endo/Heme:  Negative for anemia, swollen glands and bruises/bleeds easily.   Psychiatric/Behavioral:  Positive for depression.          ALLERGIES:  Allergies   Allergen Reactions     Codeine      Penicillins          MEDICATIONS:  Current Outpatient Medications   Medication Sig Dispense Refill     albuterol (2.5 MG/3ML) 0.083% neb solution Take 1 vial (2.5 mg) by nebulization every 6 hours as needed for shortness of breath / dyspnea or wheezing 25 vial 0     albuterol (PROAIR HFA/PROVENTIL HFA/VENTOLIN HFA) 108 (90 Base) MCG/ACT inhaler INHALE 2 PUFFS BY MOUTH EVERY 6 HOURS AS NEEDED FOR SHORTNESS OF BREATH OR DIFFICULT BREATHING 36 g 1     clindamycin (CLINDAMAX) 1 % external gel Apply topically 2 times daily 30 g 2     cyclobenzaprine (FLEXERIL) 10 MG tablet Take 1 tablet (10 mg) by mouth 3 times daily as needed for muscle spasms 30 tablet 1     Esomeprazole Magnesium (NEXIUM PO) Take 20 mg by mouth       ibuprofen (ADVIL,MOTRIN) 600 MG tablet Take 1 tablet (600 mg) by mouth every 6 hours as needed for moderate pain 30 tablet 1     levothyroxine (SYNTHROID/LEVOTHROID) 75 MCG tablet TAKE 1 TABLET(75 MCG) BY MOUTH DAILY 90 tablet 1     losartan (COZAAR) 50 MG tablet TAKE 1 TABLET(50 MG) BY MOUTH DAILY 90 tablet 1         PAST MEDICAL HISTORY:  Past Medical History:   Diagnosis Date     Asthma, mild intermittent      Hypertension      Thyroid disease   "        PAST SURGICAL HISTORY:  Past Surgical History:   Procedure Laterality Date     GYN SURGERY      cryosurgery of cervix at age 16     LAPAROSCOPIC CHOLECYSTECTOMY WITH CHOLANGIOGRAMS N/A 10/14/2016    Procedure: LAPAROSCOPIC CHOLECYSTECTOMY WITH CHOLANGIOGRAMS;  Surgeon: Cornelius Mueller MD;  Location: [facility] OR         FAMILY HISTORY:  Family History   Problem Relation Age of Onset     Coronary Artery Disease Maternal Grandmother      Hyperlipidemia Maternal Grandmother      Hyperlipidemia Maternal Grandfather      Diabetes No family hx of      Hypertension No family hx of          SOCIAL HISTORY:  Social History     Socioeconomic History     Marital status:      Spouse name: Not on file     Number of children: Not on file     Years of education: Not on file     Highest education level: Not on file   Occupational History     Not on file   Tobacco Use     Smoking status: Former Smoker     Packs/day: 0.30     Types: Cigarettes     Quit date: 2015     Years since quittin.8     Smokeless tobacco: Never Used   Substance and Sexual Activity     Alcohol use: No     Alcohol/week: 0.0 standard drinks     Drug use: No     Sexual activity: Yes     Partners: Male     Birth control/protection: None   Other Topics Concern     Parent/sibling w/ CABG, MI or angioplasty before 65F 55M? Not Asked   Social History Narrative     Not on file     Social Determinants of Health     Financial Resource Strain: Not on file   Food Insecurity: Not on file   Transportation Needs: Not on file   Physical Activity: Not on file   Stress: Not on file   Social Connections: Not on file   Intimate Partner Violence: Not on file   Housing Stability: Not on file         PHYSICAL EXAMINATION:  Vitals:    12/10/21 1030   BP: (!) 148/95   Pulse: 85   SpO2: 96%   Weight: 114.4 kg (252 lb 4.8 oz)   Height: 1.626 m (5' 4\")       GENERAL:  No acute distress.  Pleasant and cooperative.   PSYCH:  Normal mood and affect.  HEAD:  " Normocephalic.  SPEECH:  No dysarthria.  EYES:  No scleral icterus.  Wearing glasses.  EARS:  Hearing is intact to spoken voice.  NOSE/MOUTH:  Wearing a face mask.  LUNGS:  No respiratory distress.  No increased work of breathing.  VASCULAR/PULSES:  Posterior tibial:  Right 2+.  Left 2+.  Dorsalis pedis:  Right 2+.  Left 2+.  LOWER EXTREMITIES: No clubbing, cyanosis, or edema bilaterally.    BALANCE AND GAIT: The patient has a reciprocal gait pattern without antalgia.  She is able heel walk, toe walk, tandem walk without difficulty.  Double leg squat is performed with a quadriceps dominant pattern.    INSPECTION:   There is no erythema or ecchymosis of the low back.  There is a slight trunk shift to the left.    LUMBOPELVIC PALPATION:  Lumbar Spinous Processes: Mild tenderness throughout.  Lumbar Paraspinals:  Right mild tenderness throughout.  Left minimal tenderness throughout.  Posterior Superior Iliac Spine:  Right mild tenderness.  Left minimal tenderness.  Iliac Crest:  Right not tender.  Left not tender.  Sacroiliac Joint:  Right not tender.  Left not tender.  Hip External Rotators:  Right not tender.  Left not tender.  Ischial Tuberosity:  Right not tender.  Left not tender.  Greater Trochanter:  Right not tender.  Left not tender.  Coccyx:  not tender.    THORACOLUMBAR RANGE OF MOTION:  Forward flexion (85 ): Mildly reduced range of motion, increases low back pain, does not cause radiating pain.  Extension (30 ):  Mildly reduced range of motion, increases low back pain, does not cause radiating pain.  Lateral bending right (30 ):  Mildly reduced range of motion, increases low back pain, does not cause radiating pain.  Lateral bending left (30 ):  Mildly reduced range of motion, does not cause pain, does not cause radiating pain.  Twisting right with extension:  Mildly reduced range of motion, increases low back pain, does not cause radiating pain.  Twisting left with extension:  Mildly reduced range of  motion, does not cause pain, does not cause radiating pain.  Sacroiliac joint dysfunction:  Right -.  Left -.    HIP RANGE OF MOTION:  Flexion (110 ):  Right 105 .  Left 105 .  Extension (30 ):  Right 30 .  Left 30 .  External rotation (45 ):  Right 45 .  Left 45 .  Internal rotation (35 ):  Right 35 .  Left 35 .    KNEE RANGE OF MOTION:  Extension (0 ):  Right 0 .  Left 0 .  Flexion (135 ):  Right 135 .  Left 135 .    STRENGTH:  Hip flexion:  Right 5/5.  Left 5/5.  Hip abduction:  Right 4+/5.  Left 4+/5.  Hip external rotation:  Right 5/5.  Left 5/5.  Hip extension:  Right 4+/5.  Left 4+/5.  Knee extension:  Right 5/5.  Left 5/5.  Knee flexion:  Right 5/5.  Left 5/5.  Ankle dorsiflexion:  Right 5/5.  Left 5/5.  Great toe dorsiflexion:  Right 5/5.  Left 5/5.  Ankle plantar flexion:  Right 5/5.  Left 5/5.    SENSATION:  Intact to light touch along right L2, L3, L4, L5, S1, S2.  Intact to light touch along left L2, L3, L4, L5, S1, S2.    REFLEXES:  Patellar (L2-L4):  Right 2+.  Left 2+.  Achilles (S1-S2):  Right 2+.  Left 2+.  Babinski:  Right downgoing.  Left downgoing.  Forced ankle dorsiflexion (clonus):  Right 0 beats.  Left 0 beats.    LUMBOPELVIC SPECIAL TESTS:  Log roll:  Right -.  Left -.  Straight leg raise:  Right -.  Left -.  Crossover straight leg raise:  Right -.  Left -.  Resisted straight leg raise:  Right -.  Left -.  EDA:  Right -.  Left -.  FADIR:  Right -.  Left -.  Hip scour:  Right -.  Left -.  Lateral sacral compression:  Right -.  Left -.  Clamshell:  Right -.  Left -.  Ely s:  Right +.  Left +.  Yeoman s:  Right -.  Left -.  Distraction:  Right -.  Left -.  Sacral thrust:  Right -.  Left -.  Noble compression:  Right -.  Left -.        IMAGING:  XR LEG LENGTH EVALUATION 12/10/2021 11:38 AM      HISTORY: Leg length discrepancy     COMPARISON: None.    IMPRESSION: From the top of the right femoral head to the caudal  aspect of the film measures 84.4 cm. From the top of the left femoral  head to  the caudal aspect of the film measures 83.7 cm.      THORACIC SPINE TWO VIEWS 9/7/2021 11:59 AM      COMPARISON: None     HISTORY: Acute midline back pain, unspecified back location.    IMPRESSION: 12 pairs of thoracic ribs with rudimentary ribs on L1.  Alignment of the thoracic vertebrae is normal. Vertebral body heights  normal. No fractures. Mild degenerative endplate spurring at all  levels of the thoracic spine.      LUMBAR SPINE FOUR OR MORE VIEWS September 7, 2021 11:48 AM      HISTORY: Acute midline back pain, unspecified back location.     COMPARISON: None.    IMPRESSION: Five lumbar type vertebrae with rudimentary ribs on L1.  Normal alignment. Vertebral body heights normal. Alignment remains  normal in both the flexed and extended positions. Mild degenerative  endplate spurring at T12-L1, L1-L2 and L2-L3. Facet arthropathy at  L5-S1.        ASSESSMENT/PLAN:  Ms. Machelle Weiner is a 46-year-old, right-hand-dominant, female.  History and exam are most consistent with lumbosacral facet arthropathy (currently right more symptomatic than left).  She does have a small leg length discrepancy, with her right lower extremity being longer than her left lower extremity.  There may be a small contributor of lumbosacral degenerative disc disease, but this is felt to be much smaller contributor to her pain at this time.  Discussed diagnoses, pathophysiology, and treatment options with Ms. Machelle Weiner.  Discussed the use of a heel lift in her left shoe.  Discussed the options of doing nothing/living with it, physical therapy, chiropractic care, core strengthening, weight loss, lumbosacral facet joint corticosteroid injections, lumbosacral facet joint medial branch blocks with following radiofrequency ablations.  Ms. Machelle Weiner will be set up with an MRI lumbar spine.  She is to follow-up in this clinic after the MRI lumbar spine.        Saúl Rice MD

## 2021-12-10 NOTE — LETTER
12/10/2021         RE: Machelle Weiner  [address] [facility] [address]        Dear Colleague,    Thank you for referring your patient, Machelle Weiner, to the [facility]. Please see a copy of my visit note below.    PHYSICAL MEDICINE & REHABILITATION / MEDICAL SPINE        Date:  Dec 10, 2021    Name:  Machelle Weiner  YOB: 1975  MRN:  [id number]          REASON FOR CONSULTATION:  Acute midline back pain, unspecified back location.        REFERRING PROVIDER:  [facility]        CHART REVIEW:  Reviewed 09/07/2021 note from [facility] (neurosurgery).  Ms. Machelle Weiner was having thoracolumbar spine pain without any specific radiculopathy.  Pain was constant but had fluctuating intensities.  Pain was more dominant on the right side.  There was no proximal or distal radiation.  There was no inciting event.  Prolonged standing was the only aggravating factor.  There were no bowel or bladder changes.  Thoracic and lumbar spine x-rays were ordered.  Referral to medical spine was placed.        HISTORY OF PRESENT ILLNESS:  Ms. Machelle Weiner is a 46-year-old, right-hand-dominant, female.  She works as a .  With the COVID-19 pandemic, she states she has been doing very little in terms of entertainment and fun.  Ms. Machelle Weiner would like to be more active but feels limited currently by her low back pain.    Ms. Machelle Weiner denies any prior or recent injuries of her low back, pelvis, hips, thighs, knees, legs, ankles, feet, toes.    Ms. Machelle Weiner has noted pain in her bilateral low back for many years.  She states that her right leg is longer than her left leg and she feels that this might be impacting her low back pain.  Ms. Machelle Weiner states that her low back pain has really worsened over the last year.  Ms. Machelle Weiner states that her low back pain actually began in  "her late teens.    Ms. Machelle Weiner currently rates her low back pain is 6-7/10.  Her low back pain varies from a 4/10 to a 9/10.  Low back pain is constant and is described as \"burning, aching.\"  There is no radiation of her low back pain.  Ms. Machelle Rico low back pain is worse with activity and standing.  She cannot identify any alleviating factors.  Sometimes, Ms. Machelle Rosens low back pain will keep her awake at night and wake her up at night.  Ms. Machelle Weiner notes worsening of her low back pain with coughing, sneezing, and driving and hitting a pothole.    Ms. Machelle Weiner has not tried acupuncture, chiropractic treatments, injections, massage, or physical therapy for her low back pain.  She has tried acetaminophen, ibuprofen, naproxen, and cyclobenzaprine for low back pain.  None of these medications provided any pain relief.    Ms. Machelle Weiner denies any weakness.  She denies any give way episodes of her lower extremities.  Ms. Machelle Weiner denies any tripping, stumbling, falling.  She is not using any assistive devices for ambulation.  Ms. Machelle Weiner notes some numbness, tingling, pins-and-needles intermittently in her left second and third fingers.  She denies any other numbness, tingling, pins-and-needles.  Ms. Machelle Weiner denies any saddle anesthesia, bowel incontinence, bladder incontinence.    Ms. Machelle Weiner denies that her lower extremities become weak and tired with walking.  Ms. Machelle Weiner states that some of her maternal aunts and uncles have rheumatoid arthritis.  She denies any personal history of autoimmune diseases, rheumatologic diseases, gout, pseudogout.  Ms. Machelle Weiner denies any personal or family history of neurologic diseases.        REVIEW OF SYSTEMS:  Review of Systems     Constitutional:  Positive for weight gain and fatigue. Negative for fever and weight loss.   HENT:  Negative for tinnitus, " trouble swallowing and hoarse voice.    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 Negative for pain and eye pain.   Respiratory:   Positive for cough (asthma), shortness of breath and wheezing (asthma). Cough disturbing sleep: asthma.    Cardiovascular:  Negative for chest pain, palpitations and leg swelling.   Gastrointestinal:  Positive for heartburn, nausea, abdominal pain and diarrhea. Negative for vomiting, constipation, blood in stool and bowel incontinence.   Genitourinary:  Negative for bladder incontinence, dysuria, hematuria and difficulty urinating.   Musculoskeletal:  Positive for myalgias and arthralgias.   Skin:  Negative for itching, poor wound healing and poor wound healing.   Neurological:  Negative for dizziness, seizures, loss of consciousness, headaches and difficulty walking.   Endo/Heme:  Negative for anemia, swollen glands and bruises/bleeds easily.   Psychiatric/Behavioral:  Positive for depression.          ALLERGIES:  Allergies   Allergen Reactions     Codeine      Penicillins          MEDICATIONS:  Current Outpatient Medications   Medication Sig Dispense Refill     albuterol (2.5 MG/3ML) 0.083% neb solution Take 1 vial (2.5 mg) by nebulization every 6 hours as needed for shortness of breath / dyspnea or wheezing 25 vial 0     albuterol (PROAIR HFA/PROVENTIL HFA/VENTOLIN HFA) 108 (90 Base) MCG/ACT inhaler INHALE 2 PUFFS BY MOUTH EVERY 6 HOURS AS NEEDED FOR SHORTNESS OF BREATH OR DIFFICULT BREATHING 36 g 1     clindamycin (CLINDAMAX) 1 % external gel Apply topically 2 times daily 30 g 2     cyclobenzaprine (FLEXERIL) 10 MG tablet Take 1 tablet (10 mg) by mouth 3 times daily as needed for muscle spasms 30 tablet 1     Esomeprazole Magnesium (NEXIUM PO) Take 20 mg by mouth       ibuprofen (ADVIL,MOTRIN) 600 MG tablet Take 1 tablet (600 mg) by mouth every 6 hours as needed for moderate pain 30 tablet 1     levothyroxine (SYNTHROID/LEVOTHROID) 75 MCG tablet TAKE 1  TABLET(75 MCG) BY MOUTH DAILY 90 tablet 1     losartan (COZAAR) 50 MG tablet TAKE 1 TABLET(50 MG) BY MOUTH DAILY 90 tablet 1         PAST MEDICAL HISTORY:  Past Medical History:   Diagnosis Date     Asthma, mild intermittent      Hypertension      Thyroid disease          PAST SURGICAL HISTORY:  Past Surgical History:   Procedure Laterality Date     GYN SURGERY      cryosurgery of cervix at age 16     LAPAROSCOPIC CHOLECYSTECTOMY WITH CHOLANGIOGRAMS N/A 10/14/2016    Procedure: LAPAROSCOPIC CHOLECYSTECTOMY WITH CHOLANGIOGRAMS;  Surgeon: Cornelius Mueller MD;  Location:  OR         FAMILY HISTORY:  Family History   Problem Relation Age of Onset     Coronary Artery Disease Maternal Grandmother      Hyperlipidemia Maternal Grandmother      Hyperlipidemia Maternal Grandfather      Diabetes No family hx of      Hypertension No family hx of          SOCIAL HISTORY:  Social History     Socioeconomic History     Marital status:      Spouse name: Not on file     Number of children: Not on file     Years of education: Not on file     Highest education level: Not on file   Occupational History     Not on file   Tobacco Use     Smoking status: Former Smoker     Packs/day: 0.30     Types: Cigarettes     Quit date: 2015     Years since quittin.8     Smokeless tobacco: Never Used   Substance and Sexual Activity     Alcohol use: No     Alcohol/week: 0.0 standard drinks     Drug use: No     Sexual activity: Yes     Partners: Male     Birth control/protection: None   Other Topics Concern     Parent/sibling w/ CABG, MI or angioplasty before 65F 55M? Not Asked   Social History Narrative     Not on file     Social Determinants of Health     Financial Resource Strain: Not on file   Food Insecurity: Not on file   Transportation Needs: Not on file   Physical Activity: Not on file   Stress: Not on file   Social Connections: Not on file   Intimate Partner Violence: Not on file   Housing Stability: Not on file  "        PHYSICAL EXAMINATION:  Vitals:    12/10/21 1030   BP: (!) 148/95   Pulse: 85   SpO2: 96%   Weight: 114.4 kg (252 lb 4.8 oz)   Height: 1.626 m (5' 4\")       GENERAL:  No acute distress.  Pleasant and cooperative.   PSYCH:  Normal mood and affect.  HEAD:  Normocephalic.  SPEECH:  No dysarthria.  EYES:  No scleral icterus.  Wearing glasses.  EARS:  Hearing is intact to spoken voice.  NOSE/MOUTH:  Wearing a face mask.  LUNGS:  No respiratory distress.  No increased work of breathing.  VASCULAR/PULSES:  Posterior tibial:  Right 2+.  Left 2+.  Dorsalis pedis:  Right 2+.  Left 2+.  LOWER EXTREMITIES: No clubbing, cyanosis, or edema bilaterally.    BALANCE AND GAIT: The patient has a reciprocal gait pattern without antalgia.  She is able heel walk, toe walk, tandem walk without difficulty.  Double leg squat is performed with a quadriceps dominant pattern.    INSPECTION:   There is no erythema or ecchymosis of the low back.  There is a slight trunk shift to the left.    LUMBOPELVIC PALPATION:  Lumbar Spinous Processes: Mild tenderness throughout.  Lumbar Paraspinals:  Right mild tenderness throughout.  Left minimal tenderness throughout.  Posterior Superior Iliac Spine:  Right mild tenderness.  Left minimal tenderness.  Iliac Crest:  Right not tender.  Left not tender.  Sacroiliac Joint:  Right not tender.  Left not tender.  Hip External Rotators:  Right not tender.  Left not tender.  Ischial Tuberosity:  Right not tender.  Left not tender.  Greater Trochanter:  Right not tender.  Left not tender.  Coccyx:  not tender.    THORACOLUMBAR RANGE OF MOTION:  Forward flexion (85 ): Mildly reduced range of motion, increases low back pain, does not cause radiating pain.  Extension (30 ):  Mildly reduced range of motion, increases low back pain, does not cause radiating pain.  Lateral bending right (30 ):  Mildly reduced range of motion, increases low back pain, does not cause radiating pain.  Lateral bending left (30 ):  " Mildly reduced range of motion, does not cause pain, does not cause radiating pain.  Twisting right with extension:  Mildly reduced range of motion, increases low back pain, does not cause radiating pain.  Twisting left with extension:  Mildly reduced range of motion, does not cause pain, does not cause radiating pain.  Sacroiliac joint dysfunction:  Right -.  Left -.    HIP RANGE OF MOTION:  Flexion (110 ):  Right 105 .  Left 105 .  Extension (30 ):  Right 30 .  Left 30 .  External rotation (45 ):  Right 45 .  Left 45 .  Internal rotation (35 ):  Right 35 .  Left 35 .    KNEE RANGE OF MOTION:  Extension (0 ):  Right 0 .  Left 0 .  Flexion (135 ):  Right 135 .  Left 135 .    STRENGTH:  Hip flexion:  Right 5/5.  Left 5/5.  Hip abduction:  Right 4+/5.  Left 4+/5.  Hip external rotation:  Right 5/5.  Left 5/5.  Hip extension:  Right 4+/5.  Left 4+/5.  Knee extension:  Right 5/5.  Left 5/5.  Knee flexion:  Right 5/5.  Left 5/5.  Ankle dorsiflexion:  Right 5/5.  Left 5/5.  Great toe dorsiflexion:  Right 5/5.  Left 5/5.  Ankle plantar flexion:  Right 5/5.  Left 5/5.    SENSATION:  Intact to light touch along right L2, L3, L4, L5, S1, S2.  Intact to light touch along left L2, L3, L4, L5, S1, S2.    REFLEXES:  Patellar (L2-L4):  Right 2+.  Left 2+.  Achilles (S1-S2):  Right 2+.  Left 2+.  Babinski:  Right downgoing.  Left downgoing.  Forced ankle dorsiflexion (clonus):  Right 0 beats.  Left 0 beats.    LUMBOPELVIC SPECIAL TESTS:  Log roll:  Right -.  Left -.  Straight leg raise:  Right -.  Left -.  Crossover straight leg raise:  Right -.  Left -.  Resisted straight leg raise:  Right -.  Left -.  EDA:  Right -.  Left -.  FADIR:  Right -.  Left -.  Hip scour:  Right -.  Left -.  Lateral sacral compression:  Right -.  Left -.  Clamshell:  Right -.  Left -.  Ely s:  Right +.  Left +.  Yeoman s:  Right -.  Left -.  Distraction:  Right -.  Left -.  Sacral thrust:  Right -.  Left -.  Noble compression:  Right -.  Left  -.        IMAGING:  XR LEG LENGTH EVALUATION 12/10/2021 11:38 AM      HISTORY: Leg length discrepancy     COMPARISON: None.    IMPRESSION: From the top of the right femoral head to the caudal  aspect of the film measures 84.4 cm. From the top of the left femoral  head to the caudal aspect of the film measures 83.7 cm.      THORACIC SPINE TWO VIEWS 9/7/2021 11:59 AM      COMPARISON: None     HISTORY: Acute midline back pain, unspecified back location.    IMPRESSION: 12 pairs of thoracic ribs with rudimentary ribs on L1.  Alignment of the thoracic vertebrae is normal. Vertebral body heights  normal. No fractures. Mild degenerative endplate spurring at all  levels of the thoracic spine.      LUMBAR SPINE FOUR OR MORE VIEWS September 7, 2021 11:48 AM      HISTORY: Acute midline back pain, unspecified back location.     COMPARISON: None.    IMPRESSION: Five lumbar type vertebrae with rudimentary ribs on L1.  Normal alignment. Vertebral body heights normal. Alignment remains  normal in both the flexed and extended positions. Mild degenerative  endplate spurring at T12-L1, L1-L2 and L2-L3. Facet arthropathy at  L5-S1.        ASSESSMENT/PLAN:  Ms. Machelle Weiner is a 46-year-old, right-hand-dominant, female.  History and exam are most consistent with lumbosacral facet arthropathy (currently right more symptomatic than left).  She does have a small leg length discrepancy, with her right lower extremity being longer than her left lower extremity.  There may be a small contributor of lumbosacral degenerative disc disease, but this is felt to be much smaller contributor to her pain at this time.  Discussed diagnoses, pathophysiology, and treatment options with Ms. Machelle Weiner.  Discussed the use of a heel lift in her left shoe.  Discussed the options of doing nothing/living with it, physical therapy, chiropractic care, core strengthening, weight loss, lumbosacral facet joint corticosteroid injections, lumbosacral  facet joint medial branch blocks with following radiofrequency ablations.  Ms. Machelle Weiner will be set up with an MRI lumbar spine.  She is to follow-up in this clinic after the MRI lumbar spine.        Salú Rice MD

## 2021-12-10 NOTE — PATIENT INSTRUCTIONS
Please schedule a follow-up appointment after MRI lumbar spine.  You can schedule this at the , or you can call [phone].    Please schedule an MRI.  The [facility] Scheduling team will call you to schedule your imaging exam in the next 0-3 days.  You can also call them directly at [facility] [phone] ([address], [address], [place], [place]) and [facility] [phone] ([address]) to schedule your appointment.

## 2021-12-12 ASSESSMENT — ENCOUNTER SYMPTOMS
DIARRHEA: 1
DIFFICULTY URINATING: 0
DIZZINESS: 0
FATIGUE: 1
POOR WOUND HEALING: 0
DEPRESSION: 1
NAUSEA: 1
VOMITING: 0
INSOMNIA: 1
WHEEZING: 1
MYALGIAS: 1
DYSURIA: 0
WEIGHT LOSS: 0
HOARSE VOICE: 0
SWOLLEN GLANDS: 0
HEARTBURN: 1
ABDOMINAL PAIN: 1
LEG SWELLING: 0
LOSS OF CONSCIOUSNESS: 0
PALPITATIONS: 0
ARTHRALGIAS: 1
EYE PAIN: 0
COUGH: 1
BRUISES/BLEEDS EASILY: 0
CONSTIPATION: 0
HEADACHES: 0
FEVER: 0
HEMATURIA: 0
SHORTNESS OF BREATH: 1
TROUBLE SWALLOWING: 0
WEIGHT GAIN: 1
NERVOUS/ANXIOUS: 1
SEIZURES: 0
BOWEL INCONTINENCE: 0
BLOOD IN STOOL: 0

## 2021-12-14 ENCOUNTER — TELEPHONE (OUTPATIENT)
Dept: ORTHOPEDICS | Facility: CLINIC | Age: 46
End: 2021-12-14
Payer: COMMERCIAL

## 2021-12-14 NOTE — TELEPHONE ENCOUNTER
Patient calling in to schedule f/u appointment with Dr. Rice after her MRI.  Pt's MRI is scheduled 12/18/21.  1st available opening is 1/4/22.  Is there any way pt can be worked in earlier than that for her f/u?        Iram nance

## 2021-12-18 ENCOUNTER — HOSPITAL ENCOUNTER (OUTPATIENT)
Dept: MRI IMAGING | Facility: CLINIC | Age: 46
Discharge: HOME | End: 2021-12-18
Attending: PHYSICAL MEDICINE & REHABILITATION | Admitting: PHYSICAL MEDICINE & REHABILITATION
Payer: COMMERCIAL

## 2021-12-18 DIAGNOSIS — M47.817 FACET ARTHROPATHY, LUMBOSACRAL: ICD-10-CM

## 2021-12-18 DIAGNOSIS — M51.379 DDD (DEGENERATIVE DISC DISEASE), LUMBOSACRAL: ICD-10-CM

## 2021-12-18 PROCEDURE — 72148 MRI LUMBAR SPINE W/O DYE: CPT

## 2021-12-21 ENCOUNTER — OFFICE VISIT (OUTPATIENT)
Dept: ORTHOPEDICS | Facility: CLINIC | Age: 46
End: 2021-12-21
Payer: COMMERCIAL

## 2021-12-21 VITALS
DIASTOLIC BLOOD PRESSURE: 85 MMHG | BODY MASS INDEX: 41.15 KG/M2 | OXYGEN SATURATION: 95 % | HEART RATE: 81 BPM | WEIGHT: 241 LBS | SYSTOLIC BLOOD PRESSURE: 131 MMHG | HEIGHT: 64 IN

## 2021-12-21 DIAGNOSIS — M47.817 FACET ARTHROPATHY, LUMBOSACRAL: Primary | ICD-10-CM

## 2021-12-21 DIAGNOSIS — M21.70 LEG LENGTH DISCREPANCY: ICD-10-CM

## 2021-12-21 PROCEDURE — 99215 OFFICE O/P EST HI 40 MIN: CPT | Performed by: PHYSICAL MEDICINE & REHABILITATION

## 2021-12-21 ASSESSMENT — PAIN SCALES - GENERAL: PAINLEVEL: SEVERE PAIN (6)

## 2021-12-21 ASSESSMENT — MIFFLIN-ST. JEOR: SCORE: 1723.17

## 2021-12-21 NOTE — LETTER
12/21/2021         RE: Machelle Weiner  [address] [facility] [address]        Dear Colleague,    Thank you for referring your patient, Machelle Weiner, to the [facility]. Please see a copy of my visit note below.    PHYSICAL MEDICINE & REHABILITATION / MEDICAL SPINE        Date:  Dec 21, 2021    Name:  Machelle Weiner  YOB: 1975  MRN:  [id number]          CHART REVIEW:  Reviewed 09/07/2021 note from [facility] (neurosurgery).  Ms. Machelle Weiner was having thoracolumbar spine pain without any specific radiculopathy.  Pain was constant but had fluctuating intensities.  Pain was more dominant on the right side.  There was no proximal or distal radiation.  There was no inciting event.  Prolonged standing was the only aggravating factor.  There were no bowel or bladder changes.  Thoracic and lumbar spine x-rays were ordered.  Referral to medical spine was placed.        CHIEF COMPLAINT:  Low back pain        HISTORY OF PRESENT ILLNESS:  Ms. Machelle Weiner is a 46-year-old, right-hand-dominant, female.  She works as a .  Ms. Machelle Weiner would like to be more active but feels limited currently by her low back pain.  Ms. Machelle Weiner denied any prior or recent injuries of her low back, pelvis, hips, thighs, knees, legs, ankles, feet, toes.    Ms. Machelle Weiner was initially seen in this clinic on 12/10/2021.  She returns to clinic today, 12/21/21.  Ms. Machelle Weiner continues to have bilateral low back pain with her right side slightly more affected than her left side.  Pain radiates into the buttocks.  There is no distal radiation of the pain.  Pain is worsened by activity.  Pain is currently rated 6/10.  Pain varies from a 4/10 to a 9/10.  Pain does feel better with resting.  In terms of pain medications, Ms. Machelle Weiner is using ibuprofen, naproxen, acetaminophen, cyclobenzaprine.  A  combination of naproxen and acetaminophen seems to provide the best relief.    Ms. Machelle Weiner is aware of leg length discrepancy with the right lower extremity being longer than her left lower extremity.    Ms. Machelle Weiner stated that some of her maternal aunts and uncles have rheumatoid arthritis.  She denied any personal history of autoimmune diseases, rheumatologic diseases, gout, pseudogout.  Ms. Machelle Weiner denied any personal or family history of neurologic diseases.         ALLERGIES:  Allergies   Allergen Reactions     Codeine      Penicillins          MEDICATIONS:  Current Outpatient Medications   Medication Sig Dispense Refill     albuterol (2.5 MG/3ML) 0.083% neb solution Take 1 vial (2.5 mg) by nebulization every 6 hours as needed for shortness of breath / dyspnea or wheezing 25 vial 0     albuterol (PROAIR HFA/PROVENTIL HFA/VENTOLIN HFA) 108 (90 Base) MCG/ACT inhaler INHALE 2 PUFFS BY MOUTH EVERY 6 HOURS AS NEEDED FOR SHORTNESS OF BREATH OR DIFFICULT BREATHING 36 g 1     clindamycin (CLINDAMAX) 1 % external gel Apply topically 2 times daily 30 g 2     cyclobenzaprine (FLEXERIL) 10 MG tablet Take 1 tablet (10 mg) by mouth 3 times daily as needed for muscle spasms 30 tablet 1     Esomeprazole Magnesium (NEXIUM PO) Take 20 mg by mouth       ibuprofen (ADVIL,MOTRIN) 600 MG tablet Take 1 tablet (600 mg) by mouth every 6 hours as needed for moderate pain 30 tablet 1     levothyroxine (SYNTHROID/LEVOTHROID) 75 MCG tablet TAKE 1 TABLET(75 MCG) BY MOUTH DAILY 90 tablet 1     losartan (COZAAR) 50 MG tablet TAKE 1 TABLET(50 MG) BY MOUTH DAILY 90 tablet 1         PAST MEDICAL HISTORY:  Past Medical History:   Diagnosis Date     Asthma, mild intermittent      Hypertension      Thyroid disease          PAST SURGICAL HISTORY:  Past Surgical History:   Procedure Laterality Date     GYN SURGERY      cryosurgery of cervix at age 16     LAPAROSCOPIC CHOLECYSTECTOMY WITH CHOLANGIOGRAMS N/A 10/14/2016  "   Procedure: LAPAROSCOPIC CHOLECYSTECTOMY WITH CHOLANGIOGRAMS;  Surgeon: Cornelius Mueller MD;  Location: [facility] OR         FAMILY HISTORY:  Family History   Problem Relation Age of Onset     Coronary Artery Disease Maternal Grandmother      Hyperlipidemia Maternal Grandmother      Hyperlipidemia Maternal Grandfather      Diabetes No family hx of      Hypertension No family hx of          SOCIAL HISTORY:  Social History     Socioeconomic History     Marital status:      Spouse name: Not on file     Number of children: Not on file     Years of education: Not on file     Highest education level: Not on file   Occupational History     Not on file   Tobacco Use     Smoking status: Former Smoker     Packs/day: 0.30     Types: Cigarettes     Quit date: 2015     Years since quittin.8     Smokeless tobacco: Never Used   Substance and Sexual Activity     Alcohol use: No     Alcohol/week: 0.0 standard drinks     Drug use: No     Sexual activity: Yes     Partners: Male     Birth control/protection: None   Other Topics Concern     Parent/sibling w/ CABG, MI or angioplasty before 65F 55M? Not Asked   Social History Narrative     Not on file     Social Determinants of Health     Financial Resource Strain: Not on file   Food Insecurity: Not on file   Transportation Needs: Not on file   Physical Activity: Not on file   Stress: Not on file   Social Connections: Not on file   Intimate Partner Violence: Not on file   Housing Stability: Not on file         PHYSICAL EXAMINATION:  Vitals:    21 1430   BP: 131/85   Pulse: 81   SpO2: 95%   Weight: 109.3 kg (241 lb)   Height: 1.626 m (5' 4\")       GENERAL:  No acute distress.  Pleasant and cooperative.   PSYCH:  Normal mood and affect.  HEAD:  Normocephalic.  SPEECH:  No dysarthria.  EYES:  No scleral icterus.  Wearing glasses.  EARS:  Hearing is intact to spoken voice.  NOSE/MOUTH:  Wearing a face mask.  LUNGS:  No respiratory distress.  No increased work of " breathing.        IMAGING:  I reviewed leg length x-rays from 12/10/2021.  Measuring from the femoral head to the ankle mortise, right length was 768 millimeters, and left length was 763 mm.      I reviewed MRI lumbar spine from 12/18/2021.  At T12-L1, there is mild disc height loss, small broad-based disc bulge, mild bilateral facet arthropathy.  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  At L1-L2, there is mild bilateral facet arthropathy.  At L2-L3, there is mild bilateral facet arthropathy.  At L3-L4, there is mild to moderate right facet arthropathy, moderate left facet arthropathy.  At L4-L5, there is mild to moderate bilateral facet arthropathy.  L5-S1, there is mild bilateral facet arthropathy.        ASSESSMENT/PLAN:  Ms. Machelle Weiner is a 46-year-old, right-hand-dominant, female.  She has symptomatic lumbosacral facet arthropathy.  She does have a small leg length discrepancy, with the right lower extremity being longer than the left lower extremity.  Discussed the use of a heel lift for her leg length discrepancy.  Discussed lumbosacral facet arthropathy and treatment options with Ms. Machelle Weiner.  Discussed the options of doing nothing/living with it, physical therapy, chiropractic care, core strengthening, weight loss, oral medications such as gabapentin and pregabalin, lumbosacral facet joint corticosteroid injections, lumbosacral facet joint medial branch blocks with following radiofrequency ablations.  Ms. Machelle Weiner will be set up for medial branch blocks of the bilateral L3-L4 and L4-L5 facet joints with following radiofrequency ablations.  Risks and benefits of medial branch blocks and radiofrequency ablations were discussed with Ms. Machelle Weiner.  We will have her follow-up in this clinic 4 to 6 weeks after the radiofrequency ablations.    Ms. Machelle Weiner also mentions that she has thoracic back pain.  She will be set up for an appointment to be seen for thoracic back  pain at her convenience.        Total Time on encounter:  40 minutes were spent on one more or more of the following:  discussion with patient, history, exam, coordinating care, treatment goals, record review, documenting clinical information, and/or data review.      Saúl Rice MD

## 2021-12-21 NOTE — PROGRESS NOTES
PHYSICAL MEDICINE & REHABILITATION / MEDICAL SPINE        Date:  Dec 21, 2021    Name:  Machelle Weiner  YOB: 1975  MRN:  [id number]          CHART REVIEW:  Reviewed 09/07/2021 note from [facility] (neurosurgery).  Ms. Machelle Weiner was having thoracolumbar spine pain without any specific radiculopathy.  Pain was constant but had fluctuating intensities.  Pain was more dominant on the right side.  There was no proximal or distal radiation.  There was no inciting event.  Prolonged standing was the only aggravating factor.  There were no bowel or bladder changes.  Thoracic and lumbar spine x-rays were ordered.  Referral to medical spine was placed.        CHIEF COMPLAINT:  Low back pain        HISTORY OF PRESENT ILLNESS:  Ms. Machelle Weiner is a 46-year-old, right-hand-dominant, female.  She works as a .  Ms. Machelle Weiner would like to be more active but feels limited currently by her low back pain.  Ms. Machelle Weiner denied any prior or recent injuries of her low back, pelvis, hips, thighs, knees, legs, ankles, feet, toes.    Ms. Machelle Weiner was initially seen in this clinic on 12/10/2021.  She returns to clinic today, 12/21/21.  Ms. Machelle Weiner continues to have bilateral low back pain with her right side slightly more affected than her left side.  Pain radiates into the buttocks.  There is no distal radiation of the pain.  Pain is worsened by activity.  Pain is currently rated 6/10.  Pain varies from a 4/10 to a 9/10.  Pain does feel better with resting.  In terms of pain medications, Ms. Machelle Weiner is using ibuprofen, naproxen, acetaminophen, cyclobenzaprine.  A combination of naproxen and acetaminophen seems to provide the best relief.    Ms. Machelle Weiner is aware of leg length discrepancy with the right lower extremity being longer than her left lower extremity.    Ms. Machelle Weiner stated that some of her  maternal aunts and uncles have rheumatoid arthritis.  She denied any personal history of autoimmune diseases, rheumatologic diseases, gout, pseudogout.  Ms. Machelle Weiner denied any personal or family history of neurologic diseases.         ALLERGIES:  Allergies   Allergen Reactions     Codeine      Penicillins          MEDICATIONS:  Current Outpatient Medications   Medication Sig Dispense Refill     albuterol (2.5 MG/3ML) 0.083% neb solution Take 1 vial (2.5 mg) by nebulization every 6 hours as needed for shortness of breath / dyspnea or wheezing 25 vial 0     albuterol (PROAIR HFA/PROVENTIL HFA/VENTOLIN HFA) 108 (90 Base) MCG/ACT inhaler INHALE 2 PUFFS BY MOUTH EVERY 6 HOURS AS NEEDED FOR SHORTNESS OF BREATH OR DIFFICULT BREATHING 36 g 1     clindamycin (CLINDAMAX) 1 % external gel Apply topically 2 times daily 30 g 2     cyclobenzaprine (FLEXERIL) 10 MG tablet Take 1 tablet (10 mg) by mouth 3 times daily as needed for muscle spasms 30 tablet 1     Esomeprazole Magnesium (NEXIUM PO) Take 20 mg by mouth       ibuprofen (ADVIL,MOTRIN) 600 MG tablet Take 1 tablet (600 mg) by mouth every 6 hours as needed for moderate pain 30 tablet 1     levothyroxine (SYNTHROID/LEVOTHROID) 75 MCG tablet TAKE 1 TABLET(75 MCG) BY MOUTH DAILY 90 tablet 1     losartan (COZAAR) 50 MG tablet TAKE 1 TABLET(50 MG) BY MOUTH DAILY 90 tablet 1         PAST MEDICAL HISTORY:  Past Medical History:   Diagnosis Date     Asthma, mild intermittent      Hypertension      Thyroid disease          PAST SURGICAL HISTORY:  Past Surgical History:   Procedure Laterality Date     GYN SURGERY      cryosurgery of cervix at age 16     LAPAROSCOPIC CHOLECYSTECTOMY WITH CHOLANGIOGRAMS N/A 10/14/2016    Procedure: LAPAROSCOPIC CHOLECYSTECTOMY WITH CHOLANGIOGRAMS;  Surgeon: Cornelius Mueller MD;  Location:  OR         FAMILY HISTORY:  Family History   Problem Relation Age of Onset     Coronary Artery Disease Maternal Grandmother      Hyperlipidemia Maternal  "Grandmother      Hyperlipidemia Maternal Grandfather      Diabetes No family hx of      Hypertension No family hx of          SOCIAL HISTORY:  Social History     Socioeconomic History     Marital status:      Spouse name: Not on file     Number of children: Not on file     Years of education: Not on file     Highest education level: Not on file   Occupational History     Not on file   Tobacco Use     Smoking status: Former Smoker     Packs/day: 0.30     Types: Cigarettes     Quit date: 2015     Years since quittin.8     Smokeless tobacco: Never Used   Substance and Sexual Activity     Alcohol use: No     Alcohol/week: 0.0 standard drinks     Drug use: No     Sexual activity: Yes     Partners: Male     Birth control/protection: None   Other Topics Concern     Parent/sibling w/ CABG, MI or angioplasty before 65F 55M? Not Asked   Social History Narrative     Not on file     Social Determinants of Health     Financial Resource Strain: Not on file   Food Insecurity: Not on file   Transportation Needs: Not on file   Physical Activity: Not on file   Stress: Not on file   Social Connections: Not on file   Intimate Partner Violence: Not on file   Housing Stability: Not on file         PHYSICAL EXAMINATION:  Vitals:    21 1430   BP: 131/85   Pulse: 81   SpO2: 95%   Weight: 109.3 kg (241 lb)   Height: 1.626 m (5' 4\")       GENERAL:  No acute distress.  Pleasant and cooperative.   PSYCH:  Normal mood and affect.  HEAD:  Normocephalic.  SPEECH:  No dysarthria.  EYES:  No scleral icterus.  Wearing glasses.  EARS:  Hearing is intact to spoken voice.  NOSE/MOUTH:  Wearing a face mask.  LUNGS:  No respiratory distress.  No increased work of breathing.        IMAGING:  I reviewed leg length x-rays from 12/10/2021.  Measuring from the femoral head to the ankle mortise, right length was 768 millimeters, and left length was 763 mm.      I reviewed MRI lumbar spine from 2021.  At T12-L1, there is mild disc " height loss, small broad-based disc bulge, mild bilateral facet arthropathy.  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  At L1-L2, there is mild bilateral facet arthropathy.  At L2-L3, there is mild bilateral facet arthropathy.  At L3-L4, there is mild to moderate right facet arthropathy, moderate left facet arthropathy.  At L4-L5, there is mild to moderate bilateral facet arthropathy.  L5-S1, there is mild bilateral facet arthropathy.        ASSESSMENT/PLAN:  Ms. Machelle Weiner is a 46-year-old, right-hand-dominant, female.  She has symptomatic lumbosacral facet arthropathy.  She does have a small leg length discrepancy, with the right lower extremity being longer than the left lower extremity.  Discussed the use of a heel lift for her leg length discrepancy.  Discussed lumbosacral facet arthropathy and treatment options with Ms. Machelle Weiner.  Discussed the options of doing nothing/living with it, physical therapy, chiropractic care, core strengthening, weight loss, oral medications such as gabapentin and pregabalin, lumbosacral facet joint corticosteroid injections, lumbosacral facet joint medial branch blocks with following radiofrequency ablations.  Ms. Machelle Weiner will be set up for medial branch blocks of the bilateral L3-L4 and L4-L5 facet joints with following radiofrequency ablations.  Risks and benefits of medial branch blocks and radiofrequency ablations were discussed with Ms. Machelle Weiner.  We will have her follow-up in this clinic 4 to 6 weeks after the radiofrequency ablations.    Ms. Machelle Weiner also mentions that she has thoracic back pain.  She will be set up for an appointment to be seen for thoracic back pain at her convenience.        Total Time on encounter:  40 minutes were spent on one more or more of the following:  discussion with patient, history, exam, coordinating care, treatment goals, record review, documenting clinical information, and/or data  review.      Saúl Rice MD

## 2021-12-21 NOTE — PATIENT INSTRUCTIONS
Please schedule medial branch blocks of the bilateral L3-L4 and L4-L5 facet joints with me.  The [facility] Procedure Scheduling team will call you to schedule your procedure in the next 0-3 days.  You can also call them directly at [phone].    Please schedule an appointment to be seen for thoracic back pain at your convenience.

## 2021-12-30 ENCOUNTER — HOSPITAL ENCOUNTER (EMERGENCY)
Facility: CLINIC | Age: 46
Discharge: HOME | End: 2021-12-31
Attending: EMERGENCY MEDICINE | Admitting: EMERGENCY MEDICINE
Payer: COMMERCIAL

## 2021-12-30 DIAGNOSIS — T50.901A ACCIDENTAL DRUG INGESTION, INITIAL ENCOUNTER: ICD-10-CM

## 2021-12-30 PROCEDURE — 250N000011 HC RX IP 250 OP 636: Performed by: EMERGENCY MEDICINE

## 2021-12-30 PROCEDURE — 80307 DRUG TEST PRSMV CHEM ANLYZR: CPT | Performed by: EMERGENCY MEDICINE

## 2021-12-30 PROCEDURE — 99283 EMERGENCY DEPT VISIT LOW MDM: CPT

## 2021-12-30 RX ORDER — ONDANSETRON 4 MG/1
4 TABLET, ORALLY DISINTEGRATING ORAL ONCE
Status: COMPLETED | OUTPATIENT
Start: 2021-12-30 | End: 2021-12-30

## 2021-12-30 RX ADMIN — ONDANSETRON 4 MG: 4 TABLET, ORALLY DISINTEGRATING ORAL at 23:35

## 2021-12-30 ASSESSMENT — ENCOUNTER SYMPTOMS: SHORTNESS OF BREATH: 1

## 2021-12-30 ASSESSMENT — MIFFLIN-ST. JEOR: SCORE: 1786.67

## 2021-12-31 VITALS
OXYGEN SATURATION: 98 % | BODY MASS INDEX: 43.54 KG/M2 | SYSTOLIC BLOOD PRESSURE: 154 MMHG | TEMPERATURE: 99.5 F | DIASTOLIC BLOOD PRESSURE: 93 MMHG | HEART RATE: 89 BPM | RESPIRATION RATE: 16 BRPM | WEIGHT: 255 LBS | HEIGHT: 64 IN

## 2021-12-31 LAB
AMPHETAMINES UR QL SCN: NORMAL
BARBITURATES UR QL: NORMAL
BENZODIAZ UR QL: NORMAL
CANNABINOIDS UR QL SCN: NORMAL
COCAINE UR QL: NORMAL
OPIATES UR QL SCN: NORMAL
PCP UR QL SCN: NORMAL

## 2021-12-31 NOTE — ED TRIAGE NOTES
"Pt biba from home- pt tasted unknown substance in son's mountain dew can and is \"feeling high\" and very anxious  "

## 2021-12-31 NOTE — ED PROVIDER NOTES
"  History   Chief Complaint:  Ingestion (pt tasted unknown substance in son's mountain dew can and is \"feeling high\")       The history is provided by the patient.      Machelle Weiner is a 46 year old female who presents following ingestion of an unknown substance. Earlier tonight, the patient discovered an unusual Mountain Dew can in her son's room, which was hidden behind things, in an earea he often hides things.  It seemed unusually thick and smelled abnormal. She tasted some of it to identify what it was, but it made her tongue tingle and she spit it out almost immediately. Then 5 minutes later, she began \"to feel high\". Then shortly after, she started having a hard time breathing. At bedside, she notes that her sensation has subsided. The patient was unsure what was in the can, but notes that she found dextromethorphan blister packs in her son's car yesterday.  She is allergic to codeine and wonders if that may have been in the can.      Review of Systems   Respiratory: Positive for shortness of breath.    Cardiovascular: Positive for chest pain.   10 systems reviewed and negative except as above and in HPI.      Allergies:  Codeine  Penicillins    Medications:    Albuterol   Flexeril   Synthroid   Losartan     Past Medical History:    Asthma   HTN   Thyroid disease   Facet arthropathy   DDD   Leg length discrepancy   Migraine   Morbid obesity   HLD      Past Surgical History:    Cervix cryosurgery   Cholecystectomy     Family History:    Patient denies pertinent family history.      Social History:  Patient presents to the ED alone via EMS    Physical Exam     Patient Vitals for the past 24 hrs:   BP Temp Pulse Resp SpO2 Height Weight   12/30/21 2357 (!) 154/93 -- 89 16 99 % -- --   12/30/21 2240 (!) 179/101 99.5  F (37.5  C) 98 16 98 % 1.626 m (5' 4\") 115.7 kg (255 lb)       Physical Exam  General: Alert, No distress. Nontoxic appearance  Head: No signs of trauma.   Mouth/Throat: Oropharynx moist. "   Eyes: Conjunctivae are normal. Pupils are equal..   Neck: Normal range of motion.    CV: Appears well perfused.  Resp:No respiratory distress.   MSK: Normal range of motion. No obvious deformity.   Neuro: The patient is alert and interactive. LOGAN. Speech normal. GCS 15  Skin: No lesion or sign of trauma noted.   Psych: normal mood and affect. behavior is normal.     Emergency Department Course   Laboratory:  Labs Ordered and Resulted from Time of ED Arrival to Time of ED Departure   DRUG ABUSE SCREEN 77 URINE (FL, RH, SH) - Normal       Result Value    Amphetamines Urine Screen Negative      Barbiturates Urine Screen Negative      Benzodiazepines Urine Screen Negative      Cannabinoids Urine Screen Negative      Cocaine Urine Screen Negative      Opiates Urine Screen Negative      PCP Urine Screen Negative          Emergency Department Course:    Reviewed:  I reviewed nursing notes, vitals, past medical history and Care Everywhere    Assessments:  2300 I obtained history and examined the patient as noted above.   0008 I rechecked the patient and explained findings.  0036 I rechecked the patient and discussed plan for discharge home.    Consults:  2316 I spoke with Poison Control.   0024 I spoke with the patient's son.  0026 The patient's  joined the phone call and was cursing at me.  I stated that was inappropriate and ended the call.     Interventions:  2335 Zofran 4 mg PO    Disposition:  The patient was discharged to home.     Impression & Plan   Medical Decision Making:  Machelle Weiner is a 46 year old female who presents after ingesting an unknown substance found in her son's room.  She reports that her son has recently started using marijuana, dextromethorphan, and possibly other illicit substances.  She states immediately after taking a small sip she noted that it tasted abnormal and was thick and so she spit it out.  She rinsed out the can and disposed of it, but shortly thereafter started to  feel quite unwell.  She presented to the emergency department for evaluation.  Unfortunately as she only had enough to wet her mouth and spit it out and she is currently improved and unlikely to find any information regarding what it may be through a drug screen or based on an examination with specific toxidrome.  I discussed this with her and she requested a urine drug screen anyways.  I informed her that this would be very unlikely to find the culprit agent but ordered nonetheless.  With the patient's permission I contacted her son to see if he knew what was in the soda can.  He said that he was sure that it did not have codeine in it but other than that was unable to tell me what was in the can.  During the phone call the patient's  got on the phone as well.  After he started raising his voice and cursing at me I ended the phone call.  She is allergic to codeine and this may be what caused her reaction.  She is now feeling significantly better and is being discharged home.  She will return for any new or worsening symptoms.    Diagnosis:    ICD-10-CM    1. Accidental drug ingestion, initial encounter  T50.901A          Scribe Disclosure:  I, Adryan Jean, am serving as a scribe at 10:37 PM on 12/30/2021 to document services personally performed by Nathalia Gonzalez MD based on my observations and the provider's statements to me.        Nathalia Gonzalez MD  12/31/21 0136

## 2021-12-31 NOTE — ED NOTES
Bed: ED06  Expected date:   Expected time:   Means of arrival:   Comments:  Pamela [age]F consumed unknown substance, not feeling well eta 2209

## 2022-01-09 ENCOUNTER — HEALTH MAINTENANCE LETTER (OUTPATIENT)
Age: 47
End: 2022-01-09

## 2022-01-13 ENCOUNTER — HOSPITAL ENCOUNTER (OUTPATIENT)
Dept: GENERAL RADIOLOGY | Facility: CLINIC | Age: 47
Discharge: HOME | End: 2022-01-13
Attending: PHYSICAL MEDICINE & REHABILITATION | Admitting: PHYSICAL MEDICINE & REHABILITATION
Payer: COMMERCIAL

## 2022-01-13 ENCOUNTER — TELEPHONE (OUTPATIENT)
Dept: ORTHOPEDICS | Facility: CLINIC | Age: 47
End: 2022-01-13

## 2022-01-13 VITALS — HEART RATE: 77 BPM | SYSTOLIC BLOOD PRESSURE: 120 MMHG | DIASTOLIC BLOOD PRESSURE: 79 MMHG

## 2022-01-13 DIAGNOSIS — M47.817 FACET ARTHROPATHY, LUMBOSACRAL: ICD-10-CM

## 2022-01-13 PROCEDURE — 255N000002 HC RX 255 OP 636: Performed by: PHYSICAL MEDICINE & REHABILITATION

## 2022-01-13 PROCEDURE — 64494 INJ PARAVERT F JNT L/S 2 LEV: CPT | Mod: RT | Performed by: PHYSICAL MEDICINE & REHABILITATION

## 2022-01-13 PROCEDURE — 64493 INJ PARAVERT F JNT L/S 1 LEV: CPT | Mod: 50

## 2022-01-13 PROCEDURE — 64493 INJ PARAVERT F JNT L/S 1 LEV: CPT | Mod: RT | Performed by: PHYSICAL MEDICINE & REHABILITATION

## 2022-01-13 PROCEDURE — 250N000009 HC RX 250

## 2022-01-13 RX ORDER — IOPAMIDOL 408 MG/ML
10 INJECTION, SOLUTION INTRATHECAL ONCE
Status: COMPLETED | OUTPATIENT
Start: 2022-01-13 | End: 2022-01-13

## 2022-01-13 RX ORDER — LIDOCAINE HYDROCHLORIDE 10 MG/ML
5 INJECTION, SOLUTION EPIDURAL; INFILTRATION; INTRACAUDAL; PERINEURAL ONCE
Status: COMPLETED | OUTPATIENT
Start: 2022-01-13 | End: 2022-01-13

## 2022-01-13 RX ORDER — BUPIVACAINE HYDROCHLORIDE 5 MG/ML
INJECTION, SOLUTION EPIDURAL; INTRACAUDAL
Status: COMPLETED
Start: 2022-01-13 | End: 2022-01-13

## 2022-01-13 RX ORDER — BUPIVACAINE HYDROCHLORIDE 5 MG/ML
5 INJECTION, SOLUTION EPIDURAL; INTRACAUDAL ONCE
Status: COMPLETED | OUTPATIENT
Start: 2022-01-13 | End: 2022-01-13

## 2022-01-13 RX ORDER — LIDOCAINE HYDROCHLORIDE 10 MG/ML
INJECTION, SOLUTION EPIDURAL; INFILTRATION; INTRACAUDAL; PERINEURAL
Status: COMPLETED
Start: 2022-01-13 | End: 2022-01-13

## 2022-01-13 RX ADMIN — LIDOCAINE HYDROCHLORIDE 1.5 ML: 10 INJECTION, SOLUTION EPIDURAL; INFILTRATION; INTRACAUDAL; PERINEURAL at 11:28

## 2022-01-13 RX ADMIN — BUPIVACAINE HYDROCHLORIDE 15 MG: 5 INJECTION, SOLUTION EPIDURAL; INTRACAUDAL at 12:01

## 2022-01-13 RX ADMIN — IOPAMIDOL 1.5 ML: 408 INJECTION, SOLUTION INTRATHECAL at 11:46

## 2022-01-13 NOTE — TELEPHONE ENCOUNTER
I called and spoke with Ms. Machelle Weiner.  She is having a lot of left-sided low back pain from the injections earlier today.  She has noted some extension of this pain into her left buttock and left posterior thigh.  Pain does not extend past the knee.  Ms. Machelle Weiner was relaxed when the right side injections were performed.  When the left-sided injections were performed, she did become tense and had more discomfort with that part of the procedure.  Ms. Machelle Weiner is noting improvement in her joint pain on the right side.  However, the pain on the left side is affecting her.  I was going to prescribe cyclobenzaprine, but Ms. Machelle Weiner states she does have some cyclobenzaprine at home.  She will take cyclobenzaprine 10 mg.  I also encouraged her to take acetaminophen 1000 mg p.o. 3 times daily.  She may augment this with ibuprofen.  Will likely have Ms. Machelle Weiner follow-up next week.      Saúl Rice MD

## 2022-01-13 NOTE — PROGRESS NOTES
Pt was in Radiology today for a L 3-4 and L 4-5 Bilateral Medial branch blocks. Pt tolerated ortho procedure well. Pre procedure pain was 5/10 post procedure pain level 3-4/10  (the deep facet pain).Procedure was completed by ROSALINE Rice. There were no complications during this procedure. Pt verbalized understanding of written and verbal instructions and left department in stable and satisfeactory condition with son. There is no evidence of bleeding or any other complications upon discharge.

## 2022-01-21 ENCOUNTER — TELEPHONE (OUTPATIENT)
Dept: ORTHOPEDICS | Facility: CLINIC | Age: 47
End: 2022-01-21
Payer: COMMERCIAL

## 2022-01-21 DIAGNOSIS — M54.17 LUMBOSACRAL RADICULOPATHY: Primary | ICD-10-CM

## 2022-01-21 RX ORDER — PREDNISONE 20 MG/1
40 TABLET ORAL DAILY
Qty: 10 TABLET | Refills: 0 | Status: SHIPPED | OUTPATIENT
Start: 2022-01-21 | End: 2022-01-26

## 2022-01-21 NOTE — TELEPHONE ENCOUNTER
I called and spoke with Ms. Machelle Weiner.  The left-sided low back pain that she was having on 01/13/2022 was improved on 01/14/2022.  The left-sided low back pain resolved by 01/15/2022.  Ms. Machelle Weiner did note some improvement in her right-sided low back pain during the time after the medial branch blocks.  However, it is difficult for her to say the percent improved, because she was noticing so much of her left low back pain.    Beginning on 01/19/2022, Ms. Machelle Weiner developed right low back pain that radiates into right buttock, right hip, and into her right lower extremity.  She notes tingling in her right right foot and weakness in her right lower extremity.  She is not able to sleep more than 5 hours at a time.  This history sounds very consistent with right lumbosacral radiculopathy.  A new MRI lumbar spine is being ordered.  Ms. Machelle Weiner is being prescribed prednisone 40 mg daily for 5 days.  She is to follow-up in this clinic immediately after the MRI lumbar spine.      Saúl Rice MD

## 2022-01-21 NOTE — TELEPHONE ENCOUNTER
Reason for call:  Message from Machelle. Would like to speak to someone to follow up on issues she discussed with Dr Rice at her appointment last Thursday as well as to report multiple new issues and symptoms, and medication questions.    Please call back.    Phone number to reach patient:  Home number on file [phone] (home)    Best Time:  -    Can we leave a detailed message on this number?  NO

## 2022-01-28 ENCOUNTER — HOSPITAL ENCOUNTER (OUTPATIENT)
Dept: MRI IMAGING | Facility: CLINIC | Age: 47
Discharge: HOME | End: 2022-01-28
Attending: PHYSICAL MEDICINE & REHABILITATION | Admitting: PHYSICAL MEDICINE & REHABILITATION
Payer: COMMERCIAL

## 2022-01-28 DIAGNOSIS — M54.17 LUMBOSACRAL RADICULOPATHY: ICD-10-CM

## 2022-01-28 PROCEDURE — 72148 MRI LUMBAR SPINE W/O DYE: CPT

## 2022-02-04 ENCOUNTER — OFFICE VISIT (OUTPATIENT)
Dept: ORTHOPEDICS | Facility: CLINIC | Age: 47
End: 2022-02-04
Payer: COMMERCIAL

## 2022-02-04 VITALS — HEART RATE: 74 BPM | DIASTOLIC BLOOD PRESSURE: 100 MMHG | OXYGEN SATURATION: 97 % | SYSTOLIC BLOOD PRESSURE: 143 MMHG

## 2022-02-04 DIAGNOSIS — M21.70 LEG LENGTH DISCREPANCY: ICD-10-CM

## 2022-02-04 DIAGNOSIS — M51.379 DDD (DEGENERATIVE DISC DISEASE), LUMBOSACRAL: ICD-10-CM

## 2022-02-04 DIAGNOSIS — M47.817 FACET ARTHROPATHY, LUMBOSACRAL: Primary | ICD-10-CM

## 2022-02-04 PROCEDURE — 99214 OFFICE O/P EST MOD 30 MIN: CPT | Performed by: PHYSICAL MEDICINE & REHABILITATION

## 2022-02-04 RX ORDER — GABAPENTIN 300 MG/1
600 CAPSULE ORAL 3 TIMES DAILY
Qty: 360 CAPSULE | Refills: 0 | Status: SHIPPED | OUTPATIENT
Start: 2022-02-04 | End: 2022-04-08

## 2022-02-04 ASSESSMENT — PAIN SCALES - GENERAL: PAINLEVEL: SEVERE PAIN (7)

## 2022-02-04 NOTE — LETTER
2/4/2022         RE: Machelle Weiner  [address] [facility] [id number]        Dear Colleague,    Thank you for referring your patient, Machelle Weiner, to the [facility]. Please see a copy of my visit note below.    PHYSICAL MEDICINE & REHABILITATION / MEDICAL SPINE        Date:  Feb 4, 2022    Name:  Machelle Weiner  YOB: 1975  MRN:  [id number]          CHART REVIEW:  Reviewed 09/07/2021 note from [facility] (neurosurgery).  Ms. Machelle Weiner was having thoracolumbar spine pain without any specific radiculopathy.  Pain was constant but had fluctuating intensities.  Pain was more dominant on the right side.  There was no proximal or distal radiation.  There was no inciting event.  Prolonged standing was the only aggravating factor.  There were no bowel or bladder changes.  Thoracic and lumbar spine x-rays were ordered.  Referral to medical spine was placed.        CHIEF COMPLAINT:  Low back pain.        HISTORY OF PRESENT ILLNESS:  Ms. Machelle Weiner is a 46-year-old, right-hand-dominant, female.  She works as a .  Ms. Machelle Weiner would like to be more active but feels limited currently by her low back pain.  Ms. Machelle Weiner denied any prior or recent injuries of her low back, pelvis, hips, thighs, knees, legs, ankles, feet, toes.      Ms. Machelle Weiner is aware of leg length discrepancy with the right lower extremity being longer than her left lower extremity.     Ms. Machelle Weiner stated that some of her maternal aunts and uncles have rheumatoid arthritis.  She denied any personal history of autoimmune diseases, rheumatologic diseases, gout, pseudogout.  Ms. Machelle Weiner denied any personal or family history of neurologic diseases.    Ms. Machelle Weiner was last seen in this clinic on 12/21/2021.  On 01/13/2022, Ms. Machelle Weiner had fluoroscopic guided medial branch blocks  of the bilateral L3-L4 and L4-L5 facet joints.  Ms. Machelle Weiner noted that the right side of her low back felt much better but she had significant pain on the left side from lumbar muscle spasms.  This resolved after a few days, but then, Ms. Machelle Weiner noted pain radiating to her right lower extremity.  An MRI lumbar spine was ordered.  Ms. Machelle Weiner returns to clinic today, 02/04/2022.    Ms. Machelle Weiner has constant bilateral low back pain, right greater than left.  Pain is currently rated 7/10.  There is some intermittent but difficult to localize tingling in the right forefoot.  Occasionally, there is some right buttock pain.  Ms. Machelle Weiner states that she had good benefit from a previous cervical epidural steroid injection.  She is wondering if a lumbosacral epidural steroid injection would be beneficial for her.  Ms. Machelle Weiner also notes thoracic back pain.  Ms. Machelle Weiner does not want to see a chiropractor.  She is not opposed to physical therapy but does not want to go just yet.        ALLERGIES:  Allergies   Allergen Reactions     Codeine      Penicillins          MEDICATIONS:  Current Outpatient Medications   Medication Sig Dispense Refill     gabapentin (NEURONTIN) 300 MG capsule Take 2 capsules (600 mg) by mouth 3 times daily 360 capsule 0     albuterol (2.5 MG/3ML) 0.083% neb solution Take 1 vial (2.5 mg) by nebulization every 6 hours as needed for shortness of breath / dyspnea or wheezing 25 vial 0     albuterol (PROAIR HFA/PROVENTIL HFA/VENTOLIN HFA) 108 (90 Base) MCG/ACT inhaler INHALE 2 PUFFS BY MOUTH EVERY 6 HOURS AS NEEDED FOR SHORTNESS OF BREATH OR DIFFICULT BREATHING 36 g 1     clindamycin (CLINDAMAX) 1 % external gel Apply topically 2 times daily 30 g 2     cyclobenzaprine (FLEXERIL) 10 MG tablet Take 1 tablet (10 mg) by mouth 3 times daily as needed for muscle spasms 30 tablet 1     Esomeprazole Magnesium (NEXIUM PO) Take 20 mg by  mouth       ibuprofen (ADVIL,MOTRIN) 600 MG tablet Take 1 tablet (600 mg) by mouth every 6 hours as needed for moderate pain 30 tablet 1     levothyroxine (SYNTHROID/LEVOTHROID) 75 MCG tablet TAKE 1 TABLET(75 MCG) BY MOUTH DAILY 90 tablet 1     losartan (COZAAR) 50 MG tablet TAKE 1 TABLET(50 MG) BY MOUTH DAILY 90 tablet 1         PAST MEDICAL HISTORY:  Past Medical History:   Diagnosis Date     Asthma, mild intermittent      Hypertension      Thyroid disease          PAST SURGICAL HISTORY:  Past Surgical History:   Procedure Laterality Date     GYN SURGERY      cryosurgery of cervix at age 16     LAPAROSCOPIC CHOLECYSTECTOMY WITH CHOLANGIOGRAMS N/A 10/14/2016    Procedure: LAPAROSCOPIC CHOLECYSTECTOMY WITH CHOLANGIOGRAMS;  Surgeon: Cornelius Mueller MD;  Location: [facility] OR         FAMILY HISTORY:  Family History   Problem Relation Age of Onset     Coronary Artery Disease Maternal Grandmother      Hyperlipidemia Maternal Grandmother      Hyperlipidemia Maternal Grandfather      Diabetes No family hx of      Hypertension No family hx of          SOCIAL HISTORY:  Social History     Socioeconomic History     Marital status:      Spouse name: Not on file     Number of children: Not on file     Years of education: Not on file     Highest education level: Not on file   Occupational History     Not on file   Tobacco Use     Smoking status: Former Smoker     Packs/day: 0.30     Types: Cigarettes     Quit date: 2015     Years since quittin.9     Smokeless tobacco: Never Used   Substance and Sexual Activity     Alcohol use: No     Alcohol/week: 0.0 standard drinks     Drug use: No     Sexual activity: Yes     Partners: Male     Birth control/protection: None   Other Topics Concern     Parent/sibling w/ CABG, MI or angioplasty before 65F 55M? Not Asked   Social History Narrative     Not on file     Social Determinants of Health     Financial Resource Strain: Not on file   Food Insecurity: Not on file    Transportation Needs: Not on file   Physical Activity: Not on file   Stress: Not on file   Social Connections: Not on file   Intimate Partner Violence: Not on file   Housing Stability: Not on file         PHYSICAL EXAMINATION:  Vitals:    02/04/22 1504   BP: (!) 143/100   Pulse: 74   SpO2: 97%       GENERAL:  No acute distress.  Pleasant and cooperative.   PSYCH:  Normal mood and affect.  HEAD:  Normocephalic.  SPEECH:  No dysarthria.  EYES:  No scleral icterus.  Wearing glasses.  EARS:  Hearing is intact to spoken voice.  NOSE/MOUTH:  Wearing a face mask.  LUNGS:  No respiratory distress.  No increased work of breathing.      IMAGING:  I reviewed leg length x-rays from 12/10/2021.  Measuring from the femoral head to the ankle mortise, right length was 768 millimeters, and left length was 763 mm.     I reviewed MRI of the lumbar spine from 01/28/2022.  At T12-L1, there is mild disc height loss, mild circumferential disc bulge, mild bilateral facet arthropathy.  At L2-L3, there is disc desiccation, mild bilateral facet arthropathy, fluid in the bilateral facet joints.  At L3-L4, there is mild to moderate bilateral facet arthropathy.  At L4-L5, there is mild to moderate bilateral facet arthropathy, small left lateral/foraminal disc protrusion.  At L5-S1, there is mild bilateral facet arthropathy.    ASSESSMENT/PLAN:  Ms. Machelle Weiner is a 46-year-old, right-hand-dominant, female.  She has low back pain that seems most consistent with lumbosacral facet arthropathy.  Ms. Machelle Weiner does have a small leg length discrepancy, with the right lower extremity being longer than her left lower extremity.  Given the discomfort that Ms. Machelle Weiner experienced with medial branch blocks, would not plan to repeat them.  Discussed diagnoses, pathophysiology, and treatment options with Ms. Machelle Weiner.  Discussed the options of doing nothing/living with it, physical therapy, chiropractic care, oral  medications such as gabapentin and pregabalin, lumbosacral facet joint corticosteroid injections, lumbosacral epidural corticosteroid injection.  Ms. Machelle Weiner is being started on gabapentin 300 mg p.o. nightly with instructions to ramp up this dose to 600 mg p.o. 3 times daily.  Ms. Machelle Weiner will also be scheduled for an L5-S1 interlaminar epidural corticosteroid injection.  Ms. Machelle Weiner is follow-up in this clinic 2 weeks after the injection.    Ms. Machelle Weiner also notes thoracic back pain.  She will schedule an appointment to be seen for her thoracic back pain at her convenience.    Total Time on encounter:  38 minutes were spent on one more or more of the following:  discussion with patient, history, exam, coordinating care, treatment goals, record review, documenting clinical information, and/or data review.      Saúl Rice MD

## 2022-02-04 NOTE — PATIENT INSTRUCTIONS
Please schedule a follow-up appointment 2 weeks after the injection.  You can schedule this at the , or you can call [phone].    Please schedule L5-S1 interlaminar epidural steroid injection with me.  The [facility] Procedure Scheduling team will call you to schedule your procedure in the next 0-3 days.  You can also call them directly at [phone].        Gabapentin (Neurontin  ) Titration Schedule    Capsule or tablet size: 300 mg  This schedule should only be used with 300 mg capsules or tablets.  Double check your medication label from the pharmacy to confirm the dispensed dose.  Call your healthcare provider with questions.    INSTRUCTIONS:  - Follow the instructions in the table below for increasing the dose of the medication until you achieve a good response.  Do not increase the dose faster than prescribed.  - If your symptoms improve at a lower dose, continue taking that dose; increase to the next dose only if necessary.  - If side effects occur that you can not tolerate, go back down to the last dose that you tolerated.  Call your healthcare provider if you are having limiting side effects.  - Most common side effects include:  Swelling, nausea, dizziness, sleepiness.  - Gabapentin comes in many dosage sizes.  Once a stable dose has been found, your healthcare provider may provide a prescription for a larger dosage size.    SCHEDULE:    DATE DAY 1 DAY 2 DAY 3 DAY 4 DAY 5 DAY 6 DAY 7    Number of capsules or tablets Number of capsules or tablets Number of capsules or tablets Number of capsules or tablets Number of capsules or tablets Number of capsules or tablets Number of capsules or tablets   MORNING 0 0 0 1 1 1 1   MIDDAY 0 0 0 0 0 0 1   EVENING 1 1 1 1 1 1 1     DATE DAY 8 DAY 9 DAY 10 DAY 11 DAY 12 DAY 13 DAY 14    Number of capsules or tablets Number of capsules or tablets Number of capsules or tablets Number of capsules or tablets Number of capsules or tablets Number of  capsules or tablets Number of capsules or tablets   MORNING 1 1 1 1 1 2 2   MIDDAY 1 1 1 1 1 1 1   EVENING 1 1 2 2 2 2 2     DATE DAY 15 DAY 16 DAY 17 DAY 18 DAY 19 DAY 20 DAY 21    Number of capsules or tablets Number of capsules or tablets Number of capsules or tablets Number of capsules or tablets Number of capsules or tablets Number of capsules or tablets Number of capsules or tablets   MORNING 2 2 2 2 2 2 Maintain at 2   MIDDAY 1 2 2 2 2 2 Maintain at 2   EVENING 2 2 2 2 2 2 Maintain at 2

## 2022-02-04 NOTE — PROGRESS NOTES
PHYSICAL MEDICINE & REHABILITATION / MEDICAL SPINE        Date:  Feb 4, 2022    Name:  Machelle Weiner  YOB: 1975  MRN:  [id number]          CHART REVIEW:  Reviewed 09/07/2021 note from [facility] (neurosurgery).  Ms. Machelle Weiner was having thoracolumbar spine pain without any specific radiculopathy.  Pain was constant but had fluctuating intensities.  Pain was more dominant on the right side.  There was no proximal or distal radiation.  There was no inciting event.  Prolonged standing was the only aggravating factor.  There were no bowel or bladder changes.  Thoracic and lumbar spine x-rays were ordered.  Referral to medical spine was placed.        CHIEF COMPLAINT:  Low back pain.        HISTORY OF PRESENT ILLNESS:  Ms. Machelle Weiner is a 46-year-old, right-hand-dominant, female.  She works as a .  Ms. Machelle Weiner would like to be more active but feels limited currently by her low back pain.  Ms. Machelle Weiner denied any prior or recent injuries of her low back, pelvis, hips, thighs, knees, legs, ankles, feet, toes.      Ms. Machelle Weiner is aware of leg length discrepancy with the right lower extremity being longer than her left lower extremity.     Ms. Machelle Weiner stated that some of her maternal aunts and uncles have rheumatoid arthritis.  She denied any personal history of autoimmune diseases, rheumatologic diseases, gout, pseudogout.  Ms. Machelle Weiner denied any personal or family history of neurologic diseases.    Ms. Machelle Weiner was last seen in this clinic on 12/21/2021.  On 01/13/2022, Ms. Machelle Weiner had fluoroscopic guided medial branch blocks of the bilateral L3-L4 and L4-L5 facet joints.  Ms. Machelle Weiner noted that the right side of her low back felt much better but she had significant pain on the left side from lumbar muscle spasms.  This resolved after a few days, but then, Ms. Machelle JACOB  Husam noted pain radiating to her right lower extremity.  An MRI lumbar spine was ordered.  Ms. Machelle Weiner returns to clinic today, 02/04/2022.    Ms. Machelle Weiner has constant bilateral low back pain, right greater than left.  Pain is currently rated 7/10.  There is some intermittent but difficult to localize tingling in the right forefoot.  Occasionally, there is some right buttock pain.  Ms. Machelle Weiner states that she had good benefit from a previous cervical epidural steroid injection.  She is wondering if a lumbosacral epidural steroid injection would be beneficial for her.  Ms. Machelle Weiner also notes thoracic back pain.  Ms. Machelle Weiner does not want to see a chiropractor.  She is not opposed to physical therapy but does not want to go just yet.        ALLERGIES:  Allergies   Allergen Reactions     Codeine      Penicillins          MEDICATIONS:  Current Outpatient Medications   Medication Sig Dispense Refill     gabapentin (NEURONTIN) 300 MG capsule Take 2 capsules (600 mg) by mouth 3 times daily 360 capsule 0     albuterol (2.5 MG/3ML) 0.083% neb solution Take 1 vial (2.5 mg) by nebulization every 6 hours as needed for shortness of breath / dyspnea or wheezing 25 vial 0     albuterol (PROAIR HFA/PROVENTIL HFA/VENTOLIN HFA) 108 (90 Base) MCG/ACT inhaler INHALE 2 PUFFS BY MOUTH EVERY 6 HOURS AS NEEDED FOR SHORTNESS OF BREATH OR DIFFICULT BREATHING 36 g 1     clindamycin (CLINDAMAX) 1 % external gel Apply topically 2 times daily 30 g 2     cyclobenzaprine (FLEXERIL) 10 MG tablet Take 1 tablet (10 mg) by mouth 3 times daily as needed for muscle spasms 30 tablet 1     Esomeprazole Magnesium (NEXIUM PO) Take 20 mg by mouth       ibuprofen (ADVIL,MOTRIN) 600 MG tablet Take 1 tablet (600 mg) by mouth every 6 hours as needed for moderate pain 30 tablet 1     levothyroxine (SYNTHROID/LEVOTHROID) 75 MCG tablet TAKE 1 TABLET(75 MCG) BY MOUTH DAILY 90 tablet 1     losartan (COZAAR)  50 MG tablet TAKE 1 TABLET(50 MG) BY MOUTH DAILY 90 tablet 1         PAST MEDICAL HISTORY:  Past Medical History:   Diagnosis Date     Asthma, mild intermittent      Hypertension      Thyroid disease          PAST SURGICAL HISTORY:  Past Surgical History:   Procedure Laterality Date     GYN SURGERY      cryosurgery of cervix at age 16     LAPAROSCOPIC CHOLECYSTECTOMY WITH CHOLANGIOGRAMS N/A 10/14/2016    Procedure: LAPAROSCOPIC CHOLECYSTECTOMY WITH CHOLANGIOGRAMS;  Surgeon: Cornelius Mueller MD;  Location: [facility] OR         FAMILY HISTORY:  Family History   Problem Relation Age of Onset     Coronary Artery Disease Maternal Grandmother      Hyperlipidemia Maternal Grandmother      Hyperlipidemia Maternal Grandfather      Diabetes No family hx of      Hypertension No family hx of          SOCIAL HISTORY:  Social History     Socioeconomic History     Marital status:      Spouse name: Not on file     Number of children: Not on file     Years of education: Not on file     Highest education level: Not on file   Occupational History     Not on file   Tobacco Use     Smoking status: Former Smoker     Packs/day: 0.30     Types: Cigarettes     Quit date: 2015     Years since quittin.9     Smokeless tobacco: Never Used   Substance and Sexual Activity     Alcohol use: No     Alcohol/week: 0.0 standard drinks     Drug use: No     Sexual activity: Yes     Partners: Male     Birth control/protection: None   Other Topics Concern     Parent/sibling w/ CABG, MI or angioplasty before 65F 55M? Not Asked   Social History Narrative     Not on file     Social Determinants of Health     Financial Resource Strain: Not on file   Food Insecurity: Not on file   Transportation Needs: Not on file   Physical Activity: Not on file   Stress: Not on file   Social Connections: Not on file   Intimate Partner Violence: Not on file   Housing Stability: Not on file         PHYSICAL EXAMINATION:  Vitals:    22 1504   BP: (!) 143/100    Pulse: 74   SpO2: 97%       GENERAL:  No acute distress.  Pleasant and cooperative.   PSYCH:  Normal mood and affect.  HEAD:  Normocephalic.  SPEECH:  No dysarthria.  EYES:  No scleral icterus.  Wearing glasses.  EARS:  Hearing is intact to spoken voice.  NOSE/MOUTH:  Wearing a face mask.  LUNGS:  No respiratory distress.  No increased work of breathing.          IMAGING:  I reviewed leg length x-rays from 12/10/2021.  Measuring from the femoral head to the ankle mortise, right length was 768 millimeters, and left length was 763 mm.     I reviewed MRI of the lumbar spine from 01/28/2022.  At T12-L1, there is mild disc height loss, mild circumferential disc bulge, mild bilateral facet arthropathy.  At L2-L3, there is disc desiccation, mild bilateral facet arthropathy, fluid in the bilateral facet joints.  At L3-L4, there is mild to moderate bilateral facet arthropathy.  At L4-L5, there is mild to moderate bilateral facet arthropathy, small left lateral/foraminal disc protrusion.  At L5-S1, there is mild bilateral facet arthropathy.        ASSESSMENT/PLAN:  Ms. Machelle Weiner is a 46-year-old, right-hand-dominant, female.  She has low back pain that seems most consistent with lumbosacral facet arthropathy.  Ms. Machelle Weiner does have a small leg length discrepancy, with the right lower extremity being longer than her left lower extremity.  Given the discomfort that Ms. Machelle Weiner experienced with medial branch blocks, would not plan to repeat them.  Discussed diagnoses, pathophysiology, and treatment options with Ms. Machelle Weiner.  Discussed the options of doing nothing/living with it, physical therapy, chiropractic care, oral medications such as gabapentin and pregabalin, lumbosacral facet joint corticosteroid injections, lumbosacral epidural corticosteroid injection.  Ms. Machelle Weiner is being started on gabapentin 300 mg p.o. nightly with instructions to ramp up this dose to 600 mg  p.o. 3 times daily.  Ms. Machelle Weiner will also be scheduled for an L5-S1 interlaminar epidural corticosteroid injection.  Ms. Machelle Weiner is follow-up in this clinic 2 weeks after the injection.    Ms. Machelle Weiner also notes thoracic back pain.  She will schedule an appointment to be seen for her thoracic back pain at her convenience.        Total Time on encounter:  38 minutes were spent on one more or more of the following:  discussion with patient, history, exam, coordinating care, treatment goals, record review, documenting clinical information, and/or data review.      Saúl Rice MD

## 2022-02-11 DIAGNOSIS — E03.9 ACQUIRED HYPOTHYROIDISM: ICD-10-CM

## 2022-02-11 DIAGNOSIS — I10 ESSENTIAL HYPERTENSION, BENIGN: ICD-10-CM

## 2022-02-11 RX ORDER — LOSARTAN POTASSIUM 50 MG/1
TABLET ORAL
Qty: 30 TABLET | Refills: 0 | Status: SHIPPED | OUTPATIENT
Start: 2022-02-11 | End: 2022-03-22

## 2022-02-11 RX ORDER — LOSARTAN POTASSIUM 50 MG/1
TABLET ORAL
Qty: 90 TABLET | OUTPATIENT
Start: 2022-02-11

## 2022-02-11 RX ORDER — LEVOTHYROXINE SODIUM 75 UG/1
TABLET ORAL
Qty: 90 TABLET | OUTPATIENT
Start: 2022-02-11

## 2022-02-11 RX ORDER — LEVOTHYROXINE SODIUM 75 UG/1
TABLET ORAL
Qty: 30 TABLET | Refills: 0 | Status: SHIPPED | OUTPATIENT
Start: 2022-02-11 | End: 2022-03-22

## 2022-02-11 NOTE — TELEPHONE ENCOUNTER
"Received call from patient inquiring about medication refills. Per chart review, prescriptions were sent to pharmacy 2/11/22 for #30. Per provider message below, patient needs appointment for further refills. Patient states \"i've got a lot going on with my back and it's really hard to find time to have an appointment when I have everything else going on and appointments for that\". Offered e-visit or VV with provider, patient declines \"I'll just be back to having to schedule a visit with him at some point and I need lab work\". RN reiterated that provider could have VV with patient and lab work could be ordered, patient would then only have to be in for lab only appointment. Patient again declines. Scheduled for OV w/ PCP 2/17/22. No further questions or concerns at this time.   Todd CALABRESE RN    "

## 2022-02-11 NOTE — TELEPHONE ENCOUNTER
Needs appt. Already discussed previously on a request for refills (08/20/21). 30 days has been provided.

## 2022-02-11 NOTE — TELEPHONE ENCOUNTER
Message from Pharmacy:    : **Patient requests 90 days supply**    Elevated BP  Elevated TSH    Caro Santo RN

## 2022-02-11 NOTE — TELEPHONE ENCOUNTER
Routing refill request to provider for review/approval because:  Labs out of range:  Elevated TSH  Elevated BP    TSH   Date Value Ref Range Status   03/22/2021 5.90 (H) 0.40 - 4.00 mU/L Final     BP Readings from Last 3 Encounters:   02/04/22 (!) 143/100   01/13/22 120/79   12/31/21 (!) 154/93      Caro Santo RN

## 2022-03-03 ENCOUNTER — HOSPITAL ENCOUNTER (OUTPATIENT)
Dept: GENERAL RADIOLOGY | Facility: CLINIC | Age: 47
Discharge: HOME | End: 2022-03-03
Attending: PHYSICAL MEDICINE & REHABILITATION | Admitting: PHYSICAL MEDICINE & REHABILITATION
Payer: COMMERCIAL

## 2022-03-03 VITALS — DIASTOLIC BLOOD PRESSURE: 87 MMHG | SYSTOLIC BLOOD PRESSURE: 143 MMHG | HEART RATE: 74 BPM

## 2022-03-03 DIAGNOSIS — M51.379 DDD (DEGENERATIVE DISC DISEASE), LUMBOSACRAL: ICD-10-CM

## 2022-03-03 DIAGNOSIS — M47.817 FACET ARTHROPATHY, LUMBOSACRAL: ICD-10-CM

## 2022-03-03 PROCEDURE — 62323 NJX INTERLAMINAR LMBR/SAC: CPT

## 2022-03-03 PROCEDURE — 250N000011 HC RX IP 250 OP 636

## 2022-03-03 PROCEDURE — 62323 NJX INTERLAMINAR LMBR/SAC: CPT | Performed by: PHYSICAL MEDICINE & REHABILITATION

## 2022-03-03 PROCEDURE — 255N000002 HC RX 255 OP 636: Performed by: PHYSICAL MEDICINE & REHABILITATION

## 2022-03-03 PROCEDURE — 250N000009 HC RX 250

## 2022-03-03 RX ORDER — IOPAMIDOL 408 MG/ML
10 INJECTION, SOLUTION INTRATHECAL ONCE
Status: COMPLETED | OUTPATIENT
Start: 2022-03-03 | End: 2022-03-03

## 2022-03-03 RX ORDER — LIDOCAINE HYDROCHLORIDE 10 MG/ML
INJECTION, SOLUTION EPIDURAL; INFILTRATION; INTRACAUDAL; PERINEURAL
Status: COMPLETED
Start: 2022-03-03 | End: 2022-03-03

## 2022-03-03 RX ORDER — DEXAMETHASONE SODIUM PHOSPHATE 10 MG/ML
10 INJECTION, SOLUTION INTRAMUSCULAR; INTRAVENOUS ONCE
Status: COMPLETED | OUTPATIENT
Start: 2022-03-03 | End: 2022-03-03

## 2022-03-03 RX ORDER — DEXAMETHASONE SODIUM PHOSPHATE 10 MG/ML
INJECTION, SOLUTION INTRAMUSCULAR; INTRAVENOUS
Status: COMPLETED
Start: 2022-03-03 | End: 2022-03-03

## 2022-03-03 RX ORDER — LIDOCAINE HYDROCHLORIDE 10 MG/ML
5 INJECTION, SOLUTION EPIDURAL; INFILTRATION; INTRACAUDAL; PERINEURAL ONCE
Status: COMPLETED | OUTPATIENT
Start: 2022-03-03 | End: 2022-03-03

## 2022-03-03 RX ADMIN — LIDOCAINE HYDROCHLORIDE 2.5 ML: 10 INJECTION, SOLUTION EPIDURAL; INFILTRATION; INTRACAUDAL; PERINEURAL at 10:48

## 2022-03-03 RX ADMIN — DEXAMETHASONE SODIUM PHOSPHATE 10 MG: 10 INJECTION, SOLUTION INTRAMUSCULAR; INTRAVENOUS at 10:48

## 2022-03-03 RX ADMIN — IOPAMIDOL 1 ML: 408 INJECTION, SOLUTION INTRATHECAL at 10:48

## 2022-03-03 NOTE — PROGRESS NOTES
Pt was in Radiology today for a L5-S1 Interlaminar YAMILEX. Pt tolerated ortho procedure well. Pre procedure pain was 6/10 post procedure pain level 3/10. Procedure was completed by ROSALINE Rice. There were no complications during this procedure. Pt verbalized understanding of written and verbal instructions and left department in stable and satisfactory condition with . There is no evidence of bleeding or any other complications upon discharge.

## 2022-03-14 DIAGNOSIS — I10 ESSENTIAL HYPERTENSION, BENIGN: ICD-10-CM

## 2022-03-14 DIAGNOSIS — E03.9 ACQUIRED HYPOTHYROIDISM: ICD-10-CM

## 2022-03-16 NOTE — TELEPHONE ENCOUNTER
Routing refill request to provider for review/approval because:  BP elevated.    BP Readings from Last 3 Encounters:   03/03/22 (!) 143/87   02/04/22 (!) 143/100   01/13/22 120/79      Caro Santo RN

## 2022-03-22 ENCOUNTER — OFFICE VISIT (OUTPATIENT)
Dept: ORTHOPEDICS | Facility: CLINIC | Age: 47
End: 2022-03-22
Payer: COMMERCIAL

## 2022-03-22 ENCOUNTER — LAB (OUTPATIENT)
Dept: LAB | Facility: CLINIC | Age: 47
End: 2022-03-22
Payer: COMMERCIAL

## 2022-03-22 VITALS
BODY MASS INDEX: 43.54 KG/M2 | SYSTOLIC BLOOD PRESSURE: 132 MMHG | WEIGHT: 255 LBS | HEART RATE: 91 BPM | OXYGEN SATURATION: 97 % | DIASTOLIC BLOOD PRESSURE: 80 MMHG | HEIGHT: 64 IN

## 2022-03-22 DIAGNOSIS — M25.50 MULTIPLE JOINT PAIN: ICD-10-CM

## 2022-03-22 DIAGNOSIS — M47.817 FACET ARTHROPATHY, LUMBOSACRAL: ICD-10-CM

## 2022-03-22 DIAGNOSIS — M51.379 DDD (DEGENERATIVE DISC DISEASE), LUMBOSACRAL: ICD-10-CM

## 2022-03-22 DIAGNOSIS — M21.70 LEG LENGTH DISCREPANCY: ICD-10-CM

## 2022-03-22 DIAGNOSIS — M47.817 FACET ARTHROPATHY, LUMBOSACRAL: Primary | ICD-10-CM

## 2022-03-22 LAB
ALBUMIN SERPL-MCNC: 3.5 G/DL (ref 3.4–5)
ALP SERPL-CCNC: 104 U/L (ref 40–150)
ALT SERPL W P-5'-P-CCNC: 89 U/L (ref 0–50)
ANION GAP SERPL CALCULATED.3IONS-SCNC: 4 MMOL/L (ref 3–14)
AST SERPL W P-5'-P-CCNC: 71 U/L (ref 0–45)
BILIRUB SERPL-MCNC: 0.3 MG/DL (ref 0.2–1.3)
BUN SERPL-MCNC: 10 MG/DL (ref 7–30)
CALCIUM SERPL-MCNC: 9.3 MG/DL (ref 8.5–10.1)
CHLORIDE BLD-SCNC: 108 MMOL/L (ref 94–109)
CK SERPL-CCNC: 159 U/L (ref 30–225)
CO2 SERPL-SCNC: 25 MMOL/L (ref 20–32)
CREAT SERPL-MCNC: 1.02 MG/DL (ref 0.52–1.04)
CRP SERPL-MCNC: 8.7 MG/L (ref 0–8)
ERYTHROCYTE [SEDIMENTATION RATE] IN BLOOD BY WESTERGREN METHOD: 40 MM/HR (ref 0–20)
GFR SERPL CREATININE-BSD FRML MDRD: 69 ML/MIN/1.73M2
GLUCOSE BLD-MCNC: 86 MG/DL (ref 70–99)
MAGNESIUM SERPL-MCNC: 1.9 MG/DL (ref 1.6–2.3)
MYOGLOBIN SERPL-MCNC: 72 UG/L
PHOSPHATE SERPL-MCNC: 2.9 MG/DL (ref 2.5–4.5)
POTASSIUM BLD-SCNC: 4 MMOL/L (ref 3.4–5.3)
PROT SERPL-MCNC: 7.7 G/DL (ref 6.8–8.8)
SODIUM SERPL-SCNC: 137 MMOL/L (ref 133–144)
URATE SERPL-MCNC: 6.2 MG/DL (ref 2.6–6)

## 2022-03-22 PROCEDURE — 80053 COMPREHEN METABOLIC PANEL: CPT

## 2022-03-22 PROCEDURE — 99214 OFFICE O/P EST MOD 30 MIN: CPT | Performed by: PHYSICAL MEDICINE & REHABILITATION

## 2022-03-22 PROCEDURE — 84550 ASSAY OF BLOOD/URIC ACID: CPT

## 2022-03-22 PROCEDURE — 83735 ASSAY OF MAGNESIUM: CPT

## 2022-03-22 PROCEDURE — 86200 CCP ANTIBODY: CPT

## 2022-03-22 PROCEDURE — 83874 ASSAY OF MYOGLOBIN: CPT

## 2022-03-22 PROCEDURE — 36415 COLL VENOUS BLD VENIPUNCTURE: CPT

## 2022-03-22 PROCEDURE — 86431 RHEUMATOID FACTOR QUANT: CPT

## 2022-03-22 PROCEDURE — 82550 ASSAY OF CK (CPK): CPT

## 2022-03-22 PROCEDURE — 81374 HLA I TYPING 1 ANTIGEN LR: CPT

## 2022-03-22 PROCEDURE — 84100 ASSAY OF PHOSPHORUS: CPT

## 2022-03-22 PROCEDURE — 86140 C-REACTIVE PROTEIN: CPT

## 2022-03-22 PROCEDURE — 86225 DNA ANTIBODY NATIVE: CPT

## 2022-03-22 PROCEDURE — 86038 ANTINUCLEAR ANTIBODIES: CPT

## 2022-03-22 PROCEDURE — 85652 RBC SED RATE AUTOMATED: CPT

## 2022-03-22 PROCEDURE — 82306 VITAMIN D 25 HYDROXY: CPT

## 2022-03-22 RX ORDER — LOSARTAN POTASSIUM 50 MG/1
TABLET ORAL
Qty: 30 TABLET | Refills: 0 | Status: SHIPPED | OUTPATIENT
Start: 2022-03-22 | End: 2022-03-23

## 2022-03-22 RX ORDER — LEVOTHYROXINE SODIUM 75 UG/1
TABLET ORAL
Qty: 30 TABLET | Refills: 0 | Status: SHIPPED | OUTPATIENT
Start: 2022-03-22 | End: 2022-03-23

## 2022-03-22 ASSESSMENT — PAIN SCALES - GENERAL: PAINLEVEL: MODERATE PAIN (5)

## 2022-03-22 NOTE — PATIENT INSTRUCTIONS
Please schedule a follow-up appointment in 2 weeks.  You can schedule this at the , or you can call [phone].

## 2022-03-22 NOTE — PROGRESS NOTES
PHYSICAL MEDICINE & REHABILITATION / MEDICAL SPINE        Date:  Mar 22, 2022    Name:  Machelle Weiner  YOB: 1975  MRN:  [id number]          CHART REVIEW:  Reviewed 09/07/2021 note from [facility] (neurosurgery).  Ms. Machelle Weiner was having thoracolumbar spine pain without any specific radiculopathy.  Pain was constant but had fluctuating intensities.  Pain was more dominant on the right side.  There was no proximal or distal radiation.  There was no inciting event.  Prolonged standing was the only aggravating factor.  There were no bowel or bladder changes.  Thoracic and lumbar spine x-rays were ordered.  Referral to medical spine was placed.        CHIEF COMPLAINT:  Low back pain, right buttock pain.        HISTORY OF PRESENT ILLNESS:  Ms. Machelle Weiner is a 46-year-old, right-hand-dominant, female.  She works as a .  Ms. Machelle Weiner would like to be more active but feels limited currently by her low back pain.  Ms. Machelle Weiner denied any prior or recent injuries of her low back, pelvis, hips, thighs, knees, legs, ankles, feet, toes.      Ms. Machelle Weiner is aware of leg length discrepancy with the right lower extremity being longer than her left lower extremity.     Ms. Machelle Weiner was last seen in this clinic on 02/04/2022.  On 03/03/2022, Ms. Machelle Weiner had a fluoroscopic guided right paramedian L5-S1 interlaminar epidural corticosteroid injection.  Ms. Machelle Weiner returns to clinic today, 03/22/2022.    Ms. Machelle Weiner has noted some improvement in her low back pain.  However, she notes pain the pain in her upper lower back and throughout her thoracic back.  Ms. Machelle Weiner notes stabbing pains in her right buttocks.  Her pains worsen with standing and neck activity.    Ms. Machelle Weiner is taking gabapentin 600 mg p.o. 3 times daily with some benefit.  She will also use ibuprofen,  acetaminophen, naproxen.  She notes some benefit from these medications.  Pain is currently rated as 5/10.  Pain varies from 4/10 to 8/10.    Ms. Machelle Weiner has noted some intermittent numbness and tingling in her right third and fourth toes.  She denies any other numbness, tingling, pins-and-needles.    Ms. Machelle Weiner has a maternal aunt that has rheumatoid arthritis.  She suspects a maternal uncle might have rheumatoid arthritis as well, but Ms. Machelle Weiner is unsure.  Ms. Machelle Weiner denies any other personal or family history of autoimmune diseases, rheumatologic diseases, gout, pseudogout.  Ms. Machelle Weiner's maternal grandmother has Alzheimer's disease.  Ms. Machelle Weiner denies any other personal or family history of neurologic diseases.  Ms. Machelle Weiner denies any personal or family history of inflammatory bowel diseases.    Ms. 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  Ms. Machelle Weiner notes limited tolerance for any activity as this seems to severely worsen her pain.    Ms. Machelle Weiner denies shoulder pain, elbow pain, ankle pain.  Ms. Machelle Weiner notes neck, upper back, mid back, low back pain.  Ms. Machelle Weiner notes pain in her bilateral hips with the right hip more affected than her left hip.  Ms. Machelle Weiner notes pain in her bilateral knees and feet.  She notes pain in her bilateral wrists and hands.        ALLERGIES:  Allergies   Allergen Reactions     Codeine      Penicillins          MEDICATIONS:  Current Outpatient Medications   Medication Sig Dispense Refill     albuterol (2.5 MG/3ML) 0.083% neb solution Take 1 vial (2.5 mg) by nebulization every 6 hours as needed for shortness of breath / dyspnea or wheezing 25 vial 0     albuterol (PROAIR HFA/PROVENTIL HFA/VENTOLIN HFA) 108 (90 Base) MCG/ACT inhaler INHALE 2 PUFFS BY  MOUTH EVERY 6 HOURS AS NEEDED FOR SHORTNESS OF BREATH OR DIFFICULT BREATHING 36 g 1     clindamycin (CLINDAMAX) 1 % external gel Apply topically 2 times daily 30 g 2     cyclobenzaprine (FLEXERIL) 10 MG tablet Take 1 tablet (10 mg) by mouth 3 times daily as needed for muscle spasms 30 tablet 1     Esomeprazole Magnesium (NEXIUM PO) Take 20 mg by mouth       gabapentin (NEURONTIN) 300 MG capsule Take 2 capsules (600 mg) by mouth 3 times daily 360 capsule 0     ibuprofen (ADVIL,MOTRIN) 600 MG tablet Take 1 tablet (600 mg) by mouth every 6 hours as needed for moderate pain 30 tablet 1     levothyroxine (SYNTHROID/LEVOTHROID) 75 MCG tablet TAKE 1 TABLET(75 MCG) BY MOUTH DAILY 90 tablet 0     losartan (COZAAR) 50 MG tablet TAKE 1 TABLET(50 MG) BY MOUTH DAILY 90 tablet 0         PAST MEDICAL HISTORY:  Past Medical History:   Diagnosis Date     Asthma, mild intermittent      Hypertension      Thyroid disease          PAST SURGICAL HISTORY:  Past Surgical History:   Procedure Laterality Date     GYN SURGERY      cryosurgery of cervix at age 16     LAPAROSCOPIC CHOLECYSTECTOMY WITH CHOLANGIOGRAMS N/A 10/14/2016    Procedure: LAPAROSCOPIC CHOLECYSTECTOMY WITH CHOLANGIOGRAMS;  Surgeon: Cornelius Mueller MD;  Location: [facility] OR         FAMILY HISTORY:  Family History   Problem Relation Age of Onset     Coronary Artery Disease Maternal Grandmother      Hyperlipidemia Maternal Grandmother      Hyperlipidemia Maternal Grandfather      Diabetes No family hx of      Hypertension No family hx of          SOCIAL HISTORY:  Social History     Socioeconomic History     Marital status:      Spouse name: Not on file     Number of children: Not on file     Years of education: Not on file     Highest education level: Not on file   Occupational History     Not on file   Tobacco Use     Smoking status: Former Smoker     Packs/day: 0.30     Types: Cigarettes     Quit date: 2015     Years since quittin.1     Smokeless tobacco: Never  "Used   Substance and Sexual Activity     Alcohol use: No     Alcohol/week: 0.0 standard drinks     Drug use: No     Sexual activity: Yes     Partners: Male     Birth control/protection: None   Other Topics Concern     Parent/sibling w/ CABG, MI or angioplasty before 65F 55M? Not Asked   Social History Narrative     Not on file     Social Determinants of Health     Financial Resource Strain: Not on file   Food Insecurity: Not on file   Transportation Needs: Not on file   Physical Activity: Not on file   Stress: Not on file   Social Connections: Not on file   Intimate Partner Violence: Not on file   Housing Stability: Not on file         PHYSICAL EXAMINATION:  Vitals:    03/22/22 1425 03/22/22 1524   BP: (!) 150/95 132/80   Pulse: 91    SpO2: 97%    Weight: 115.7 kg (255 lb)    Height: 1.626 m (5' 4\")        GENERAL:  No acute distress.  Pleasant and cooperative.   PSYCH:  Normal mood and affect.  HEAD:  Normocephalic.  SPEECH:  No dysarthria.  EYES:  No scleral icterus.  Wearing glasses.  EARS:  Hearing is intact to spoken voice.  NOSE/MOUTH:  Wearing a face mask.  LUNGS:  No respiratory distress.  No increased work of breathing.    BALANCE AND GAIT: The patient has a reciprocal gait pattern with slight right antalgia.  She is able to heel walk, toe walk, tandem walk without difficulty.  Double leg squat is performed with a quadriceps dominant pattern and limited range of motion.    FIBROMYALGIA TENDER POINTS: 6 of 18 fibromyalgia tender points are positive.        IMAGING:  I reviewed leg length x-rays from 12/10/2021.  Measuring from the femoral head to the ankle mortise, right length was 768 millimeters, and left length was 763 mm.     I reviewed MRI of the lumbar spine from 01/28/2022.  At T12-L1, there is mild disc height loss, mild circumferential disc bulge, mild bilateral facet arthropathy.  At L2-L3, there is disc desiccation, mild bilateral facet arthropathy, fluid in the bilateral facet joints.  At L3-L4, " there is mild to moderate bilateral facet arthropathy.  At L4-L5, there is mild to moderate bilateral facet arthropathy, small left lateral/foraminal disc protrusion.  At L5-S1, there is mild bilateral facet arthropathy.        ASSESSMENT/PLAN:  Ms. Machelle Weiner is a 46-year-old, right-hand-dominant, female.  Medial branch blocks of the bilateral L3-L4 and L4-L5 facet joints did not provide any benefit.  Ms. Machelle Weiner had limited benefit from a right paramedian L5-S1 interlaminar epidural corticosteroid injection.  We have made little improvement in Ms. Machelle Rico pains.  At this point, question if there may be a systemic process going on.  We will begin a rheumatologic work-up with initial labs.  Ms. Machelle Rico pains could represent a chronic pain syndrome.  We could consider using a medication such as duloxetine.  May also need to consider referral to pain medicine for discussion of chronic pain rehabilitation programs or even a spinal cord stimulator.  Ms. Machelle Weiner is to follow-up in this clinic in 2 weeks.        Total Time on encounter:  33 minutes were spent on one more or more of the following:  discussion with patient, history, exam, coordinating care, treatment goals, record review, documenting clinical information, and/or data review.      Saúl Rice MD

## 2022-03-22 NOTE — TELEPHONE ENCOUNTER
PT scheduled BP and thyroid F/U w/ PCP on 4/19/22, she had to cancel her last appt because she was tested for COVID and was waiting on her results. Does need refill ASAP, she is currently seeing a back specialist and dealing w/ child issues but was advised that she CANNOT miss this appointment.  -Ivett Borrego

## 2022-03-22 NOTE — LETTER
3/22/2022         RE: Machelle Weiner  [address] [facility] [place] [address]        Dear Colleague,    Thank you for referring your patient, Machelle Weiner, to the [facility]. Please see a copy of my visit note below.    PHYSICAL MEDICINE & REHABILITATION / MEDICAL SPINE        Date:  Mar 22, 2022    Name:  Machelle Weiner  YOB: 1975  MRN:  [id number]          CHART REVIEW:  Reviewed 09/07/2021 note from [facility] (neurosurgery).  Ms. Machelle Weiner was having thoracolumbar spine pain without any specific radiculopathy.  Pain was constant but had fluctuating intensities.  Pain was more dominant on the right side.  There was no proximal or distal radiation.  There was no inciting event.  Prolonged standing was the only aggravating factor.  There were no bowel or bladder changes.  Thoracic and lumbar spine x-rays were ordered.  Referral to medical spine was placed.        CHIEF COMPLAINT:  Low back pain, right buttock pain.        HISTORY OF PRESENT ILLNESS:  Ms. Machelle Weiner is a 46-year-old, right-hand-dominant, female.  She works as a .  Ms. Machelle Weiner would like to be more active but feels limited currently by her low back pain.  Ms. Machelle Weiner denied any prior or recent injuries of her low back, pelvis, hips, thighs, knees, legs, ankles, feet, toes.      Ms. Machelle Weiner is aware of leg length discrepancy with the right lower extremity being longer than her left lower extremity.     Ms. Machelle Weiner was last seen in this clinic on 02/04/2022.  On 03/03/2022, Ms. Machelle Weiner had a fluoroscopic guided right paramedian L5-S1 interlaminar epidural corticosteroid injection.  Ms. Machelle Weiner returns to clinic today, 03/22/2022.    Ms. Machelle Weiner has noted some improvement in her low back pain.  However, she notes pain the pain in her upper lower back and throughout her  thoracic back.  Ms. aMchelle Weiner notes stabbing pains in her right buttocks.  Her pains worsen with standing and neck activity.    Ms. Machelle Weiner is taking gabapentin 600 mg p.o. 3 times daily with some benefit.  She will also use ibuprofen, acetaminophen, naproxen.  She notes some benefit from these medications.  Pain is currently rated as 5/10.  Pain varies from 4/10 to 8/10.    Ms. Machelle Weiner has noted some intermittent numbness and tingling in her right third and fourth toes.  She denies any other numbness, tingling, pins-and-needles.    Ms. Machelle Weiner has a maternal aunt that has rheumatoid arthritis.  She suspects a maternal uncle might have rheumatoid arthritis as well, but Ms. Machelle Weiner is unsure.  Ms. Machelle Weiner denies any other personal or family history of autoimmune diseases, rheumatologic diseases, gout, pseudogout.  Ms. Machelle Weiner's maternal grandmother has Alzheimer's disease.  Ms. Machelle Weiner denies any other personal or family history of neurologic diseases.  Ms. Machelle Weiner denies any personal or family history of inflammatory bowel diseases.    Ms. 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  Ms. Machelle Weiner notes limited tolerance for any activity as this seems to severely worsen her pain.    Ms. Machelle Weiner denies shoulder pain, elbow pain, ankle pain.  Ms. Machelle Weiner notes neck, upper back, mid back, low back pain.  Ms. Machelle Weiner notes pain in her bilateral hips with the right hip more affected than her left hip.  Ms. Machelle Weiner notes pain in her bilateral knees and feet.  She notes pain in her bilateral wrists and hands.        ALLERGIES:  Allergies   Allergen Reactions     Codeine      Penicillins          MEDICATIONS:  Current Outpatient Medications   Medication Sig Dispense Refill     albuterol  (2.5 MG/3ML) 0.083% neb solution Take 1 vial (2.5 mg) by nebulization every 6 hours as needed for shortness of breath / dyspnea or wheezing 25 vial 0     albuterol (PROAIR HFA/PROVENTIL HFA/VENTOLIN HFA) 108 (90 Base) MCG/ACT inhaler INHALE 2 PUFFS BY MOUTH EVERY 6 HOURS AS NEEDED FOR SHORTNESS OF BREATH OR DIFFICULT BREATHING 36 g 1     clindamycin (CLINDAMAX) 1 % external gel Apply topically 2 times daily 30 g 2     cyclobenzaprine (FLEXERIL) 10 MG tablet Take 1 tablet (10 mg) by mouth 3 times daily as needed for muscle spasms 30 tablet 1     Esomeprazole Magnesium (NEXIUM PO) Take 20 mg by mouth       gabapentin (NEURONTIN) 300 MG capsule Take 2 capsules (600 mg) by mouth 3 times daily 360 capsule 0     ibuprofen (ADVIL,MOTRIN) 600 MG tablet Take 1 tablet (600 mg) by mouth every 6 hours as needed for moderate pain 30 tablet 1     levothyroxine (SYNTHROID/LEVOTHROID) 75 MCG tablet TAKE 1 TABLET(75 MCG) BY MOUTH DAILY 90 tablet 0     losartan (COZAAR) 50 MG tablet TAKE 1 TABLET(50 MG) BY MOUTH DAILY 90 tablet 0         PAST MEDICAL HISTORY:  Past Medical History:   Diagnosis Date     Asthma, mild intermittent      Hypertension      Thyroid disease          PAST SURGICAL HISTORY:  Past Surgical History:   Procedure Laterality Date     GYN SURGERY      cryosurgery of cervix at age 16     LAPAROSCOPIC CHOLECYSTECTOMY WITH CHOLANGIOGRAMS N/A 10/14/2016    Procedure: LAPAROSCOPIC CHOLECYSTECTOMY WITH CHOLANGIOGRAMS;  Surgeon: Cornelius Mueller MD;  Location: [facility] OR         FAMILY HISTORY:  Family History   Problem Relation Age of Onset     Coronary Artery Disease Maternal Grandmother      Hyperlipidemia Maternal Grandmother      Hyperlipidemia Maternal Grandfather      Diabetes No family hx of      Hypertension No family hx of          SOCIAL HISTORY:  Social History     Socioeconomic History     Marital status:      Spouse name: Not on file     Number of children: Not on file     Years of education: Not on file  "    Highest education level: Not on file   Occupational History     Not on file   Tobacco Use     Smoking status: Former Smoker     Packs/day: 0.30     Types: Cigarettes     Quit date: 2015     Years since quittin.1     Smokeless tobacco: Never Used   Substance and Sexual Activity     Alcohol use: No     Alcohol/week: 0.0 standard drinks     Drug use: No     Sexual activity: Yes     Partners: Male     Birth control/protection: None   Other Topics Concern     Parent/sibling w/ CABG, MI or angioplasty before 65F 55M? Not Asked   Social History Narrative     Not on file     Social Determinants of Health     Financial Resource Strain: Not on file   Food Insecurity: Not on file   Transportation Needs: Not on file   Physical Activity: Not on file   Stress: Not on file   Social Connections: Not on file   Intimate Partner Violence: Not on file   Housing Stability: Not on file         PHYSICAL EXAMINATION:  Vitals:    22 1425 22 1524   BP: (!) 150/95 132/80   Pulse: 91    SpO2: 97%    Weight: 115.7 kg (255 lb)    Height: 1.626 m (5' 4\")        GENERAL:  No acute distress.  Pleasant and cooperative.   PSYCH:  Normal mood and affect.  HEAD:  Normocephalic.  SPEECH:  No dysarthria.  EYES:  No scleral icterus.  Wearing glasses.  EARS:  Hearing is intact to spoken voice.  NOSE/MOUTH:  Wearing a face mask.  LUNGS:  No respiratory distress.  No increased work of breathing.    BALANCE AND GAIT: The patient has a reciprocal gait pattern with slight right antalgia.  She is able to heel walk, toe walk, tandem walk without difficulty.  Double leg squat is performed with a quadriceps dominant pattern and limited range of motion.    FIBROMYALGIA TENDER POINTS: 6 of 18 fibromyalgia tender points are positive.        IMAGING:  I reviewed leg length x-rays from 12/10/2021.  Measuring from the femoral head to the ankle mortise, right length was 768 millimeters, and left length was 763 mm.     I reviewed MRI of the lumbar " spine from 01/28/2022.  At T12-L1, there is mild disc height loss, mild circumferential disc bulge, mild bilateral facet arthropathy.  At L2-L3, there is disc desiccation, mild bilateral facet arthropathy, fluid in the bilateral facet joints.  At L3-L4, there is mild to moderate bilateral facet arthropathy.  At L4-L5, there is mild to moderate bilateral facet arthropathy, small left lateral/foraminal disc protrusion.  At L5-S1, there is mild bilateral facet arthropathy.        ASSESSMENT/PLAN:  Ms. Machelle Weiner is a 46-year-old, right-hand-dominant, female.  Medial branch blocks of the bilateral L3-L4 and L4-L5 facet joints did not provide any benefit.  Ms. Machelle Weiner had limited benefit from a right paramedian L5-S1 interlaminar epidural corticosteroid injection.  We have made little improvement in Ms. Machelle Rico pains.  At this point, question if there may be a systemic process going on.  We will begin a rheumatologic work-up with initial labs.  Ms. Machelle Rico pains could represent a chronic pain syndrome.  We could consider using a medication such as duloxetine.  May also need to consider referral to pain medicine for discussion of chronic pain rehabilitation programs or even a spinal cord stimulator.  Ms. Machelle Weiner is to follow-up in this clinic in 2 weeks.        Total Time on encounter:  33 minutes were spent on one more or more of the following:  discussion with patient, history, exam, coordinating care, treatment goals, record review, documenting clinical information, and/or data review.      Saúl Rice MD

## 2022-03-23 DIAGNOSIS — E03.9 ACQUIRED HYPOTHYROIDISM: ICD-10-CM

## 2022-03-23 DIAGNOSIS — I10 ESSENTIAL HYPERTENSION, BENIGN: ICD-10-CM

## 2022-03-23 LAB
CCP AB SER IA-ACNC: 1.2 U/ML
DEPRECATED CALCIDIOL+CALCIFEROL SERPL-MC: 42 UG/L (ref 20–75)
DSDNA AB SER-ACNC: 4.4 IU/ML
RHEUMATOID FACT SER NEPH-ACNC: <6 IU/ML

## 2022-03-23 RX ORDER — LEVOTHYROXINE SODIUM 75 UG/1
TABLET ORAL
Qty: 90 TABLET | Refills: 0 | Status: SHIPPED | OUTPATIENT
Start: 2022-03-23 | End: 2022-04-21

## 2022-03-23 RX ORDER — LOSARTAN POTASSIUM 50 MG/1
TABLET ORAL
Qty: 90 TABLET | Refills: 0 | Status: SHIPPED | OUTPATIENT
Start: 2022-03-23 | End: 2022-04-19

## 2022-03-23 NOTE — TELEPHONE ENCOUNTER
Routing refill request to provider for review/approval because:  Labs out of range:    TSH   Date Value Ref Range Status   03/22/2021 5.90 (H) 0.40 - 4.00 mU/L Final      Requesting 90 day refill.    Next 5 appointments (look out 90 days)      Apr 19, 2022  2:30 PM  (Arrive by 2:10 PM)  Provider Visit with Jean Sauceda PA-C  [facility] ([facility] [place] ) [address] [facility] [address] [phone]          Cathy العلي RN

## 2022-03-23 NOTE — TELEPHONE ENCOUNTER
Prescription losartan approved per [facility] Refill Protocol.    Next 5 appointments (look out 90 days)    Apr 19, 2022  2:30 PM  (Arrive by 2:10 PM)  Provider Visit with Jean Sauceda PA-C  [facility] ([facility] [place] ) [address] [facility] [address] [phone]        Cathy العلي RN

## 2022-03-24 LAB
ANA PAT SER IF-IMP: ABNORMAL
ANA SER QL IF: POSITIVE
ANA TITR SER IF: ABNORMAL {TITER}

## 2022-03-25 LAB
B LOCUS: NORMAL
B27TEST METHOD: NORMAL

## 2022-04-08 DIAGNOSIS — M51.379 DDD (DEGENERATIVE DISC DISEASE), LUMBOSACRAL: ICD-10-CM

## 2022-04-08 DIAGNOSIS — M47.817 FACET ARTHROPATHY, LUMBOSACRAL: ICD-10-CM

## 2022-04-08 RX ORDER — GABAPENTIN 300 MG/1
600 CAPSULE ORAL 3 TIMES DAILY
Qty: 360 CAPSULE | Refills: 0 | Status: SHIPPED | OUTPATIENT
Start: 2022-04-08 | End: 2022-06-11

## 2022-04-08 NOTE — PROGRESS NOTES
For Ms. Carty CECIL Weiner, signed prescription for gabapentin 600 mg p.o. 3 times daily, dispense 60-day supply.      Saúl Rice MD

## 2022-04-12 ENCOUNTER — OFFICE VISIT (OUTPATIENT)
Dept: ORTHOPEDICS | Facility: CLINIC | Age: 47
End: 2022-04-12
Payer: COMMERCIAL

## 2022-04-12 ENCOUNTER — LAB (OUTPATIENT)
Dept: LAB | Facility: CLINIC | Age: 47
End: 2022-04-12
Payer: COMMERCIAL

## 2022-04-12 VITALS
SYSTOLIC BLOOD PRESSURE: 157 MMHG | WEIGHT: 255 LBS | HEART RATE: 73 BPM | BODY MASS INDEX: 43.54 KG/M2 | OXYGEN SATURATION: 97 % | DIASTOLIC BLOOD PRESSURE: 98 MMHG | HEIGHT: 64 IN

## 2022-04-12 DIAGNOSIS — M21.70 LEG LENGTH DISCREPANCY: ICD-10-CM

## 2022-04-12 DIAGNOSIS — M25.50 MULTIPLE JOINT PAIN: ICD-10-CM

## 2022-04-12 DIAGNOSIS — M51.379 DDD (DEGENERATIVE DISC DISEASE), LUMBOSACRAL: ICD-10-CM

## 2022-04-12 DIAGNOSIS — R76.8 POSITIVE ANA (ANTINUCLEAR ANTIBODY): ICD-10-CM

## 2022-04-12 DIAGNOSIS — R76.8 POSITIVE ANA (ANTINUCLEAR ANTIBODY): Primary | ICD-10-CM

## 2022-04-12 DIAGNOSIS — M47.817 FACET ARTHROPATHY, LUMBOSACRAL: ICD-10-CM

## 2022-04-12 PROCEDURE — 86235 NUCLEAR ANTIGEN ANTIBODY: CPT

## 2022-04-12 PROCEDURE — 36415 COLL VENOUS BLD VENIPUNCTURE: CPT

## 2022-04-12 PROCEDURE — 99214 OFFICE O/P EST MOD 30 MIN: CPT | Performed by: PHYSICAL MEDICINE & REHABILITATION

## 2022-04-12 ASSESSMENT — PAIN SCALES - GENERAL: PAINLEVEL: SEVERE PAIN (6)
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
## 2022-04-12 NOTE — PATIENT INSTRUCTIONS
Please go to the lab for a blood draw today.    Please schedule a follow-up appointment after your appointment with Rheumatology.  You can schedule this at the , or you can call [phone].    Please schedule an appointment with Rheumatology.
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
## 2022-04-14 LAB
ENA SM IGG SER IA-ACNC: 2.2 U/ML
ENA SM IGG SER IA-ACNC: NEGATIVE
ENA SS-A AB SER IA-ACNC: <0.5 U/ML
ENA SS-A AB SER IA-ACNC: NEGATIVE
ENA SS-B IGG SER IA-ACNC: <0.6 U/ML
ENA SS-B IGG SER IA-ACNC: NEGATIVE
U1 SNRNP IGG SER IA-ACNC: <1.1 U/ML
U1 SNRNP IGG SER IA-ACNC: NEGATIVE

## 2022-04-19 ENCOUNTER — OFFICE VISIT (OUTPATIENT)
Dept: FAMILY MEDICINE | Facility: CLINIC | Age: 47
End: 2022-04-19
Payer: COMMERCIAL

## 2022-04-19 VITALS
TEMPERATURE: 98.6 F | BODY MASS INDEX: 46.1 KG/M2 | WEIGHT: 270 LBS | HEART RATE: 97 BPM | RESPIRATION RATE: 14 BRPM | DIASTOLIC BLOOD PRESSURE: 72 MMHG | OXYGEN SATURATION: 98 % | HEIGHT: 64 IN | SYSTOLIC BLOOD PRESSURE: 134 MMHG

## 2022-04-19 DIAGNOSIS — Z12.11 SCREEN FOR COLON CANCER: ICD-10-CM

## 2022-04-19 DIAGNOSIS — I10 ESSENTIAL HYPERTENSION, BENIGN: ICD-10-CM

## 2022-04-19 DIAGNOSIS — Z87.42 HISTORY OF ABNORMAL CERVICAL PAP SMEAR: ICD-10-CM

## 2022-04-19 DIAGNOSIS — J45.20 MILD INTERMITTENT ASTHMA WITHOUT COMPLICATION: ICD-10-CM

## 2022-04-19 DIAGNOSIS — R06.83 SNORING: ICD-10-CM

## 2022-04-19 DIAGNOSIS — E03.9 ACQUIRED HYPOTHYROIDISM: ICD-10-CM

## 2022-04-19 DIAGNOSIS — M54.12 CERVICAL RADICULOPATHY: ICD-10-CM

## 2022-04-19 DIAGNOSIS — Z12.31 VISIT FOR SCREENING MAMMOGRAM: ICD-10-CM

## 2022-04-19 DIAGNOSIS — F43.23 ADJUSTMENT DISORDER WITH MIXED ANXIETY AND DEPRESSED MOOD: Primary | ICD-10-CM

## 2022-04-19 DIAGNOSIS — R79.0 LOW FERRITIN: ICD-10-CM

## 2022-04-19 DIAGNOSIS — E66.01 MORBID OBESITY (H): ICD-10-CM

## 2022-04-19 LAB
BASOPHILS # BLD AUTO: 0.1 10E3/UL (ref 0–0.2)
BASOPHILS NFR BLD AUTO: 1 %
EOSINOPHIL # BLD AUTO: 0.5 10E3/UL (ref 0–0.7)
EOSINOPHIL NFR BLD AUTO: 4 %
ERYTHROCYTE [DISTWIDTH] IN BLOOD BY AUTOMATED COUNT: 14.8 % (ref 10–15)
HCT VFR BLD AUTO: 40.6 % (ref 35–47)
HGB BLD-MCNC: 12.6 G/DL (ref 11.7–15.7)
LYMPHOCYTES # BLD AUTO: 5 10E3/UL (ref 0.8–5.3)
LYMPHOCYTES NFR BLD AUTO: 37 %
MCH RBC QN AUTO: 28.6 PG (ref 26.5–33)
MCHC RBC AUTO-ENTMCNC: 31 G/DL (ref 31.5–36.5)
MCV RBC AUTO: 92 FL (ref 78–100)
MONOCYTES # BLD AUTO: 1.1 10E3/UL (ref 0–1.3)
MONOCYTES NFR BLD AUTO: 8 %
NEUTROPHILS # BLD AUTO: 6.6 10E3/UL (ref 1.6–8.3)
NEUTROPHILS NFR BLD AUTO: 50 %
PLATELET # BLD AUTO: 316 10E3/UL (ref 150–450)
RBC # BLD AUTO: 4.4 10E6/UL (ref 3.8–5.2)
WBC # BLD AUTO: 13.3 10E3/UL (ref 4–11)

## 2022-04-19 PROCEDURE — 83550 IRON BINDING TEST: CPT | Performed by: PHYSICIAN ASSISTANT

## 2022-04-19 PROCEDURE — 96127 BRIEF EMOTIONAL/BEHAV ASSMT: CPT | Performed by: PHYSICIAN ASSISTANT

## 2022-04-19 PROCEDURE — 82728 ASSAY OF FERRITIN: CPT | Performed by: PHYSICIAN ASSISTANT

## 2022-04-19 PROCEDURE — 85025 COMPLETE CBC W/AUTO DIFF WBC: CPT | Performed by: PHYSICIAN ASSISTANT

## 2022-04-19 PROCEDURE — 36415 COLL VENOUS BLD VENIPUNCTURE: CPT | Performed by: PHYSICIAN ASSISTANT

## 2022-04-19 PROCEDURE — 99214 OFFICE O/P EST MOD 30 MIN: CPT | Performed by: PHYSICIAN ASSISTANT

## 2022-04-19 PROCEDURE — 84443 ASSAY THYROID STIM HORMONE: CPT | Performed by: PHYSICIAN ASSISTANT

## 2022-04-19 RX ORDER — ALBUTEROL SULFATE 0.83 MG/ML
2.5 SOLUTION RESPIRATORY (INHALATION) EVERY 6 HOURS PRN
Qty: 90 ML | Refills: 0 | Status: SHIPPED | OUTPATIENT
Start: 2022-04-19

## 2022-04-19 RX ORDER — DULOXETIN HYDROCHLORIDE 30 MG/1
30 CAPSULE, DELAYED RELEASE ORAL 2 TIMES DAILY
Qty: 180 CAPSULE | Refills: 0 | Status: SHIPPED | OUTPATIENT
Start: 2022-04-19 | End: 2022-12-17

## 2022-04-19 RX ORDER — LOSARTAN POTASSIUM 50 MG/1
50 TABLET ORAL DAILY
Qty: 90 TABLET | Refills: 1 | Status: SHIPPED | OUTPATIENT
Start: 2022-04-19 | End: 2023-01-27

## 2022-04-19 RX ORDER — CYCLOBENZAPRINE HCL 10 MG
10 TABLET ORAL 3 TIMES DAILY PRN
Qty: 30 TABLET | Refills: 1 | Status: SHIPPED | OUTPATIENT
Start: 2022-04-19 | End: 2022-06-07 | Stop reason: DRUGHIGH

## 2022-04-19 RX ORDER — ALBUTEROL SULFATE 90 UG/1
AEROSOL, METERED RESPIRATORY (INHALATION)
Qty: 36 G | Refills: 1 | Status: SHIPPED | OUTPATIENT
Start: 2022-04-19

## 2022-04-19 ASSESSMENT — PAIN SCALES - GENERAL: PAINLEVEL: MODERATE PAIN (4)

## 2022-04-19 ASSESSMENT — ANXIETY QUESTIONNAIRES
3. WORRYING TOO MUCH ABOUT DIFFERENT THINGS: NEARLY EVERY DAY
6. BECOMING EASILY ANNOYED OR IRRITABLE: SEVERAL DAYS
GAD7 TOTAL SCORE: 14
7. FEELING AFRAID AS IF SOMETHING AWFUL MIGHT HAPPEN: MORE THAN HALF THE DAYS
GAD7 TOTAL SCORE: 14
5. BEING SO RESTLESS THAT IT IS HARD TO SIT STILL: SEVERAL DAYS
1. FEELING NERVOUS, ANXIOUS, OR ON EDGE: NEARLY EVERY DAY
7. FEELING AFRAID AS IF SOMETHING AWFUL MIGHT HAPPEN: MORE THAN HALF THE DAYS
2. NOT BEING ABLE TO STOP OR CONTROL WORRYING: NEARLY EVERY DAY
4. TROUBLE RELAXING: SEVERAL DAYS
GAD7 TOTAL SCORE: 14

## 2022-04-19 ASSESSMENT — PATIENT HEALTH QUESTIONNAIRE - PHQ9
SUM OF ALL RESPONSES TO PHQ QUESTIONS 1-9: 17
10. IF YOU CHECKED OFF ANY PROBLEMS, HOW DIFFICULT HAVE THESE PROBLEMS MADE IT FOR YOU TO DO YOUR WORK, TAKE CARE OF THINGS AT HOME, OR GET ALONG WITH OTHER PEOPLE: VERY DIFFICULT
SUM OF ALL RESPONSES TO PHQ QUESTIONS 1-9: 17

## 2022-04-19 NOTE — PROGRESS NOTES
Assessment & Plan     Adjustment disorder with mixed anxiety and depressed mood  Cervical radiculopathy  There are a lot of changes going on in her life; does have hx of anxiety but now depression adding; likely pain contributing; recommend consideration of therapy but will also try below to treat a number of concerns.   - DULoxetine (CYMBALTA) 30 MG capsule; Take 1 capsule (30 mg) by mouth 2 times daily  - cyclobenzaprine (FLEXERIL) 10 MG tablet; Take 1 tablet (10 mg) by mouth 3 times daily as needed for muscle spasms    Acquired hypothyroidism  Need to update labs to determine if contributory to current symptoms     Essential hypertension, benign  Controlled. Continue present management.   - TSH with free T4 reflex; Future  - losartan (COZAAR) 50 MG tablet; Take 1 tablet (50 mg) by mouth daily  - TSH with free T4 reflex    Morbid obesity (H)  Limited ability to be active 2/2 pain; stress eating; affecting risk for KARLIE, asthma, pain    Mild intermittent asthma without complication  Breathing more difficult with weight gain  - albuterol (PROVENTIL) (2.5 MG/3ML) 0.083% neb solution; Take 1 vial (2.5 mg) by nebulization every 6 hours as needed for shortness of breath / dyspnea or wheezing  - albuterol (PROAIR HFA/PROVENTIL HFA/VENTOLIN HFA) 108 (90 Base) MCG/ACT inhaler; INHALE 2 PUFFS BY MOUTH EVERY 6 HOURS AS NEEDED FOR SHORTNESS OF BREATH OR DIFFICULT BREATHING    Low ferritin  Need to update labs. Only using iron a few times weekly.   - CBC with Platelets & Differential; Future  - Ferritin; Future  - Iron & Iron Binding Capacity; Future  - CBC with Platelets & Differential  - Ferritin  - Iron & Iron Binding Capacity    Snoring  Strongly recommend KARLIE screen    History of abnormal cervical Pap smear  DUE especially with hx of abnormal. We have discssed this previously as well  - Ob/Gyn Referral; Future    Screen for colon cancer  DUE  - Adult Gastro Ref - Procedure Only; Future    Visit for screening  "mammogram  DUE  - MA SCREENING DIGITAL BILAT - Future  (s+30); Future       BMI:   Estimated body mass index is 46.35 kg/m  as calculated from the following:    Height as of this encounter: 1.626 m (5' 4\").    Weight as of this encounter: 122.5 kg (270 lb).       Depression Screening Follow Up    PHQ 4/19/2022   PHQ-9 Total Score 17   Q9: Thoughts of better off dead/self-harm past 2 weeks Not at all       Return in about 6 months (around 10/19/2022) for Physical Exam, Lab Work.    Jean Sauceda PA-C  [facility] GUILLERMO Carty is a 47 year old who presents for the following health issues     History of Present Illness     Asthma:  She presents for follow up of asthma.  She has some cough, some wheezing, and some shortness of breath. She is using a relief medication a few times a month. She does not have a controller medication. Patient is aware of the following triggers: emotions, exercise or sports, humidity, pollens, smoke and strong odors and fumes. The patient has not had a visit to the Emergency Room, Urgent Care or Hospital due to asthma since the last clinic visit.     Back Pain:  She presents for follow up of back pain. Patient's back pain is a chronic problem.  Location of back pain:  Right lower back, left lower back, right middle of back, left middle of back, right upper back, left upper back, left side of neck, right buttock, right hip and right side of waist  Description of back pain: burning, dull ache, sharp, shooting and stabbing  Back pain spreads: right buttocks, right thigh, right knee, right foot, left shoulder and left side of neck    Since patient first noticed back pain, pain is: gradually worsening  Does back pain interfere with her job:  Yes      Mental Health Follow-up:  Patient presents to follow-up on Depression & Anxiety.Patient's depression since last visit has been:  Worse  The patient is having other symptoms associated with " "depression.  Patient's anxiety since last visit has been:  Worse  The patient is having other symptoms associated with anxiety.  Any significant life events: relationship concerns, health concerns and other  Patient is feeling anxious or having panic attacks.  Patient has no concerns about alcohol or drug use.       Today's PHQ-9         PHQ-9 Total Score: 17  PHQ-9 Q9 Thoughts of better off dead/self-harm past 2 weeks :   (P) Not at all    How difficult have these problems made it for you to do your work, take care of things at home, or get along with other people: Very difficult    Today's TIMMY-7 Score: 14    Hypertension: She presents for follow up of hypertension.  She does check blood pressure  regularly outside of the clinic. Outside blood pressures have been over 140/90. She does not follow a low salt diet.     Hypothyroidism:     Since last visit, patient describes the following symptoms::  Anxiety, Depression, Dry skin, Fatigue, Hair loss, Loose stools and Weight gain    Weight gain::  >20 lbs.    Migraines:   Since the patient's last clinic visit, headaches are: no change  The patient is getting headaches:  Most days  She is not able to do normal daily activities when she has a migraine.  The patient is taking the following rescue/relief medications:  Ibuprofen (Advil, Motrin), Naproxyn (Aleve) and Tylenol   Patient states \"The relief is inconsistent\" from the rescue/relief medications.   The patient is taking the following medications to prevent migraines:  No medications to prevent migraines  In the past 4 weeks, the patient has gone to an Urgent Care or Emergency Room 0 times times due to headaches.    Reason for visit:  Please discuss  Symptom onset:  More than a month    She eats 4 or more servings of fruits and vegetables daily.She consumes 0 sweetened beverage(s) daily.She exercises with enough effort to increase her heart rate 9 or less minutes per day.  She exercises with enough effort to increase " "her heart rate 3 or less days per week.   She is taking medications regularly.       Machelle Weiner is a 47 year old female who presents today for medication check  She mentions dealing with her son as well on ongoing ortho concerns have added a lot of outsized stress in comparison to prior years  Pain from ortho concerns adding to stress and snacking  Everything seems to be happening all at once  Feeling increased anxiousness/depressive thoughts  Historically she has been treated for anxiety; son with depression and mother as well  Hydroxyzine has not been effective but this was prescribed during a time of lesser stress  Also mentioning some hair loss, looser stools, weight gain -- wondering about thyroid as well; does need updates  Weight gain is far more than previous  Breathing affected  Does snore more; spouse mentions some likely apneic events      Review of Systems   Constitutional, HEENT, cardiovascular, pulmonary, gi and gu systems are negative, except as otherwise noted.      Objective    /72 (BP Location: Right arm, Patient Position: Sitting, Cuff Size: Adult Large)   Pulse 97   Temp 98.6  F (37  C) (Tympanic)   Resp 14   Ht 1.626 m (5' 4\")   Wt 122.5 kg (270 lb)   LMP 03/29/2022 (Approximate)   SpO2 98%   BMI 46.35 kg/m    Body mass index is 46.35 kg/m .  Physical Exam   GENERAL: alert and no distress  NECK: no adenopathy and thyroid normal to palpation  RESP: lungs clear to auscultation - no rales, rhonchi or wheezes  CV: regular rates and rhythm, normal S1 S2, no S3 or S4 and no murmur, click or rub  MS: No peripheral edema; limited ortho exam  SKIN: there is no overt hair loss patches with negative hair pull  PSYCH: mentation appears normal, affect normal/bright    LABS: reviewed labs/imaging          "

## 2022-04-20 LAB
FERRITIN SERPL-MCNC: 18 NG/ML (ref 8–252)
IRON SATN MFR SERPL: 11 % (ref 15–46)
IRON SERPL-MCNC: 45 UG/DL (ref 35–180)
TIBC SERPL-MCNC: 392 UG/DL (ref 240–430)
TSH SERPL DL<=0.005 MIU/L-ACNC: 3.6 MU/L (ref 0.4–4)

## 2022-04-20 ASSESSMENT — PATIENT HEALTH QUESTIONNAIRE - PHQ9: SUM OF ALL RESPONSES TO PHQ QUESTIONS 1-9: 17

## 2022-04-20 ASSESSMENT — ANXIETY QUESTIONNAIRES: GAD7 TOTAL SCORE: 14

## 2022-04-21 DIAGNOSIS — E03.9 ACQUIRED HYPOTHYROIDISM: ICD-10-CM

## 2022-04-21 RX ORDER — LEVOTHYROXINE SODIUM 75 UG/1
TABLET ORAL
Qty: 90 TABLET | Refills: 3 | Status: SHIPPED | OUTPATIENT
Start: 2022-04-21 | End: 2023-06-28

## 2022-05-10 ENCOUNTER — HOSPITAL ENCOUNTER (OUTPATIENT)
Dept: MAMMOGRAPHY | Facility: CLINIC | Age: 47
Discharge: HOME | End: 2022-05-10
Attending: PHYSICIAN ASSISTANT | Admitting: PHYSICIAN ASSISTANT
Payer: COMMERCIAL

## 2022-05-10 DIAGNOSIS — Z12.31 VISIT FOR SCREENING MAMMOGRAM: ICD-10-CM

## 2022-05-10 PROCEDURE — 77067 SCR MAMMO BI INCL CAD: CPT

## 2022-06-07 ENCOUNTER — VIRTUAL VISIT (OUTPATIENT)
Dept: RHEUMATOLOGY | Facility: CLINIC | Age: 47
End: 2022-06-07
Attending: PHYSICAL MEDICINE & REHABILITATION
Payer: COMMERCIAL

## 2022-06-07 DIAGNOSIS — R79.82 CRP ELEVATED: ICD-10-CM

## 2022-06-07 DIAGNOSIS — G89.29 CHRONIC THORACIC BACK PAIN, UNSPECIFIED BACK PAIN LATERALITY: ICD-10-CM

## 2022-06-07 DIAGNOSIS — M70.61 GREATER TROCHANTERIC BURSITIS, RIGHT: ICD-10-CM

## 2022-06-07 DIAGNOSIS — M25.562 CHRONIC PAIN OF BOTH KNEES: ICD-10-CM

## 2022-06-07 DIAGNOSIS — M79.18 BILATERAL BUTTOCK PAIN: ICD-10-CM

## 2022-06-07 DIAGNOSIS — M54.6 CHRONIC THORACIC BACK PAIN, UNSPECIFIED BACK PAIN LATERALITY: ICD-10-CM

## 2022-06-07 DIAGNOSIS — M25.50 MULTIPLE JOINT PAIN: Primary | ICD-10-CM

## 2022-06-07 DIAGNOSIS — M25.561 CHRONIC PAIN OF BOTH KNEES: ICD-10-CM

## 2022-06-07 DIAGNOSIS — R74.01 TRANSAMINITIS: ICD-10-CM

## 2022-06-07 DIAGNOSIS — R76.8 POSITIVE ANA (ANTINUCLEAR ANTIBODY): ICD-10-CM

## 2022-06-07 DIAGNOSIS — M54.50 LUMBAR BACK PAIN: ICD-10-CM

## 2022-06-07 DIAGNOSIS — G89.29 CHRONIC PAIN OF BOTH KNEES: ICD-10-CM

## 2022-06-07 PROCEDURE — 99205 OFFICE O/P NEW HI 60 MIN: CPT | Mod: GT | Performed by: INTERNAL MEDICINE

## 2022-06-07 RX ORDER — CYCLOBENZAPRINE HCL 10 MG
TABLET ORAL
Qty: 30 TABLET | Refills: 2 | Status: SHIPPED | OUTPATIENT
Start: 2022-06-07 | End: 2023-11-07

## 2022-06-07 NOTE — PROGRESS NOTES
Machelle Weiner who presents today with a chief complaint of  Consult      Joint Pains: Yes  Location: low>thoracici back mainly, bilater hand, knees, hips.   Onset: Chronic  Intensity: can be 4-10 /10  AM Stiffness: Minutes  Alleviating/Aggravating Factors: Medications helpful?  Tolerating Meds: Yes  Other: Left elbow back and knees are most severe. R side worse. Bursitis. Knees giving out x2 years. R leg is longer      ROS:  Patient denies having: +persistent dry eyes (2017- present), +dry mouth (>5 years), recurrent oral ulcers, +patchy alopecia (some hair loss), active rashes, photosensitivity, ?history of psoriasis(unclears scaling on fingers, self questions), active chest pain, +active shortness of breath (chronic), +active cough(chronic with asthma), active dysuria, history of kidney stones, +active abdominal pain(years), +active diarrhea(2 years), history of hematochezia, +active dysphagia (years), +history of peptic ulcer disease (12 years old, high risk for recurring), history of HIV, tuberculosis, hepatitis B or C, Lyme disease, seizure history, raynaud's, active documented fevers, recent infections, +difficulty sleeping or chronic unrefreshing sleep (years, deviated septum), involuntary weight loss, loss of appetite, +excessive fatigue (5-6 years), +depression (2 years), +anxiety (3-4 years, panic attacks),  recurrent sinus infections, history of inflammatory eye diseases (such as uveitis, scleritis, iritis, etc).         Information gathered by medical assistant incorporated into this note, was reviewed and discussed with the patient.    Problem List:  Patient Active Problem List   Diagnosis     Asthma, mild intermittent     Hyperlipidemia LDL goal <160     Essential hypertension, benign     Acquired hypothyroidism     S/P laparoscopic cholecystectomy     Morbid obesity (H)     Migraine     Facet arthropathy, lumbosacral     DDD (degenerative disc disease), lumbosacral     Leg length discrepancy      Positive LYUDMILA (antinuclear antibody)     Multiple joint pain        PMH:   Past Medical History:   Diagnosis Date     Asthma, mild intermittent      Hypertension      Thyroid disease        Surgical History:  Past Surgical History:   Procedure Laterality Date     GYN SURGERY      cryosurgery of cervix at age 16     LAPAROSCOPIC CHOLECYSTECTOMY WITH CHOLANGIOGRAMS N/A 10/14/2016    Procedure: LAPAROSCOPIC CHOLECYSTECTOMY WITH CHOLANGIOGRAMS;  Surgeon: Cornelius Mueller MD;  Location:  OR       Family History:  Family History   Problem Relation Age of Onset     Coronary Artery Disease Maternal Grandmother      Hyperlipidemia Maternal Grandmother      Hyperlipidemia Maternal Grandfather      Diabetes No family hx of      Hypertension No family hx of        Allergies:  Allergies   Allergen Reactions     Codeine      Penicillins         Current Medications:  Current Outpatient Medications   Medication Sig Dispense Refill     albuterol (PROAIR HFA/PROVENTIL HFA/VENTOLIN HFA) 108 (90 Base) MCG/ACT inhaler INHALE 2 PUFFS BY MOUTH EVERY 6 HOURS AS NEEDED FOR SHORTNESS OF BREATH OR DIFFICULT BREATHING 36 g 1     albuterol (PROVENTIL) (2.5 MG/3ML) 0.083% neb solution Take 1 vial (2.5 mg) by nebulization every 6 hours as needed for shortness of breath / dyspnea or wheezing 90 mL 0     clindamycin (CLINDAMAX) 1 % external gel Apply topically 2 times daily 30 g 2     cyclobenzaprine (FLEXERIL) 10 MG tablet Take 1 tablet (10 mg) by mouth 3 times daily as needed for muscle spasms 30 tablet 1     DULoxetine (CYMBALTA) 30 MG capsule Take 1 capsule (30 mg) by mouth 2 times daily 180 capsule 0     Esomeprazole Magnesium (NEXIUM PO) Take 20 mg by mouth       gabapentin (NEURONTIN) 300 MG capsule Take 2 capsules (600 mg) by mouth 3 times daily 360 capsule 0     ibuprofen (ADVIL,MOTRIN) 600 MG tablet Take 1 tablet (600 mg) by mouth every 6 hours as needed for moderate pain 30 tablet 1     levothyroxine (SYNTHROID/LEVOTHROID) 75 MCG tablet  TAKE 1 TABLET(75 MCG) BY MOUTH DAILY 90 tablet 3     losartan (COZAAR) 50 MG tablet Take 1 tablet (50 mg) by mouth daily 90 tablet 1           Physical Exam:  Following up today via video visit, per Covid-19 pandemic requirements.    Verbal consent has been obtained for this service by care team member.    Video call start time: 3:32 PM    Video call end time: 4:04 PM    Nbat utilized for video call.    Patient location for video visit: Home     Provider location for video visit:  Home (working remotely)        Summary/Assessment:    Pleasant 47-year-old female presents with primarily chronic lumbar greater than thoracic back pain.    Patient explains that she has been experiencing joint pains involving her back for over 20 years now.    States recently saw a neurosurgeon who referred patient to PM&R doctor, had medial nerve branch blocks performed which resulted in more pain hence, did not pursue radiofrequency ablation.  States typically has right-sided greater than left-sided low back pain however after nerve block left side was more painful.  Already tried epidural involving lumbar spine with insignificant benefit.    Patient adds had epidural involving cervical spine in the past, which was beneficial.    Also occasionally has bilateral knee pains and right trochanteric bursa pains.  Has not tried physical therapy for these pains.    Has tried Tylenol with insufficient benefit.  Has also tried Aleve or Advil with partial benefit.    Recently started on duloxetine, plans on increasing dose to 60 mg daily.    States is 5 foot 4 and weighs about 265 pounds.    Noted to have positive/elevated: LYUDMILA, CRP and liver enzymes.    Some LYUDMILA subsets performed were unremarkable.    Noted to have negative HLA-B27 marker.    Given the above, difficult to tell with certainty at this time as to what the primary source is, contributing to symptoms described.  We will obtain some labs/x-rays and correlate clinically to screen  for possible underlying etiologies.    Pertinent rheumatology/past medical history (please refer to above for more detailed history):      Positive/elevated LYUDMILA (1-160 titer with homogeneous pattern)    Chronic low back pain, typically nonradiating (DJD multilevel on MRI)    Chronic thoracic back pain    History of neck pain, improved with epidural    Chronic knee pains, bilateral    Right trochanteric bursa pain    Transaminitis    Positive family history for RA (aunts and an uncle on mother side)    Overweight    Elevated CRP    History of anxiety/depression    History of chronic nonrestorative sleep and chronic fatigue    History of asthma    History of hypothyroidism    History leg length discrepancy        Rheumatology medications provided/suggested:    Flexeril      Pertinent medication from other providers or from otc (please refer to above for more detailed med list):    Gabapentin  Cymbalta  Ibuprofen      Pertinent medications already tried:     Tylenol      Pertinent lab history:    Positive/elevated: LYUDMILA    Negative/unremarkable: HLA-B27, rheumatoid factor, CCP antibody, CK, myoglobin, TSH      Pertinent imaging/test history:    1/2022 MRI of lumbar spine                                                      IMPRESSION:  1.  Mild multilevel degenerative changes of the lumbar spine, as above.  2.  No significant central spinal canal or neural foraminal stenosis.         Other:      Marital status:      How many kids: 1 son    Type of work:      Drinking alcohol: no    Tobacco use: no      Recreational drug use: no    Active contraceptive: n/a     History hysterectomy: no     Tubal ligation: no    Partner vasectomy: no      Plan:      We will add Flexeril to act as a muscle relaxant hopefully improve her sleep, muscle aches and back pains.    On gabapentin 600 mg twice a day and recently started Cymbalta.    We will avoid adding NSAIDs given history of transaminitis.    Will refer  patient to spine clinic for chronic pains involving thoracic spine and lumbar spine.    Will refer to physical therapy to work on pains involving: Bilateral knees and right trochanteric bursa region.    Will obtain x-rays of SI joints.    Given positive LYUDMILA we will continue to monitor for any signs and symptoms consistent with having an associated connective tissue disease and manage accordingly.    Will obtain some labs and correlate clinically.    Follow-up in 3 months.        Procedure note:     Total time, spent 71 minutes involved with patient care, includes placing orders, reviewing records and formulating management plan.      Major side effect profile of medications provided/suggested were discussed with the patient.    This note was transcribed using Dragon voice recognition software as a result unintentional grammatical errors or word substitutions may have occurred. Please contact our Rheumatology department if you need any clarification or if you have any related inquiries.    Thank you for referring this patient to our clinic.      Theron Espinoza DO     ....................  6/7/2022   2:50 PM

## 2022-06-07 NOTE — PATIENT INSTRUCTIONS
Summary of Your Rheumatology Visit    Next Appointment:   3 Months      Medications:      Referrals:     Spine clinic    Tests:        Injections:      Other:

## 2022-06-11 DIAGNOSIS — M47.817 FACET ARTHROPATHY, LUMBOSACRAL: ICD-10-CM

## 2022-06-11 DIAGNOSIS — M51.379 DDD (DEGENERATIVE DISC DISEASE), LUMBOSACRAL: ICD-10-CM

## 2022-06-11 RX ORDER — GABAPENTIN 300 MG/1
600 CAPSULE ORAL 3 TIMES DAILY
Qty: 360 CAPSULE | Refills: 0 | Status: SHIPPED | OUTPATIENT
Start: 2022-06-11 | End: 2022-12-17

## 2022-06-17 ENCOUNTER — TELEPHONE (OUTPATIENT)
Dept: NURSING | Facility: CLINIC | Age: 47
End: 2022-06-17
Payer: COMMERCIAL

## 2022-06-17 NOTE — TELEPHONE ENCOUNTER
Outreach attempt made to review COVID testing options for upcoming procedure. Patient asked to call back [phone] to discuss further.     Agueda Headley LPN

## 2022-06-21 ENCOUNTER — HOSPITAL ENCOUNTER (OUTPATIENT)
Facility: CLINIC | Age: 47
Discharge: HOME | End: 2022-06-21
Attending: INTERNAL MEDICINE | Admitting: INTERNAL MEDICINE
Payer: COMMERCIAL

## 2022-06-21 VITALS
HEART RATE: 61 BPM | BODY MASS INDEX: 46.1 KG/M2 | HEIGHT: 64 IN | TEMPERATURE: 97.1 F | SYSTOLIC BLOOD PRESSURE: 109 MMHG | RESPIRATION RATE: 16 BRPM | DIASTOLIC BLOOD PRESSURE: 68 MMHG | WEIGHT: 270 LBS | OXYGEN SATURATION: 96 %

## 2022-06-21 LAB — COLONOSCOPY: NORMAL

## 2022-06-21 PROCEDURE — 45378 DIAGNOSTIC COLONOSCOPY: CPT | Performed by: INTERNAL MEDICINE

## 2022-06-21 PROCEDURE — 258N000003 HC RX IP 258 OP 636: Performed by: INTERNAL MEDICINE

## 2022-06-21 PROCEDURE — G0121 COLON CA SCRN NOT HI RSK IND: HCPCS | Performed by: INTERNAL MEDICINE

## 2022-06-21 PROCEDURE — 99153 MOD SED SAME PHYS/QHP EA: CPT | Performed by: INTERNAL MEDICINE

## 2022-06-21 PROCEDURE — G0500 MOD SEDAT ENDO SERVICE >5YRS: HCPCS | Performed by: INTERNAL MEDICINE

## 2022-06-21 PROCEDURE — 250N000011 HC RX IP 250 OP 636: Performed by: INTERNAL MEDICINE

## 2022-06-21 RX ORDER — SIMETHICONE 40MG/0.6ML
133 SUSPENSION, DROPS(FINAL DOSAGE FORM)(ML) ORAL
Status: DISCONTINUED | OUTPATIENT
Start: 2022-06-21 | End: 2022-06-21 | Stop reason: HOSPADM

## 2022-06-21 RX ORDER — PROCHLORPERAZINE MALEATE 10 MG
10 TABLET ORAL EVERY 6 HOURS PRN
Status: DISCONTINUED | OUTPATIENT
Start: 2022-06-21 | End: 2022-06-21 | Stop reason: HOSPADM

## 2022-06-21 RX ORDER — ONDANSETRON 2 MG/ML
4 INJECTION INTRAMUSCULAR; INTRAVENOUS
Status: COMPLETED | OUTPATIENT
Start: 2022-06-21 | End: 2022-06-21

## 2022-06-21 RX ORDER — NALOXONE HYDROCHLORIDE 0.4 MG/ML
0.4 INJECTION, SOLUTION INTRAMUSCULAR; INTRAVENOUS; SUBCUTANEOUS
Status: DISCONTINUED | OUTPATIENT
Start: 2022-06-21 | End: 2022-06-21 | Stop reason: HOSPADM

## 2022-06-21 RX ORDER — FLUMAZENIL 0.1 MG/ML
0.2 INJECTION, SOLUTION INTRAVENOUS
Status: DISCONTINUED | OUTPATIENT
Start: 2022-06-21 | End: 2022-06-21 | Stop reason: HOSPADM

## 2022-06-21 RX ORDER — EPINEPHRINE 1 MG/ML
0.1 INJECTION, SOLUTION INTRAMUSCULAR; SUBCUTANEOUS
Status: DISCONTINUED | OUTPATIENT
Start: 2022-06-21 | End: 2022-06-21 | Stop reason: HOSPADM

## 2022-06-21 RX ORDER — ONDANSETRON 4 MG/1
4 TABLET, ORALLY DISINTEGRATING ORAL EVERY 6 HOURS PRN
Status: DISCONTINUED | OUTPATIENT
Start: 2022-06-21 | End: 2022-06-21 | Stop reason: HOSPADM

## 2022-06-21 RX ORDER — FENTANYL CITRATE 0.05 MG/ML
50-100 INJECTION, SOLUTION INTRAMUSCULAR; INTRAVENOUS EVERY 5 MIN PRN
Status: DISCONTINUED | OUTPATIENT
Start: 2022-06-21 | End: 2022-06-21 | Stop reason: HOSPADM

## 2022-06-21 RX ORDER — NALOXONE HYDROCHLORIDE 0.4 MG/ML
0.2 INJECTION, SOLUTION INTRAMUSCULAR; INTRAVENOUS; SUBCUTANEOUS
Status: DISCONTINUED | OUTPATIENT
Start: 2022-06-21 | End: 2022-06-21 | Stop reason: HOSPADM

## 2022-06-21 RX ORDER — DIPHENHYDRAMINE HYDROCHLORIDE 50 MG/ML
25-50 INJECTION INTRAMUSCULAR; INTRAVENOUS
Status: DISCONTINUED | OUTPATIENT
Start: 2022-06-21 | End: 2022-06-21 | Stop reason: HOSPADM

## 2022-06-21 RX ORDER — ONDANSETRON 2 MG/ML
4 INJECTION INTRAMUSCULAR; INTRAVENOUS EVERY 6 HOURS PRN
Status: DISCONTINUED | OUTPATIENT
Start: 2022-06-21 | End: 2022-06-21 | Stop reason: HOSPADM

## 2022-06-21 RX ORDER — ATROPINE SULFATE 0.1 MG/ML
1 INJECTION INTRAVENOUS
Status: DISCONTINUED | OUTPATIENT
Start: 2022-06-21 | End: 2022-06-21 | Stop reason: HOSPADM

## 2022-06-21 RX ORDER — LIDOCAINE 40 MG/G
CREAM TOPICAL
Status: DISCONTINUED | OUTPATIENT
Start: 2022-06-21 | End: 2022-06-21 | Stop reason: HOSPADM

## 2022-06-21 RX ADMIN — FENTANYL CITRATE 50 MCG: 50 INJECTION INTRAMUSCULAR; INTRAVENOUS at 12:13

## 2022-06-21 RX ADMIN — MIDAZOLAM HYDROCHLORIDE 2 MG: 1 INJECTION, SOLUTION INTRAMUSCULAR; INTRAVENOUS at 12:01

## 2022-06-21 RX ADMIN — ONDANSETRON 4 MG: 2 INJECTION INTRAMUSCULAR; INTRAVENOUS at 11:58

## 2022-06-21 RX ADMIN — MIDAZOLAM HYDROCHLORIDE 1 MG: 1 INJECTION, SOLUTION INTRAMUSCULAR; INTRAVENOUS at 12:13

## 2022-06-21 RX ADMIN — SODIUM CHLORIDE 500 ML: 9 INJECTION, SOLUTION INTRAVENOUS at 11:58

## 2022-06-21 RX ADMIN — FENTANYL CITRATE 100 MCG: 50 INJECTION INTRAMUSCULAR; INTRAVENOUS at 12:01

## 2022-06-21 RX ADMIN — MIDAZOLAM HYDROCHLORIDE 1 MG: 1 INJECTION, SOLUTION INTRAMUSCULAR; INTRAVENOUS at 12:15

## 2022-06-21 NOTE — H&P
Pre-Endoscopy History and Physical     Machelle Weiner MRN# [id number]   YOB: 1975 Age: 47 year old     Date of Procedure: 2022  Primary care provider: Jean Sauceda  Type of Endoscopy: Colonoscopy with possible biopsy, possible polypectomy  Reason for Procedure: screen  Type of Anesthesia Anticipated: Conscious Sedation    HPI:    Machelle is a 47 year old female who will be undergoing the above procedure.      A history and physical has been performed. The patient's medications and allergies have been reviewed. The risks and benefits of the procedure and the sedation options and risks were discussed with the patient.  All questions were answered and informed consent was obtained.      She denies a personal or family history of anesthesia complications or bleeding disorders.     Patient Active Problem List   Diagnosis     Asthma, mild intermittent     Hyperlipidemia LDL goal <160     Essential hypertension, benign     Acquired hypothyroidism     S/P laparoscopic cholecystectomy     Morbid obesity (H)     Migraine     Facet arthropathy, lumbosacral     DDD (degenerative disc disease), lumbosacral     Leg length discrepancy     Positive LYUDMILA (antinuclear antibody)     Multiple joint pain        Past Medical History:   Diagnosis Date     Asthma, mild intermittent      Hypertension      Thyroid disease         Past Surgical History:   Procedure Laterality Date     GYN SURGERY      cryosurgery of cervix at age 16     LAPAROSCOPIC CHOLECYSTECTOMY WITH CHOLANGIOGRAMS N/A 10/14/2016    Procedure: LAPAROSCOPIC CHOLECYSTECTOMY WITH CHOLANGIOGRAMS;  Surgeon: Cronelius Mueller MD;  Location:  OR       Social History     Tobacco Use     Smoking status: Former Smoker     Packs/day: 0.30     Types: Cigarettes     Quit date: 2015     Years since quittin.3     Smokeless tobacco: Never Used   Substance Use Topics     Alcohol use: No     Alcohol/week: 0.0 standard drinks       Family History    Problem Relation Age of Onset     Coronary Artery Disease Maternal Grandmother      Hyperlipidemia Maternal Grandmother      Hyperlipidemia Maternal Grandfather      Diabetes No family hx of      Hypertension No family hx of        Prior to Admission medications    Medication Sig Start Date End Date Taking? Authorizing Provider   albuterol (PROAIR HFA/PROVENTIL HFA/VENTOLIN HFA) 108 (90 Base) MCG/ACT inhaler INHALE 2 PUFFS BY MOUTH EVERY 6 HOURS AS NEEDED FOR SHORTNESS OF BREATH OR DIFFICULT BREATHING 4/19/22   Jean Sauceda PA-C   albuterol (PROVENTIL) (2.5 MG/3ML) 0.083% neb solution Take 1 vial (2.5 mg) by nebulization every 6 hours as needed for shortness of breath / dyspnea or wheezing 4/19/22   Jean Sauceda PA-C   clindamycin (CLINDAMAX) 1 % external gel Apply topically 2 times daily 4/29/19   Jean Sauceda PA-C   cyclobenzaprine (FLEXERIL) 10 MG tablet Take 1 tab p.o. qhs (if feeling groggy the following morning, can try taking half a tablet instead or can take earlier the night before). 6/7/22   Theron Espinoza,    DULoxetine (CYMBALTA) 30 MG capsule Take 1 capsule (30 mg) by mouth 2 times daily 4/19/22   Jean Sauceda PA-C   Esomeprazole Magnesium (NEXIUM PO) Take 20 mg by mouth    Reported, Patient   gabapentin (NEURONTIN) 300 MG capsule Take 2 capsules (600 mg) by mouth 3 times daily 6/11/22   Saúl Rice MD   ibuprofen (ADVIL,MOTRIN) 600 MG tablet Take 1 tablet (600 mg) by mouth every 6 hours as needed for moderate pain 10/3/16   Joni Porter MD   levothyroxine (SYNTHROID/LEVOTHROID) 75 MCG tablet TAKE 1 TABLET(75 MCG) BY MOUTH DAILY 4/21/22   Jean Sauceda PA-C   losartan (COZAAR) 50 MG tablet Take 1 tablet (50 mg) by mouth daily 4/19/22   Jean Sauceda PA-C       Allergies   Allergen Reactions     Codeine      Penicillins         REVIEW OF SYSTEMS:   5 point ROS negative except as noted above in HPI, including Gen., Resp., CV,  "GI &  system review.    PHYSICAL EXAM:   There were no vitals taken for this visit. Estimated body mass index is 46.35 kg/m  as calculated from the following:    Height as of 4/19/22: 1.626 m (5' 4\").    Weight as of 4/19/22: 122.5 kg (270 lb).   GENERAL APPEARANCE: alert, and oriented  MENTAL STATUS: alert  AIRWAY EXAM: Mallampatti Class I (visualization of the soft palate, fauces, uvula, anterior and posterior pillars)  RESP: lungs clear to auscultation - no rales, rhonchi or wheezes  CV: regular rates and rhythm  DIAGNOSTICS:    Not indicated    IMPRESSION   ASA Class 2 - Mild systemic disease    PLAN:   Plan for Gastroscopy with possible biopsy, possible dilation. We discussed the risks, benefits and alternatives and the patient wished to proceed.    The above has been forwarded to the consulting provider.      Signed Electronically by: Aries Engle MD  June 21, 2022          "

## 2022-06-21 NOTE — LETTER
June 2, 2022      Machelle Weiner  [address] [facility] [place] [address]        Dear Machelle,           Please be aware that coverage of these services is subject to the terms and limitations of your health insurance plan.  Call member services at your health plan with any benefit or coverage questions.    Thank you for choosing [facility]. You are scheduled for the following service(s):    Date:  6/21/22             Procedure:  COLONOSCOPY  Doctor:        Aries Fish   Arrival Time:  10:15 AM Enter and check in at the Main Hospital Entrance  Procedure Time:  11:00 AM      Location:   [facility]        Endoscopy Department, First Floor         [address] [place], [place] [address] [phone] or [phone] ([facility]) to reschedule      MIRALAX -GATORADE  PREP  Colonoscopy is the most accurate test to detect colon polyps and colon cancer; and the only test where polyps can be removed. During this procedure, a doctor examines the lining of your large intestine and rectum through a flexible tube.   Transportation  You must arrange for a ride for the day of your procedure with a responsible adult. A taxi , Uber, etc, is not an option unless you are accompanied by a responsible adult. If you fail to arrange transportation with a responsible adult, your procedure will be cancelled and rescheduled.    Purchase the  following supplies at your local pharmacy:  - 2 (two) bisacodyl tablets: each tablet contains 5 mg.  (Dulcolax  laxative NOT Dulcolax  stool softener)   - 1 (one) 8.3 oz bottle of Polyethylene Glycol (PEG) 3350 Powder   (MiraLAX , Smooth LAX , ClearLAX  or equivalent)  - 64 oz Gatorade    Regular Gatorade, Gatorade G2 , Powerade , Powerade Zero  or Pedialyte  is acceptable. Red colored flavors are not allowed; all other colors (yellow, green, orange, purple and blue) are okay. It is also okay to buy two 2.12 oz packets of powdered  Gatorade that can be mixed with water to a total volume of 64 oz of liquid.  - 1 (one) 10 oz bottle of Magnesium Citrate (Red colored flavors are not allowed)  It is also okay for you to use a 0.5 oz package of powdered magnesium citrate (17 g) mixed with 10 oz of water.      PREPARATION FOR COLONOSCOPY    7 days before:    Discontinue fiber supplements and medications containing iron. This includes Metamucil  and Fibercon ; and multivitamins with iron.  3 days before:    Begin a low-fiber diet. A low-fiber diet helps making the cleanout more effective.     Examples of a low-fiber diet include (but are not limited to): white bread, white rice, pasta, crackers, fish, chicken, eggs, ground beef, creamy peanut butter, cooked/steamed/boiled vegetables, canned fruit, bananas, melons, milk, plain yogurt cheese, salad dressing and other condiments.     The following are not allowed on a low-fiber diet: seeds, nuts, popcorn, bran, whole wheat, corn, quinoa, raw fruits and vegetables, berries and dried fruit, beans and lentils.    For additional details on low-fiber diet, please refer to the table on the last page.    2 days before:    Continue the low-fiber diet.     Drink at least 8 glasses of water throughout the day.     Stop eating solid foods at 11:45 pm.    1 day before:    In the morning: begin a clear liquid diet (liquids you can see through).     Examples of a clear liquid diet include: water, clear broth or bouillon, Gatorade, Pedialyte or Powerade, carbonated and non-carbonated soft drinks (Sprite , 7-Up , ginger ale), strained fruit juices without pulp (apple, white grape, white cranberry), Jell-O  and popsicles.     The following are not allowed on a clear liquid diet: red liquids, alcoholic beverages, dairy products (milk, creamer, and yogurt), protein shakes, creamy broths, juice with pulp and chewing tobacco.    At noon: take 2 (two) bisacodyl tablets     At 4 (and no later than 6pm): start drinking the  Miralax-Gatorade preparation (8.3 oz of Miralax mixed with 64 oz of Gatorade in a large pitcher). Drink 1(one) 8 oz glass every 15 minutes thereafter, until the mixture is gone.  COLON CLEANSING TIPS: drink adequate amounts of fluids before and after your colon cleansing to prevent dehydration. Stay near a toilet because you will have diarrhea. Even if you are sitting on the toilet, continue to drink the cleansing solution every 15 minutes. If you feel nauseous or vomit, rinse your mouth with water, take a 15 to 30-minute-break and then continue drinking the solution. You will be uncomfortable until the stool has flushed from your colon (in about 2 to 4 hours). You may feel chilled.    Day of your procedure  You may take your morning medications including blood pressure medications, methadone, anti-seizure medications with sips of water 3 hours prior to your procedure or earlier. Do not take insulin, blood thinners (unless specifically told by your primary care provider) or vitamins prior to your procedure. Continue the clear liquid diet.     4 hours prior: drink 10 oz of magnesium citrate. It may be easier to drink it with a straw.    STOP consuming all liquids after that.     Do not take anything by mouth during this time.     Allow extra time to travel to your procedure as you may need to stop and use a restroom along the way.    You are ready for the procedure, if you followed all instructions and your stool is no longer formed, but clear or yellow liquid. If you are unsure whether your colon is clean, please call our office at [phone] before you leave for your appointment.    Bring the following to your procedure:  - Insurance Card/Photo ID.   - List of current medications including over-the-counter medications and supplements.   - Your rescue inhaler if you currently use one to control asthma.    Canceling or rescheduling your appointment:   If you must cancel or reschedule your appointment, please call  [phone] as soon as possible.  COLONOSCOPY PRE-PROCEDURE CHECKLIST    If you have diabetes, ask your regular doctor for diet and medication restrictions.  If you take an anticoagulant or anti-platelet medication (such as Coumadin , Lovenox , Pradaxa , Xarelto , Eliquis , etc.), please call your primary doctor for advice on holding this medication.  If you take aspirin you may continue to do so.  If you are or may be pregnant, please discuss the risks and benefits of this procedure with your doctor.        What happens during a colonoscopy?    Plan to spend up to two hours, starting at registration time, at the endoscopy center the day of your procedure. The colonoscopy takes an average of 15 to 30 minutes. Recovery time is about 30 minutes.      Before the exam:    You will change into a gown.    Your medical history and medication list will be reviewed with you, unless that has been done over the phone prior to the procedure.     A nurse will insert an intravenous (IV) line into your hand or arm.    The doctor will meet with you and will give you a consent form to sign.  During the exam:     Medicine will be given through the IV line to help you relax.     Your heart rate and oxygen levels will be monitored. If your blood pressure is low, you may be given fluids through the IV line.     The doctor will insert a flexible hollow tube, called a colonoscope, into your rectum. The scope will be advanced slowly through the large intestine (colon).    You may have a feeling of fullness or pressure.     If an abnormal tissue or a polyp is found, the doctor may remove it through the endoscope for closer examination, or biopsy. Tissue removal is painless    After the exam:           Any tissue samples removed during the exam will be sent to a lab for evaluation. It may take 5-7 working days for you to be notified of the results.     A nurse will provide you with complete discharge instructions before you leave the  endoscopy center. Be sure to ask the nurse for specific instructions if you take blood thinners such as Aspirin, Coumadin or Plavix.     The doctor will prepare a full report for you and for the physician who referred you for the procedure.     Your doctor will talk with you about the initial results of your exam.      Medication given during the exam will prohibit you from driving for the rest of the day.     Following the exam, you may resume your normal diet. Your first meal should be light, no greasy foods. Avoid alcohol until the next day.     You may resume your regular activities the day after the procedure.         LOW-FIBER DIET    Foods RECOMMENDED Foods to AVOID   Breads, Cereal, Rice and Pasta:   White bread, rolls, biscuits, croissant and dianne toast.   Waffles, Somali toast and pancakes.   White rice, noodles, pasta, macaroni and peeled cooked potatoes.   Plain crackers and saltines.   Cooked cereals: farina, cream of rice.   Cold cereals: Puffed Rice , Rice Krispies , Corn Flakes  and Special K    Breads, Cereal, Rice and Pasta:   Breads or rolls with nuts, seeds or fruit.   Whole wheat, pumpernickel, rye breads and cornbread.   Potatoes with skin, brown or wild rice, and kasha (buckwheat).     Vegetables:   Tender cooked and canned vegetables without seeds: carrots, asparagus tips, green or wax beans, pumpkin, spinach, lima beans. Vegetables:   Raw or steamed vegetables.   Vegetables with seeds.   Sauerkraut.   Winter squash, peas, broccoli, Brussel sprouts, cabbage, onions, cauliflower, baked beans, peas and corn.   Fruits:   Strained fruit juice.   Canned fruit, except pineapple.   Ripe bananas and melon. Fruits:   Prunes and prune juice.   Raw fruits.   Dried fruits: figs, dates and raisins.   Milk/Dairy:   Milk: plain or flavored.   Yogurt, custard and ice cream.   Cheese and cottage cheese Milk/Dairy:     Meat and other proteins:   ground, well-cooked tender beef, lamb, ham, veal, pork, fish,  poultry and organ meats.   Eggs.   Peanut butter without nuts. Meat and other proteins:   Tough, fibrous meats with gristle.   Dry beans, peas and lentils.   Peanut butter with nuts.   Tofu.   Fats, Snack, Sweets, Condiments and Beverages:   Margarine, butter, oils, mayonnaise, sour cream and salad dressing, plain gravy.   Sugar, hard candy, clear jelly, honey and syrup.   Spices, cooked herbs, bouillon, broth and soups made with allowed vegetable, ketchup and mustard.   Coffee, tea and carbonated drinks.   Plain cakes, cookies and pretzels.   Gelatin, plain puddings, custard, ice cream, sherbet and popsicles. Fats, Snack, Sweets, Condiments and Beverages:   Nuts, seeds and coconut.   Jam, marmalade and preserves.   Pickles, olives, relish and horseradish.   All desserts containing nuts, seeds, dried fruit and coconut; or made from whole grains or bran.   Candy made with nuts or seeds.   Popcorn.     DIRECTIONS TO THE ENDOSCOPY DEPARTMENT    From the north ([facility])  Take [address] South, exit on [address] [address]. Get into the left hand windy, turn left (east), go one-half mile to [address] and turn left. Go north to the second stoplight, take a right on [address] and follow it to the Main Hospital entrance.    From the south ([facility])  Take 35N to the 35E split and exit on [address] [address]. On [address] [address], turn left (west) to [address]. Turn right (north) on [address]. Go north to the second stoplight, take a right on [address] and follow it to the Main Hospital entrance.    From the east via 35E ([facility])  Take [address]E south to [address] [address] exit. Turn right on [address] [address]. Go west to [address]. Turn right (north) on [address]. Go to the second stoplight, take a right on [address] to the Main Hospital entrance.    From the east via [address] ([facility])  Take [address] to [address]. Turn left  (south) on [address] to [address], turn left (east) on [address] and follow it to the Main Hospital entrance.    From the west via [address] ([place], [place])  Take [address] east to [address]. Turn right (south) on [address] to [address], turn left (east) on [address] and follow it to the Main Hospital entrance.

## 2022-06-21 NOTE — DISCHARGE INSTRUCTIONS
The patient has received a copy of the [place]  report the doctor has written and discharge instructions have been discussed with the patient and responsible adult.  All questions were addressed and answered prior to patient discharge.     Understanding Diverticulosis and Diverticulitis     Pouches or diverticula usually occur in the lower part of the colon called the sigmoid.      Diverticulitis occurs when the pouches become inflamed.     The colon (large intestine) is the last part of the digestive tract. It absorbs water from stool and changes it from a liquid to a solid. In certain cases, small pouches called diverticula can form in the colon wall. This condition is called diverticulosis. The pouches can become infected. If this happens, it becomes a more serious problem called diverticulitis. These problems can be painful. But they can be managed.   Managing Your Condition  Diet changes or taking medications are often tried first. These may be enough to bring relief. If the case is bad, surgery may be done. You and your doctor can discuss the plan that is best for you.  If You Have Diverticulosis  Diet changes are often enough to control symptoms. The main changes are adding fiber (roughage) and drinking more water. Fiber absorbs water as it travels through your colon. This helps your stool stay soft and move smoothly. Water helps this process. If needed, you may be told to take over-the-counter stool softeners. To help relieve pain, antispasmodic medications may be prescribed.  If You Have Diverticulitis  Treatment depends on how bad your symptoms are.  For mild symptoms: You may be put on a liquid diet for a short time. You may also be prescribed antibiotics. If these two steps relieve your symptoms, you may then be prescribed a high-fiber diet. If you still have symptoms, your doctor will discuss further treatment options with you.  For severe symptoms: You may need to be admitted to the hospital. There,  you can be given IV antibiotics and fluids. Once symptoms are under control, the above treatments may be tried. If these don t control your condition, your doctor may discuss the option of having surgery with you.  [place] to Colon Health  Help keep your colon healthy with a diet that includes plenty of high-fiber fruits, vegetables, and whole grains. Drink plenty of liquids like water and juice. Your doctor may also recommend avoiding seeds and nuts.          [id number] [facility], [address] [place] [address], [place] [address]. All rights reserved. This information is not intended as a substitute for professional medical care. Always follow your healthcare professional's instructions.     Eating a High-Fiber Diet  Fiber is what gives strength and structure to plants. Most grains, beans, vegetables, and fruits contain fiber. Foods rich in fiber are often low in calories and fat, and they fill you up more. They may also reduce your risks for certain health problems. To find out the amount of fiber in canned, packaged, or frozen foods, read the  Nutrition Facts  label. It tells you how much fiber is in a serving.      Types of Fiber and Their Benefits  There are two types of fiber: insoluble and soluble. They both aid digestion and help you maintain a healthy weight.  Insoluble fiber: This is found in whole grains, cereals, certain fruits and vegetables (such as apple skin, corn, and carrots). Insoluble fiber may prevent constipation and reduce the risk of certain types of cancer.   Soluble fiber: This type of fiber is in oats, beans, and certain fruits and vegetables (such as strawberries and peas). Soluble fiber can reduce cholesterol (which may help lower the risk of heart disease), and helps control blood sugar levels.  Look for High-Fiber Foods  Whole-grain breads and cereals: Try to eat 6-8 ounces a day. Include wheat and oat bran cereals, whole-wheat muffins or toast, and corn tortillas in your meals.  Fruits: Try  to eat 2 cups a day. Apples, oranges, strawberries, pears, and bananas are good sources. (Note: Fruit juice is low in fiber.)  Vegetables: Try to eat 3 cups a day. Add asparagus, carrots, broccoli, peas, and corn to your meals.  Legumes (beans): One cup of cooked lentils gives you over 15 grams of fiber. Try navy beans, lentils, and chickpeas.  Seeds:  A small handful of seeds gives you about 3 grams of fiber. Try sunflower seeds.    Keep Track of Your Fiber  A healthy diet includes 31 grams of fiber a day if you have a 2,000-calorie diet. Keep track of how much fiber you eat. Start by reading food labels. Then eat a variety of foods high in fiber. Ask your doctor about supplemental fiber products.            [id number] [facility], [address] [place] [address], [place] [address]. All rights reserved. This information is not intended as a substitute for professional medical care. Always follow your healthcare professional's instructions.

## 2022-06-24 ENCOUNTER — THERAPY VISIT (OUTPATIENT)
Dept: PHYSICAL THERAPY | Facility: CLINIC | Age: 47
End: 2022-06-24
Attending: INTERNAL MEDICINE
Payer: COMMERCIAL

## 2022-06-24 DIAGNOSIS — M70.61 GREATER TROCHANTERIC BURSITIS, RIGHT: ICD-10-CM

## 2022-06-24 DIAGNOSIS — M25.561 CHRONIC PAIN OF BOTH KNEES: ICD-10-CM

## 2022-06-24 DIAGNOSIS — G89.29 CHRONIC PAIN OF BOTH KNEES: ICD-10-CM

## 2022-06-24 DIAGNOSIS — M25.559 HIP PAIN: ICD-10-CM

## 2022-06-24 DIAGNOSIS — M25.562 CHRONIC PAIN OF BOTH KNEES: ICD-10-CM

## 2022-06-24 PROCEDURE — 97161 PT EVAL LOW COMPLEX 20 MIN: CPT | Mod: GP | Performed by: PHYSICAL THERAPIST

## 2022-06-24 PROCEDURE — 97110 THERAPEUTIC EXERCISES: CPT | Mod: GP | Performed by: PHYSICAL THERAPIST

## 2022-06-24 ASSESSMENT — ACTIVITIES OF DAILY LIVING (ADL)
TWISTING/PIVOTING_ON_INVOLVED_LEG: EXTREME DIFFICULTY
HOS_ADL_COUNT: 16
GOING_UP_1_FLIGHT_OF_STAIRS: EXTREME DIFFICULTY
STAND: ACTIVITY IS SOMEWHAT DIFFICULT
HOS_ADL_SCORE(%): 34.38
KNEE_ACTIVITY_OF_DAILY_LIVING_SUM: 30
STANDING_FOR_15_MINUTES: EXTREME DIFFICULTY
SQUAT: ACTIVITY IS VERY DIFFICULT
AS_A_RESULT_OF_YOUR_KNEE_INJURY,_HOW_WOULD_YOU_RATE_YOUR_CURRENT_LEVEL_OF_DAILY_ACTIVITY?: ABNORMAL
HEAVY_WORK: EXTREME DIFFICULTY
LIMPING: THE SYMPTOM AFFECTS MY ACTIVITY MODERATELY
WALKING_DOWN_STEEP_HILLS: EXTREME DIFFICULTY
DEEP_SQUATTING: EXTREME DIFFICULTY
KNEE_ACTIVITY_OF_DAILY_LIVING_SCORE: 42.86
ROLLING_OVER_IN_BED: SLIGHT DIFFICULTY
HOW_WOULD_YOU_RATE_YOUR_CURRENT_LEVEL_OF_FUNCTION_DURING_YOUR_USUAL_ACTIVITIES_OF_DAILY_LIVING_FROM_0_TO_100_WITH_100_BEING_YOUR_LEVEL_OF_FUNCTION_PRIOR_TO_YOUR_HIP_PROBLEM_AND_0_BEING_THE_INABILITY_TO_PERFORM_ANY_OF_YOUR_USUAL_DAILY_ACTIVITIES?: 50
WALKING_APPROXIMATELY_10_MINUTES: EXTREME DIFFICULTY
GIVING WAY, BUCKLING OR SHIFTING OF KNEE: THE SYMPTOM AFFECTS MY ACTIVITY MODERATELY
RISE FROM A CHAIR: ACTIVITY IS FAIRLY DIFFICULT
HOW_WOULD_YOU_RATE_THE_OVERALL_FUNCTION_OF_YOUR_KNEE_DURING_YOUR_USUAL_DAILY_ACTIVITIES?: ABNORMAL
KNEEL ON THE FRONT OF YOUR KNEE: I AM UNABLE TO DO THE ACTIVITY
HOS_ADL_HIGHEST_POTENTIAL_SCORE: 64
WALKING_15_MINUTES_OR_GREATER: EXTREME DIFFICULTY
WEAKNESS: THE SYMPTOM AFFECTS MY ACTIVITY SLIGHTLY
PAIN: THE SYMPTOM AFFECTS MY ACTIVITY MODERATELY
RAW_SCORE: 30
PUTTING_ON_SOCKS_AND_SHOES: MODERATE DIFFICULTY
WALK: ACTIVITY IS SOMEWHAT DIFFICULT
GO UP STAIRS: ACTIVITY IS FAIRLY DIFFICULT
WALKING_INITIALLY: MODERATE DIFFICULTY
WALKING_UP_STEEP_HILLS: EXTREME DIFFICULTY
SWELLING: THE SYMPTOM AFFECTS MY ACTIVITY SLIGHTLY
LIGHT_TO_MODERATE_WORK: EXTREME DIFFICULTY
HOS_ADL_ITEM_SCORE_TOTAL: 22
SITTING_FOR_15_MINUTES: SLIGHT DIFFICULTY
STIFFNESS: THE SYMPTOM AFFECTS MY ACTIVITY MODERATELY
GOING_DOWN_1_FLIGHT_OF_STAIRS: EXTREME DIFFICULTY
RECREATIONAL_ACTIVITIES: EXTREME DIFFICULTY
STEPPING_UP_AND_DOWN_CURBS: SLIGHT DIFFICULTY
GETTING_INTO_AND_OUT_OF_AN_AVERAGE_CAR: MODERATE DIFFICULTY
SIT WITH YOUR KNEE BENT: ACTIVITY IS SOMEWHAT DIFFICULT
HOW_WOULD_YOU_RATE_THE_CURRENT_FUNCTION_OF_YOUR_KNEE_DURING_YOUR_USUAL_DAILY_ACTIVITIES_ON_A_SCALE_FROM_0_TO_100_WITH_100_BEING_YOUR_LEVEL_OF_KNEE_FUNCTION_PRIOR_TO_YOUR_INJURY_AND_0_BEING_THE_INABILITY_TO_PERFORM_ANY_OF_YOUR_USUAL_DAILY_ACTIVITIES?: 50
GO DOWN STAIRS: ACTIVITY IS FAIRLY DIFFICULT

## 2022-06-24 NOTE — PROGRESS NOTES
Physical Therapy Initial Evaluation  Subjective:  The history is provided by the patient. No  was used.   Patient Health History  Machelle Weiner being seen for R hip and B knee.     Date of Onset: > 20 years ago.   Problem occurred: unknown   Pain is reported as 4/10 on pain scale.  General health as reported by patient is fair.  Pertinent medical history includes: anemia, asthma, depression, high blood pressure, numbness/tingling, overweight and thyroid problems.     Medical allergies: See EPIC.   Surgeries include:  Cancer surgery.    Current medications:  Anti-depressants, anti-inflammatory, anti-seizure medication, high blood pressure medication, pain medication and thyroid medication (See EPIC).    Current occupation is [employer].   Primary job tasks include:  Computer work and prolonged sitting.                  Therapist Generated HPI Evaluation  Problem details: Pt. complains of R hip and B knee pian that has been present for greater than 20 years.  No known trauma.  PT order dated 6/7/22.      .         Type of problem:  Bilateral knees.    This is a chronic condition.  Condition occurred with:  Insidious onset.  Where condition occurred: for unknown reasons.  Patient reports pain:  Anterior.  Pain is described as aching and is intermittent.  Pain is the same all the time.  Since onset symptoms are unchanged.  Associated symptoms:  Loss of motion/stiffness, loss of strength and buckling/giving out. Symptoms are exacerbated by kneeling, weight bearing, standing, ascending stairs, descending stairs and walking  and relieved by rest.      Restrictions due to condition include:  Working in normal job without restrictions.  Barriers include:  None as reported by patient.                        Objective:        Flexibility/Screens:   Positive screens:  Hip and Knee                                                     Hip Evaluation  HIP AROM:  AROM:    Left Hip:     Normal     Right Hip:   Normal                  Hip PROM:  Hip PROM:  Left Hip:    Normal  Right Hip:  Normal                          Hip Strength:    Flexion:   Left: 3/5   Pain:  Right: 3/5   Pain:                    Extension:  Left: 3/5  Pain:Right: 3/5    Pain:    Abduction:  Left: 3/5     Pain:Right: 3/5    Pain:                    Hip Palpation:      Left hip tenderness not present at:  Greater Trachanter; IT Band; hip flexors; Piriformis or Bursa  Right hip tenderness present at:  Greater Trachanter and Bursa  Right hip tenderness not present at:  IT Band; hip flexors or Piriformis       Knee Evaluation:  ROM:      PROM    Hyperextension: Left: 0    Right:  0   Extension: Left: 0    Right:  5 with ERP  Flexion: Left: 95    Right:  95    Endfeel: ERP with passive and active R knee ext    Ligament Testing:  Normal                Special Tests:     Left knee negative for the following special tests:  Meninscal; Patellar compression; Patellar Tracking-Abduction Medial and Patellar Tracking-Abduction Lateral  Right knee positive for the following tests:  Meniscal  Right knee negative for the following special tests:  Patellar Compression; Patellar Tracking-Abduction Medial and Patellar Tracking-Abduction Lateral  Palpation:      Left knee tenderness not present at:  Medial Joint Line; Lateral Joint Line; Patellar Tendon; Popliteal; Patellar Medial; Patellar Lateral; Patellar Superior and Patellar Inferior  Right knee tenderness present at:  Medial Joint Line and Lateral Joint Line  Right knee tenderness not present at:  Patellar Tendon; Popliteal; Patellar Medial; Patellar Lateral; Patellar Superior and Patellar Inferior            General     ROS    Assessment/Plan:    Patient is a 47 year old female with both sides hip and both sides knee complaints.    Patient has the following significant findings with corresponding treatment plan.                Diagnosis 1:  R hip and B knee pain  Pain -  self management,  education, directional preference exercise and home program  Decreased ROM/flexibility - manual therapy and therapeutic exercise  Decreased strength - therapeutic exercise and therapeutic activities  Impaired muscle performance - neuro re-education  Decreased function - therapeutic activities    Therapy Evaluation Codes:   1) Clinical presentation characteristics are:   Stable/Uncomplicated.  2) Decision-Making    Low complexity using standardized patient assessment instrument and/or measureable assessment of functional outcome.  Cumulative Therapy Evaluation is: Low complexity.    Previous and current functional limitations:  (See Goal Flow Sheet for this information)    Short term and Long term goals: (See Goal Flow Sheet for this information)     Communication ability:  Patient appears to be able to clearly communicate and understand verbal and written communication and follow directions correctly.  Treatment Explanation - The following has been discussed with the patient:   RX ordered/plan of care  Anticipated outcomes  Possible risks and side effects  This patient would benefit from PT intervention to resume normal activities.   Rehab potential is fair.    Frequency:  1 X week, once daily  Duration:  for 6 weeks  Discharge Plan:  Achieve all LTG.  Independent in home treatment program.  Reach maximal therapeutic benefit.    Please refer to the daily flowsheet for treatment today, total treatment time and time spent performing 1:1 timed codes.

## 2022-08-15 ENCOUNTER — NURSE TRIAGE (OUTPATIENT)
Dept: FAMILY MEDICINE | Facility: CLINIC | Age: 47
End: 2022-08-15

## 2022-08-15 NOTE — TELEPHONE ENCOUNTER
"Pt transferred to triage  Hx of abd pain in this region for many years- usually lets it go and waits it out and it goes away.   Hx isaak     Upper middle to left side of abd recently moved to upper right as well   Started about 1 week ago - minor felt it in bed when moving around   Pain constant   Worsening w/ eating     Pizza cpl days ago- made it worse  Pt reports she is overweight, but feels like she is bloated     No fever  No vomiting   Nausea +   Pain increasing over time about 7/10    Scheduled w/ Juice tomorrow. Adv of worsening/new symptoms should be seen more urgently at UC/ED.     Erika WALLS RN     Next 5 appointments (look out 90 days)    Aug 16, 2022  1:00 PM  (Arrive by 12:40 PM)  Provider Visit with Jean Sauceda PA-C  [facility] ([facility] ) [address] [address] [phone]          Answer Assessment - Initial Assessment Questions  1. LOCATION: \"Where does it hurt?\"       Upper middle to left and right some   2. RADIATION: \"Does the pain shoot anywhere else?\" (e.g., chest, back)      Back   3. ONSET: \"When did the pain begin?\" (e.g., minutes, hours or days ago)       Approx 1 week ago  4. SUDDEN: \"Gradual or sudden onset?\"      Gradual   5. PATTERN \"Does the pain come and go, or is it constant?\"     - If constant: \"Is it getting better, staying the same, or worsening?\"       (Note: Constant means the pain never goes away completely; most serious pain is constant and it progresses)      - If intermittent: \"How long does it last?\" \"Do you have pain now?\"      (Note: Intermittent means the pain goes away completely between bouts)      Constant   6. SEVERITY: \"How bad is the pain?\"  (e.g., Scale 1-10; mild, moderate, or severe)     - MILD (1-3): doesn't interfere with normal activities, abdomen soft and not tender to touch      - MODERATE (4-7): interferes with normal activities or awakens from sleep, abdomen tender " "to touch      - SEVERE (8-10): excruciating pain, doubled over, unable to do any normal activities        Has a high pain tolerance 3-4 in beginning now after eating up to 7  7. RECURRENT SYMPTOM: \"Have you ever had this type of stomach pain before?\" If Yes, ask: \"When was the last time?\" and \"What happened that time?\"       Every month maybe   8. AGGRAVATING FACTORS: \"Does anything seem to cause this pain?\" (e.g., foods, stress, alcohol)      Food  9. CARDIAC SYMPTOMS: \"Do you have any of the following symptoms: chest pain, difficulty breathing, sweating, nausea?\"      Nausea- all the time   10. OTHER SYMPTOMS: \"Do you have any other symptoms?\" (e.g., back pain, diarrhea, fever, urination pain, vomiting)        Nausea, no fever, no vomiting, ALWAYS has bowel issues- diarrrhea/soft stools- occasional pale color stool and reports it is 'fluffy'  11. PREGNANCY: \"Is there any chance you are pregnant?\" \"When was your last menstrual period?\"        No    Protocols used: ABDOMINAL PAIN - UPPER-A-OH      "

## 2022-08-16 ENCOUNTER — HOSPITAL ENCOUNTER (OUTPATIENT)
Dept: CT IMAGING | Facility: CLINIC | Age: 47
Discharge: HOME | End: 2022-08-16
Attending: FAMILY MEDICINE | Admitting: FAMILY MEDICINE
Payer: COMMERCIAL

## 2022-08-16 ENCOUNTER — OFFICE VISIT (OUTPATIENT)
Dept: PEDIATRICS | Facility: CLINIC | Age: 47
End: 2022-08-16

## 2022-08-16 ENCOUNTER — OFFICE VISIT (OUTPATIENT)
Dept: FAMILY MEDICINE | Facility: CLINIC | Age: 47
End: 2022-08-16
Payer: COMMERCIAL

## 2022-08-16 VITALS
BODY MASS INDEX: 48.06 KG/M2 | DIASTOLIC BLOOD PRESSURE: 70 MMHG | TEMPERATURE: 98.3 F | WEIGHT: 280 LBS | RESPIRATION RATE: 16 BRPM | HEART RATE: 84 BPM | OXYGEN SATURATION: 98 % | SYSTOLIC BLOOD PRESSURE: 108 MMHG

## 2022-08-16 VITALS
TEMPERATURE: 99.3 F | BODY MASS INDEX: 48.06 KG/M2 | SYSTOLIC BLOOD PRESSURE: 147 MMHG | RESPIRATION RATE: 18 BRPM | WEIGHT: 280 LBS | HEART RATE: 76 BPM | OXYGEN SATURATION: 96 % | DIASTOLIC BLOOD PRESSURE: 87 MMHG

## 2022-08-16 DIAGNOSIS — R10.12 LEFT UPPER QUADRANT PAIN: ICD-10-CM

## 2022-08-16 DIAGNOSIS — G44.209 TENSION HEADACHE: Primary | ICD-10-CM

## 2022-08-16 DIAGNOSIS — R19.8 CHANGE IN BOWEL MOVEMENT: ICD-10-CM

## 2022-08-16 DIAGNOSIS — R10.13 EPIGASTRIC PAIN: ICD-10-CM

## 2022-08-16 DIAGNOSIS — R10.13 EPIGASTRIC PAIN: Primary | ICD-10-CM

## 2022-08-16 LAB
ALBUMIN SERPL BCG-MCNC: 3.8 G/DL (ref 3.5–5.2)
ALBUMIN UR-MCNC: NEGATIVE MG/DL
ALP SERPL-CCNC: 107 U/L (ref 35–104)
ALT SERPL W P-5'-P-CCNC: 158 U/L (ref 10–35)
ANION GAP SERPL CALCULATED.3IONS-SCNC: 12 MMOL/L (ref 7–15)
APPEARANCE UR: CLEAR
AST SERPL W P-5'-P-CCNC: 160 U/L (ref 10–35)
BACTERIA #/AREA URNS HPF: ABNORMAL /HPF
BASOPHILS # BLD AUTO: 0.1 10E3/UL (ref 0–0.2)
BASOPHILS NFR BLD AUTO: 1 %
BILIRUB SERPL-MCNC: 0.3 MG/DL
BILIRUB UR QL STRIP: NEGATIVE
BUN SERPL-MCNC: 6.8 MG/DL (ref 6–20)
CALCIUM SERPL-MCNC: 8.9 MG/DL (ref 8.6–10)
CHLORIDE SERPL-SCNC: 105 MMOL/L (ref 98–107)
COLOR UR AUTO: ABNORMAL
CREAT SERPL-MCNC: 0.77 MG/DL (ref 0.51–0.95)
CRP SERPL-MCNC: 8.83 MG/L
DEPRECATED HCO3 PLAS-SCNC: 20 MMOL/L (ref 22–29)
EOSINOPHIL # BLD AUTO: 0.5 10E3/UL (ref 0–0.7)
EOSINOPHIL NFR BLD AUTO: 4 %
ERYTHROCYTE [DISTWIDTH] IN BLOOD BY AUTOMATED COUNT: 14.6 % (ref 10–15)
GFR SERPL CREATININE-BSD FRML MDRD: >90 ML/MIN/1.73M2
GLUCOSE SERPL-MCNC: 82 MG/DL (ref 70–99)
GLUCOSE UR STRIP-MCNC: NEGATIVE MG/DL
HCT VFR BLD AUTO: 40 % (ref 35–47)
HGB BLD-MCNC: 12.2 G/DL (ref 11.7–15.7)
HGB UR QL STRIP: NEGATIVE
IMM GRANULOCYTES # BLD: 0.1 10E3/UL
IMM GRANULOCYTES NFR BLD: 1 %
KETONES UR STRIP-MCNC: NEGATIVE MG/DL
LEUKOCYTE ESTERASE UR QL STRIP: NEGATIVE
LIPASE SERPL-CCNC: 30 U/L (ref 13–60)
LYMPHOCYTES # BLD AUTO: 2.7 10E3/UL (ref 0.8–5.3)
LYMPHOCYTES NFR BLD AUTO: 23 %
MCH RBC QN AUTO: 28.4 PG (ref 26.5–33)
MCHC RBC AUTO-ENTMCNC: 30.5 G/DL (ref 31.5–36.5)
MCV RBC AUTO: 93 FL (ref 78–100)
MONOCYTES # BLD AUTO: 0.6 10E3/UL (ref 0–1.3)
MONOCYTES NFR BLD AUTO: 6 %
NEUTROPHILS # BLD AUTO: 7.6 10E3/UL (ref 1.6–8.3)
NEUTROPHILS NFR BLD AUTO: 65 %
NITRATE UR QL: NEGATIVE
NRBC # BLD AUTO: 0 10E3/UL
NRBC BLD AUTO-RTO: 0 /100
PH UR STRIP: 6 [PH] (ref 5–7)
PLATELET # BLD AUTO: 293 10E3/UL (ref 150–450)
POTASSIUM SERPL-SCNC: 3.9 MMOL/L (ref 3.4–5.3)
PROT SERPL-MCNC: 7.3 G/DL (ref 6.4–8.3)
RBC # BLD AUTO: 4.29 10E6/UL (ref 3.8–5.2)
RBC URINE: <1 /HPF
SODIUM SERPL-SCNC: 137 MMOL/L (ref 136–145)
SP GR UR STRIP: 1.01 (ref 1–1.03)
SQUAMOUS EPITHELIAL: 1 /HPF
UROBILINOGEN UR STRIP-MCNC: NORMAL MG/DL
WBC # BLD AUTO: 11.5 10E3/UL (ref 4–11)
WBC URINE: 3 /HPF

## 2022-08-16 PROCEDURE — 86140 C-REACTIVE PROTEIN: CPT | Performed by: FAMILY MEDICINE

## 2022-08-16 PROCEDURE — 83690 ASSAY OF LIPASE: CPT | Performed by: FAMILY MEDICINE

## 2022-08-16 PROCEDURE — 99207 REFERRAL TO ACUTE AND DIAGNOSTIC SERVICES: CPT | Performed by: PHYSICIAN ASSISTANT

## 2022-08-16 PROCEDURE — 85025 COMPLETE CBC W/AUTO DIFF WBC: CPT | Performed by: FAMILY MEDICINE

## 2022-08-16 PROCEDURE — 99214 OFFICE O/P EST MOD 30 MIN: CPT | Performed by: FAMILY MEDICINE

## 2022-08-16 PROCEDURE — 74177 CT ABD & PELVIS W/CONTRAST: CPT

## 2022-08-16 PROCEDURE — 36415 COLL VENOUS BLD VENIPUNCTURE: CPT | Performed by: FAMILY MEDICINE

## 2022-08-16 PROCEDURE — 250N000011 HC RX IP 250 OP 636: Performed by: FAMILY MEDICINE

## 2022-08-16 PROCEDURE — 81001 URINALYSIS AUTO W/SCOPE: CPT | Performed by: FAMILY MEDICINE

## 2022-08-16 PROCEDURE — 250N000009 HC RX 250: Performed by: FAMILY MEDICINE

## 2022-08-16 PROCEDURE — 80053 COMPREHEN METABOLIC PANEL: CPT | Performed by: FAMILY MEDICINE

## 2022-08-16 RX ORDER — ACETAMINOPHEN 500 MG
500 TABLET ORAL ONCE
Status: COMPLETED | OUTPATIENT
Start: 2022-08-16 | End: 2022-08-16

## 2022-08-16 RX ORDER — IOPAMIDOL 755 MG/ML
500 INJECTION, SOLUTION INTRAVASCULAR ONCE
Status: COMPLETED | OUTPATIENT
Start: 2022-08-16 | End: 2022-08-16

## 2022-08-16 RX ADMIN — Medication 500 MG: at 14:44

## 2022-08-16 RX ADMIN — SODIUM CHLORIDE 65 ML: 9 INJECTION, SOLUTION INTRAVENOUS at 15:42

## 2022-08-16 RX ADMIN — IOPAMIDOL 90 ML: 755 INJECTION, SOLUTION INTRAVENOUS at 15:42

## 2022-08-16 ASSESSMENT — PATIENT HEALTH QUESTIONNAIRE - PHQ9
10. IF YOU CHECKED OFF ANY PROBLEMS, HOW DIFFICULT HAVE THESE PROBLEMS MADE IT FOR YOU TO DO YOUR WORK, TAKE CARE OF THINGS AT HOME, OR GET ALONG WITH OTHER PEOPLE: VERY DIFFICULT
SUM OF ALL RESPONSES TO PHQ QUESTIONS 1-9: 12
SUM OF ALL RESPONSES TO PHQ QUESTIONS 1-9: 12

## 2022-08-16 ASSESSMENT — ASTHMA QUESTIONNAIRES
ACT_TOTALSCORE: 17
QUESTION_3 LAST FOUR WEEKS HOW OFTEN DID YOUR ASTHMA SYMPTOMS (WHEEZING, COUGHING, SHORTNESS OF BREATH, CHEST TIGHTNESS OR PAIN) WAKE YOU UP AT NIGHT OR EARLIER THAN USUAL IN THE MORNING: NOT AT ALL
QUESTION_2 LAST FOUR WEEKS HOW OFTEN HAVE YOU HAD SHORTNESS OF BREATH: MORE THAN ONCE A DAY
QUESTION_4 LAST FOUR WEEKS HOW OFTEN HAVE YOU USED YOUR RESCUE INHALER OR NEBULIZER MEDICATION (SUCH AS ALBUTEROL): ONCE A WEEK OR LESS
QUESTION_1 LAST FOUR WEEKS HOW MUCH OF THE TIME DID YOUR ASTHMA KEEP YOU FROM GETTING AS MUCH DONE AT WORK, SCHOOL OR AT HOME: A LITTLE OF THE TIME
ACT_TOTALSCORE: 17
QUESTION_5 LAST FOUR WEEKS HOW WOULD YOU RATE YOUR ASTHMA CONTROL: SOMEWHAT CONTROLLED

## 2022-08-16 ASSESSMENT — PAIN SCALES - GENERAL: PAINLEVEL: MODERATE PAIN (5)

## 2022-08-16 NOTE — PROGRESS NOTES
"  Assessment & Plan     Epigastric pain  Left upper quadrant pain  Change in bowel movement  referal placed. Differential includes stool burden, bowel inflammation/diverticulitis, pancreatitis, PUD, other. Accepted to [facility]    Referral to Acute and Diagnostic Services    The [facility] has been contacted at [phone] ([place]) to confirm patient acceptance. The transition to Acute & Diagnostic Services Clinic has been discussed with patient, and she agrees with next level of care.  Patient understands that evaluation/treatment at [facility] typically takes significantly longer than in clinic/urgent care (>2 hours).        Special issues:  None        Referral placed: Yes  Patient has transportation arranged and will travel to the [facility] without delay: Yes  Patient aware not to eat or drink. Yes    The following provider has assessed this patient for intervention at [facility], and directed the patient for referral: GEOVANNA Isidro PA-C        - Referral to Acute and Diagnostic Services (Day of diagnostic / First order acute); Future    BMI:   Estimated body mass index is 48.06 kg/m  as calculated from the following:    Height as of 6/21/22: 1.626 m (5' 4\").    Weight as of this encounter: 127 kg (280 lb).       Depression Screening Follow Up    PHQ 8/16/2022   PHQ-9 Total Score 12   Q9: Thoughts of better off dead/self-harm past 2 weeks Not at all   Return in about 3 months (around 11/16/2022) for PAP & Physical!.    Jean Sauceda PA-C  [facility] GUILLERMO Carty is a 47 year old, presenting for the following health issues:  Abdominal Pain      History of Present Illness       Reason for visit:  Cbdominal pain that hadnt gone away for over a week and has gotten worse  Symptom onset:  1-2 weeks ago  Symptoms include:  Moderate pain in the upper abdomen on both sides that radiates to my back and my right upper " "arm  Symptom intensity:  Moderate  Symptom progression:  Worsening  Had these symptoms before:  Yes  Has tried/received treatment for these symptoms:  No  What makes it worse:  Eating and certain movements that put pressure on my upper left abdomen  What makes it better:  No    She eats 2-3 servings of fruits and vegetables daily.She consumes 0 sweetened beverage(s) daily.She exercises with enough effort to increase her heart rate 9 or less minutes per day.  She exercises with enough effort to increase her heart rate 3 or less days per week.   She is taking medications regularly.    Today's PHQ-9         PHQ-9 Total Score: 12    PHQ-9 Q9 Thoughts of better off dead/self-harm past 2 weeks :   Not at all    How difficult have these problems made it for you to do your work, take care of things at home, or get along with other people: Very difficult     Machelle Weiner is a 47 year old female who presents today for acute flare of her abdominal pain  Has had these periodically and symptoms will improve on their own  Most recently noted a more dull ache/throbbing and then moved in bed and experienced a sharp pain - and this was deeper than muscle wall  When she eats, the symptoms are worse - both luq and ruq pain   -though the left sided pain is different than the right sided pain   -pain is delayed around an hour   -generally feels least painful earlier in the day  BMs are \"weird\" though always seem off   -more often than not it is looser  Does take nexium otc; used two yesterday because was having more heartburn   No new urinary symptoms      Review of Systems   Constitutional, HEENT, cardiovascular, pulmonary, gi and gu systems are negative, except as otherwise noted.      Objective    /70 (BP Location: Right arm, Patient Position: Sitting, Cuff Size: Adult Large)   Pulse 84   Temp 98.3  F (36.8  C) (Oral)   Resp 16   Wt 127 kg (280 lb)   LMP 07/26/2022 (Approximate)   SpO2 98%   BMI 48.06 kg/m    Body " mass index is 48.06 kg/m .  Physical Exam   GENERAL: healthy, alert and no distress  RESP: lungs clear to auscultation - no rales, rhonchi or wheezes  CV: regular rates and rhythm, normal S1 S2, no S3 or S4 and no murmur, click or rub  ABDOMEN: morbidly obese; nabs; there is epigastric and luq pain just inferior to the rib line  MS: No peripheral edema                 .  ..

## 2022-08-16 NOTE — PROGRESS NOTES
Assessment & Plan     Epigastric pain  - Referral to Acute and Diagnostic Services (Day of diagnostic / First order acute)    Left upper quadrant pain  - Referral to Acute and Diagnostic Services (Day of diagnostic / First order acute)  - sodium chloride (PF) 0.9% PF flush 3 mL  - CBC with platelets differential  - Comprehensive metabolic panel  - Lipase  - CRP inflammation  - UA with Microscopic reflex to Culture  - IV access  - UA with Microscopic reflex to Culture  - lidocaine (viscous) (XYLOCAINE) 2 % 15 mL, alum & mag hydroxide-simethicone (MAALOX) 15 mL GI Cocktail  - CBC with platelets differential  - Comprehensive metabolic panel  - Lipase  - CRP inflammation  - CT Abdomen Pelvis w Contrast    Change in bowel movement  - Referral to Acute and Diagnostic Services (Day of diagnostic / First order acute)    Tension headache  - acetaminophen (TYLENOL) tablet 500 mg     Patient obtaining no relief with GI cocktail. Blood work consistent with her baseline levels, CT imaging revealing no obvious abnormalities. Patient appears well at this time and is relieved with findings at this time. Recommend f/u with PCP in 1 week if symptoms not improving. Consider tums as needed for indigestion.     Consider H./ pylori testing if symptoms persist.       Orlin Mendoza MD   [facility]    Giuliano Carty is a 47 year old female who presents to clinic today for the following health issues:  Chief Complaint   Patient presents with     Abdominal Pain     LUQ pain X 1 week     HPI    Patient comes in today with 4 days of left upper quadrant pain she is on Nexium and this has not been helpful.  Remote history of stomach ulcers but has not been an issue in many years she reports.  Question of possible bloody stool and some changes in her stool quality including flakiness and may be a bit more pale.  There is no yellowing of her eyes or skin.  She also has pain that radiates toward her right back.  No previous  history of pancreatitis.  She has a history of gallbladder removal.  No history of hiatal hernia.  Appetite is decreased from baseline.  No remedies have been helpful.    No swallowing difficulties.     Patient Active Problem List    Diagnosis Date Noted     Chronic pain of both knees 06/24/2022     Priority: Medium     Hip pain 06/24/2022     Priority: Medium     Positive LYUDMILA (antinuclear antibody) 04/12/2022     Priority: Medium     Multiple joint pain 04/12/2022     Priority: Medium     Facet arthropathy, lumbosacral 12/10/2021     Priority: Medium     DDD (degenerative disc disease), lumbosacral 12/10/2021     Priority: Medium     Leg length discrepancy 12/10/2021     Priority: Medium     Migraine 03/02/2018     Priority: Medium     Morbid obesity (H) 11/20/2017     Priority: Medium     S/P laparoscopic cholecystectomy 10/14/2016     Priority: Medium     Acquired hypothyroidism 02/04/2016     Priority: Medium     Essential hypertension, benign 09/08/2015     Priority: Medium     Hyperlipidemia LDL goal <160 01/25/2013     Priority: Medium     Asthma, mild intermittent      Priority: Medium       Current Outpatient Medications   Medication     albuterol (PROAIR HFA/PROVENTIL HFA/VENTOLIN HFA) 108 (90 Base) MCG/ACT inhaler     albuterol (PROVENTIL) (2.5 MG/3ML) 0.083% neb solution     clindamycin (CLINDAMAX) 1 % external gel     cyclobenzaprine (FLEXERIL) 10 MG tablet     DULoxetine (CYMBALTA) 30 MG capsule     Esomeprazole Magnesium (NEXIUM PO)     gabapentin (NEURONTIN) 300 MG capsule     ibuprofen (ADVIL,MOTRIN) 600 MG tablet     levothyroxine (SYNTHROID/LEVOTHROID) 75 MCG tablet     losartan (COZAAR) 50 MG tablet     Current Facility-Administered Medications   Medication     sodium chloride (PF) 0.9% PF flush 3 mL           Objective    BP (!) 147/87 (BP Location: Left arm, Patient Position: Chair, Cuff Size: Adult Large)   Pulse 76   Temp 99.3  F (37.4  C) (Oral)   Resp 18   Wt 127 kg (280 lb)   LMP  07/26/2022 (Approximate)   SpO2 96%   BMI 48.06 kg/m    Physical Exam     CV; HDS  Pulm: non-labored  Abd: mid epigastric discomfort with palpation, no masses,   Results for orders placed or performed during the hospital encounter of 08/16/22   CT Abdomen Pelvis w Contrast     Status: None    Narrative    CT ABDOMEN AND PELVIS WITH CONTRAST 8/16/2022 3:49 PM    CLINICAL HISTORY: Abdominal pain. History of cholecystectomy -- 4-5  days of midepigastric discomfort and changes in stool quality. No  known diverticulosis. Left upper quadrant pain.    TECHNIQUE: CT scan of the abdomen and pelvis was performed following  injection of IV contrast. Multiplanar reformats were obtained. Dose  reduction techniques were used.    CONTRAST: 90mL Isovue-370    COMPARISON: None.    FINDINGS:   LOWER CHEST: No infiltrates or effusions.    HEPATOBILIARY: No significant mass or bile duct dilatation.  Cholecystectomy.     PANCREAS: No significant mass, duct dilatation, or inflammatory  change.    SPLEEN: Normal size.    ADRENAL GLANDS: No significant nodules.    KIDNEYS/BLADDER: No significant mass, stones, or hydronephrosis.    BOWEL: No obstruction or inflammatory change. Normal appendix. Minimal  diverticulosis without diverticulitis.    PELVIC ORGANS: No pelvic masses.    ADDITIONAL FINDINGS: No ascites.    MUSCULOSKELETAL: No frankly destructive bony lesions.      Impression    IMPRESSION:   No acute process demonstrated.    LONA GARCIAS MD         SYSTEM ID:  [id number]   Results for orders placed or performed in visit on 08/16/22   UA with Microscopic reflex to Culture     Status: Abnormal    Specimen: Urine, Clean Catch   Result Value Ref Range    Color Urine Light Yellow Colorless, Straw, Light Yellow, Yellow    Appearance Urine Clear Clear    Glucose Urine Negative Negative mg/dL    Bilirubin Urine Negative Negative    Ketones Urine Negative Negative mg/dL    Specific Gravity Urine 1.008 1.003 - 1.035    Blood Urine Negative  Negative    pH Urine 6.0 5.0 - 7.0    Protein Albumin Urine Negative Negative mg/dL    Urobilinogen Urine Normal Normal, 2.0 mg/dL    Nitrite Urine Negative Negative    Leukocyte Esterase Urine Negative Negative    Bacteria Urine Few (A) None Seen /HPF    RBC Urine <1 <=2 /HPF    WBC Urine 3 <=5 /HPF    Squamous Epithelials Urine 1 <=1 /HPF    Narrative    Urine Culture not indicated   Comprehensive metabolic panel     Status: Abnormal   Result Value Ref Range    Sodium 137 136 - 145 mmol/L    Potassium 3.9 3.4 - 5.3 mmol/L    Creatinine 0.77 0.51 - 0.95 mg/dL    Urea Nitrogen 6.8 6.0 - 20.0 mg/dL    Chloride 105 98 - 107 mmol/L    Carbon Dioxide (CO2) 20 (L) 22 - 29 mmol/L    Anion Gap 12 7 - 15 mmol/L    Glucose 82 70 - 99 mg/dL    Calcium 8.9 8.6 - 10.0 mg/dL    Protein Total 7.3 6.4 - 8.3 g/dL    Albumin 3.8 3.5 - 5.2 g/dL    Bilirubin Total 0.3 <=1.2 mg/dL    Alkaline Phosphatase 107 (H) 35 - 104 U/L     (H) 10 - 35 U/L     (H) 10 - 35 U/L    GFR Estimate >90 >60 mL/min/1.73m2   Lipase     Status: Normal   Result Value Ref Range    Lipase 30 13 - 60 U/L   CRP inflammation     Status: Abnormal   Result Value Ref Range    CRP Inflammation 8.83 (H) <5.00 mg/L   CBC with platelets and differential     Status: Abnormal   Result Value Ref Range    WBC Count 11.5 (H) 4.0 - 11.0 10e3/uL    RBC Count 4.29 3.80 - 5.20 10e6/uL    Hemoglobin 12.2 11.7 - 15.7 g/dL    Hematocrit 40.0 35.0 - 47.0 %    MCV 93 78 - 100 fL    MCH 28.4 26.5 - 33.0 pg    MCHC 30.5 (L) 31.5 - 36.5 g/dL    RDW 14.6 10.0 - 15.0 %    Platelet Count 293 150 - 450 10e3/uL    % Neutrophils 65 %    % Lymphocytes 23 %    % Monocytes 6 %    % Eosinophils 4 %    % Basophils 1 %    % Immature Granulocytes 1 %    NRBCs per 100 WBC 0 <1 /100    Absolute Neutrophils 7.6 1.6 - 8.3 10e3/uL    Absolute Lymphocytes 2.7 0.8 - 5.3 10e3/uL    Absolute Monocytes 0.6 0.0 - 1.3 10e3/uL    Absolute Eosinophils 0.5 0.0 - 0.7 10e3/uL    Absolute Basophils 0.1  0.0 - 0.2 10e3/uL    Absolute Immature Granulocytes 0.1 <=0.4 10e3/uL    Absolute NRBCs 0.0 10e3/uL   CBC with platelets differential     Status: Abnormal    Narrative    The following orders were created for panel order CBC with platelets differential.  Procedure                               Abnormality         Status                     ---------                               -----------         ------                     CBC with platelets and d...[[id number]]  Abnormal            Final result                 Please view results for these tests on the individual orders.           The use of Dragon/PowerMic dictation services may have been used to construct the content in this note; any grammatical or spelling errors are non-intentional. Please contact the author of this note directly if you are in need of any clarification.

## 2022-08-16 NOTE — PROGRESS NOTES
{PROVIDER CHARTING PREFERENCE:071527}    Subjective   Machelle is a 47 year old{ACCOMPANIED BY STATEMENT (Optional):108744}, presenting for the following health issues:  Abdominal Pain (LUQ pain X 4 days)      HPI     Abdominal/Flank Pain  Onset/Duration: X 1 week  Description:   Character: Dull ache and Stabbing  Location: left upper quadrant  Radiation: Back  Intensity: 5/10  Progression of Symptoms:  worsening  Accompanying Signs & Symptoms:  Fever/Chills: YES- chills intermittent, no fevers  Gas/Bloating: YES  Nausea: YES  Vomiting: no   Diarrhea: YES- chronic, but BM's over the last 2 weeks has noticed a change in BM's, change in color and consistancy  Constipation: no   Dysuria or Hematuria: no   History:   Trauma: no   Previous similar pain: YES- but not to this extent of pain, similar to when she did have her gallbladder   Previous tests done: no   Previous Abdominal Surgery: YES- Gallbladder removed  Precipitating factors:   Does the pain change with:     Food: YES- worse pain    Bowel Movement: no     Urination: no    Other factors:  no   Therapies tried and outcome: none    When did you eat last: water today about 1 hour ago, little  sips, no food    {additonal problems for provider to add (Optional):538648}    Review of Systems   {ROS COMP (Optional):324669}      Objective    LMP 07/26/2022 (Approximate)   There is no height or weight on file to calculate BMI.  Physical Exam   {Exam List (Optional):183239}    {Diagnostic Test Results (Optional):950903}    {AMBULATORY ATTESTATION (Optional):964984}            .  ..

## 2022-08-23 ENCOUNTER — TELEPHONE (OUTPATIENT)
Dept: RHEUMATOLOGY | Facility: CLINIC | Age: 47
End: 2022-08-23

## 2022-08-23 NOTE — TELEPHONE ENCOUNTER
[facility] Call Center    Phone Message    May a detailed message be left on voicemail: yes     Reason for Call: Order(s): Other:   Reason for requested: xray for bi knee   Date needed: ASAP   Provider: Dr. Espinoza     Previous pt of Dr. Espinoza, stating she is having pain daily. Pt states Dr. Espinoza was suppose to put orders in for imaging, but nothing was order.       Action Taken: Other: Mplw Rheum    Travel Screening: Not Applicable

## 2022-08-23 NOTE — TELEPHONE ENCOUNTER
Pt having more knee pain, Dr Espinoza ordered PT for pt knees but did not do imaging. Pt wonders if imaging would be helpful. Advised she would need to see a new provider to establish care for imaging to be ordered. Offered appt tomorrow 8/24 at 2pm with Dr Benoit. Pt will check with her employer and get back to us if this date or time will not work.     Ok to add in next week- double book at 3pm spot on Dr Benoit schedule.

## 2022-08-24 ENCOUNTER — HOSPITAL ENCOUNTER (OUTPATIENT)
Dept: GENERAL RADIOLOGY | Facility: HOSPITAL | Age: 47
Discharge: HOME | End: 2022-08-24
Attending: INTERNAL MEDICINE
Payer: COMMERCIAL

## 2022-08-24 ENCOUNTER — OFFICE VISIT (OUTPATIENT)
Dept: RHEUMATOLOGY | Facility: CLINIC | Age: 47
End: 2022-08-24
Payer: COMMERCIAL

## 2022-08-24 VITALS
BODY MASS INDEX: 48.06 KG/M2 | DIASTOLIC BLOOD PRESSURE: 82 MMHG | SYSTOLIC BLOOD PRESSURE: 130 MMHG | WEIGHT: 280 LBS | HEART RATE: 84 BPM

## 2022-08-24 DIAGNOSIS — R76.8 POSITIVE ANA (ANTINUCLEAR ANTIBODY): ICD-10-CM

## 2022-08-24 DIAGNOSIS — R79.82 CRP ELEVATED: ICD-10-CM

## 2022-08-24 DIAGNOSIS — R70.0 ESR RAISED: ICD-10-CM

## 2022-08-24 DIAGNOSIS — M25.50 MULTIPLE JOINT PAIN: ICD-10-CM

## 2022-08-24 DIAGNOSIS — M25.50 MULTIPLE JOINT PAIN: Primary | ICD-10-CM

## 2022-08-24 DIAGNOSIS — R74.01 ALT (SGPT) LEVEL RAISED: ICD-10-CM

## 2022-08-24 DIAGNOSIS — M79.18 BILATERAL BUTTOCK PAIN: ICD-10-CM

## 2022-08-24 PROCEDURE — 73560 X-RAY EXAM OF KNEE 1 OR 2: CPT | Mod: 50,FY

## 2022-08-24 PROCEDURE — 72200 X-RAY EXAM SI JOINTS: CPT | Mod: FY

## 2022-08-24 PROCEDURE — 99214 OFFICE O/P EST MOD 30 MIN: CPT | Performed by: INTERNAL MEDICINE

## 2022-08-24 NOTE — PROGRESS NOTES
This document was created using a software with less than 100% fidelity, at times resulting in unintended, even erroneous syntax and grammar.  The reader is advised to keep this under consideration while reviewing, interpreting this note.      Rheumatology Consult Note      Machelle Weiner     YOB: 1975 Age: 47 year old    Date of visit: 8/24/22    PCP: Jean Sauceda    Chief Complaint   Patient presents with:  RECHECK: DODIE      Assessment and Plan     Machelle was seen today for recheck.    Diagnoses and all orders for this visit:    Multiple joint pain  -     XR Knee AP/Lat Standing Bilateral; Future    CRP elevated    Positive LYUDMILA (antinuclear antibody)    ALT (SGPT) level raised    ESR raised           This patient with polyarthralgia has positive LYUDMILA in the background, her anti-CCP, rheumatoid factor were negative.  Some of her symptoms are reminiscent of osteoarthritis, such as in her knees.  She has longstanding extensive neck, thoracic lumbar area pain that she follows up at the spine clinic for.  She has it would appear significant sleep apnea and nonrestorative sleep.  The role of that in causing generalized achiness was reviewed with her.  She is anticipating CPAP prescription in near future.  The likelihood that she has inflammatory joint disease connective tissue disease thankfully appears to be remote this is discussed with her reassured her.  The clear etiology for elevated sed rate is unavailable at this time this will require ongoing observations, excess BMI is a well recognized cause.  As she is going to have x-rays of the knees taken as predominantly that is one of the areas of her bigger concerns.  We will meet here in the near future.    Return to clinic: 3 months      HPI   Machelle Weiner is a 47 year old female  is seen today for evaluation of polyarthralgias affecting multiple joint areas including her knees especially.  She has noted pain going back several  years she recalls this may be as long as 20+ years.  But now it is getting to a point where ambulation is getting affected.  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  As she feels as if the knee might give underneath her.  She has noted pain in her fingers going back to her preteens she recalls.  This especially is the case with the ring and middle finger.  She has had no history of psoriasis ulcerative colitis Crohn's disease herself or the immediate family.  She had extensive neck and back pain and work-up.  She has had imaging such as MRIs.  She has had attempted medial branch branch block that was not successful therefore the radiofrequency ablation was not recommended.  She is on current medications as noted.  During some of the work-up elsewhere she was found to have positive LYUDMILA and has referral to rheumatology.  She has noted unrefreshing sleep for quite some time.  As she noted that while she was waiting here in the waiting room she almost dozed off.  She snores.  She has recently consulted her primary physician for looking into sleep apnea.  She has history of with severe migraines.  She does not seem to have definite small bowel symptoms although she does get diarrhea since her cholecystectomy.  She describes no features suggestive of irritable bladder syndrome.  She has hypothyroidism.  On her mom side of the family there is rheumatoid arthritis however none in the immediate family seem to have rheumatoid arthritis or SLE..           Active Problem List     Patient Active Problem List   Diagnosis     Asthma, mild intermittent     Hyperlipidemia LDL goal <160     Essential hypertension, benign     Acquired hypothyroidism     S/P laparoscopic cholecystectomy     Morbid obesity (H)     Migraine     Facet arthropathy, lumbosacral     DDD (degenerative disc disease), lumbosacral     Leg length discrepancy     Positive LYUDMILA (antinuclear antibody)      Multiple joint pain     Chronic pain of both knees     Hip pain     Past Medical History     Past Medical History:   Diagnosis Date     Asthma, mild intermittent      Hypertension      Thyroid disease      Past Surgical History     Past Surgical History:   Procedure Laterality Date     COLONOSCOPY N/A 6/21/2022    Procedure: COLONOSCOPY (fv);  Surgeon: Aries Engle MD;  Location:  GI     GYN SURGERY      cryosurgery of cervix at age 16     LAPAROSCOPIC CHOLECYSTECTOMY WITH CHOLANGIOGRAMS N/A 10/14/2016    Procedure: LAPAROSCOPIC CHOLECYSTECTOMY WITH CHOLANGIOGRAMS;  Surgeon: Cornelius Mueller MD;  Location:  OR     Allergy     Allergies   Allergen Reactions     Codeine      Penicillins      Current Medication List     Current Outpatient Medications   Medication Sig     albuterol (PROAIR HFA/PROVENTIL HFA/VENTOLIN HFA) 108 (90 Base) MCG/ACT inhaler INHALE 2 PUFFS BY MOUTH EVERY 6 HOURS AS NEEDED FOR SHORTNESS OF BREATH OR DIFFICULT BREATHING     albuterol (PROVENTIL) (2.5 MG/3ML) 0.083% neb solution Take 1 vial (2.5 mg) by nebulization every 6 hours as needed for shortness of breath / dyspnea or wheezing     clindamycin (CLINDAMAX) 1 % external gel Apply topically 2 times daily     cyclobenzaprine (FLEXERIL) 10 MG tablet Take 1 tab p.o. qhs (if feeling groggy the following morning, can try taking half a tablet instead or can take earlier the night before).     DULoxetine (CYMBALTA) 30 MG capsule Take 1 capsule (30 mg) by mouth 2 times daily     Esomeprazole Magnesium (NEXIUM PO) Take 20 mg by mouth     gabapentin (NEURONTIN) 300 MG capsule Take 2 capsules (600 mg) by mouth 3 times daily     ibuprofen (ADVIL,MOTRIN) 600 MG tablet Take 1 tablet (600 mg) by mouth every 6 hours as needed for moderate pain     levothyroxine (SYNTHROID/LEVOTHROID) 75 MCG tablet TAKE 1 TABLET(75 MCG) BY MOUTH DAILY     losartan (COZAAR) 50 MG tablet Take 1 tablet (50 mg) by mouth daily     No current facility-administered  medications for this visit.            Family History     Family History   Problem Relation Age of Onset     Coronary Artery Disease Maternal Grandmother      Hyperlipidemia Maternal Grandmother      Hyperlipidemia Maternal Grandfather      Diabetes No family hx of      Hypertension No family hx of          Physical Exam     COMPREHENSIVE EXAMINATION:  Vitals:    08/24/22 1426   Weight: 127 kg (280 lb)     A well appearing alert oriented female. Vital data as noted above. Her eyes examined for inflammation/scleromalacia. ENT examined for oral mucositis, moisture, thrush, nasal deformity, external ear redness, deformity. Her neck is examined for suppleness and lymphadenopathy.  Cardiopulmonary examination without dyspnea at rest, use of accessory muscles of breathing, wheezing, edema, peripheral or central cyanosis.  Abdomen examined for softness, tenderness, obvious organomegaly.  Skin examined for heliotrope, malar area eruption, lupus pernio, periungual erythema, sclerodactyly, papules, erythema nodosum, purpura, nail pitting, onycholysis, and obvious psoriasis lesion. Neurological examination done for alertness, speech, facial symmetry,  tone and power in upper and lower extremities, and gait. The joint examination is performed for swelling, tenderness, warmth, erythema, and range of motion in the following joints: DIPs, PIPs, MCPs, wrists, first CMC's, elbows, shoulders, hips, knees, ankles, feet; spine for range of motion and paraspinal muscles for tenderness. The salient normal / abnormal findings are appended.  There is no Maller area eruption.  She does not have heliotrope's.  She does not have stomatitis.  She has crowding of the pharyngeal soft tissue.  She does not have synovitis of palpable joints.  She has no periungual erythema.  She does not have sclerodactyly.  There is tenderness in the trochanteric areas.  Sacroiliac area tenderness.  She is not tender across trapezius along the paraspinal region  up to the sacroiliac regions.  She is tender in her joint line of the knees worse on the right side.  This is more so on the medial aspect.    Labs / Imaging (select studies)     Rheumatoid Factor   Date Value Ref Range Status   03/22/2022 <6 <12 IU/mL Final     Uric Acid   Date Value Ref Range Status   03/22/2022 6.2 (H) 2.6 - 6.0 mg/dL Final      Hemoglobin   Date Value Ref Range Status   08/16/2022 12.2 11.7 - 15.7 g/dL Final   04/19/2022 12.6 11.7 - 15.7 g/dL Final   03/22/2021 12.9 11.7 - 15.7 g/dL Final   08/21/2020 13.1 11.7 - 15.7 g/dL Final   04/29/2019 14.5 11.7 - 15.7 g/dL Final     Urea Nitrogen   Date Value Ref Range Status   08/16/2022 6.8 6.0 - 20.0 mg/dL Final   03/22/2022 10 7 - 30 mg/dL Final   03/22/2021 11 7 - 30 mg/dL Final   08/21/2020 11 7 - 30 mg/dL Final   02/28/2018 13 7 - 30 mg/dL Final     Sed Rate   Date Value Ref Range Status   08/19/2015 13 0 - 20 mm/h Final     Erythrocyte Sedimentation Rate   Date Value Ref Range Status   03/22/2022 40 (H) 0 - 20 mm/hr Final     CRP Inflammation   Date Value Ref Range Status   08/16/2022 8.83 (H) <5.00 mg/L Final   03/22/2022 8.7 (H) 0.0 - 8.0 mg/L Final   10/03/2016 8.6 (H) 0.0 - 8.0 mg/L Final     AST   Date Value Ref Range Status   08/16/2022 160 (H) 10 - 35 U/L Final   03/22/2022 71 (H) 0 - 45 U/L Final   03/22/2021 27 0 - 45 U/L Final   08/21/2020 25 0 - 45 U/L Final   02/28/2018 60 (H) 0 - 45 U/L Final     Albumin   Date Value Ref Range Status   08/16/2022 3.8 3.5 - 5.2 g/dL Final   03/22/2022 3.5 3.4 - 5.0 g/dL Final   03/22/2021 3.6 3.4 - 5.0 g/dL Final   08/21/2020 3.6 3.4 - 5.0 g/dL Final   02/28/2018 3.5 3.4 - 5.0 g/dL Final     Alkaline Phosphatase   Date Value Ref Range Status   08/16/2022 107 (H) 35 - 104 U/L Final   03/22/2022 104 40 - 150 U/L Final   03/22/2021 103 40 - 150 U/L Final   08/21/2020 98 40 - 150 U/L Final   02/28/2018 127 40 - 150 U/L Final     ALT   Date Value Ref Range Status   08/16/2022 158 (H) 10 - 35 U/L Final    03/22/2022 89 (H) 0 - 50 U/L Final   03/22/2021 31 0 - 50 U/L Final   08/21/2020 31 0 - 50 U/L Final   02/28/2018 87 (H) 0 - 50 U/L Final     Rheumatoid Factor   Date Value Ref Range Status   03/22/2022 <6 <12 IU/mL Final          Immunization History     Immunization History   Administered Date(s) Administered     COVID-19,PF,Pfizer (12+ Yrs) 04/14/2021, 05/06/2021     Influenza (IIV3) PF 11/05/2014

## 2022-08-26 ENCOUNTER — NURSE TRIAGE (OUTPATIENT)
Dept: FAMILY MEDICINE | Facility: CLINIC | Age: 47
End: 2022-08-26

## 2022-08-26 DIAGNOSIS — R10.13 EPIGASTRIC PAIN: Primary | ICD-10-CM

## 2022-08-26 DIAGNOSIS — R19.8 CHANGE IN BOWEL MOVEMENT: ICD-10-CM

## 2022-08-26 DIAGNOSIS — R10.11 RUQ ABDOMINAL PAIN: ICD-10-CM

## 2022-08-26 NOTE — TELEPHONE ENCOUNTER
"Nurse Triage SBAR    Is this a 2nd Level Triage? YES, LICENSED PRACTITIONER REVIEW IS REQUIRED    Situation: Patient has upper right sided abdominal pain. She was seen in the ADS for this on 8/16/22. Since then her abdominal pain has slowly increased in severity.     Background: Patient has history of stomach ulcers and a gallbladder removal.     Assessment: Patient reports pain in her upper right abdomen wrapping around to her back and into her left scapula. Pain is a 6 out of 10 but will having stabbing pain of a 9 that will sometimes occur. Last had 9 out of 10 pain last night. Pain has been constant for the last week.     Patient is also quite nauseated and has loose to diarrheal stools (normal for her.)     Protocol Recommended Disposition:   No disposition on file.    Recommendation: Let's see how your provider would like you to be seen.      Routed to provider    Does the patient meet one of the following criteria for ADS visit consideration? 16+ years old, with an [facility] PCP     TIP  Providers, please consider if this condition is appropriate for management at one of our Acute and Diagnostic Services sites.     If patient is a good candidate, please use dotphrase <dot>triageresponse and select Refer to ADS to document.      Answer Assessment - Initial Assessment Questions  1. LOCATION: \"Where does it hurt?\"       Upper right side of the abdomen  2. RADIATION: \"Does the pain shoot anywhere else?\" (e.g., chest, back)      Radiates around her back and up into left scapula  3. ONSET: \"When did the pain begin?\" (e.g., minutes, hours or days ago)       Started 4 weeks ago  4. SUDDEN: \"Gradual or sudden onset?\"      Gradual   5. PATTERN \"Does the pain come and go, or is it constant?\"     - If constant: \"Is it getting better, staying the same, or worsening?\"       (Note: Constant means the pain never goes away completely; most serious pain is constant and it progresses)      - If intermittent: \"How long does it " "last?\" \"Do you have pain now?\"      (Note: Intermittent means the pain goes away completely between bouts)      Started out intermittent but for the last week pain has been constant  6. SEVERITY: \"How bad is the pain?\"  (e.g., Scale 1-10; mild, moderate, or severe)    - MILD (1-3): doesn't interfere with normal activities, abdomen soft and not tender to touch     - MODERATE (4-7): interferes with normal activities or awakens from sleep, abdomen tender to touch     - SEVERE (8-10): excruciating pain, doubled over, unable to do any normal activities       Pain is a 6 on a scale of 1-10. Pain will sometimes spike to a 9 and be a stabbing pain.   7. RECURRENT SYMPTOM: \"Have you ever had this type of stomach pain before?\" If Yes, ask: \"When was the last time?\" and \"What happened that time?\"       Has had pain in the same area before but not as severe  8. CAUSE: \"What do you think is causing the stomach pain?\"      Might have primary biliary cholangitis.   9. RELIEVING/AGGRAVATING FACTORS: \"What makes it better or worse?\" (e.g., movement, antacids, bowel movement)      Sometimes food will trigger the pain.   10. OTHER SYMPTOMS: \"Do you have any other symptoms?\" (e.g., back pain, diarrhea, fever, urination pain, vomiting)        Back pain, diarrhea but normal for her, nausea.   11. PREGNANCY: \"Is there any chance you are pregnant?\" \"When was your last menstrual period?\"        no    Protocols used: ABDOMINAL PAIN - FEMALE-A-[place]    Amelia Garg RN on 8/26/2022 at 9:57 AM    "

## 2022-08-26 NOTE — TELEPHONE ENCOUNTER
Did she get better after the ADS interventions? What have BMs been like leading up the past week? Any vomiting or fevers. Would like to try to keep her from ER visit. Has she tried anything at home other than her nexium?

## 2022-08-29 ENCOUNTER — LAB (OUTPATIENT)
Dept: LAB | Facility: CLINIC | Age: 47
End: 2022-08-29
Payer: COMMERCIAL

## 2022-08-29 DIAGNOSIS — R10.11 RUQ ABDOMINAL PAIN: ICD-10-CM

## 2022-08-29 DIAGNOSIS — M25.50 MULTIPLE JOINT PAIN: ICD-10-CM

## 2022-08-29 DIAGNOSIS — R76.8 POSITIVE ANA (ANTINUCLEAR ANTIBODY): ICD-10-CM

## 2022-08-29 DIAGNOSIS — R79.82 CRP ELEVATED: ICD-10-CM

## 2022-08-29 LAB
ALBUMIN UR-MCNC: NEGATIVE MG/DL
APPEARANCE UR: CLEAR
BASOPHILS # BLD AUTO: 0.1 10E3/UL (ref 0–0.2)
BASOPHILS NFR BLD AUTO: 1 %
BILIRUB UR QL STRIP: NEGATIVE
COLOR UR AUTO: YELLOW
EOSINOPHIL # BLD AUTO: 0.4 10E3/UL (ref 0–0.7)
EOSINOPHIL NFR BLD AUTO: 4 %
ERYTHROCYTE [DISTWIDTH] IN BLOOD BY AUTOMATED COUNT: 14.2 % (ref 10–15)
GLUCOSE UR STRIP-MCNC: NEGATIVE MG/DL
HCT VFR BLD AUTO: 39.2 % (ref 35–47)
HGB BLD-MCNC: 11.9 G/DL (ref 11.7–15.7)
HGB UR QL STRIP: NEGATIVE
IMM GRANULOCYTES # BLD: 0.1 10E3/UL
IMM GRANULOCYTES NFR BLD: 1 %
KETONES UR STRIP-MCNC: NEGATIVE MG/DL
LEUKOCYTE ESTERASE UR QL STRIP: NEGATIVE
LYMPHOCYTES # BLD AUTO: 3.4 10E3/UL (ref 0.8–5.3)
LYMPHOCYTES NFR BLD AUTO: 31 %
MCH RBC QN AUTO: 28.3 PG (ref 26.5–33)
MCHC RBC AUTO-ENTMCNC: 30.4 G/DL (ref 31.5–36.5)
MCV RBC AUTO: 93 FL (ref 78–100)
MONOCYTES # BLD AUTO: 0.6 10E3/UL (ref 0–1.3)
MONOCYTES NFR BLD AUTO: 6 %
MUCOUS THREADS #/AREA URNS LPF: PRESENT /LPF
NEUTROPHILS # BLD AUTO: 6.3 10E3/UL (ref 1.6–8.3)
NEUTROPHILS NFR BLD AUTO: 57 %
NITRATE UR QL: NEGATIVE
NRBC # BLD AUTO: 0 10E3/UL
NRBC BLD AUTO-RTO: 0 /100
PH UR STRIP: 5.5 [PH] (ref 5–7)
PLATELET # BLD AUTO: 294 10E3/UL (ref 150–450)
RBC # BLD AUTO: 4.2 10E6/UL (ref 3.8–5.2)
RBC URINE: 2 /HPF
SP GR UR STRIP: 1.02 (ref 1–1.03)
SQUAMOUS EPITHELIAL: 1 /HPF
TOTAL PROTEIN SERUM FOR ELP: 7 G/DL (ref 6.4–8.3)
UROBILINOGEN UR STRIP-MCNC: NORMAL MG/DL
WBC # BLD AUTO: 10.9 10E3/UL (ref 4–11)
WBC URINE: 1 /HPF

## 2022-08-29 PROCEDURE — 85730 THROMBOPLASTIN TIME PARTIAL: CPT

## 2022-08-29 PROCEDURE — 86618 LYME DISEASE ANTIBODY: CPT

## 2022-08-29 PROCEDURE — 84166 PROTEIN E-PHORESIS/URINE/CSF: CPT | Performed by: PATHOLOGY

## 2022-08-29 PROCEDURE — 36415 COLL VENOUS BLD VENIPUNCTURE: CPT

## 2022-08-29 PROCEDURE — 84165 PROTEIN E-PHORESIS SERUM: CPT | Mod: 26 | Performed by: PATHOLOGY

## 2022-08-29 PROCEDURE — 86235 NUCLEAR ANTIGEN ANTIBODY: CPT

## 2022-08-29 PROCEDURE — 86160 COMPLEMENT ANTIGEN: CPT

## 2022-08-29 PROCEDURE — 86381 MITOCHONDRIAL ANTIBODY EACH: CPT

## 2022-08-29 PROCEDURE — 86147 CARDIOLIPIN ANTIBODY EA IG: CPT

## 2022-08-29 PROCEDURE — 82043 UR ALBUMIN QUANTITATIVE: CPT

## 2022-08-29 PROCEDURE — 81001 URINALYSIS AUTO W/SCOPE: CPT

## 2022-08-29 PROCEDURE — 85004 AUTOMATED DIFF WBC COUNT: CPT

## 2022-08-29 PROCEDURE — 85390 FIBRINOLYSINS SCREEN I&R: CPT | Mod: 26 | Performed by: PATHOLOGY

## 2022-08-29 PROCEDURE — 84155 ASSAY OF PROTEIN SERUM: CPT

## 2022-08-29 PROCEDURE — 84165 PROTEIN E-PHORESIS SERUM: CPT | Mod: TC | Performed by: PATHOLOGY

## 2022-08-29 PROCEDURE — 84166 PROTEIN E-PHORESIS/URINE/CSF: CPT | Mod: 26

## 2022-08-29 PROCEDURE — 86225 DNA ANTIBODY NATIVE: CPT

## 2022-08-30 ENCOUNTER — TELEPHONE (OUTPATIENT)
Dept: GASTROENTEROLOGY | Facility: CLINIC | Age: 47
End: 2022-08-30

## 2022-08-30 LAB
ALBUMIN SERPL ELPH-MCNC: 3.9 G/DL (ref 3.7–5.1)
ALPHA1 GLOB SERPL ELPH-MCNC: 0.3 G/DL (ref 0.2–0.4)
ALPHA2 GLOB SERPL ELPH-MCNC: 0.7 G/DL (ref 0.5–0.9)
B BURGDOR IGG+IGM SER QL: 0.06
B-GLOBULIN SERPL ELPH-MCNC: 0.9 G/DL (ref 0.6–1)
C3 SERPL-MCNC: 159 MG/DL (ref 81–157)
C4 SERPL-MCNC: 19 MG/DL (ref 13–39)
CARDIOLIPIN IGA SER IA-ACNC: 2.7 APL-U/ML
CARDIOLIPIN IGA SER IA-ACNC: NEGATIVE
CARDIOLIPIN IGG SER IA-ACNC: <2 GPL-U/ML
CARDIOLIPIN IGG SER IA-ACNC: NEGATIVE
CARDIOLIPIN IGM SER IA-ACNC: <2 MPL-U/ML
CARDIOLIPIN IGM SER IA-ACNC: NEGATIVE
CREAT UR-MCNC: 175 MG/DL
DRVVT SCREEN RATIO: 0.88
DSDNA AB SER-ACNC: 4 IU/ML
ENA SCL70 IGG SER IA-ACNC: <0.6 U/ML
ENA SCL70 IGG SER IA-ACNC: NEGATIVE
GAMMA GLOB SERPL ELPH-MCNC: 1.2 G/DL (ref 0.7–1.6)
INR PPP: 1.04 (ref 0.85–1.15)
LA PPP-IMP: NEGATIVE
LUPUS INTERPRETATION: NORMAL
M PROTEIN SERPL ELPH-MCNC: 0 G/DL
MICROALBUMIN UR-MCNC: <12 MG/L
MICROALBUMIN/CREAT UR: NORMAL MG/G{CREAT}
MITOCHONDRIA M2 IGG SER-ACNC: <1 U/ML
PROT PATTERN SERPL ELPH-IMP: NORMAL
PROT PATTERN UR ELPH-IMP: NORMAL
PTT RATIO: 1.05
THROMBIN TIME: 16.8 SECONDS (ref 13–19)

## 2022-08-30 NOTE — TELEPHONE ENCOUNTER
[facility] Call Center    Phone Message    May a detailed message be left on voicemail: yes     Reason for Call: Other: Pt scheduled not within time frame please call for earlier apt, thanks     Action Taken: Other: GI    Travel Screening: Not Applicable

## 2022-09-08 ENCOUNTER — OFFICE VISIT (OUTPATIENT)
Dept: GASTROENTEROLOGY | Facility: CLINIC | Age: 47
End: 2022-09-08
Attending: PHYSICIAN ASSISTANT
Payer: COMMERCIAL

## 2022-09-08 VITALS
OXYGEN SATURATION: 97 % | HEART RATE: 72 BPM | SYSTOLIC BLOOD PRESSURE: 138 MMHG | WEIGHT: 281.3 LBS | DIASTOLIC BLOOD PRESSURE: 87 MMHG | BODY MASS INDEX: 48.29 KG/M2

## 2022-09-08 DIAGNOSIS — R10.13 EPIGASTRIC PAIN: ICD-10-CM

## 2022-09-08 DIAGNOSIS — K76.0 FATTY LIVER: ICD-10-CM

## 2022-09-08 DIAGNOSIS — R13.10 DYSPHAGIA, UNSPECIFIED TYPE: ICD-10-CM

## 2022-09-08 DIAGNOSIS — R79.89 ELEVATED LFTS: Primary | ICD-10-CM

## 2022-09-08 DIAGNOSIS — R10.11 RUQ ABDOMINAL PAIN: ICD-10-CM

## 2022-09-08 DIAGNOSIS — R19.8 CHANGE IN BOWEL MOVEMENT: ICD-10-CM

## 2022-09-08 LAB
ALBUMIN SERPL-MCNC: 3.7 G/DL (ref 3.4–5)
ALP SERPL-CCNC: 115 U/L (ref 40–150)
ALT SERPL W P-5'-P-CCNC: 237 U/L (ref 0–50)
AST SERPL W P-5'-P-CCNC: 264 U/L (ref 0–45)
BILIRUB DIRECT SERPL-MCNC: 0.1 MG/DL (ref 0–0.2)
BILIRUB SERPL-MCNC: 0.4 MG/DL (ref 0.2–1.3)
PROT SERPL-MCNC: 8 G/DL (ref 6.8–8.8)

## 2022-09-08 PROCEDURE — 86803 HEPATITIS C AB TEST: CPT | Performed by: PHYSICIAN ASSISTANT

## 2022-09-08 PROCEDURE — 36415 COLL VENOUS BLD VENIPUNCTURE: CPT | Performed by: PHYSICIAN ASSISTANT

## 2022-09-08 PROCEDURE — 86706 HEP B SURFACE ANTIBODY: CPT | Performed by: PHYSICIAN ASSISTANT

## 2022-09-08 PROCEDURE — 80076 HEPATIC FUNCTION PANEL: CPT | Performed by: PHYSICIAN ASSISTANT

## 2022-09-08 PROCEDURE — 99215 OFFICE O/P EST HI 40 MIN: CPT | Performed by: PHYSICIAN ASSISTANT

## 2022-09-08 PROCEDURE — 87340 HEPATITIS B SURFACE AG IA: CPT | Performed by: PHYSICIAN ASSISTANT

## 2022-09-08 PROCEDURE — 86704 HEP B CORE ANTIBODY TOTAL: CPT | Performed by: PHYSICIAN ASSISTANT

## 2022-09-08 PROCEDURE — 83550 IRON BINDING TEST: CPT | Performed by: PHYSICIAN ASSISTANT

## 2022-09-08 ASSESSMENT — PAIN SCALES - GENERAL: PAINLEVEL: MODERATE PAIN (4)

## 2022-09-08 NOTE — NURSING NOTE
Machelle Weiner's goals for this visit include:   Chief Complaint   Patient presents with     Consult     Epigastric pain on right side and to the left of the center; pain on the right side started as intermittent and is now constant, stabbing and radiating to the back; Elevated LFT's on 8/16/22; changes in bowel habits; history of ulcers; has had gallbladder removed; has GERD- takes Nexium and has been having to take this more than 1 time daily       She requests these members of her care team be copied on today's visit information: PCP    PCP: Jean Sauceda    Referring Provider:  Jean Sauceda PA-C  [id number] [place] [address]    /87 (BP Location: Left arm, Patient Position: Sitting, Cuff Size: Adult Large)   Pulse 72   Wt 127.6 kg (281 lb 4.8 oz)   LMP 07/26/2022 (Approximate)   SpO2 97%   BMI 48.29 kg/m      Do you need any medication refills at today's visit? No    Ema Jacobsen LPN on 9/8/2022 at 3:26 PM

## 2022-09-08 NOTE — PROGRESS NOTES
GI CLINIC VISIT    CC/REFERRING MD:  Jean Sauceda  REASON FOR CONSULTATION:   Jean Sauceda for   Chief Complaint   Patient presents with     Consult     Epigastric pain on right side and to the left of the center; pain on the right side started as intermittent and is now constant, stabbing and radiating to the back; Elevated LFT's on 8/16/22; changes in bowel habits; history of ulcers; has had gallbladder removed; has GERD- takes Nexium and has been having to take this more than 1 time daily       ASSESSMENT/PLAN: Patient is here for evaluation of some ongoing upper abdominal pain that affects both the right and left upper quadrants as well as associated bloating and nausea.  We discussed potential etiologies for her symptoms.  Given that she has had some episodes of dysphagia, I did recommend pursuing EGD.  This will allow us to evaluate her upper gastrointestinal anatomy and potentially obtain biopsies for H. pylori and celiac sprue.  We also discussed her elevated liver enzymes in the context of fatty liver disease.  I will be rechecking hepatic panel today and if they remain elevated, we will be referring patient for FibroScan and will consider checking other potential causes of hepatitis.  I will follow-up with patient after EGD.      RTC after EGD    Thank you for this consultation.  It was a pleasure to participate in the care of this patient; please contact us with any further questions.      52 minutes spent on the date of the encounter doing chart review, patient visit and documentation    This note was created with voice recognition software, and while reviewed for accuracy, typos may remain.     Laz Manley PA-C  Division of Gastroenterology, Hepatology and Nutrition  [facility]  Machelle Weiner is a 47-year-old female with a past medical history significant for chronic musculoskeletal pain, hypothyroidism, hypertension, migraines,  and morbid obesity that presents for evaluation of multiple gastrointestinal symptoms.  She describes having ongoing pain in both the left upper quadrant and right upper quadrant of the abdomen.  She experiences this pain generally daily but has been getting worse recently.  After acute worsening of pain, she was evaluated by acute diagnostic services here and underwent CT scan of the abdomen pelvis, which was essentially normal.  She did have mildly elevated liver enzymes, that had increased from previous check.  She does have known fatty liver, identified on ultrasound in 2018.  She notes that eating generally tends to make her symptoms worse.  The pain in the right side of the upper abdomen often wraps around towards the back.  She is status postcholecystectomy, states that her current pain is mostly dissimilar to the pain she had with biliary colic.  She describes the left upper quadrant pain is often getting worse after eating.  There is associated bloating and nausea.  She does describe having some periods of dysphagia, recalls a time when she ate Chinese food, beef and rice, where it was stuck in her esophagus and she had to force herself to throw up.  She denies having any lower abdominal pain.  Since her cholecystectomy, she typically has less formed stool and goes a few times per day.  She never has any problems with constipation.  There is never any blood in the stool.  She currently takes Nexium for GERD.  Typical heartburn is typically well controlled, sometimes has breakthrough symptoms and uses Pepto-Bismol.  Aside from cholecystectomy, no other abdominal surgeries.  She drinks alcohol minimally, does not smoke, and did admit to some very recent marijuana use to help with her pain, but otherwise does not use regularly.    ROS:    10 point ROS neg other than the symptoms noted above in the HPI.    PREVIOUS ENDOSCOPY:  Colonoscopy in June 2022 was normal.  Remote history of upper endoscopy, recalls  having ulcers at a young age.    PERTINENT RELEVANT IMAGING OR LABS:  CT scan from 8/16/2022 was negative for acute process.  Mildly elevated liver enzymes in the last several weeks.  Abdominal ultrasound from 2018 showed fatty liver.    ALLERGIES:     Allergies   Allergen Reactions     Codeine      Penicillins        PERTINENT MEDICATIONS:    Current Outpatient Medications:      albuterol (PROAIR HFA/PROVENTIL HFA/VENTOLIN HFA) 108 (90 Base) MCG/ACT inhaler, INHALE 2 PUFFS BY MOUTH EVERY 6 HOURS AS NEEDED FOR SHORTNESS OF BREATH OR DIFFICULT BREATHING, Disp: 36 g, Rfl: 1     albuterol (PROVENTIL) (2.5 MG/3ML) 0.083% neb solution, Take 1 vial (2.5 mg) by nebulization every 6 hours as needed for shortness of breath / dyspnea or wheezing, Disp: 90 mL, Rfl: 0     clindamycin (CLINDAMAX) 1 % external gel, Apply topically 2 times daily, Disp: 30 g, Rfl: 2     cyclobenzaprine (FLEXERIL) 10 MG tablet, Take 1 tab p.o. qhs (if feeling groggy the following morning, can try taking half a tablet instead or can take earlier the night before)., Disp: 30 tablet, Rfl: 2     DULoxetine (CYMBALTA) 30 MG capsule, Take 1 capsule (30 mg) by mouth 2 times daily, Disp: 180 capsule, Rfl: 0     Esomeprazole Magnesium (NEXIUM PO), Take 20 mg by mouth, Disp: , Rfl:      gabapentin (NEURONTIN) 300 MG capsule, Take 2 capsules (600 mg) by mouth 3 times daily, Disp: 360 capsule, Rfl: 0     ibuprofen (ADVIL,MOTRIN) 600 MG tablet, Take 1 tablet (600 mg) by mouth every 6 hours as needed for moderate pain, Disp: 30 tablet, Rfl: 1     levothyroxine (SYNTHROID/LEVOTHROID) 75 MCG tablet, TAKE 1 TABLET(75 MCG) BY MOUTH DAILY, Disp: 90 tablet, Rfl: 3     losartan (COZAAR) 50 MG tablet, Take 1 tablet (50 mg) by mouth daily, Disp: 90 tablet, Rfl: 1    PROBLEM LIST  Patient Active Problem List    Diagnosis Date Noted     Chronic pain of both knees 06/24/2022     Priority: Medium     Hip pain 06/24/2022     Priority: Medium     Positive LYUDMILA (antinuclear  antibody) 2022     Priority: Medium     Multiple joint pain 2022     Priority: Medium     Facet arthropathy, lumbosacral 12/10/2021     Priority: Medium     DDD (degenerative disc disease), lumbosacral 12/10/2021     Priority: Medium     Leg length discrepancy 12/10/2021     Priority: Medium     Migraine 2018     Priority: Medium     Morbid obesity (H) 2017     Priority: Medium     S/P laparoscopic cholecystectomy 10/14/2016     Priority: Medium     Acquired hypothyroidism 2016     Priority: Medium     Essential hypertension, benign 2015     Priority: Medium     Hyperlipidemia LDL goal <160 2013     Priority: Medium     Asthma, mild intermittent      Priority: Medium       PERTINENT PAST MEDICAL HISTORY:  Past Medical History:   Diagnosis Date     Asthma, mild intermittent      Hypertension      Thyroid disease        PREVIOUS SURGERIES:  Past Surgical History:   Procedure Laterality Date     COLONOSCOPY N/A 2022    Procedure: COLONOSCOPY (fv);  Surgeon: Aries Engle MD;  Location:  GI     GYN SURGERY      cryosurgery of cervix at age 16     LAPAROSCOPIC CHOLECYSTECTOMY WITH CHOLANGIOGRAMS N/A 10/14/2016    Procedure: LAPAROSCOPIC CHOLECYSTECTOMY WITH CHOLANGIOGRAMS;  Surgeon: Cornelius Mueller MD;  Location:  OR       SOCIAL HISTORY:  Social History     Socioeconomic History     Marital status:      Spouse name: Not on file     Number of children: Not on file     Years of education: Not on file     Highest education level: Not on file   Occupational History     Not on file   Tobacco Use     Smoking status: Former Smoker     Packs/day: 0.30     Types: Cigarettes     Quit date: 2015     Years since quittin.5     Smokeless tobacco: Never Used   Vaping Use     Vaping Use: Former   Substance and Sexual Activity     Alcohol use: No     Alcohol/week: 0.0 standard drinks     Drug use: No     Sexual activity: Yes     Partners: Male     Birth  control/protection: None   Other Topics Concern     Parent/sibling w/ CABG, MI or angioplasty before 65F 55M? Not Asked   Social History Narrative     Not on file     Social Determinants of Health     Financial Resource Strain: Not on file   Food Insecurity: Not on file   Transportation Needs: Not on file   Physical Activity: Not on file   Stress: Not on file   Social Connections: Not on file   Intimate Partner Violence: Not on file   Housing Stability: Not on file       FAMILY HISTORY:  Family History   Problem Relation Age of Onset     Coronary Artery Disease Maternal Grandmother      Hyperlipidemia Maternal Grandmother      Hyperlipidemia Maternal Grandfather      Diabetes No family hx of      Hypertension No family hx of        Past/family/social history reviewed and no changes    PHYSICAL EXAMINATION:  Constitutional: aaox3, cooperative, pleasant, not dyspneic/diaphoretic, no acute distress  Vitals reviewed: /87 (BP Location: Left arm, Patient Position: Sitting, Cuff Size: Adult Large)   Pulse 72   Wt 127.6 kg (281 lb 4.8 oz)   LMP 07/26/2022 (Approximate)   SpO2 97%   BMI 48.29 kg/m    Wt:   Wt Readings from Last 2 Encounters:   09/08/22 127.6 kg (281 lb 4.8 oz)   08/24/22 127 kg (280 lb)      Eyes: Sclera anicteric/injected  CV: No edema  Respiratory: Unlabored breathing  Skin: warm, perfused, no jaundice  Psych: Normal affect  MSK: Normal gait

## 2022-09-08 NOTE — LETTER
9/8/2022         RE: Machelle Weiner  [address] [facility] [address]        Dear Colleague,    Thank you for referring your patient, Machelle Weiner, to the [facility]. Please see a copy of my visit note below.    GI CLINIC VISIT    CC/REFERRING MD:  Jean Sauceda  REASON FOR CONSULTATION:   Jean Sauceda for   Chief Complaint   Patient presents with     Consult     Epigastric pain on right side and to the left of the center; pain on the right side started as intermittent and is now constant, stabbing and radiating to the back; Elevated LFT's on 8/16/22; changes in bowel habits; history of ulcers; has had gallbladder removed; has GERD- takes Nexium and has been having to take this more than 1 time daily       ASSESSMENT/PLAN: Patient is here for evaluation of some ongoing upper abdominal pain that affects both the right and left upper quadrants as well as associated bloating and nausea.  We discussed potential etiologies for her symptoms.  Given that she has had some episodes of dysphagia, I did recommend pursuing EGD.  This will allow us to evaluate her upper gastrointestinal anatomy and potentially obtain biopsies for H. pylori and celiac sprue.  We also discussed her elevated liver enzymes in the context of fatty liver disease.  I will be rechecking hepatic panel today and if they remain elevated, we will be referring patient for FibroScan and will consider checking other potential causes of hepatitis.  I will follow-up with patient after EGD.      RTC after EGD    Thank you for this consultation.  It was a pleasure to participate in the care of this patient; please contact us with any further questions.      52 minutes spent on the date of the encounter doing chart review, patient visit and documentation    This note was created with voice recognition software, and while reviewed for accuracy, typos may remain.     Laz Manley PA-C  Division of  Gastroenterology, Hepatology and Nutrition  [facility]  Machelle Weiner is a 47-year-old female with a past medical history significant for chronic musculoskeletal pain, hypothyroidism, hypertension, migraines, and morbid obesity that presents for evaluation of multiple gastrointestinal symptoms.  She describes having ongoing pain in both the left upper quadrant and right upper quadrant of the abdomen.  She experiences this pain generally daily but has been getting worse recently.  After acute worsening of pain, she was evaluated by acute diagnostic services here and underwent CT scan of the abdomen pelvis, which was essentially normal.  She did have mildly elevated liver enzymes, that had increased from previous check.  She does have known fatty liver, identified on ultrasound in 2018.  She notes that eating generally tends to make her symptoms worse.  The pain in the right side of the upper abdomen often wraps around towards the back.  She is status postcholecystectomy, states that her current pain is mostly dissimilar to the pain she had with biliary colic.  She describes the left upper quadrant pain is often getting worse after eating.  There is associated bloating and nausea.  She does describe having some periods of dysphagia, recalls a time when she ate Chinese food, beef and rice, where it was stuck in her esophagus and she had to force herself to throw up.  She denies having any lower abdominal pain.  Since her cholecystectomy, she typically has less formed stool and goes a few times per day.  She never has any problems with constipation.  There is never any blood in the stool.  She currently takes Nexium for GERD.  Typical heartburn is typically well controlled, sometimes has breakthrough symptoms and uses Pepto-Bismol.  Aside from cholecystectomy, no other abdominal surgeries.  She drinks alcohol minimally, does not smoke, and did admit to some very  recent marijuana use to help with her pain, but otherwise does not use regularly.    ROS:    10 point ROS neg other than the symptoms noted above in the HPI.    PREVIOUS ENDOSCOPY:  Colonoscopy in June 2022 was normal.  Remote history of upper endoscopy, recalls having ulcers at a young age.    PERTINENT RELEVANT IMAGING OR LABS:  CT scan from 8/16/2022 was negative for acute process.  Mildly elevated liver enzymes in the last several weeks.  Abdominal ultrasound from 2018 showed fatty liver.    ALLERGIES:     Allergies   Allergen Reactions     Codeine      Penicillins        PERTINENT MEDICATIONS:    Current Outpatient Medications:      albuterol (PROAIR HFA/PROVENTIL HFA/VENTOLIN HFA) 108 (90 Base) MCG/ACT inhaler, INHALE 2 PUFFS BY MOUTH EVERY 6 HOURS AS NEEDED FOR SHORTNESS OF BREATH OR DIFFICULT BREATHING, Disp: 36 g, Rfl: 1     albuterol (PROVENTIL) (2.5 MG/3ML) 0.083% neb solution, Take 1 vial (2.5 mg) by nebulization every 6 hours as needed for shortness of breath / dyspnea or wheezing, Disp: 90 mL, Rfl: 0     clindamycin (CLINDAMAX) 1 % external gel, Apply topically 2 times daily, Disp: 30 g, Rfl: 2     cyclobenzaprine (FLEXERIL) 10 MG tablet, Take 1 tab p.o. qhs (if feeling groggy the following morning, can try taking half a tablet instead or can take earlier the night before)., Disp: 30 tablet, Rfl: 2     DULoxetine (CYMBALTA) 30 MG capsule, Take 1 capsule (30 mg) by mouth 2 times daily, Disp: 180 capsule, Rfl: 0     Esomeprazole Magnesium (NEXIUM PO), Take 20 mg by mouth, Disp: , Rfl:      gabapentin (NEURONTIN) 300 MG capsule, Take 2 capsules (600 mg) by mouth 3 times daily, Disp: 360 capsule, Rfl: 0     ibuprofen (ADVIL,MOTRIN) 600 MG tablet, Take 1 tablet (600 mg) by mouth every 6 hours as needed for moderate pain, Disp: 30 tablet, Rfl: 1     levothyroxine (SYNTHROID/LEVOTHROID) 75 MCG tablet, TAKE 1 TABLET(75 MCG) BY MOUTH DAILY, Disp: 90 tablet, Rfl: 3     losartan (COZAAR) 50 MG tablet, Take 1  tablet (50 mg) by mouth daily, Disp: 90 tablet, Rfl: 1    PROBLEM LIST  Patient Active Problem List    Diagnosis Date Noted     Chronic pain of both knees 06/24/2022     Priority: Medium     Hip pain 06/24/2022     Priority: Medium     Positive LYUDMILA (antinuclear antibody) 04/12/2022     Priority: Medium     Multiple joint pain 04/12/2022     Priority: Medium     Facet arthropathy, lumbosacral 12/10/2021     Priority: Medium     DDD (degenerative disc disease), lumbosacral 12/10/2021     Priority: Medium     Leg length discrepancy 12/10/2021     Priority: Medium     Migraine 03/02/2018     Priority: Medium     Morbid obesity (H) 11/20/2017     Priority: Medium     S/P laparoscopic cholecystectomy 10/14/2016     Priority: Medium     Acquired hypothyroidism 02/04/2016     Priority: Medium     Essential hypertension, benign 09/08/2015     Priority: Medium     Hyperlipidemia LDL goal <160 01/25/2013     Priority: Medium     Asthma, mild intermittent      Priority: Medium       PERTINENT PAST MEDICAL HISTORY:  Past Medical History:   Diagnosis Date     Asthma, mild intermittent      Hypertension      Thyroid disease        PREVIOUS SURGERIES:  Past Surgical History:   Procedure Laterality Date     COLONOSCOPY N/A 6/21/2022    Procedure: COLONOSCOPY (fv);  Surgeon: Aries Engle MD;  Location:  GI     GYN SURGERY      cryosurgery of cervix at age 16     LAPAROSCOPIC CHOLECYSTECTOMY WITH CHOLANGIOGRAMS N/A 10/14/2016    Procedure: LAPAROSCOPIC CHOLECYSTECTOMY WITH CHOLANGIOGRAMS;  Surgeon: Cornelius Mueller MD;  Location:  OR       SOCIAL HISTORY:  Social History     Socioeconomic History     Marital status:      Spouse name: Not on file     Number of children: Not on file     Years of education: Not on file     Highest education level: Not on file   Occupational History     Not on file   Tobacco Use     Smoking status: Former Smoker     Packs/day: 0.30     Types: Cigarettes     Quit date: 2/13/2015     Years  since quittin.5     Smokeless tobacco: Never Used   Vaping Use     Vaping Use: Former   Substance and Sexual Activity     Alcohol use: No     Alcohol/week: 0.0 standard drinks     Drug use: No     Sexual activity: Yes     Partners: Male     Birth control/protection: None   Other Topics Concern     Parent/sibling w/ CABG, MI or angioplasty before 65F 55M? Not Asked   Social History Narrative     Not on file     Social Determinants of Health     Financial Resource Strain: Not on file   Food Insecurity: Not on file   Transportation Needs: Not on file   Physical Activity: Not on file   Stress: Not on file   Social Connections: Not on file   Intimate Partner Violence: Not on file   Housing Stability: Not on file       FAMILY HISTORY:  Family History   Problem Relation Age of Onset     Coronary Artery Disease Maternal Grandmother      Hyperlipidemia Maternal Grandmother      Hyperlipidemia Maternal Grandfather      Diabetes No family hx of      Hypertension No family hx of        Past/family/social history reviewed and no changes    PHYSICAL EXAMINATION:  Constitutional: aaox3, cooperative, pleasant, not dyspneic/diaphoretic, no acute distress  Vitals reviewed: /87 (BP Location: Left arm, Patient Position: Sitting, Cuff Size: Adult Large)   Pulse 72   Wt 127.6 kg (281 lb 4.8 oz)   LMP 2022 (Approximate)   SpO2 97%   BMI 48.29 kg/m    Wt:   Wt Readings from Last 2 Encounters:   22 127.6 kg (281 lb 4.8 oz)   22 127 kg (280 lb)      Eyes: Sclera anicteric/injected  CV: No edema  Respiratory: Unlabored breathing  Skin: warm, perfused, no jaundice  Psych: Normal affect  MSK: Normal gait                        Again, thank you for allowing me to participate in the care of your patient.        Sincerely,        Laz Manley PA-C

## 2022-09-08 NOTE — PATIENT INSTRUCTIONS
It was nice meeting you today.  As we discussed, my recommendation is proceeding with an upper endoscopy to evaluate your symptoms.  This will help us rule out a number of esophageal, stomach, and duodenal conditions that may be contributing to your ongoing pain.  Our schedulers will reach out to you in the next few days to get this study scheduled.    I have also ordered repeat liver panel today.  Your most recent set of liver enzymes was mildly elevated.  As we discussed, this is most likely due to your known fatty infiltration of the liver.  If the enzymes remain elevated, I will be setting up for a specific liver ultrasound to check for any stiffness in the liver.

## 2022-09-12 DIAGNOSIS — R79.89 ELEVATED LFTS: ICD-10-CM

## 2022-09-12 LAB
HBV CORE AB SERPL QL IA: NONREACTIVE
HBV SURFACE AB SERPL IA-ACNC: 5.26 M[IU]/ML
HBV SURFACE AB SERPL IA-ACNC: NONREACTIVE M[IU]/ML
HBV SURFACE AG SERPL QL IA: NONREACTIVE
HCV AB SERPL QL IA: NONREACTIVE
IRON SATN MFR SERPL: 18 % (ref 15–46)
IRON SERPL-MCNC: 74 UG/DL (ref 35–180)
TIBC SERPL-MCNC: 405 UG/DL (ref 240–430)

## 2022-09-12 NOTE — RESULT ENCOUNTER NOTE
Chrissie Carty,    Your lab results are available for you to review.  Unfortunately, your liver enzymes have increased further.  As we had discussed during your visit, increases in liver enzymes can be related to fatty liver, but I would not expect them to increase in the short period of time.  I have placed some additional lab orders and I would like you to schedule a lab visit this week to get these tests done so we can investigate this further.    Please let me know if you have any questions.    Sincerely,  Laz Manley PA-C

## 2022-09-13 ENCOUNTER — TELEPHONE (OUTPATIENT)
Dept: GASTROENTEROLOGY | Facility: CLINIC | Age: 47
End: 2022-09-13

## 2022-09-13 DIAGNOSIS — R79.89 ELEVATED LFTS: ICD-10-CM

## 2022-09-13 DIAGNOSIS — R10.13 EPIGASTRIC PAIN: Primary | ICD-10-CM

## 2022-09-13 DIAGNOSIS — R13.10 DYSPHAGIA, UNSPECIFIED TYPE: ICD-10-CM

## 2022-09-13 DIAGNOSIS — K76.0 FATTY LIVER: ICD-10-CM

## 2022-09-13 NOTE — TELEPHONE ENCOUNTER
[facility] Call Center    Phone Message    May a detailed message be left on voicemail: yes     Reason for Call: Other: Pt called wanting to know if Laz Manley could put in an order for upper endoscopy. Pt also requests call back, she thinks that liver scan needs to happen as she is in pain and it gets stronger every day. Please call pt back at [phone].     Action Taken: Other: [facility]    Travel Screening: Not Applicable

## 2022-09-13 NOTE — TELEPHONE ENCOUNTER
Spoke with patient.   Patient is inquiring if provider could place order for upper endoscopy.   She is concerned (and frustrated) about the continued pain that she has been having - described as similar to when she was having gall bladder issues - stabbing at times and has migrated slightly to the left.   Patient expresses that she would like to have the fibroscan done as well so that all avenues are exhausted in determining what is causing her pain.   Writer told patient that she would forward her message and concerns to provider. Patient appreciative.     Laverne Evangelista RN

## 2022-09-13 NOTE — RESULT ENCOUNTER NOTE
Your additional blood tests have come back normal, which is reassuring.  My recommendation would be to repeat your liver tests in the next couple of months to make sure they remain stable.  I think the most likely explanation is that you have elevated liver enzymes from the fatty liver.  Based on your age and other risk factors, you are unlikely to have any fibrosis or scarring in your liver at this time.    Please let me know if you have any questions.    Sincerely,  Laz Manley PA-C

## 2022-09-14 ENCOUNTER — HOSPITAL ENCOUNTER (OUTPATIENT)
Facility: CLINIC | Age: 47
End: 2022-09-14
Attending: INTERNAL MEDICINE | Admitting: INTERNAL MEDICINE
Payer: COMMERCIAL

## 2022-09-14 ENCOUNTER — TELEPHONE (OUTPATIENT)
Dept: GASTROENTEROLOGY | Facility: CLINIC | Age: 47
End: 2022-09-14

## 2022-09-14 NOTE — TELEPHONE ENCOUNTER
Spoke with patient that EGD and Fibroscan referrals have been placed by provider.   Scheduling numbers given.   Patient appreciative of phone call.     Laverne Evangelista RN

## 2022-09-14 NOTE — TELEPHONE ENCOUNTER
Screening Questions    BlueKIND OF PREP RedLOCATION [review exclusion criteria] GreenSEDATION TYPE      1. Are you active on mychart? Y    2. What insurance is in the chart? Y     3.   Ordering/Referring Provider: Laz Manley PA-C    4. BMI   (If greater than 40 review exclusion criteria [PAC APPT IF [MAC] @ U)  48.1  [If yes, BMI OVER 40-EXTENDED PREP]      **(Sedation review/consideration needed)**  Do you have a legal guardian or Medical Power of    and/or are you able to give consent for your medical care?   N    5. Have you had a positive covid test in the last 90 days?   N -     6.  Are you currently on dialysis?   N [ If yes, G-PREP & HOSPITAL setting ONLY]     7.  Do you have chronic kidney disease?  N [ If yes, G-PREP ]    8.   Do you have a diagnosis of diabetes?   N   [ If yes, G-PREP ]    9.  On a regular basis do you go 3-5 days between bowel movements?   N   [ If yes, EXTENDED PREP]    10.  Are you taking any prescription pain medications on a routine schedule?    N -  [ If yes, EXTENDED PREP] [If yes, MAC]      11.   Do you have any chemical dependencies such as alcohol, street drugs, or methadone?    N [If yes, MAC]    12.   Do you have any history of post-traumatic stress syndrome, severe anxiety or history of psychosis?    N  [If yes, MAC]    13.  [FEMALES] Are you currently pregnant? N    If yes, how many weeks?       Respiratory/Heart Screening:  [If yes to any of the following HOSPITAL setting only]     14. Do you have Pulmonary Hypertension [Lungs]?   N       15. Do you have UNCONTROLLED asthma?   N     16.  Do you use daily home oxygen?  N      17. Do you have mod to severe Obstructive Sleep Apnea?         (OKAY @  [facility]  [facility]  PH  [facility]  MG - if pt is not on OXYGEN)  N      18.   Have you had a heart or lung transplant?   N      19.   Have you had a stroke or Transient ischemic attack (TIA - aka  mini stroke ) within 6 months?  (If yes, please review exclusion criteria)  N      20.   In the past 6 months, have you had any heart related issues including cardiomyopathy or heart attack?   N           If yes, did it require cardiac stenting or other implantable device?         21.   Do you have any implantable devices in your body (pacemaker, defib, LVAD)? (If yes, please review exclusion criteria)  N   22.  Do you take the medication Phentermine?  NO        23. Do you take nitroglycerin?   N           If yes, how often?   (if yes, HOSPITAL setting ONLY)    24.  Are you currently taking any blood thinners?    [If yes, INFORM patient to follw up w/ ORDERING PROVIDER FOR BRIDGING INSTRUCTIONS]     N    25.   Do you transfer independently?                (If NO, please HOSPITAL setting ONLY)  Y    26.   Preferred LOCAL Pharmacy for Pre Prescription:           [facility] [id number] [facility] [address] [facility]    Scheduling Details  (Please ask for phone number if not scheduled by patient)      Caller : Machelle Weiner  Date of Procedure: 09/22/22  Surgeon: DR IRVING  [place] [facility] [place] Type: MAC l   Conscious Sedation- Needs  for 6 hours after the procedure  MAC/General-Needs  for 24 hours after procedure    N :[Pre-op Required] at [facility]  SH  MG and OR for MAC sedation   (advise patient they will need a pre-op WITH IN 30 DAYS of procedure date)     Type of Procedure Scheduled:   Upper Endoscopy [EGD]    Which Colonoscopy Prep was Sent?:    -     [place] [facility] PATIENTS & [facility] PATIENTS NEEDS EXTENDED PREP       Informed patient they will need an adult  Y  Cannot take any type of public or medical transportation alone    Pre-Procedure Covid test to be completed at [facility] or Externally: Y  **INFORMED OF HOME TESTING & LAB OPTION**        Confirmed Nurse will call to complete assessment Y    Additional comments:

## 2022-09-15 ENCOUNTER — TELEPHONE (OUTPATIENT)
Dept: GASTROENTEROLOGY | Facility: CLINIC | Age: 47
End: 2022-09-15

## 2022-09-15 NOTE — TELEPHONE ENCOUNTER
Called the Pt to discuss below. No answer, Left message.     Patient scheduled for EGD on 9/22/22.     Discuss at home test option.     Needs Pre-Op: Pt has PAC eval scheduled for 9/20/22    Arrival time: 1140    Facility location: [facility]    Sedation type: MAC    Indication for procedure: dysphagia, heartburn    Anticoagulations? None     Prep instructions sent via [employer]    Pre visit planning completed.    Tess Sauer RN

## 2022-09-15 NOTE — TELEPHONE ENCOUNTER
FUTURE VISIT INFORMATION      SURGERY INFORMATION:    Date: 22    Location:  gi    Surgeon:  Aries Wright MD    Anesthesia Type:  mac    Procedure: ESOPHAGOGASTRODUODENOSCOPY (EGD)    RECORDS REQUESTED FROM:        Primary Care Provider: Jean Sauceda PA-C- [facility]    Pertinent Medical History: hypertensiom    Most recent EKG+ [id number]

## 2022-09-15 NOTE — TELEPHONE ENCOUNTER
Caller: Machelle Weiner    Procedure: EGD    Date, Location, and Surgeon of Procedure Cancelled: 09/22/22/ AMANDA, DR IRVING     Ordering Provider:Laz Manley PA-C    Reason for cancel (please be detailed, any staff messages or encounters to note?):  WRONG LOCATION, SCHEDULING ERROR        Rescheduled: Y     If rescheduled:    Date: 09/22/22   Location: [facility]   Prep Resent: Y(changes to prep?)   Covid Test Rescheduled: Y   Note any change or update to original order/sedation: PT IS AWARE OF THE PAC APPT ON 09/22/22 ON VIDEO.  SHE IS AWARE OF THE LOCATION CHANGE.

## 2022-09-19 ENCOUNTER — ANCILLARY PROCEDURE (OUTPATIENT)
Dept: ULTRASOUND IMAGING | Facility: CLINIC | Age: 47
End: 2022-09-19
Attending: PHYSICIAN ASSISTANT
Payer: COMMERCIAL

## 2022-09-19 DIAGNOSIS — K76.0 FATTY LIVER: ICD-10-CM

## 2022-09-19 DIAGNOSIS — R79.89 ELEVATED LFTS: ICD-10-CM

## 2022-09-19 PROCEDURE — 76981 USE PARENCHYMA: CPT | Mod: GC | Performed by: STUDENT IN AN ORGANIZED HEALTH CARE EDUCATION/TRAINING PROGRAM

## 2022-09-20 ENCOUNTER — ANESTHESIA EVENT (OUTPATIENT)
Dept: GASTROENTEROLOGY | Facility: CLINIC | Age: 47
End: 2022-09-20
Payer: COMMERCIAL

## 2022-09-20 ENCOUNTER — PRE VISIT (OUTPATIENT)
Dept: SURGERY | Facility: CLINIC | Age: 47
End: 2022-09-20

## 2022-09-20 ENCOUNTER — VIRTUAL VISIT (OUTPATIENT)
Dept: SURGERY | Facility: CLINIC | Age: 47
End: 2022-09-20
Payer: COMMERCIAL

## 2022-09-20 DIAGNOSIS — R10.13 EPIGASTRIC PAIN: ICD-10-CM

## 2022-09-20 DIAGNOSIS — Z01.818 PREOP EXAMINATION: Primary | ICD-10-CM

## 2022-09-20 PROCEDURE — 99205 OFFICE O/P NEW HI 60 MIN: CPT | Mod: GT | Performed by: CLINICAL NURSE SPECIALIST

## 2022-09-20 ASSESSMENT — PAIN SCALES - GENERAL: PAINLEVEL: SEVERE PAIN (6)

## 2022-09-20 ASSESSMENT — PATIENT HEALTH QUESTIONNAIRE - PHQ9: SUM OF ALL RESPONSES TO PHQ QUESTIONS 1-9: 18

## 2022-09-20 ASSESSMENT — ENCOUNTER SYMPTOMS: DYSRHYTHMIAS: 0

## 2022-09-20 ASSESSMENT — LIFESTYLE VARIABLES: TOBACCO_USE: 1

## 2022-09-20 NOTE — PATIENT INSTRUCTIONS
Preparing for Your Surgery      Name:  Machelle Weiner   MRN:  [id number]   :  1975   Today's Date:  2022                 -  No Alcohol for at least 24 hours before surgery.     Which medicines can I take?    Hold Aspirin for 7 days before surgery.   Hold Multivitamins for 7 days before surgery.  Hold Supplements for 7 days before surgery.  Hold Ibuprofen (Advil, Motrin) for 1 day before surgery--unless otherwise directed by surgeon.  Hold Naproxen (Aleve) for 4 days before surgery.    -  PLEASE TAKE these medications the day of surgery:  Tylenol if needed; take morning medications.    Bring inhalers if using them.

## 2022-09-20 NOTE — TELEPHONE ENCOUNTER
2nd attempt    Attempted to contact patient regarding upcoming EGD procedure on 9.22.22 for pre assessment questions. No answer.     Left message to return call to [phone] #[phone]    PAC eval today with Josephine Mendenhall APRN CNS    MyChart message sent    Kylie Knight RN

## 2022-09-20 NOTE — H&P
Pre-Operative H & P     CC:  Preoperative exam to assess for increased cardiopulmonary risk while undergoing surgery and anesthesia.    Date of Encounter: 9/20/2022  Primary Care Physician:  Jean Sauceda     Reason for visit:   Encounter Diagnoses   Name Primary?     Preop examination Yes     Epigastric pain        HPI  Machelle Weiner is a 47 year old female who presents for pre-operative H & P in preparation for  Procedure Information     Case: [id number] Date/Time: 09/22/22 1310    Procedure: ESOPHAGOGASTRODUODENOSCOPY (EGD) (N/A Esophagus) - Dysphagia  Epigastric pain    Anesthesia type: MAC    Diagnosis:       Epigastric pain [R10.13]      Dysphagia [R13.10]    Pre-op diagnosis:       Epigastric pain [R10.13]      Dysphagia [R13.10]    Location:  GI 04 /  GI    Providers: Aries Wright MD        History is obtained from the patient and chart review    Patient has recently been undergoing evaluation by the GI team for epigastric pain, elevated LFTs and change in bowel habits. She is also having some episodes of dysphagia. She has past history of gastric ulcers. She has been referred for above procedure for further evaluation.    Her history is otherwise significant for class 3 obesity, HYPERTENSION, KARLIE RF, asthma, GERD, hypothyroidism, migraines, and widespread pain of back and joints. She has OA but is also undergoing evaluation with rheumatology. She has no additional diagnosis to date.     Hx of abnormal bleeding or anti-platelet use: Denies.     Menstrual history: Patient's last menstrual period was 08/29/2022 (within days).     Past Medical History  Past Medical History:   Diagnosis Date     Asthma, mild intermittent      Depression      Epigastric pain      Hypertension      Intractable chronic migraine without aura      Thyroid disease        Past Surgical History  Past Surgical History:   Procedure Laterality Date     COLONOSCOPY N/A 6/21/2022    Procedure: COLONOSCOPY (fv);  Surgeon:  Aries Engle MD;  Location:  GI     GYN SURGERY      cryosurgery of cervix at age 16     LAPAROSCOPIC CHOLECYSTECTOMY WITH CHOLANGIOGRAMS N/A 10/14/2016    Procedure: LAPAROSCOPIC CHOLECYSTECTOMY WITH CHOLANGIOGRAMS;  Surgeon: Cornelius Mueller MD;  Location:  OR       Prior to Admission Medications  Current Outpatient Medications   Medication Sig Dispense Refill     albuterol (PROAIR HFA/PROVENTIL HFA/VENTOLIN HFA) 108 (90 Base) MCG/ACT inhaler INHALE 2 PUFFS BY MOUTH EVERY 6 HOURS AS NEEDED FOR SHORTNESS OF BREATH OR DIFFICULT BREATHING (Patient taking differently: as needed INHALE 2 PUFFS BY MOUTH EVERY 6 HOURS AS NEEDED FOR SHORTNESS OF BREATH OR DIFFICULT BREATHING) 36 g 1     albuterol (PROVENTIL) (2.5 MG/3ML) 0.083% neb solution Take 1 vial (2.5 mg) by nebulization every 6 hours as needed for shortness of breath / dyspnea or wheezing 90 mL 0     clindamycin (CLINDAMAX) 1 % external gel Apply topically 2 times daily (Patient taking differently: Apply topically as needed) 30 g 2     cyclobenzaprine (FLEXERIL) 10 MG tablet Take 1 tab p.o. qhs (if feeling groggy the following morning, can try taking half a tablet instead or can take earlier the night before). (Patient taking differently: as needed Take 1 tab p.o. qhs (if feeling groggy the following morning, can try taking half a tablet instead or can take earlier the night before).) 30 tablet 2     DULoxetine (CYMBALTA) 30 MG capsule Take 1 capsule (30 mg) by mouth 2 times daily 180 capsule 0     Esomeprazole Magnesium (NEXIUM PO) Take 20 mg by mouth every evening       gabapentin (NEURONTIN) 300 MG capsule Take 2 capsules (600 mg) by mouth 3 times daily 360 capsule 0     ibuprofen (ADVIL,MOTRIN) 600 MG tablet Take 1 tablet (600 mg) by mouth every 6 hours as needed for moderate pain 30 tablet 1     levothyroxine (SYNTHROID/LEVOTHROID) 75 MCG tablet TAKE 1 TABLET(75 MCG) BY MOUTH DAILY (Patient taking differently: every morning TAKE 1 TABLET(75 MCG) BY  MOUTH DAILY) 90 tablet 3     losartan (COZAAR) 50 MG tablet Take 1 tablet (50 mg) by mouth daily (Patient taking differently: Take 50 mg by mouth every morning) 90 tablet 1       Allergies  Allergies   Allergen Reactions     Codeine      Penicillins        Social History  Social History     Socioeconomic History     Marital status:      Spouse name: Not on file     Number of children: Not on file     Years of education: Not on file     Highest education level: Not on file   Occupational History     Not on file   Tobacco Use     Smoking status: Former Smoker     Packs/day: 0.30     Types: Cigarettes     Quit date: 2015     Years since quittin.6     Smokeless tobacco: Never Used   Vaping Use     Vaping Use: Former   Substance and Sexual Activity     Alcohol use: No     Alcohol/week: 0.0 standard drinks     Drug use: No     Sexual activity: Yes     Partners: Male     Birth control/protection: None   Other Topics Concern     Parent/sibling w/ CABG, MI or angioplasty before 65F 55M? Not Asked   Social History Narrative     Not on file     Social Determinants of Health     Financial Resource Strain: Not on file   Food Insecurity: Not on file   Transportation Needs: Not on file   Physical Activity: Not on file   Stress: Not on file   Social Connections: Not on file   Intimate Partner Violence: Not on file   Housing Stability: Not on file       Family History  Family History   Problem Relation Age of Onset     Coronary Artery Disease Maternal Grandmother      Hyperlipidemia Maternal Grandmother      Hyperlipidemia Maternal Grandfather      Diabetes No family hx of      Hypertension No family hx of      Anesthesia Reaction No family hx of        Review of Systems  The complete review of systems is negative other than noted in the HPI or here.   Anesthesia Evaluation   Pt has had prior anesthetic. Type: General and MAC.    No history of anesthetic complications       ROS/MED HX  ENT/Pulmonary: Comment: Patient  believes she has sleep apnea but has not been tested yet    Shortness of breath which she is attributing to her current limited mobility and weight gain.     Using albuterol prn    (+) KARLIE risk factors, snores loudly, hypertension, obese, observed stopped breathing, tobacco use, Past use, Intermittent, asthma Last exacerbation: no recent exacerbation,  Treatment: Inhaler prn and Nebulizer prn,   (-) recent URI   Neurologic: Comment: Frequent migraines    (+) migraines,     Cardiovascular:     (+) hypertension-range: 130s/80s / ----CLEMENTS. Previous cardiac testing   Echo: Date: Results:    Stress Test: Date: Results:    ECG Reviewed: Date: 2018 Results:  SR  Cath: Date: Results:   (-) taking anticoagulants/antiplatelets and arrhythmias   METS/Exercise Tolerance: 1 - Eating, dressing Comment: Activity is very limited by back and joint pain   Hematologic:  - neg hematologic  ROS  (-) history of blood clots and history of blood transfusion   Musculoskeletal: Comment: Chronic back and joint pain. Currently undergoing Rheumatology eval. No diagnosis to date.  (+) arthritis,     GI/Hepatic: Comment: Epigastric pain    (+) GERD, Symptomatic,     Renal/Genitourinary:  - neg Renal ROS     Endo:     (+) thyroid problem, hypothyroidism, Obesity,     Psychiatric/Substance Use:     (+) psychiatric history depression     Infectious Disease:  - neg infectious disease ROS     Malignancy:  - neg malignancy ROS     Other:  - neg other ROS    (+) , H/O Chronic Pain,        Virtual visit -  No vitals were obtained    Physical Exam  Constitutional: Awake, alert, cooperative, no apparent distress, and appears stated age.  Eyes: Pupils equal  HENT: Normocephalic  Respiratory: non labored breathing   Neurologic: Awake, alert, oriented to name, place and time.   Neuropsychiatric: Calm, cooperative. Normal affect.      Prior Labs/Diagnostic Studies   All labs and imaging personally reviewed   Lab Results   Component Value Date    WBC 10.9  08/29/2022    WBC 11.4 03/22/2021     Lab Results   Component Value Date    RBC 4.20 08/29/2022    RBC 4.53 03/22/2021     Lab Results   Component Value Date    HGB 11.9 08/29/2022    HGB 12.9 03/22/2021     Lab Results   Component Value Date    HCT 39.2 08/29/2022    HCT 41.9 03/22/2021     Lab Results   Component Value Date    MCV 93 08/29/2022    MCV 93 03/22/2021     Lab Results   Component Value Date    MCH 28.3 08/29/2022    MCH 28.5 03/22/2021     Lab Results   Component Value Date    MCHC 30.4 08/29/2022    MCHC 30.8 03/22/2021     Lab Results   Component Value Date    RDW 14.2 08/29/2022    RDW 14.3 03/22/2021     Lab Results   Component Value Date     08/29/2022     03/22/2021     Last Comprehensive Metabolic Panel:  Sodium   Date Value Ref Range Status   08/16/2022 137 136 - 145 mmol/L Final   03/22/2021 137 133 - 144 mmol/L Final     Potassium   Date Value Ref Range Status   08/16/2022 3.9 3.4 - 5.3 mmol/L Final   03/22/2022 4.0 3.4 - 5.3 mmol/L Final   03/22/2021 4.1 3.4 - 5.3 mmol/L Final     Chloride   Date Value Ref Range Status   08/16/2022 105 98 - 107 mmol/L Final   03/22/2022 108 94 - 109 mmol/L Final   03/22/2021 108 94 - 109 mmol/L Final     Carbon Dioxide   Date Value Ref Range Status   03/22/2021 28 20 - 32 mmol/L Final     Carbon Dioxide (CO2)   Date Value Ref Range Status   08/16/2022 20 (L) 22 - 29 mmol/L Final   03/22/2022 25 20 - 32 mmol/L Final     Anion Gap   Date Value Ref Range Status   08/16/2022 12 7 - 15 mmol/L Final   03/22/2022 4 3 - 14 mmol/L Final   03/22/2021 1 (L) 3 - 14 mmol/L Final     Glucose   Date Value Ref Range Status   08/16/2022 82 70 - 99 mg/dL Final   03/22/2022 86 70 - 99 mg/dL Final   03/22/2021 68 (L) 70 - 99 mg/dL Final     Urea Nitrogen   Date Value Ref Range Status   08/16/2022 6.8 6.0 - 20.0 mg/dL Final   03/22/2022 10 7 - 30 mg/dL Final   03/22/2021 11 7 - 30 mg/dL Final     Creatinine   Date Value Ref Range Status   08/16/2022 0.77 0.51 -  0.95 mg/dL Final   2021 0.94 0.52 - 1.04 mg/dL Final     GFR Estimate   Date Value Ref Range Status   2022 >90 >60 mL/min/1.73m2 Final     Comment:     Effective 2021 eGFRcr in adults is calculated using the  CKD-EPI creatinine equation which includes age and gender (Elliot abad al., [place], DOI: 10.1056/NKSJmt6944299)   2021 74 >60 mL/min/[1.73_m2] Final     Comment:     Non  GFR Calc  Starting 2018, serum creatinine based estimated GFR (eGFR) will be   calculated using the Chronic Kidney Disease Epidemiology Collaboration   (CKD-EPI) equation.       Calcium   Date Value Ref Range Status   2022 8.9 8.6 - 10.0 mg/dL Final   2021 9.1 8.5 - 10.1 mg/dL Final     Lab Results   Component Value Date     2022    AST 27 2021     Lab Results   Component Value Date     2022    ALT 31 2021     No results found for: BILICONJ   Lab Results   Component Value Date    BILITOTAL 0.4 2022    BILITOTAL 0.2 2021     Lab Results   Component Value Date    ALBUMIN 3.7 2022    ALBUMIN 3.6 2021     Lab Results   Component Value Date    PROTTOTAL 8.0 2022    PROTTOTAL 7.8 2021      Lab Results   Component Value Date    ALKPHOS 115 2022    ALKPHOS 103 2021 TSH 3.60    EK Sinus rhythm    US Elastograph 22                                                                     IMPRESSION:     The median liver stiffness is 1.25 m/sec and the IQR/median value is  0.24, though the and elastography median stiffness value suggests  probability of being normal; obtained data set is unreliable; consider  MR elastography for further evaluation    Xray knee 22                                                                     IMPRESSION:   RIGHT KNEE: Mild tricompartmental degenerative change. There is a joint effusion, favor mechanical as opposed to inflammatory. No erosive change  or fracture.     LEFT KNEE: No erosive change or significant joint space narrowing. No joint effusion or fracture.    CT abd/pelvis 8/16/22                                                                     IMPRESSION:   No acute process demonstrated.    'The patient's records and results personally reviewed by this provider.     Outside records reviewed from: Care Everywhere      Assessment      Machelle Weiner is a 47 year old female seen as a PAC referral for risk assessment and optimization for anesthesia.    Plan/Recommendations  Pt will be optimized for the proposed procedure.  See below for details on the assessment, risk, and preoperative recommendations    NEUROLOGY  - No history of TIA, CVA or seizure  - Chronic pain in back and joints. Will take gabapentin and Duloxetine on day of procedure. May require some assistance with positioning for procedure.   - Frequent migraines, sometimes 3 weekly. No specific meds, and now is trying to limit NSAIDS and Tylenol.   -Post Op delirium risk factors:  No risk identified    ENT  - No current airway concerns.  Will need to be reassessed day of surgery.  Mallampati: Unable to assess  TM: Unable to assess     CARDIAC  HYPERTENSION. Average in clinic 130s/80s. Will take losartan on day of procedure. No other cardiac history, symptoms or meds. Activity is very limited due to pain.   - METS (Metabolic Equivalents)<4    RCRI: 0.4% risk of serious cardiac events    PULMONARY  Mild intermittent asthma. No recent exacerbation but has progressively had more shortness of breath she attributes to her limited mobility and weight gain. Albuterol inhaler and nebs prn. May use and will bring albuterol on day of procedure.   Will take Zyrtec on day of procedure.    Intermediate to high risk for KARLIE.  Patient feels that she has KARLIE but has not been tested.     - Tobacco History      History   Smoking Status     Former Smoker     Packs/day: 0.30     Types: Cigarettes     Quit date:  "2/13/2015   Smokeless Tobacco     Never Used       GI: Epigastric pain. Some dysphagia. GERD Nexium at HS.   PONV Medium Risk  Total Score: 2           1 AN PONV: Pt is Female    1 AN PONV: Patient is not a current smoker        /RENAL  - Baseline Creatinine 0.77    ENDOCRINE   - BMI: Estimated body mass index is 48.29 kg/m  as calculated from the following:    Height as of 6/21/22: 1.626 m (5' 4\").    Weight as of 9/8/22: 127.6 kg (281 lb 4.8 oz).  Class 3 Obesity (BMI > 40)  - No history of Diabetes Mellitus Random glucose 82  - Hypothyroidism. Will take Synthroid on day of procedure.      HEME  VTE Low Risk 0.26%            Total Score: 0       Denies history of blood clots  Denies history of blood transfusion     MSK: OA. Knee pain.     PSYCH  - Depression. Duloxetine as above.      The patient is optimized for their procedure. No further diagnostic testing indicated. Confirmed with patient that she has received instructions from the GI team regarding date, arrival time, eating and drinking, and need for . Encouraged her to review.   AVS with information on meds sent via [employer] by nursing staff.  Please refer to the physical examination documented by the anesthesiologist in the anesthesia record on the day of surgery.    Video-Visit Details    Type of service:  Video Visit    Patient verbally consented to video service today: YES    Video Start Time: 3:50pm   Video End Time (time video stopped): 4:15pm     Originating Location (pt. Location): Home    Distant Location (provider location):  [facility] PREOPERATIVE ASSESSMENT [facility]ENTER     Mode of Communication:  Video Conference via [employer]  On the day of service:     Prep time: 17 minutes  Visit time: 25 minutes  Documentation time: 20 minutes  ------------------------------------------  Total time: 62 minutes      BLANCA Robles CNS  Preoperative Assessment Center  [facility]  Clinic and Surgery Center  Phone: [phone]  Fax: " [phone]

## 2022-09-20 NOTE — H&P (VIEW-ONLY)
Pre-Operative H & P     CC:  Preoperative exam to assess for increased cardiopulmonary risk while undergoing surgery and anesthesia.    Date of Encounter: 9/20/2022  Primary Care Physician:  Jean Sauceda     Reason for visit:   Encounter Diagnoses   Name Primary?     Preop examination Yes     Epigastric pain        HPI  Machelle Weiner is a 47 year old female who presents for pre-operative H & P in preparation for  Procedure Information     Case: [id number] Date/Time: 09/22/22 1310    Procedure: ESOPHAGOGASTRODUODENOSCOPY (EGD) (N/A Esophagus) - Dysphagia  Epigastric pain    Anesthesia type: MAC    Diagnosis:       Epigastric pain [R10.13]      Dysphagia [R13.10]    Pre-op diagnosis:       Epigastric pain [R10.13]      Dysphagia [R13.10]    Location:  [facility]    Providers: Aries Wright MD        History is obtained from the patient and chart review    Patient has recently been undergoing evaluation by the GI team for epigastric pain, elevated LFTs and change in bowel habits. She is also having some episodes of dysphagia. She has past history of gastric ulcers. She has been referred for above procedure for further evaluation.    Her history is otherwise significant for class 3 obesity, HYPERTENSION, KARLIE RF, asthma, GERD, hypothyroidism, migraines, and widespread pain of back and joints. She has OA but is also undergoing evaluation with rheumatology. She has no additional diagnosis to date.     Hx of abnormal bleeding or anti-platelet use: Denies.     Menstrual history: Patient's last menstrual period was 08/29/2022 (within days).     Past Medical History  Past Medical History:   Diagnosis Date     Asthma, mild intermittent      Depression      Epigastric pain      Hypertension      Intractable chronic migraine without aura      Thyroid disease        Past Surgical History  Past Surgical History:   Procedure Laterality Date     COLONOSCOPY N/A 6/21/2022    Procedure: COLONOSCOPY (fv);  Surgeon:  Aries Engle MD;  Location:  GI     GYN SURGERY      cryosurgery of cervix at age 16     LAPAROSCOPIC CHOLECYSTECTOMY WITH CHOLANGIOGRAMS N/A 10/14/2016    Procedure: LAPAROSCOPIC CHOLECYSTECTOMY WITH CHOLANGIOGRAMS;  Surgeon: Corneilus Mueller MD;  Location:  OR       Prior to Admission Medications  Current Outpatient Medications   Medication Sig Dispense Refill     albuterol (PROAIR HFA/PROVENTIL HFA/VENTOLIN HFA) 108 (90 Base) MCG/ACT inhaler INHALE 2 PUFFS BY MOUTH EVERY 6 HOURS AS NEEDED FOR SHORTNESS OF BREATH OR DIFFICULT BREATHING (Patient taking differently: as needed INHALE 2 PUFFS BY MOUTH EVERY 6 HOURS AS NEEDED FOR SHORTNESS OF BREATH OR DIFFICULT BREATHING) 36 g 1     albuterol (PROVENTIL) (2.5 MG/3ML) 0.083% neb solution Take 1 vial (2.5 mg) by nebulization every 6 hours as needed for shortness of breath / dyspnea or wheezing 90 mL 0     clindamycin (CLINDAMAX) 1 % external gel Apply topically 2 times daily (Patient taking differently: Apply topically as needed) 30 g 2     cyclobenzaprine (FLEXERIL) 10 MG tablet Take 1 tab p.o. qhs (if feeling groggy the following morning, can try taking half a tablet instead or can take earlier the night before). (Patient taking differently: as needed Take 1 tab p.o. qhs (if feeling groggy the following morning, can try taking half a tablet instead or can take earlier the night before).) 30 tablet 2     DULoxetine (CYMBALTA) 30 MG capsule Take 1 capsule (30 mg) by mouth 2 times daily 180 capsule 0     Esomeprazole Magnesium (NEXIUM PO) Take 20 mg by mouth every evening       gabapentin (NEURONTIN) 300 MG capsule Take 2 capsules (600 mg) by mouth 3 times daily 360 capsule 0     ibuprofen (ADVIL,MOTRIN) 600 MG tablet Take 1 tablet (600 mg) by mouth every 6 hours as needed for moderate pain 30 tablet 1     levothyroxine (SYNTHROID/LEVOTHROID) 75 MCG tablet TAKE 1 TABLET(75 MCG) BY MOUTH DAILY (Patient taking differently: every morning TAKE 1 TABLET(75 MCG) BY  MOUTH DAILY) 90 tablet 3     losartan (COZAAR) 50 MG tablet Take 1 tablet (50 mg) by mouth daily (Patient taking differently: Take 50 mg by mouth every morning) 90 tablet 1       Allergies  Allergies   Allergen Reactions     Codeine      Penicillins        Social History  Social History     Socioeconomic History     Marital status:      Spouse name: Not on file     Number of children: Not on file     Years of education: Not on file     Highest education level: Not on file   Occupational History     Not on file   Tobacco Use     Smoking status: Former Smoker     Packs/day: 0.30     Types: Cigarettes     Quit date: 2015     Years since quittin.6     Smokeless tobacco: Never Used   Vaping Use     Vaping Use: Former   Substance and Sexual Activity     Alcohol use: No     Alcohol/week: 0.0 standard drinks     Drug use: No     Sexual activity: Yes     Partners: Male     Birth control/protection: None   Other Topics Concern     Parent/sibling w/ CABG, MI or angioplasty before 65F 55M? Not Asked   Social History Narrative     Not on file     Social Determinants of Health     Financial Resource Strain: Not on file   Food Insecurity: Not on file   Transportation Needs: Not on file   Physical Activity: Not on file   Stress: Not on file   Social Connections: Not on file   Intimate Partner Violence: Not on file   Housing Stability: Not on file       Family History  Family History   Problem Relation Age of Onset     Coronary Artery Disease Maternal Grandmother      Hyperlipidemia Maternal Grandmother      Hyperlipidemia Maternal Grandfather      Diabetes No family hx of      Hypertension No family hx of      Anesthesia Reaction No family hx of        Review of Systems  The complete review of systems is negative other than noted in the HPI or here.   Anesthesia Evaluation   Pt has had prior anesthetic. Type: General and MAC.    No history of anesthetic complications       ROS/MED HX  ENT/Pulmonary: Comment: Patient  believes she has sleep apnea but has not been tested yet    Shortness of breath which she is attributing to her current limited mobility and weight gain.     Using albuterol prn    (+) KARLIE risk factors, snores loudly, hypertension, obese, observed stopped breathing, tobacco use, Past use, Intermittent, asthma Last exacerbation: no recent exacerbation,  Treatment: Inhaler prn and Nebulizer prn,   (-) recent URI   Neurologic: Comment: Frequent migraines    (+) migraines,     Cardiovascular:     (+) hypertension-range: 130s/80s / ----CLEMENTS. Previous cardiac testing   Echo: Date: Results:    Stress Test: Date: Results:    ECG Reviewed: Date: 2018 Results:  SR  Cath: Date: Results:   (-) taking anticoagulants/antiplatelets and arrhythmias   METS/Exercise Tolerance: 1 - Eating, dressing Comment: Activity is very limited by back and joint pain   Hematologic:  - neg hematologic  ROS  (-) history of blood clots and history of blood transfusion   Musculoskeletal: Comment: Chronic back and joint pain. Currently undergoing Rheumatology eval. No diagnosis to date.  (+) arthritis,     GI/Hepatic: Comment: Epigastric pain    (+) GERD, Symptomatic,     Renal/Genitourinary:  - neg Renal ROS     Endo:     (+) thyroid problem, hypothyroidism, Obesity,     Psychiatric/Substance Use:     (+) psychiatric history depression     Infectious Disease:  - neg infectious disease ROS     Malignancy:  - neg malignancy ROS     Other:  - neg other ROS    (+) , H/O Chronic Pain,        Virtual visit -  No vitals were obtained    Physical Exam  Constitutional: Awake, alert, cooperative, no apparent distress, and appears stated age.  Eyes: Pupils equal  HENT: Normocephalic  Respiratory: non labored breathing   Neurologic: Awake, alert, oriented to name, place and time.   Neuropsychiatric: Calm, cooperative. Normal affect.      Prior Labs/Diagnostic Studies   All labs and imaging personally reviewed   Lab Results   Component Value Date    WBC 10.9  08/29/2022    WBC 11.4 03/22/2021     Lab Results   Component Value Date    RBC 4.20 08/29/2022    RBC 4.53 03/22/2021     Lab Results   Component Value Date    HGB 11.9 08/29/2022    HGB 12.9 03/22/2021     Lab Results   Component Value Date    HCT 39.2 08/29/2022    HCT 41.9 03/22/2021     Lab Results   Component Value Date    MCV 93 08/29/2022    MCV 93 03/22/2021     Lab Results   Component Value Date    MCH 28.3 08/29/2022    MCH 28.5 03/22/2021     Lab Results   Component Value Date    MCHC 30.4 08/29/2022    MCHC 30.8 03/22/2021     Lab Results   Component Value Date    RDW 14.2 08/29/2022    RDW 14.3 03/22/2021     Lab Results   Component Value Date     08/29/2022     03/22/2021     Last Comprehensive Metabolic Panel:  Sodium   Date Value Ref Range Status   08/16/2022 137 136 - 145 mmol/L Final   03/22/2021 137 133 - 144 mmol/L Final     Potassium   Date Value Ref Range Status   08/16/2022 3.9 3.4 - 5.3 mmol/L Final   03/22/2022 4.0 3.4 - 5.3 mmol/L Final   03/22/2021 4.1 3.4 - 5.3 mmol/L Final     Chloride   Date Value Ref Range Status   08/16/2022 105 98 - 107 mmol/L Final   03/22/2022 108 94 - 109 mmol/L Final   03/22/2021 108 94 - 109 mmol/L Final     Carbon Dioxide   Date Value Ref Range Status   03/22/2021 28 20 - 32 mmol/L Final     Carbon Dioxide (CO2)   Date Value Ref Range Status   08/16/2022 20 (L) 22 - 29 mmol/L Final   03/22/2022 25 20 - 32 mmol/L Final     Anion Gap   Date Value Ref Range Status   08/16/2022 12 7 - 15 mmol/L Final   03/22/2022 4 3 - 14 mmol/L Final   03/22/2021 1 (L) 3 - 14 mmol/L Final     Glucose   Date Value Ref Range Status   08/16/2022 82 70 - 99 mg/dL Final   03/22/2022 86 70 - 99 mg/dL Final   03/22/2021 68 (L) 70 - 99 mg/dL Final     Urea Nitrogen   Date Value Ref Range Status   08/16/2022 6.8 6.0 - 20.0 mg/dL Final   03/22/2022 10 7 - 30 mg/dL Final   03/22/2021 11 7 - 30 mg/dL Final     Creatinine   Date Value Ref Range Status   08/16/2022 0.77 0.51 -  0.95 mg/dL Final   2021 0.94 0.52 - 1.04 mg/dL Final     GFR Estimate   Date Value Ref Range Status   2022 >90 >60 mL/min/1.73m2 Final     Comment:     Effective 2021 eGFRcr in adults is calculated using the  CKD-EPI creatinine equation which includes age and gender (Elliot abad al., [place], DOI: 10.1056/PJUAhu4883622)   2021 74 >60 mL/min/[1.73_m2] Final     Comment:     Non  GFR Calc  Starting 2018, serum creatinine based estimated GFR (eGFR) will be   calculated using the Chronic Kidney Disease Epidemiology Collaboration   (CKD-EPI) equation.       Calcium   Date Value Ref Range Status   2022 8.9 8.6 - 10.0 mg/dL Final   2021 9.1 8.5 - 10.1 mg/dL Final     Lab Results   Component Value Date     2022    AST 27 2021     Lab Results   Component Value Date     2022    ALT 31 2021     No results found for: BILICONJ   Lab Results   Component Value Date    BILITOTAL 0.4 2022    BILITOTAL 0.2 2021     Lab Results   Component Value Date    ALBUMIN 3.7 2022    ALBUMIN 3.6 2021     Lab Results   Component Value Date    PROTTOTAL 8.0 2022    PROTTOTAL 7.8 2021      Lab Results   Component Value Date    ALKPHOS 115 2022    ALKPHOS 103 2021 TSH 3.60    EK Sinus rhythm    US Elastograph 22                                                                     IMPRESSION:     The median liver stiffness is 1.25 m/sec and the IQR/median value is  0.24, though the and elastography median stiffness value suggests  probability of being normal; obtained data set is unreliable; consider  MR elastography for further evaluation    Xray knee 22                                                                     IMPRESSION:   RIGHT KNEE: Mild tricompartmental degenerative change. There is a joint effusion, favor mechanical as opposed to inflammatory. No erosive change  or fracture.     LEFT KNEE: No erosive change or significant joint space narrowing. No joint effusion or fracture.    CT abd/pelvis 8/16/22                                                                     IMPRESSION:   No acute process demonstrated.    'The patient's records and results personally reviewed by this provider.     Outside records reviewed from: Care Everywhere      Assessment      Machelle Weiner is a 47 year old female seen as a PAC referral for risk assessment and optimization for anesthesia.    Plan/Recommendations  Pt will be optimized for the proposed procedure.  See below for details on the assessment, risk, and preoperative recommendations    NEUROLOGY  - No history of TIA, CVA or seizure  - Chronic pain in back and joints. Will take gabapentin and Duloxetine on day of procedure. May require some assistance with positioning for procedure.   - Frequent migraines, sometimes 3 weekly. No specific meds, and now is trying to limit NSAIDS and Tylenol.   -Post Op delirium risk factors:  No risk identified    ENT  - No current airway concerns.  Will need to be reassessed day of surgery.  Mallampati: Unable to assess  TM: Unable to assess     CARDIAC  HYPERTENSION. Average in clinic 130s/80s. Will take losartan on day of procedure. No other cardiac history, symptoms or meds. Activity is very limited due to pain.   - METS (Metabolic Equivalents)<4    RCRI: 0.4% risk of serious cardiac events    PULMONARY  Mild intermittent asthma. No recent exacerbation but has progressively had more shortness of breath she attributes to her limited mobility and weight gain. Albuterol inhaler and nebs prn. May use and will bring albuterol on day of procedure.   Will take Zyrtec on day of procedure.    Intermediate to high risk for KARLIE.  Patient feels that she has KARLIE but has not been tested.     - Tobacco History      History   Smoking Status     Former Smoker     Packs/day: 0.30     Types: Cigarettes     Quit date:  "2/13/2015   Smokeless Tobacco     Never Used       GI: Epigastric pain. Some dysphagia. GERD Nexium at HS.   PONV Medium Risk  Total Score: 2           1 AN PONV: Pt is Female    1 AN PONV: Patient is not a current smoker        /RENAL  - Baseline Creatinine 0.77    ENDOCRINE   - BMI: Estimated body mass index is 48.29 kg/m  as calculated from the following:    Height as of 6/21/22: 1.626 m (5' 4\").    Weight as of 9/8/22: 127.6 kg (281 lb 4.8 oz).  Class 3 Obesity (BMI > 40)  - No history of Diabetes Mellitus Random glucose 82  - Hypothyroidism. Will take Synthroid on day of procedure.      HEME  VTE Low Risk 0.26%            Total Score: 0       Denies history of blood clots  Denies history of blood transfusion     MSK: OA. Knee pain.     PSYCH  - Depression. Duloxetine as above.      The patient is optimized for their procedure. No further diagnostic testing indicated. Confirmed with patient that she has received instructions from the GI team regarding date, arrival time, eating and drinking, and need for . Encouraged her to review.   AVS with information on meds sent via [employer] by nursing staff.  Please refer to the physical examination documented by the anesthesiologist in the anesthesia record on the day of surgery.    Video-Visit Details    Type of service:  Video Visit    Patient verbally consented to video service today: YES    Video Start Time: 3:50pm   Video End Time (time video stopped): 4:15pm     Originating Location (pt. Location): Home    Distant Location (provider location):  [facility]OPER[facility]     Mode of Communication:  Video Conference via [employer]  On the day of service:     Prep time: 17 minutes  Visit time: 25 minutes  Documentation time: 20 minutes  ------------------------------------------  Total time: 62 minutes      BLANCA Robles CNS  Preoperative Assessment Center  [facility]  Cl[facility]  Phone: [phone]  Fax: " [phone]

## 2022-09-20 NOTE — PROGRESS NOTES
Machelle is a 47 year old who is being evaluated via a billable video visit.      How would you like to obtain your AVS? MyChart  If the video visit is dropped, the invitation should be resent by: Text to cell phone: [phone]    HPI       Review of Systems         Objective    Vitals - Patient Reported  Pain Score: Severe Pain (6) (GI Pain, Abdominal pain. Joint Pain)        Physical Exam

## 2022-09-20 NOTE — RESULT ENCOUNTER NOTE
Mustapha Carty,    The results of your liver ultrasound elastography are available for you to review.  The stiffness of your liver is measured to be right on the borderline of normal.  We could consider an MRI elastography to further evaluate this, though I do not think this is going to change our management of monitoring your liver enzymes and having you avoid things that can be harmful to the liver (alcohol, tylenol) and eating a healthy, low fat diet.  I see that you have your EGD scheduled for a couple of days from now.  I will be speaking with you after that test is complete.    Please let me know if you have any questions.    Sincerely,  Laz Manley PA-C

## 2022-09-21 NOTE — TELEPHONE ENCOUNTER
Patient returned call.     Pre assessment questions completed for upcoming EGD procedure scheduled on 9/22/22    COVID policy reviewed. Patient to complete rapid antigen test one to two days before their scheduled procedure. Patient to bring photo of the results when they come in for their procedure.    Pre-op scheduled 9/20/22 - PAC    Reviewed procedural arrival time 1140 and facility location [facility].    Designated  policy reviewed. Instructed to have someone stay 24 hours post procedure.    Reviewed EGD prep instructions. NPO six hours prior to procedure.     Patient verbalized understanding and had no questions or concerns at this time.    Comfort Coats RN

## 2022-09-21 NOTE — TELEPHONE ENCOUNTER
Third attempt for pre-assessment prior to upcoming EGD.    No answer.  Left message to return call [phone] #4    Hannah Harris RN

## 2022-09-22 ENCOUNTER — HOSPITAL ENCOUNTER (OUTPATIENT)
Facility: CLINIC | Age: 47
Discharge: HOME | End: 2022-09-22
Attending: INTERNAL MEDICINE | Admitting: INTERNAL MEDICINE
Payer: COMMERCIAL

## 2022-09-22 ENCOUNTER — ANESTHESIA (OUTPATIENT)
Dept: GASTROENTEROLOGY | Facility: CLINIC | Age: 47
End: 2022-09-22
Payer: COMMERCIAL

## 2022-09-22 VITALS
DIASTOLIC BLOOD PRESSURE: 86 MMHG | SYSTOLIC BLOOD PRESSURE: 135 MMHG | WEIGHT: 284.83 LBS | TEMPERATURE: 97.9 F | HEIGHT: 64 IN | OXYGEN SATURATION: 99 % | HEART RATE: 64 BPM | BODY MASS INDEX: 48.63 KG/M2

## 2022-09-22 LAB — UPPER GI ENDOSCOPY: NORMAL

## 2022-09-22 PROCEDURE — 250N000011 HC RX IP 250 OP 636: Performed by: NURSE ANESTHETIST, CERTIFIED REGISTERED

## 2022-09-22 PROCEDURE — 370N000017 HC ANESTHESIA TECHNICAL FEE, PER MIN: Performed by: INTERNAL MEDICINE

## 2022-09-22 PROCEDURE — 258N000003 HC RX IP 258 OP 636: Performed by: NURSE ANESTHETIST, CERTIFIED REGISTERED

## 2022-09-22 PROCEDURE — 88305 TISSUE EXAM BY PATHOLOGIST: CPT | Mod: TC | Performed by: INTERNAL MEDICINE

## 2022-09-22 PROCEDURE — 88305 TISSUE EXAM BY PATHOLOGIST: CPT | Mod: 26 | Performed by: PATHOLOGY

## 2022-09-22 PROCEDURE — 250N000009 HC RX 250: Performed by: NURSE ANESTHETIST, CERTIFIED REGISTERED

## 2022-09-22 PROCEDURE — 43239 EGD BIOPSY SINGLE/MULTIPLE: CPT | Performed by: INTERNAL MEDICINE

## 2022-09-22 PROCEDURE — 88342 IMHCHEM/IMCYTCHM 1ST ANTB: CPT | Mod: 26 | Performed by: PATHOLOGY

## 2022-09-22 RX ORDER — PROPOFOL 10 MG/ML
INJECTION, EMULSION INTRAVENOUS PRN
Status: DISCONTINUED | OUTPATIENT
Start: 2022-09-22 | End: 2022-09-22

## 2022-09-22 RX ORDER — LIDOCAINE HYDROCHLORIDE 20 MG/ML
INJECTION, SOLUTION INFILTRATION; PERINEURAL PRN
Status: DISCONTINUED | OUTPATIENT
Start: 2022-09-22 | End: 2022-09-22

## 2022-09-22 RX ORDER — DIMENHYDRINATE 50 MG/ML
25 INJECTION, SOLUTION INTRAMUSCULAR; INTRAVENOUS
Status: CANCELLED | OUTPATIENT
Start: 2022-09-22

## 2022-09-22 RX ORDER — SODIUM CHLORIDE, SODIUM LACTATE, POTASSIUM CHLORIDE, CALCIUM CHLORIDE 600; 310; 30; 20 MG/100ML; MG/100ML; MG/100ML; MG/100ML
INJECTION, SOLUTION INTRAVENOUS CONTINUOUS PRN
Status: DISCONTINUED | OUTPATIENT
Start: 2022-09-22 | End: 2022-09-22

## 2022-09-22 RX ORDER — SODIUM CHLORIDE, SODIUM LACTATE, POTASSIUM CHLORIDE, CALCIUM CHLORIDE 600; 310; 30; 20 MG/100ML; MG/100ML; MG/100ML; MG/100ML
INJECTION, SOLUTION INTRAVENOUS CONTINUOUS
Status: CANCELLED | OUTPATIENT
Start: 2022-09-22

## 2022-09-22 RX ORDER — ONDANSETRON 2 MG/ML
4 INJECTION INTRAMUSCULAR; INTRAVENOUS EVERY 30 MIN PRN
Status: CANCELLED | OUTPATIENT
Start: 2022-09-22

## 2022-09-22 RX ORDER — MEPERIDINE HYDROCHLORIDE 25 MG/ML
12.5 INJECTION INTRAMUSCULAR; INTRAVENOUS; SUBCUTANEOUS
Status: CANCELLED | OUTPATIENT
Start: 2022-09-22

## 2022-09-22 RX ORDER — OXYCODONE HYDROCHLORIDE 5 MG/1
5 TABLET ORAL EVERY 4 HOURS PRN
Status: CANCELLED | OUTPATIENT
Start: 2022-09-22

## 2022-09-22 RX ORDER — LABETALOL HYDROCHLORIDE 5 MG/ML
10 INJECTION, SOLUTION INTRAVENOUS
Status: DISCONTINUED | OUTPATIENT
Start: 2022-09-22 | End: 2022-09-22 | Stop reason: HOSPADM

## 2022-09-22 RX ORDER — PROPOFOL 10 MG/ML
INJECTION, EMULSION INTRAVENOUS CONTINUOUS PRN
Status: DISCONTINUED | OUTPATIENT
Start: 2022-09-22 | End: 2022-09-22

## 2022-09-22 RX ORDER — ONDANSETRON 4 MG/1
4 TABLET, ORALLY DISINTEGRATING ORAL EVERY 30 MIN PRN
Status: CANCELLED | OUTPATIENT
Start: 2022-09-22

## 2022-09-22 RX ORDER — ACETAMINOPHEN 325 MG/1
975 TABLET ORAL ONCE
Status: CANCELLED | OUTPATIENT
Start: 2022-09-22 | End: 2022-09-22

## 2022-09-22 RX ORDER — HYDRALAZINE HYDROCHLORIDE 20 MG/ML
2.5-5 INJECTION INTRAMUSCULAR; INTRAVENOUS EVERY 10 MIN PRN
Status: DISCONTINUED | OUTPATIENT
Start: 2022-09-22 | End: 2022-09-22 | Stop reason: HOSPADM

## 2022-09-22 RX ADMIN — SODIUM CHLORIDE, POTASSIUM CHLORIDE, SODIUM LACTATE AND CALCIUM CHLORIDE: 600; 310; 30; 20 INJECTION, SOLUTION INTRAVENOUS at 13:30

## 2022-09-22 RX ADMIN — LIDOCAINE HYDROCHLORIDE 100 MG: 20 INJECTION, SOLUTION INFILTRATION; PERINEURAL at 13:30

## 2022-09-22 RX ADMIN — MIDAZOLAM 2 MG: 1 INJECTION INTRAMUSCULAR; INTRAVENOUS at 13:28

## 2022-09-22 RX ADMIN — PROPOFOL 60 MG: 10 INJECTION, EMULSION INTRAVENOUS at 13:30

## 2022-09-22 RX ADMIN — PROPOFOL 175 MCG/KG/MIN: 10 INJECTION, EMULSION INTRAVENOUS at 13:30

## 2022-09-22 ASSESSMENT — ENCOUNTER SYMPTOMS: DYSRHYTHMIAS: 0

## 2022-09-22 ASSESSMENT — LIFESTYLE VARIABLES: TOBACCO_USE: 1

## 2022-09-22 ASSESSMENT — ACTIVITIES OF DAILY LIVING (ADL): ADLS_ACUITY_SCORE: 33

## 2022-09-22 NOTE — ANESTHESIA POSTPROCEDURE EVALUATION
Patient: Machelle Weiner    Procedure: Procedure(s):  ESOPHAGOGASTRODUODENOSCOPY, WITH BIOPSY       Anesthesia Type:  MAC    Note:  Disposition: Outpatient   Postop Pain Control: Uneventful            Sign Out: Well controlled pain   PONV: No   Neuro/Psych: Uneventful            Sign Out: Acceptable/Baseline neuro status   Airway/Respiratory: Uneventful            Sign Out: Acceptable/Baseline resp. status   CV/Hemodynamics: Uneventful            Sign Out: Acceptable CV status; No obvious hypovolemia; No obvious fluid overload   Other NRE: NONE   DID A NON-ROUTINE EVENT OCCUR? No           Last vitals:  Vitals Value Taken Time   /86 09/22/22 1400   Temp 36.6  C (97.9  F) 09/22/22 1350   Pulse 64 09/22/22 1400   Resp     SpO2 96 % 09/22/22 1416   Vitals shown include unvalidated device data.    Electronically Signed By: Tameka Plunkett MD  September 22, 2022  2:19 PM

## 2022-09-22 NOTE — ANESTHESIA PREPROCEDURE EVALUATION
Anesthesia Pre-Procedure Evaluation    Patient: Machelle Weiner   MRN: [id number] : 1975        Procedure : Procedure(s):  ESOPHAGOGASTRODUODENOSCOPY (EGD)          Past Medical History:   Diagnosis Date     Asthma, mild intermittent      Depression      Epigastric pain      Hypertension      Intractable chronic migraine without aura      Thyroid disease       Past Surgical History:   Procedure Laterality Date     COLONOSCOPY N/A 2022    Procedure: COLONOSCOPY (fv);  Surgeon: Aries Engle MD;  Location:  GI     GYN SURGERY      cryosurgery of cervix at age 16     LAPAROSCOPIC CHOLECYSTECTOMY WITH CHOLANGIOGRAMS N/A 10/14/2016    Procedure: LAPAROSCOPIC CHOLECYSTECTOMY WITH CHOLANGIOGRAMS;  Surgeon: Cornelius Mueller MD;  Location:  OR      Allergies   Allergen Reactions     Codeine      Penicillins       Social History     Tobacco Use     Smoking status: Former Smoker     Packs/day: 0.30     Types: Cigarettes     Quit date: 2015     Years since quittin.6     Smokeless tobacco: Never Used   Substance Use Topics     Alcohol use: No     Alcohol/week: 0.0 standard drinks      Wt Readings from Last 1 Encounters:   22 127.6 kg (281 lb 4.8 oz)        Anesthesia Evaluation   Pt has had prior anesthetic. Type: General and MAC.    No history of anesthetic complications       ROS/MED HX  ENT/Pulmonary: Comment: Patient believes she has sleep apnea but has not been tested yet    Shortness of breath which she is attributing to her current limited mobility and weight gain.     Using albuterol prn    (+) KARLIE risk factors, snores loudly, hypertension, obese, observed stopped breathing, tobacco use, Past use, Intermittent, asthma Last exacerbation: no recent exacerbation,  Treatment: Inhaler prn and Nebulizer prn,   (-) recent URI   Neurologic: Comment: Frequent migraines    (+) migraines,     Cardiovascular: Comment:   Takes Losartan     (+) hypertension-range: 130s/80s / ----CLEMENTS. Previous  cardiac testing   Echo: Date: Results:    Stress Test: Date: Results:    ECG Reviewed: Date: 2018 Results:  SR  Cath: Date: Results:   (-) taking anticoagulants/antiplatelets and arrhythmias   METS/Exercise Tolerance: 1 - Eating, dressing Comment: Activity is very limited by back and joint pain   Hematologic: Comments:   Hgb 11.9    (+) anemia,  (-) history of blood clots and history of blood transfusion   Musculoskeletal: Comment:   -Chronic back pain in setting of lumbosacral DDD and known facet arthropathy.   - Chronic joint pain, including pain to bilateral knees and hip. Currently undergoing Rheumatology eval. Positive LYUDMILA, but No diagnosis to date.  (+) arthritis,     GI/Hepatic: Comment:   Epigastric pain & dysphagia. Reports sensation of food getting stuck in her throat. Denies coughing of chocking. Denies recurrent respiratory infections/pneumonia.     (+) GERD, Symptomatic,     Renal/Genitourinary: Comment:   GFR >90, creat 0.77 - neg Renal ROS     Endo:     (+) thyroid problem, hypothyroidism takes levothyroxine 65mcg/d, Obesity,     Psychiatric/Substance Use:     (+) psychiatric history depression     Infectious Disease:  - neg infectious disease ROS     Malignancy:  - neg malignancy ROS     Other:  - neg other ROS    (+) , H/O Chronic Pain,        Physical Exam    Airway  airway exam normal      Mallampati: I   TM distance: > 3 FB   Neck ROM: full   Mouth opening: > 3 cm    Respiratory Devices and Support         Dental  no notable dental history         Cardiovascular   cardiovascular exam normal          Pulmonary   pulmonary exam normal                OUTSIDE LABS:  CBC:   Lab Results   Component Value Date    WBC 10.9 08/29/2022    WBC 11.5 (H) 08/16/2022    HGB 11.9 08/29/2022    HGB 12.2 08/16/2022    HCT 39.2 08/29/2022    HCT 40.0 08/16/2022     08/29/2022     08/16/2022     BMP:   Lab Results   Component Value Date     08/16/2022     03/22/2022    POTASSIUM 3.9  08/16/2022    POTASSIUM 4.0 03/22/2022    CHLORIDE 105 08/16/2022    CHLORIDE 108 03/22/2022    CO2 20 (L) 08/16/2022    CO2 25 03/22/2022    BUN 6.8 08/16/2022    BUN 10 03/22/2022    CR 0.77 08/16/2022    CR 1.02 03/22/2022    GLC 82 08/16/2022    GLC 86 03/22/2022     COAGS:   Lab Results   Component Value Date    INR 1.04 08/29/2022     POC:   Lab Results   Component Value Date    HCG Negative 01/11/2017    HCGS Negative 02/28/2018     HEPATIC:   Lab Results   Component Value Date    ALBUMIN 3.7 09/08/2022    PROTTOTAL 8.0 09/08/2022     (H) 09/08/2022     (H) 09/08/2022    ALKPHOS 115 09/08/2022    BILITOTAL 0.4 09/08/2022     OTHER:   Lab Results   Component Value Date    BETINA 8.9 08/16/2022    PHOS 2.9 03/22/2022    MAG 1.9 03/22/2022    LIPASE 30 08/16/2022    TSH 3.60 04/19/2022    T4 0.80 03/22/2021    CRP 8.83 (H) 08/16/2022    SED 40 (H) 03/22/2022       Anesthesia Plan    ASA Status:  3   NPO Status:  NPO Appropriate    Anesthesia Type: MAC.     - Reason for MAC: straight local not clinically adequate, immobility needed   Induction: Intravenous.   Maintenance: TIVA.        Consents    Anesthesia Plan(s) and associated risks, benefits, and realistic alternatives discussed. Questions answered and patient/representative(s) expressed understanding.    - Discussed:     - Discussed with:  Patient      - Specific Concerns: discussed plan for starting/attempting endoscopy under MAC with high likelyhood of requiring conversion to general anesthesia with ETT if patient starts obstructing. Aspiration risk explained. .     - Extended Intubation/Ventilatory Support Discussed: Yes.      - Patient is DNR/DNI Status: No    Use of blood products discussed: No .     Postoperative Care    Pain management: Oral pain medications.   PONV prophylaxis: Ondansetron (or other 5HT-3), Dexamethasone or Solumedrol     Comments:    Other Comments: 47 year old morbidly obese female  presenting epigastric pain, elevated  LFTs and change in bowel habits. Patient presents also episodes of dysphagia. She has past history of gastric ulcers. Patient is now here for EGD.    Additional anesthesia concerns include HTN, obesity, likely KARLIE (no formal diagnosis but several risk factors),  mild asthma, and GERD.  Plan is to start the case under MAC with awareness of potential need to convert to general anesthesia given patient's high risk for KARLIE. Likelihood of need for general anesthesia discussed with patient as well as risks of aspiration. Her airway exam is reassuring.   Detailed anaesthesia considerations and plan as stated above.   MD Tameka Werner MD

## 2022-09-22 NOTE — ANESTHESIA CARE TRANSFER NOTE
Patient: Machelle Weiner    Procedure: Procedure(s):  ESOPHAGOGASTRODUODENOSCOPY, WITH BIOPSY       Diagnosis: Epigastric pain [R10.13]  Dysphagia [R13.10]  Diagnosis Additional Information: No value filed.    Anesthesia Type:   MAC     Note:    Oropharynx: oropharynx clear of all foreign objects  Level of Consciousness: awake  Oxygen Supplementation: face mask  Level of Supplemental Oxygen (L/min / FiO2): 8  Independent Airway: airway patency satisfactory and stable  Dentition: dentition unchanged  Vital Signs Stable: post-procedure vital signs reviewed and stable  Report to RN Given: handoff report given  Patient transferred to: Phase II    Handoff Report: Identifed the Patient, Identified the Reponsible Provider, Reviewed the pertinent medical history, Discussed the surgical course, Reviewed Intra-OP anesthesia mangement and issues during anesthesia, Set expectations for post-procedure period and Allowed opportunity for questions and acknowledgement of understanding      Vitals:  Vitals Value Taken Time   /80 09/22/22 1345   Temp     Pulse 71 09/22/22 1345   Resp     SpO2 99 % 09/22/22 1348   Vitals shown include unvalidated device data.    Electronically Signed By: BLANCA Sierra CRNA  September 22, 2022  1:49 PM

## 2022-09-22 NOTE — H&P
Machelle Weiner  [id number]  female  47 year old      Reason for procedure/surgery: dysphagia, dyspepsia    Patient Active Problem List   Diagnosis     Asthma, mild intermittent     Hyperlipidemia LDL goal <160     Essential hypertension, benign     Acquired hypothyroidism     S/P laparoscopic cholecystectomy     Morbid obesity (H)     Migraine     Facet arthropathy, lumbosacral     DDD (degenerative disc disease), lumbosacral     Leg length discrepancy     Positive LYUDMILA (antinuclear antibody)     Multiple joint pain     Chronic pain of both knees     Hip pain       Past Surgical History:    Past Surgical History:   Procedure Laterality Date     COLONOSCOPY N/A 2022    Procedure: COLONOSCOPY (fv);  Surgeon: Aries Engle MD;  Location:  GI     GYN SURGERY      cryosurgery of cervix at age 16     LAPAROSCOPIC CHOLECYSTECTOMY WITH CHOLANGIOGRAMS N/A 10/14/2016    Procedure: LAPAROSCOPIC CHOLECYSTECTOMY WITH CHOLANGIOGRAMS;  Surgeon: Cornelius Mueller MD;  Location:  OR       Past Medical History:   Past Medical History:   Diagnosis Date     Asthma, mild intermittent      Depression      Epigastric pain      Hypertension      Intractable chronic migraine without aura      Thyroid disease        Social History:   Social History     Tobacco Use     Smoking status: Former Smoker     Packs/day: 0.30     Types: Cigarettes     Quit date: 2015     Years since quittin.6     Smokeless tobacco: Never Used   Substance Use Topics     Alcohol use: No     Alcohol/week: 0.0 standard drinks       Family History:   Family History   Problem Relation Age of Onset     Coronary Artery Disease Maternal Grandmother      Hyperlipidemia Maternal Grandmother      Hyperlipidemia Maternal Grandfather      Diabetes No family hx of      Hypertension No family hx of      Anesthesia Reaction No family hx of        Allergies:   Allergies   Allergen Reactions     Codeine      Penicillins        Active Medications:   No current  outpatient medications on file.       Systemic Review:   CONSTITUTIONAL: NEGATIVE for fever, chills, change in weight  ENT/MOUTH: NEGATIVE for ear, mouth and throat problems  RESP: NEGATIVE for significant cough or SOB  CV: NEGATIVE for chest pain, palpitations or peripheral edema    Physical Examination:   Vital Signs: [facility] 08/29/2022 (Within Days)   GENERAL: healthy, alert and no distress  NECK: no adenopathy, no asymmetry, masses, or scars  RESP: lungs clear to auscultation - no rales, rhonchi or wheezes  CV: regular rate and rhythm, normal S1 S2, no S3 or S4, no murmur, click or rub, no peripheral edema and peripheral pulses strong  ABDOMEN: soft, nontender, no hepatosplenomegaly, no masses and bowel sounds normal  MS: no gross musculoskeletal defects noted, no edema    ASA: 2    Mallampati Score: 2    Plan: Appropriate to proceed as scheduled.      Aries Wright MD  9/22/2022    PCP:  Jean Sauceda

## 2022-09-22 NOTE — OR NURSING
Procedure: EGD with biopsies  Sedation:monitored anesthesia care  Specimens: x 3 jars, sent to lab.   O2: per CRNA  Tolerated procedure: well  Pt to recovery area in stable condition accompanied by RN.   Other:  none    Pat Foster RN

## 2022-09-26 LAB
PATH REPORT.COMMENTS IMP SPEC: NORMAL
PATH REPORT.FINAL DX SPEC: NORMAL
PATH REPORT.GROSS SPEC: NORMAL
PATH REPORT.MICROSCOPIC SPEC OTHER STN: NORMAL
PATH REPORT.RELEVANT HX SPEC: NORMAL
PHOTO IMAGE: NORMAL

## 2022-09-27 ENCOUNTER — MYC MEDICAL ADVICE (OUTPATIENT)
Dept: GASTROENTEROLOGY | Facility: CLINIC | Age: 47
End: 2022-09-27

## 2022-09-27 DIAGNOSIS — R11.0 NAUSEA: ICD-10-CM

## 2022-09-27 DIAGNOSIS — A04.8 H. PYLORI INFECTION: Primary | ICD-10-CM

## 2022-09-28 RX ORDER — ONDANSETRON 8 MG/1
8 TABLET, FILM COATED ORAL EVERY 8 HOURS PRN
Qty: 30 TABLET | Refills: 0 | Status: SHIPPED | OUTPATIENT
Start: 2022-09-28 | End: 2023-01-30

## 2022-09-30 ENCOUNTER — VIRTUAL VISIT (OUTPATIENT)
Dept: PHARMACY | Facility: CLINIC | Age: 47
End: 2022-09-30
Attending: PHYSICIAN ASSISTANT
Payer: COMMERCIAL

## 2022-09-30 DIAGNOSIS — A04.8 H. PYLORI INFECTION: Primary | ICD-10-CM

## 2022-09-30 DIAGNOSIS — G89.29 CHRONIC PAIN OF BOTH KNEES: ICD-10-CM

## 2022-09-30 DIAGNOSIS — M25.561 CHRONIC PAIN OF BOTH KNEES: ICD-10-CM

## 2022-09-30 DIAGNOSIS — Z78.9 TAKES DIETARY SUPPLEMENTS: ICD-10-CM

## 2022-09-30 DIAGNOSIS — I10 ESSENTIAL HYPERTENSION, BENIGN: ICD-10-CM

## 2022-09-30 DIAGNOSIS — E03.9 ACQUIRED HYPOTHYROIDISM: ICD-10-CM

## 2022-09-30 DIAGNOSIS — R06.00 DYSPNEA, UNSPECIFIED TYPE: ICD-10-CM

## 2022-09-30 DIAGNOSIS — F39 MOOD DISORDER (H): ICD-10-CM

## 2022-09-30 DIAGNOSIS — M25.562 CHRONIC PAIN OF BOTH KNEES: ICD-10-CM

## 2022-09-30 DIAGNOSIS — R74.8 ELEVATED LIVER ENZYMES: ICD-10-CM

## 2022-09-30 PROCEDURE — 99605 MTMS BY PHARM NP 15 MIN: CPT | Performed by: PHARMACIST

## 2022-09-30 PROCEDURE — 99607 MTMS BY PHARM ADDL 15 MIN: CPT | Performed by: PHARMACIST

## 2022-09-30 RX ORDER — BIOTIN 10 MG
2 TABLET ORAL DAILY
COMMUNITY

## 2022-09-30 RX ORDER — METRONIDAZOLE 250 MG/1
250 TABLET ORAL 4 TIMES DAILY
Qty: 56 TABLET | Refills: 0 | Status: SHIPPED | OUTPATIENT
Start: 2022-09-30 | End: 2022-10-20

## 2022-09-30 RX ORDER — TETRACYCLINE HYDROCHLORIDE 500 MG/1
500 CAPSULE ORAL 4 TIMES DAILY
Qty: 56 CAPSULE | Refills: 0 | Status: SHIPPED | OUTPATIENT
Start: 2022-09-30 | End: 2022-09-30

## 2022-09-30 RX ORDER — DOXYCYCLINE HYCLATE 100 MG
100 TABLET ORAL 2 TIMES DAILY
Qty: 28 TABLET | Refills: 0 | Status: SHIPPED | OUTPATIENT
Start: 2022-09-30 | End: 2022-10-20

## 2022-09-30 NOTE — PROGRESS NOTES
Medication Therapy Management (MTM) Encounter    ASSESSMENT:                            Medication Adherence/Access: See below for considerations    H pylori: Based on our discussion, would recommend bismuth quadruple therapy for 14 days for the treatment of H pylori. In the event tetracycline is not covered or she is unable to get it, we will plan on substituting for doxycycline. It is okay for her to use the liquid pepto bismol she has at home, and we reviewed the dosing for this today. Her multivitamin does interact with doxycycline/tetracycline and it would be best to time this between antibiotic doses. Encouraged her to dose the ondansetron first to see if this controls her nausea enough to keep the medications down. Reviewed need for eradication testing. It is possible that there was PPI interference with stool testing back in 2018. I would still recommend eradication testing via stool to start. If she has ongoing symptoms down the road, repeat EGD to confirm eradication via biopsies could be considered with discussion with her provider team.    There are no specific hepatic adjustments needed for her H pylori medications at this time, however, given elevated AST/ALT I think it would be beneficial to re-check a hepatic panel during therapy, especially since metronidazole can be hepatotoxic. There was a point raised about fatty liver. I do not see a recent lipid panel on file for her. It may be beneficial to screen her for this given her concern for fatty liver and her cholesterol could be elevated overall, and I will discuss this with her providers.     Elevated Liver Enzymes: See Pain/Mood for discussion on gabapentin/duloxetine. We discussed that THC/CBD products can affect LFTs, however, she states her use of this was after these labs were drawn. I encouraged her to hold off on further usage if able, especially during H pylori treatment, until we know that her LFTs have stabilized. As below, if she needs  "additional therapy for pain/mood she should follow-up with her care team.    Shortness of breath: Stable.    Pain/Mood: Reviewed [facility] information regarding changes in liver enzymes hepatotoxicity from gabapentin / duloxetine. Overall it is unlikely that duloxetine is the source of her hepatotoxicity. Per [facility] \"Liver test abnormalities with ALT elevations above 3 times the upper limit of normal have been reported to occur in ~1% of patients on duloxetine, but elevations were usually self-limited and did not require dose modification or discontinuation. Rare instances of acute, clinically apparent episodes of liver injury with marked liver enzyme elevations with or without jaundice have been reported in patients on duloxetine.\" In regards to gabapentin, [facility] states \"Reported cases have been mild to moderate in severity and self-limited in course. In view of the wide-scale use of gabapentin, liver injury with symptoms or jaundice is clearly quite rare.\" I encouraged her to hold-off on decreasing duloxetine further at this point and she should follow-up with her providers re: pain management.    Hypothyroidism: Stable.    Hypertension: Stable.    Supplements: Stable. Encouraged her to avoid additional herbal products which have been known to increase LFTs.    PLAN:                            1. Recommend bismuth quadruple therapy x 14 days for treatment of H pylori:     -- Pepto bismol (prefers to get liquid OTC) 525 mg by mouth four times daily  -- esomeprazole 20 mg by mouth twice daily   -- metronidazole 250 mg by mouth four times daily  -- tetracycline 500 mg by mouth four times daily    -- Update. Confirmed with [facility]'s that tetracycline is not covered. Will change to doxycycline 100 mg twice daily and update patient via MyChart. Canceled prescription at [facility] for tetracycline. Second update: Machelle preferred to try and get an override for the tetracycline. I spoke with the pharmacist at [facility] and they " are aware that she should only fill the doxycycline OR tetracycline and not both. The pharmacist will cancel the order that is not filled today.    2. I will reach out to Laz Manley PA-C and your primary care provider regarding additional labs given your liver function.   -- chart routed to PCP   -- staff message sent to Laz to discuss hepatic panel check during treatment    Follow-up:    -- 7-10 days for check-in on tolerance / ability to complete regimen   -- at least four weeks after treatment for eradication testing    SUBJECTIVE/OBJECTIVE:                          Machelle Weiner is a 47 year old female contacted via secure video for an initial visit. She was referred to me from Laz Manley PA-C.      Reason for visit: H pylori treatment     Allergies/ADRs: Reviewed in chart   -- notes she had violent   Tobacco: She reports that she quit smoking about 7 years ago. Her smoking use included cigarettes. She smoked 0.30 packs per day. She has never used smokeless tobacco.  Alcohol: not currently using (only at girls night 3-4x yearly)  -- endorses some recent MJ use (but after seeing Laz)    Medication Adherence/Access: no issues reported    H pylori:   Ondansetron 8 mg (has not re-started)  Esomeprazole 20 mg daily  Tums/Pepto PRN      Notes that she had a negative test previously (in 2018). She had a recent scope which was positive on biopsy from 9/22. Discussed allergy to penicillins which she states she had as a child and it caused profuse vomiting that required suppositories to stop. Denies concerns for pregnancy at this time and she is not breastfeeding. She reports that her nausea, especially the past couple of days has been very bad. She has a high tolerance for pain/abdominal issues as she has had them here whole life. She notes that she feels there are two main things going on, one she describes as stomach pain and the other she calls liver pain as it is right sided. She does not have a gall bladder.  "Notes mentioning to her providers previously who state her liver is fine. She has had a recent lipase which was WNL.    We reviewed H pylori today including background information, indications for treatment and potential modes of transmission. We also discussed potential for antibiotic resistance and importance of finishing treatment if able, as well as follow-up testing. Discussed bismuth quadruple therapy today including medications, dosing, side effects, precautions and general counseling information. Discussed the potential for bismuth to cause dark tongue/stools and the potential interaction between metronidazole and alcohol. Reviewed spacing of supplements from doxycycline therapy (via [employer]). She prefers to use pepto bismol and has the liquid ULTRA sitting right next to her. She would like to start antibiotic therapy as soon as possible.    Last eGFR on file 8/16/22 > 90 mL/min/1.73m2    Elevated Liver Enzymes:     Per result notes: \"Your additional blood tests have come back normal, which is reassuring.  My recommendation would be to repeat your liver tests in the next couple of months to make sure they remain stable.  I think the most likely explanation is that you have elevated liver enzymes from the fatty liver.  Based on your age and other risk factors, you are unlikely to have any fibrosis or scarring in your liver at this time.\"     Denies use of herbal supplements other than recent MJ use (flower bud - not vape). She notes that this was after the tests she had done with Laz. Hep B/C screenings were negative and she is non-immune to hepatitis B.    Lab Results   Component Value Date     09/08/2022    AST 27 03/22/2021     Lab Results   Component Value Date     09/08/2022    ALT 31 03/22/2021     Lab Results   Component Value Date    BILITOTAL 0.4 09/08/2022    BILITOTAL 0.2 03/22/2021     Lab Results   Component Value Date    ALBUMIN 3.7 09/08/2022    ALBUMIN 3.6 03/22/2021     Lab " Results   Component Value Date    PROTTOTAL 8.0 09/08/2022    PROTTOTAL 7.8 03/22/2021      Lab Results   Component Value Date    ALKPHOS 115 09/08/2022    ALKPHOS 103 03/22/2021     Shortness of breath:   Albuterol HFA/nebs     Uses inhaler once weekly, nebs only when sick.     Pain/Mood:  Cyclobenzaprine 10 mg as needed  Duloxetine 30 mg daily (written for 30 mg twice daily)  Gabapentin 600 mg three times daily (not taking - weaned off - last dose a couple of days ago)    Notes she hasn't used the cyclobenzaprine. Notes she weaned off gabapentin and is only taking duloxetine 30 mg daily. She decreased the duloxetine out of concern for her liver function, and to some degree gabapentin as well. She is a little concerned about coming off the duloxetine completely since she does feel she needs something for her mood. Notes cognitive issues with gabapentin, not really helping with her back which is her primary concern. Feels like on gabapentin she can't get all her words out.    Hypothyroidism:   Levothyroxine 75 mcg daily    No reported concerns.    Lab Results   Component Value Date    TSH 3.60 04/19/2022    TSH 5.90 03/22/2021     Hypertension:   Losartan 50 mg daily    No reported side effects or concerns.    BP Readings from Last 3 Encounters:   09/22/22 135/86   09/08/22 138/87   08/24/22 130/82     Supplements:  Multivitamin daily  Vitamin D daily     No reported concerns.    Today's Vitals: LMP 08/29/2022 (Within Days)   ----------------    I spent 51 minutes with this patient today. All changes were made via collaborative practice agreement with Laz Manley A copy of the visit note was provided to the patient's provider(s).    The patient was sent via [employer] a summary of these recommendations.     Pilar RosasD, BCACP  MTM Pharmacist   [facility]   Phone: [phone]    Telemedicine Visit Details  Type of service:  Video Conference via [employer]  Start Time: 8:00 AM  End  Time: 9:04 AM  Originating Location (patient location): Home  Distant Location (provider location):  [facility]     Medication Therapy Recommendations  H. pylori infection    Rationale: Untreated condition - Needs additional medication therapy - Indication   Recommendation: Start Medication - bismuth subsalicylate 525 MG/15ML   Status: Accepted per CPA   Note: Would recommend bismuth quadruple therapy x 14 days for treatment of H pylori.

## 2022-09-30 NOTE — PATIENT INSTRUCTIONS
"Recommendations from today's MTM visit:                                                    MTM (medication therapy management) is a service provided by a clinical pharmacist designed to help you get the most of out of your medicines.   Today we reviewed what your medicines are for, how to know if they are working, that your medicines are safe and how to make your medicine regimen as easy as possible.      1. Recommend bismuth quadruple therapy x 14 days for treatment of H pylori:     -- Pepto bismol (prefers to get liquid OTC) 525 mg by mouth four times daily  -- esomeprazole 20 mg by mouth twice daily   -- metronidazole 250 mg by mouth four times daily  -- tetracycline 500 mg by mouth four times daily    -- Update. Confirmed with [facility] that tetracycline is not covered. Will change to doxycycline 100 mg twice daily and update patient via [employer]. Canceled prescription at [facility] for tetracycline.    2. I will reach out to Laz Manley PA-C and your primary care provider regarding additional labs given your liver function    Follow-up:    -- 7-10 days for check-in on tolerance / ability to complete regimen   -- at least four weeks after treatment for eradication testing    It was great speaking with you today.  I value your experience and would be very thankful for your time in providing feedback in our clinic survey. In the next few days, you may receive an email or text message from [employer] with a link to a survey related to your  clinical pharmacist.\"     To schedule another MTM appointment, please call the clinic directly or you may call the MTM scheduling line at [phone] or toll-free at [phone].     My Clinical Pharmacist's contact information:                                                      Please feel free to contact me with any questions or concerns you have.      Pilar RosasD, BCACP  MTM Pharmacist   [facility]   Phone: [phone]    "

## 2022-09-30 NOTE — Clinical Note
Mustapha Pena -- I met with Machelle to discuss H pylori treatment and review her medications. It looks like we are keeping a close eye on her liver enzymes, which could be a result of fatty liver. I do not see a recent lipid panel on file. Given her other cardiovascular risk factors, would you be okay with me adding a lipid panel to her future labs? Thank you, Pilar ([facility] pharmacist in GI)

## 2022-10-02 RX ORDER — MULTIVIT-MIN/IRON/FOLIC ACID/K 18-600-40
1 CAPSULE ORAL DAILY
COMMUNITY
End: 2024-04-15

## 2022-10-03 ENCOUNTER — MYC MEDICAL ADVICE (OUTPATIENT)
Dept: PHARMACY | Facility: CLINIC | Age: 47
End: 2022-10-03

## 2022-10-03 NOTE — PROGRESS NOTES
Update 10/3/2022:     SHERYL Sultana pharmacist out of clinic today, covering inbasket. Ordering Hepatic Panel that was authorized by provider Laz Manley PA-C. Patient to complete panel 1 week. Mychart sent to patient. Routing to Yasmani Rogers for tomorrow as FYI and next steps if any.     Lauren Bloch, PharmD, BCACP   Medication Therapy Management Pharmacist

## 2022-10-04 NOTE — PROGRESS NOTES
From: Jean Sauceda PA-C   Sent: 10/3/2022   7:10 AM CDT   To: Pilar Rogers RPH     Definitely - thanks!   ----- Message -----   From: Pilar Rogesr RPH   Sent: 10/2/2022   6:54 PM CDT   To: GEOVANNA Isidro -- I met with Machelle to discuss H pylori treatment and review her medications. It looks like we are keeping a close eye on her liver enzymes, which could be a result of fatty liver. I do not see a recent lipid panel on file. Given her other cardiovascular risk factors, would you be okay with me adding a lipid panel to her future labs? Thank you, Pilar ([facility] pharmacist in GI)     Updated Machelle via [employer].

## 2022-10-07 ENCOUNTER — LAB (OUTPATIENT)
Dept: LAB | Facility: CLINIC | Age: 47
End: 2022-10-07
Payer: COMMERCIAL

## 2022-10-07 DIAGNOSIS — R74.8 ELEVATED LIVER ENZYMES: ICD-10-CM

## 2022-10-07 DIAGNOSIS — A04.8 H. PYLORI INFECTION: ICD-10-CM

## 2022-10-07 LAB
ALBUMIN SERPL BCG-MCNC: 4 G/DL (ref 3.5–5.2)
ALP SERPL-CCNC: 96 U/L (ref 35–104)
ALT SERPL W P-5'-P-CCNC: 116 U/L (ref 10–35)
AST SERPL W P-5'-P-CCNC: 122 U/L (ref 10–35)
BILIRUB DIRECT SERPL-MCNC: <0.2 MG/DL (ref 0–0.3)
BILIRUB SERPL-MCNC: 0.4 MG/DL
CHOLEST SERPL-MCNC: 201 MG/DL
HDLC SERPL-MCNC: 50 MG/DL
LDLC SERPL CALC-MCNC: 128 MG/DL
NONHDLC SERPL-MCNC: 151 MG/DL
PROT SERPL-MCNC: 7.3 G/DL (ref 6.4–8.3)
TRIGL SERPL-MCNC: 117 MG/DL

## 2022-10-07 PROCEDURE — 80061 LIPID PANEL: CPT

## 2022-10-07 PROCEDURE — 36415 COLL VENOUS BLD VENIPUNCTURE: CPT

## 2022-10-07 PROCEDURE — 80076 HEPATIC FUNCTION PANEL: CPT

## 2022-10-10 NOTE — RESULT ENCOUNTER NOTE
Chrissie Carty,    Your liver enzymes have improved somewhat compared to last month.  This is reassuring that the medication you are taking for H. pylori is not adversely affecting your liver.    Please let me know if you have any questions.    Sincerely,  Laz Manley PA-C

## 2022-10-20 ENCOUNTER — MYC MEDICAL ADVICE (OUTPATIENT)
Dept: PHARMACY | Facility: CLINIC | Age: 47
End: 2022-10-20

## 2022-10-20 DIAGNOSIS — A04.8 H. PYLORI INFECTION: Primary | ICD-10-CM

## 2022-10-20 DIAGNOSIS — R10.10 UPPER ABDOMINAL PAIN: ICD-10-CM

## 2022-10-20 DIAGNOSIS — K52.9 CHRONIC DIARRHEA: ICD-10-CM

## 2022-10-20 NOTE — TELEPHONE ENCOUNTER
I called patient and spoke with her about her current symptoms.  To review, patient saw me a month ago with complaints of both right and left chronic upper abdominal pain, intermittent dysphagia, and occasional diarrhea.  Her previous work-up had included normal colonoscopy in June of this year and normal CT scan of the abdomen and pelvis in August.  I had sent her for upper endoscopy, which identified mucosal changes in the esophagus consistent with eosinophilic esophagitis, with confirmation on biopsies as well.  She also had H. pylori identified in the stomach.  There were no abnormalities noted in the duodenum.    I referred her to our [facility] pharmacist for H. pylori treatment.  She did complete a course of antibiotics and has continued using both bismuth salicylate and Nexium.  She has had no change in her symptoms, in fact, she is experiencing worsening pain in both the right and left upper quadrants of the abdomen as well as general intestinal discomfort.  She now has more severe diarrhea, having 5 loose to watery stools daily.  She is very concerned about her symptoms.  She states that her pain is severe at times.    We spent some time discussing her symptoms.  Her right and left upper quadrant pain is chronic and I reviewed that so far, her CT scan from August is reassuring and well she did have chronic active gastritis likely secondary to H. pylori, I cannot explain why she has not had any improvement in symptoms with appropriate treatment.  There is evidence for EOE as well, though I do not think that this would explain the level of her pain.  Given that she has had an increase in loose to watery stool, I am going to have her come in to do fecal calprotectin, CRP, and C. difficile testing.  We will also pursue MR enterography, given the potential differential for increased eosinophils in the esophagus to include Crohn's disease and connective tissue disease.  She stated understanding and will make a lab  visit and imaging study visit.    Laz Manley PA-C

## 2022-11-07 ENCOUNTER — MYC MEDICAL ADVICE (OUTPATIENT)
Dept: GASTROENTEROLOGY | Facility: CLINIC | Age: 47
End: 2022-11-07

## 2022-11-07 DIAGNOSIS — R79.89 ELEVATED LFTS: Primary | ICD-10-CM

## 2022-11-07 NOTE — TELEPHONE ENCOUNTER
Damian reply to patient that her first message from earlier today had been forwarded to provider for review.   Laverne Evangelista RN

## 2022-11-07 NOTE — TELEPHONE ENCOUNTER
[employer] message sent to patient that update will be forwarded to provider for review.   Writer informed patient that she should seek medical attention if she has difficulty breathing despite using inhalers. Reminded patient to complete labs (stool tests and blood work) these had been ordered by provider on 10/20/2022.    Laverne Evangelista RN

## 2022-11-08 DIAGNOSIS — K20.0 EOSINOPHILIC ESOPHAGITIS: Primary | ICD-10-CM

## 2022-11-08 RX ORDER — BUDESONIDE 0.5 MG/2ML
INHALANT ORAL
Qty: 240 ML | Refills: 2 | Status: SHIPPED | OUTPATIENT
Start: 2022-11-08 | End: 2022-12-17

## 2022-11-08 NOTE — TELEPHONE ENCOUNTER
1[employer]bel message sent that inquiry will be forwarded to provider for review.     Laverne Evangelista RN

## 2022-11-15 ENCOUNTER — TELEPHONE (OUTPATIENT)
Dept: GASTROENTEROLOGY | Facility: CLINIC | Age: 47
End: 2022-11-15

## 2022-11-15 NOTE — TELEPHONE ENCOUNTER
ADDENDUM: 11/16/22 at 2:09 pm Harish from [facility] called requesting clarification, as he noticed some discrepancies with the new order for this medication. Per Harish, the new order it states 2 mg or 4 mi. Per Harish 2 mg equals 8 mi, he mentioned it is possible someone did the math wrong. Second thing he needs clarification on is 8 mi 2 times a day equals 140 mi which is enough for half a month. If pt would need enough medication for the whole month they would need 480 mi. Please call Harish back at this number [phone] to clarify                    [facility]    Phone Message    May a detailed message be left on voicemail: yes     Reason for Call:   Other: Laz- Pharmacist from [facility] # [phone] called in regards to pt's medication budesonide (PULMICORT) 0.5 MG/2ML neb solution. Per Laz, the order sent is written for inhalation for a nebulizer solution, pt understood it was to be taken orally, slowly. Should medication be specially compounded or should they send Budesonide nebs? Per pharmacist, these probably won't taste very good. Please call back to clarify.     Action Taken: Other: mg gi    Travel Screening: Not Applicable

## 2022-11-16 DIAGNOSIS — K20.0 EOSINOPHILIC ESOPHAGITIS: Primary | ICD-10-CM

## 2022-11-16 RX ORDER — BUDESONIDE 0.5 MG/2ML
INHALANT ORAL
Qty: 240 ML | Refills: 2 | Status: SHIPPED | OUTPATIENT
Start: 2022-11-16

## 2022-11-16 NOTE — TELEPHONE ENCOUNTER
Call to pharmacist Laz at [facility] to update that provider will be sending over a new Rx with clarification.    Arabella Johnson RN

## 2022-11-16 NOTE — TELEPHONE ENCOUNTER
Replied to [facility] message that message/inquiry will be forwarded to provider for review.    Laverne Evangelista RN

## 2022-11-16 NOTE — TELEPHONE ENCOUNTER
Reason for Call:   Other: Laz- Pharmacist from [facility] # [phone] called in regards to pt's medication budesonide (PULMICORT) 0.5 MG/2ML neb solution. Per Laz, the order sent is written for inhalation for a nebulizer solution, pt understood it was to be taken orally, slowly. Should medication be specially compounded or should they send Budesonide nebs? Per pharmacist, these probably won't taste very good. Please call back to clarify.      Replied to patient that her [employer] message was forwarded to provider for review. Also asked for clarification on budesonide order from above call from Laz Degroot.  Arabella Johnson RN

## 2022-11-17 ENCOUNTER — LAB (OUTPATIENT)
Dept: LAB | Facility: CLINIC | Age: 47
End: 2022-11-17
Payer: COMMERCIAL

## 2022-11-17 DIAGNOSIS — R79.89 ELEVATED LFTS: ICD-10-CM

## 2022-11-17 DIAGNOSIS — R10.10 UPPER ABDOMINAL PAIN: ICD-10-CM

## 2022-11-17 DIAGNOSIS — K52.9 CHRONIC DIARRHEA: ICD-10-CM

## 2022-11-17 DIAGNOSIS — A04.8 H. PYLORI INFECTION: ICD-10-CM

## 2022-11-17 LAB
ALBUMIN SERPL BCG-MCNC: 4.3 G/DL (ref 3.5–5.2)
ALP SERPL-CCNC: 110 U/L (ref 35–104)
ALT SERPL W P-5'-P-CCNC: 140 U/L (ref 10–35)
AST SERPL W P-5'-P-CCNC: 113 U/L (ref 10–35)
BILIRUB DIRECT SERPL-MCNC: <0.2 MG/DL (ref 0–0.3)
BILIRUB SERPL-MCNC: 0.4 MG/DL
CRP SERPL-MCNC: 6.25 MG/L
PROT SERPL-MCNC: 7.7 G/DL (ref 6.4–8.3)

## 2022-11-17 PROCEDURE — 80076 HEPATIC FUNCTION PANEL: CPT

## 2022-11-17 PROCEDURE — 36415 COLL VENOUS BLD VENIPUNCTURE: CPT

## 2022-11-17 PROCEDURE — 86140 C-REACTIVE PROTEIN: CPT

## 2022-11-17 NOTE — TELEPHONE ENCOUNTER
Late entry from 11/16/22: Info from addendum on 11/16/22 at 2:09pm sent to provider to clarify.  Arabella Johnson RN

## 2022-11-17 NOTE — TELEPHONE ENCOUNTER
Call to [facility] to ensure that clarification needed for budesonide was received. Spoke to Ema, pharmacist who states that provider did call with clarification. No further follow up needed, closing episode    Arabella Johnson RN

## 2022-11-18 LAB — C DIFF TOX B STL QL: NEGATIVE

## 2022-11-18 PROCEDURE — 83993 ASSAY FOR CALPROTECTIN FECAL: CPT | Performed by: PHYSICIAN ASSISTANT

## 2022-11-18 PROCEDURE — 87493 C DIFF AMPLIFIED PROBE: CPT | Performed by: PHYSICIAN ASSISTANT

## 2022-11-18 PROCEDURE — 87338 HPYLORI STOOL AG IA: CPT | Performed by: PHYSICIAN ASSISTANT

## 2022-11-18 NOTE — TELEPHONE ENCOUNTER
Replied to [facility] message that information will be forwarded to provider for review.  Arabella Johnson RN

## 2022-11-21 ENCOUNTER — HEALTH MAINTENANCE LETTER (OUTPATIENT)
Age: 47
End: 2022-11-21

## 2022-11-21 LAB
CALPROTECTIN STL-MCNT: 11.1 MG/KG (ref 0–49.9)
H PYLORI AG STL QL IA: NEGATIVE

## 2022-11-22 NOTE — RESULT ENCOUNTER NOTE
Chrissie Carty,    Your recent set of lab tests are available for you to review.  First, your H. pylori test is negative.  This indicates that the H. pylori bacteria has been eradicated and you do not need any further antibiotics.  Your stool tests screening for infection and inflammation are normal as well, including a negative C. difficile test.  Your inflammatory marker level has improved as well, now it is near the normal range.  Lastly, your liver enzymes are stable.  I will be in touch as soon as I see the results from the MRI later this week.    Also, I did see your message stating that you had fasted prior to these labs, and that is fine, it does not affect these levels.    Please let me know if you have any questions.    Sincerely,  Laz Manley PA-C

## 2022-11-25 ENCOUNTER — HOSPITAL ENCOUNTER (OUTPATIENT)
Dept: MRI IMAGING | Facility: CLINIC | Age: 47
Discharge: HOME | End: 2022-11-25
Attending: PHYSICIAN ASSISTANT | Admitting: PHYSICIAN ASSISTANT
Payer: COMMERCIAL

## 2022-11-25 DIAGNOSIS — R10.10 UPPER ABDOMINAL PAIN: ICD-10-CM

## 2022-11-25 DIAGNOSIS — K52.9 CHRONIC DIARRHEA: ICD-10-CM

## 2022-11-25 PROCEDURE — A9585 GADOBUTROL INJECTION: HCPCS | Performed by: PHYSICIAN ASSISTANT

## 2022-11-25 PROCEDURE — 72197 MRI PELVIS W/O & W/DYE: CPT

## 2022-11-25 PROCEDURE — 74183 MRI ABD W/O CNTR FLWD CNTR: CPT

## 2022-11-25 PROCEDURE — 255N000002 HC RX 255 OP 636: Performed by: PHYSICIAN ASSISTANT

## 2022-11-25 RX ORDER — GADOBUTROL 604.72 MG/ML
13 INJECTION INTRAVENOUS ONCE
Status: COMPLETED | OUTPATIENT
Start: 2022-11-25 | End: 2022-11-25

## 2022-11-25 RX ADMIN — GADOBUTROL 13 ML: 604.72 INJECTION INTRAVENOUS at 09:58

## 2022-11-28 NOTE — RESULT ENCOUNTER NOTE
Chrissie Carty,    The results of your abdominal MRI are available for you to review.    Overall, everything looks normal in terms of your bowel. The other solid organs look normal as well - the only things noted were some ovarian cysts. These appear normal and although they can be a source of pain/bloating, I would expect more of a cyclical pelvic pain versus the upper abdominal pain you have been experiencing.     So at this point, given that you H pylori has been treated, I would recommend focusing on treating the eosinophilic esophagitis that was identified on your upper endoscopy. I do not know how much of your current abdominal pains might be coming from it, but it would be sensible to try to treat the condition to see if you get any improvement in your symptoms.     I did send a prescription for the budesonide suspension to our compounding pharmacy. If you haven't picked it up already, please do so.    Please let me know if you have any questions.    Sincerely,  Laz Manley PA-C

## 2022-11-29 DIAGNOSIS — K76.0 NAFLD (NONALCOHOLIC FATTY LIVER DISEASE): ICD-10-CM

## 2022-11-29 DIAGNOSIS — R79.89 ELEVATED LFTS: Primary | ICD-10-CM

## 2022-11-29 DIAGNOSIS — R10.11 RUQ ABDOMINAL PAIN: ICD-10-CM

## 2022-12-11 NOTE — TELEPHONE ENCOUNTER
Notified Dr. Roddy Williamson of 1600 BMP result.  Order to stop D5W Routing refill request to provider for review/approval because:  BP above desired range, K+ below normal range. Due for BP follow up with [facility].  Please sign if ok. Routing to POD due to PCP absence.  Norma Bravo, JOSE J  Triage Nurse

## 2022-12-14 ENCOUNTER — OFFICE VISIT (OUTPATIENT)
Dept: RHEUMATOLOGY | Facility: CLINIC | Age: 47
End: 2022-12-14
Payer: COMMERCIAL

## 2022-12-14 VITALS
HEART RATE: 72 BPM | WEIGHT: 277.8 LBS | BODY MASS INDEX: 47.43 KG/M2 | SYSTOLIC BLOOD PRESSURE: 118 MMHG | DIASTOLIC BLOOD PRESSURE: 82 MMHG | HEIGHT: 64 IN

## 2022-12-14 DIAGNOSIS — M25.50 MULTIPLE JOINT PAIN: ICD-10-CM

## 2022-12-14 DIAGNOSIS — R79.82 CRP ELEVATED: ICD-10-CM

## 2022-12-14 DIAGNOSIS — G89.29 CHRONIC THORACIC BACK PAIN, UNSPECIFIED BACK PAIN LATERALITY: Primary | ICD-10-CM

## 2022-12-14 DIAGNOSIS — M54.6 CHRONIC THORACIC BACK PAIN, UNSPECIFIED BACK PAIN LATERALITY: Primary | ICD-10-CM

## 2022-12-14 PROCEDURE — 99214 OFFICE O/P EST MOD 30 MIN: CPT | Performed by: INTERNAL MEDICINE

## 2022-12-14 NOTE — PROGRESS NOTES
Rheumatology follow-up visit note     Machelle is a 47 year old female presents today for follow-up.    Machelle was seen today for recheck.    Diagnoses and all orders for this visit:    Chronic thoracic back pain, unspecified back pain laterality  -     MR Thoracic Spine w/o Contrast; Future    Multiple joint pain    CRP elevated        She continues to be troubled by generalized pain, worse off it now in the thoracic spine, she has had MRI of the C-spine and lumbar spine the reports of which were reviewed.  Will proceed with thoracic spine MRI.  She has evidence of osteoarthritis that has most recently on the knee x-rays.  This is outlined to her.  She has decided to stop taking duloxetine and gabapentin.  As she is concerned that she has liver pain as she points to the right subcostal area and is working with a GI specialist.  She was unfortunately not able to start the CPAP in view of her GI symptoms are coming up in the way of her going to go for further evaluation for the sleep aspect.  We discussed once again how lack of restful sleep can add to generalized pain.  We reviewed that one of the key questions under consideration here in rheumatology is if she has an inflammatory arthropathy/seronegative spondyloarthropathy/connective tissue disease.  So far the evidence for would appear to be reassuringly negative.  Once the MRI information is available further course to be charted accordingly.    HPI    Machelle Weiner is a 47 year old female is here for follow-up.  She was seen here recently with polyarthralgias widespread pain positive LYUDMILA.  She had had modest elevation of sed rate and CRP.  She has had evidence of degenerative process setting and such as in her knees, cervical and lumbar spine.  Radiologically these have been milder than her symptoms.  She was not able to get to the sleep clinic to initiate the sleep apnea treatment as she developed further symptoms including what she describes as  "\"liver pain\" for which she is under work-up at GI.  Meanwhile in case that gabapentin duloxetine combination was adding to her symptoms with regards to \"liver pain\" she just stopped taking that.  She states it will be approximately 1-1/2 to 2 months that she stopped the 2 and has not noted any change being on and off those 2.     of  polyarthralgias affecting multiple joint areas including her knees especially.  She has noted pain going back several years she recalls this may be as long as 20+ years.  But now it is getting to a point where ambulation is getting affected.  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  As she feels as if the knee might give underneath her.  She has noted pain in her fingers going back to her preteens she recalls.  This especially is the case with the ring and middle finger.  She has had no history of psoriasis ulcerative colitis Crohn's disease herself or the immediate family.  She had extensive neck and back pain and work-up.  She has had imaging such as MRIs.  She has had attempted medial branch branch block that was not successful therefore the radiofrequency ablation was not recommended.  She is on current medications as noted.  During some of the work-up elsewhere she was found to have positive LYUDMILA and has referral to rheumatology.  She has noted unrefreshing sleep for quite some time.  As she noted that while she was waiting here in the waiting room she almost dozed off.  She snores.  She has recently consulted her primary physician for looking into sleep apnea.  She has history of with severe migraines.  She does not seem to have definite small bowel symptoms although she does get diarrhea since her cholecystectomy.  She describes no features suggestive of irritable bladder syndrome.  She has hypothyroidism.  On her mom side of the family there is rheumatoid arthritis however none in the immediate family seem to have " "rheumatoid arthritis or SLE..        DETAILED EXAMINATION  12/14/22  :    Vitals:    12/14/22 1447   BP: 118/82   BP Location: Right arm   Patient Position: Sitting   Cuff Size: Adult Large   Pulse: 72   Weight: 126 kg (277 lb 12.8 oz)   Height: 1.626 m (5' 4\")     Alert oriented. Head including the face is examined for malar rash, heliotropes, scarring, lupus pernio. Eyes examined for redness such as in episcleritis/scleritis, periorbital lesions.   Neck/ Face examined for parotid gland swelling, range of motion of neck.  Left upper and lower and right upper and lower extremities examined for tenderness, swelling, warmth of the appendicular joints, range of motion, edema, rash.  Some of the important findings included: she does not have evidence of synovitis in the palpable joints of the upper extremities.  No significant deformities of the digits.  She does not have dactylitis of digits.  She has tenderness across and along the paraspinal region throughout including the thoracic spine.  Patient Active Problem List    Diagnosis Date Noted     Chronic pain of both knees 06/24/2022     Priority: Medium     Hip pain 06/24/2022     Priority: Medium     Positive LYUDMILA (antinuclear antibody) 04/12/2022     Priority: Medium     Multiple joint pain 04/12/2022     Priority: Medium     Facet arthropathy, lumbosacral 12/10/2021     Priority: Medium     DDD (degenerative disc disease), lumbosacral 12/10/2021     Priority: Medium     Leg length discrepancy 12/10/2021     Priority: Medium     Migraine 03/02/2018     Priority: Medium     Morbid obesity (H) 11/20/2017     Priority: Medium     S/P laparoscopic cholecystectomy 10/14/2016     Priority: Medium     Acquired hypothyroidism 02/04/2016     Priority: Medium     Essential hypertension, benign 09/08/2015     Priority: Medium     Hyperlipidemia LDL goal <160 01/25/2013     Priority: Medium     Asthma, mild intermittent      Priority: Medium     Past Surgical History: "   Procedure Laterality Date     COLONOSCOPY N/A 6/21/2022    Procedure: COLONOSCOPY (fv);  Surgeon: Aries Engle MD;  Location:  GI     ESOPHAGOSCOPY, GASTROSCOPY, DUODENOSCOPY (EGD), COMBINED N/A 9/22/2022    Procedure: ESOPHAGOGASTRODUODENOSCOPY, WITH BIOPSY;  Surgeon: Aries Wright MD;  Location:  GI     GYN SURGERY      cryosurgery of cervix at age 16     LAPAROSCOPIC CHOLECYSTECTOMY WITH CHOLANGIOGRAMS N/A 10/14/2016    Procedure: LAPAROSCOPIC CHOLECYSTECTOMY WITH CHOLANGIOGRAMS;  Surgeon: Cornelius Mueller MD;  Location:  OR      Past Medical History:   Diagnosis Date     Asthma, mild intermittent      Depression      Epigastric pain      Hypertension      Intractable chronic migraine without aura      Thyroid disease      Allergies   Allergen Reactions     Codeine      Penicillins Nausea and Vomiting     Current Outpatient Medications   Medication Sig Dispense Refill     albuterol (PROAIR HFA/PROVENTIL HFA/VENTOLIN HFA) 108 (90 Base) MCG/ACT inhaler INHALE 2 PUFFS BY MOUTH EVERY 6 HOURS AS NEEDED FOR SHORTNESS OF BREATH OR DIFFICULT BREATHING (Patient taking differently: as needed INHALE 2 PUFFS BY MOUTH EVERY 6 HOURS AS NEEDED FOR SHORTNESS OF BREATH OR DIFFICULT BREATHING) 36 g 1     albuterol (PROVENTIL) (2.5 MG/3ML) 0.083% neb solution Take 1 vial (2.5 mg) by nebulization every 6 hours as needed for shortness of breath / dyspnea or wheezing 90 mL 0     budesonide (PULMICORT) 0.5 MG/2ML neb solution Mix 2 mg (4 mL) budesonide suspension with 10 splenda packets to create slurry and drink solution slowly over 5-10 minutes twice daily for treatment of eosinophilic esophagitis 240 mL 2     budesonide (PULMICORT) 0.5 MG/2ML neb solution Drink 8 mL slowly over 5-10 minutes once daily 240 mL 2     clindamycin (CLINDAMAX) 1 % external gel Apply topically 2 times daily (Patient taking differently: Apply topically as needed) 30 g 2     cyclobenzaprine (FLEXERIL) 10 MG tablet Take 1 tab p.o. qhs (if  feeling groggy the following morning, can try taking half a tablet instead or can take earlier the night before). (Patient taking differently: as needed Take 1 tab p.o. qhs (if feeling groggy the following morning, can try taking half a tablet instead or can take earlier the night before).) 30 tablet 2     Esomeprazole Magnesium (NEXIUM PO) Take 20 mg by mouth every evening       levothyroxine (SYNTHROID/LEVOTHROID) 75 MCG tablet TAKE 1 TABLET(75 MCG) BY MOUTH DAILY (Patient taking differently: every morning TAKE 1 TABLET(75 MCG) BY MOUTH DAILY) 90 tablet 3     losartan (COZAAR) 50 MG tablet Take 1 tablet (50 mg) by mouth daily (Patient taking differently: Take 50 mg by mouth every morning) 90 tablet 1     Multiple Vitamins-Minerals (MULTIVITAMIN ADULT) CHEW Take 2 chew tab by mouth daily       ondansetron (ZOFRAN) 8 MG tablet Take 1 tablet (8 mg) by mouth every 8 hours as needed for nausea 30 tablet 0     Vitamin D (Cholecalciferol) 25 MCG (1000 UT) TABS Take 1 tablet by mouth daily       DULoxetine (CYMBALTA) 30 MG capsule Take 1 capsule (30 mg) by mouth 2 times daily (Patient not taking: Reported on 12/14/2022) 180 capsule 0     gabapentin (NEURONTIN) 300 MG capsule Take 2 capsules (600 mg) by mouth 3 times daily (Patient not taking: Reported on 12/14/2022) 360 capsule 0     family history includes Coronary Artery Disease in her maternal grandmother; Hyperlipidemia in her maternal grandfather and maternal grandmother.  Social Connections: Not on file          WBC   Date Value Ref Range Status   03/22/2021 11.4 (H) 4.0 - 11.0 10e9/L Final     WBC Count   Date Value Ref Range Status   08/29/2022 10.9 4.0 - 11.0 10e3/uL Final     RBC Count   Date Value Ref Range Status   08/29/2022 4.20 3.80 - 5.20 10e6/uL Final   03/22/2021 4.53 3.8 - 5.2 10e12/L Final     Hemoglobin   Date Value Ref Range Status   08/29/2022 11.9 11.7 - 15.7 g/dL Final   03/22/2021 12.9 11.7 - 15.7 g/dL Final     Hematocrit   Date Value Ref Range  Status   08/29/2022 39.2 35.0 - 47.0 % Final   03/22/2021 41.9 35.0 - 47.0 % Final     MCV   Date Value Ref Range Status   08/29/2022 93 78 - 100 fL Final   03/22/2021 93 78 - 100 fl Final     MCH   Date Value Ref Range Status   08/29/2022 28.3 26.5 - 33.0 pg Final   03/22/2021 28.5 26.5 - 33.0 pg Final     Platelet Count   Date Value Ref Range Status   08/29/2022 294 150 - 450 10e3/uL Final   03/22/2021 293 150 - 450 10e9/L Final     % Lymphocytes   Date Value Ref Range Status   08/29/2022 31 % Final   08/21/2020 28.5 % Final     AST   Date Value Ref Range Status   11/17/2022 113 (H) 10 - 35 U/L Final   03/22/2021 27 0 - 45 U/L Final     ALT   Date Value Ref Range Status   11/17/2022 140 (H) 10 - 35 U/L Final   03/22/2021 31 0 - 50 U/L Final     Albumin   Date Value Ref Range Status   11/17/2022 4.3 3.5 - 5.2 g/dL Final   09/08/2022 3.7 3.4 - 5.0 g/dL Final   03/22/2021 3.6 3.4 - 5.0 g/dL Final     Alkaline Phosphatase   Date Value Ref Range Status   11/17/2022 110 (H) 35 - 104 U/L Final   03/22/2021 103 40 - 150 U/L Final     Creatinine   Date Value Ref Range Status   08/16/2022 0.77 0.51 - 0.95 mg/dL Final   03/22/2021 0.94 0.52 - 1.04 mg/dL Final     GFR Estimate   Date Value Ref Range Status   08/16/2022 >90 >60 mL/min/1.73m2 Final     Comment:     Effective December 21, 2021 eGFRcr in adults is calculated using the 2021 CKD-EPI creatinine equation which includes age and gender (Elliot abad al., NEJM, DOI: 10.1056/JPVYcf1444828)   03/22/2021 74 >60 mL/min/[1.73_m2] Final     Comment:     Non  GFR Calc  Starting 12/18/2018, serum creatinine based estimated GFR (eGFR) will be   calculated using the Chronic Kidney Disease Epidemiology Collaboration   (CKD-EPI) equation.       GFR Estimate If Black   Date Value Ref Range Status   03/22/2021 85 >60 mL/min/[1.73_m2] Final     Comment:      GFR Calc  Starting 12/18/2018, serum creatinine based estimated GFR (eGFR) will be   calculated  using the Chronic Kidney Disease Epidemiology Collaboration   (CKD-EPI) equation.       Creatinine Urine mg/dL   Date Value Ref Range Status   08/29/2022 175.0 mg/dL Final     Comment:     The reference ranges have not been established in urine creatinine. The results should be integrated into the clinical context for interpretation.     Sed Rate   Date Value Ref Range Status   08/19/2015 13 0 - 20 mm/h Final     Erythrocyte Sedimentation Rate   Date Value Ref Range Status   03/22/2022 40 (H) 0 - 20 mm/hr Final     CRP Inflammation   Date Value Ref Range Status   11/17/2022 6.25 (H) <5.00 mg/L Final   03/22/2022 8.7 (H) 0.0 - 8.0 mg/L Final   10/03/2016 8.6 (H) 0.0 - 8.0 mg/L Final

## 2022-12-15 ENCOUNTER — OFFICE VISIT (OUTPATIENT)
Dept: GASTROENTEROLOGY | Facility: CLINIC | Age: 47
End: 2022-12-15
Attending: PHYSICIAN ASSISTANT
Payer: COMMERCIAL

## 2022-12-15 ENCOUNTER — APPOINTMENT (OUTPATIENT)
Dept: LAB | Facility: CLINIC | Age: 47
End: 2022-12-15
Payer: COMMERCIAL

## 2022-12-15 VITALS
OXYGEN SATURATION: 97 % | DIASTOLIC BLOOD PRESSURE: 87 MMHG | BODY MASS INDEX: 47.29 KG/M2 | SYSTOLIC BLOOD PRESSURE: 137 MMHG | WEIGHT: 277 LBS | HEART RATE: 72 BPM | HEIGHT: 64 IN

## 2022-12-15 DIAGNOSIS — R79.89 ELEVATED LFTS: ICD-10-CM

## 2022-12-15 DIAGNOSIS — K76.0 NAFLD (NONALCOHOLIC FATTY LIVER DISEASE): ICD-10-CM

## 2022-12-15 DIAGNOSIS — R10.11 RUQ ABDOMINAL PAIN: ICD-10-CM

## 2022-12-15 LAB
ALBUMIN SERPL BCG-MCNC: 4 G/DL (ref 3.5–5.2)
ALP SERPL-CCNC: 134 U/L (ref 35–104)
ALT SERPL W P-5'-P-CCNC: 98 U/L (ref 10–35)
AST SERPL W P-5'-P-CCNC: 83 U/L (ref 10–35)
BILIRUB DIRECT SERPL-MCNC: <0.2 MG/DL (ref 0–0.3)
BILIRUB SERPL-MCNC: 0.2 MG/DL
PROT SERPL-MCNC: 7.4 G/DL (ref 6.4–8.3)

## 2022-12-15 PROCEDURE — 36415 COLL VENOUS BLD VENIPUNCTURE: CPT

## 2022-12-15 PROCEDURE — 80076 HEPATIC FUNCTION PANEL: CPT

## 2022-12-15 PROCEDURE — 99215 OFFICE O/P EST HI 40 MIN: CPT | Performed by: PHYSICIAN ASSISTANT

## 2022-12-15 PROCEDURE — 86015 ACTIN ANTIBODY EACH: CPT

## 2022-12-15 PROCEDURE — 86381 MITOCHONDRIAL ANTIBODY EACH: CPT | Performed by: PHYSICIAN ASSISTANT

## 2022-12-15 PROCEDURE — 82784 ASSAY IGA/IGD/IGG/IGM EACH: CPT | Performed by: PHYSICIAN ASSISTANT

## 2022-12-15 PROCEDURE — 81332 SERPINA1 GENE: CPT

## 2022-12-15 PROCEDURE — 86364 TISS TRNSGLTMNASE EA IG CLAS: CPT | Mod: 59 | Performed by: PHYSICIAN ASSISTANT

## 2022-12-15 ASSESSMENT — PAIN SCALES - GENERAL: PAINLEVEL: SEVERE PAIN (6)

## 2022-12-15 NOTE — LETTER
12/15/2022         RE: Machelle Weiner  [address] [facility] [id number]        Dear Colleague,    Thank you for referring your patient, Machelle Weiner, to the [facility] [place]. Please see a copy of my visit note below.    Hepatology Clinic note  Machelle Weiner   Date of Birth 1975  Date of Service 12/14/2022    REASON FOR CONSULTATION: NAFLD  REFERRING PROVIDER: Laz Manley PA-C          Assessment/plan:   Machelle Weiner is a 47 year old female with history of elevated LFT's (dating back to 2016) and more recently (ALT 230s-90s and AST 80s-260s) and ultrasound findings of hepatic steatosis. She also had US elastography readings that median liver stiffness is 1.25 m/sec, but not valid due to variability of readings. Risk factors for fatty liver disease includes: obesity, weight gain (30 lbs in last 6 months) HTN, hyperlipidemia, hypothyroidism. We discussed the pathophysiology and natural history of nonalcoholic fatty liver disease and cirrhosis. We discussed NAFLD treatment includes slow gradual weight loss, as well as optimization of risk factors including hyperlipidemia/blood glucose. Will also rule out genetic and autoimmune causes of hepatobiliary disease.     Discussed that pain with NALFD is typically self-limiting and typically more dull/pressure rather than the intensive pain she is describing. Wonder if she has more of a costochondritis, but patient states pain feels deeper. Do not feel that liver biopsy is indicated at this time, but will consider if transaminases remain stable with lifestyle changes.     # NAFLD:    - Recommend slow gradual weight loss   - Referral for Comprehensive Weight Management with medical management   - Maintain good control of cholesterol (No contraindication to starting a statin with LFT elevations)   - Optimize blood glucose as needed. Can consider use of GLP-1 inhibitor for both insulin resistance and help with weight  loss.   - Improve nutrition:  emphasizing limit carbohydrates, especially simple carbohydrates/ Mediterranean Eating Plan .  - Increase physical activity as able. Continue to follow up with rheumatology with chronic pain of unclear etiology and elevated ESR/CRP   - Labs: Iron panel, alpha-1-antitrypsin deficiency, AMA, F-actin, TTG   - Optimize mental health   - Limit alcohol use to <3 drinks a week and less than one a day.    - Follow up in six months     Michelle Knight PA-C   [facility] Hepatology     Total time for E/M services performed on the date of the encounter 60 minutes.  This included review of previous: clinic visits, hospital records, lab results, imaging studies, and procedural documentation. Time also includes patient visit, documentation.  The findings from this review are summarized in the above note.   -----------------------------------------------------       HPI:   Machelle Weiner is a 47 year old female  presenting for the evaluation of elevated LFT's.     Per chart reviewed, elevated LFT's (dating back to 2016) and more recently (ALT 230s-90s and AST 80s-260s)    Since H Pylori treatment, eating has been off. Appetite is decreased and now forcing self to eat. Feeling nauseated a lot.     In regards to foods:  Typically doesn't eat breakfast.  Eat a lot of vegetables (brussel sprouts, cauliflower)   Cheerios / raisin bran   Chicken   Rice / pasta   Tolerates Eggs really well     Epigastric/just left upper quadrant to consistent pain. RUQ intermittent prior to cholecystectomy, before and after. This summer the RUQ pain became constant and instant (stabbing right behind ribs). Currently feels more of a twisting pain. Very much affecting quality of life.     Had EGD in September which showed EoE and H Pylroi. Had bowel movements are never normal. Typically either fluffy loose stools or very loose stools. Had colonoscopy in June 2022 which showed diverticulosis, otherwise normal.    Exhausted a lot, hard time getting out of bed.     In 2018-19, had done Keto, lost about 76 lbs. Then back and neck pain increased, became more sedentary and then started going out to eat. Weight has now peaked.   Per chart review, weight is up about 30 lbs in the last 6 months.     Patient denies jaundice, lower extremity edema, abdominal distension or confusion.  Patient also denies melena, hematochezia or hematemesis. Patient denies  fevers, sweats or chills.    PMH: Chronic pain of both knees, hip pain: Degenerative disc disease, migraines, morbid obesity, hypothyroidism, hyperlipidemia, mild asthma, hypertension    SMH: S/p laparoscopic cholecystectomy in 2006, cryosurgery of cervix at age 16    Medications:   See below     Quit smoking in 2016. No history of significant alcohol intake. No previous IV/IN drug use.  Currently works as a . Patient currently lives with significant other and 18 year old son. No known family history of liver disease or cancer.     Previous work-up:  HCV antibody nonreactive  HBV SAb: 5.6 not immune  HBV SAg: Nonreactive  HBV CAb: Nonreactive  LYUDMILA: +1:1 60  F-actin  AMA:   Iron panel:   Ferritin 18  Iron Sats: 18%  TTG   Alpha-1-antripsin  Ceruloplasmin   TSH 3.60  Cholesterol Total   HDL    Triglycerides  Hemoglobin A1c    Medical hx Surgical hx   Past Medical History:   Diagnosis Date     Asthma, mild intermittent      Depression      Epigastric pain      Hypertension      Intractable chronic migraine without aura      Thyroid disease     Past Surgical History:   Procedure Laterality Date     COLONOSCOPY N/A 6/21/2022    Procedure: COLONOSCOPY (fv);  Surgeon: Aries Engle MD;  Location:  GI     ESOPHAGOSCOPY, GASTROSCOPY, DUODENOSCOPY (EGD), COMBINED N/A 9/22/2022    Procedure: ESOPHAGOGASTRODUODENOSCOPY, WITH BIOPSY;  Surgeon: Aries Wright MD;  Location:  GI     GYN SURGERY      cryosurgery of cervix at age 16     LAPAROSCOPIC CHOLECYSTECTOMY  WITH CHOLANGIOGRAMS N/A 10/14/2016    Procedure: LAPAROSCOPIC CHOLECYSTECTOMY WITH CHOLANGIOGRAMS;  Surgeon: Cornelius Mueller MD;  Location:  OR                 Medications:     Current Outpatient Medications   Medication     albuterol (PROAIR HFA/PROVENTIL HFA/VENTOLIN HFA) 108 (90 Base) MCG/ACT inhaler     albuterol (PROVENTIL) (2.5 MG/3ML) 0.083% neb solution     budesonide (PULMICORT) 0.5 MG/2ML neb solution     budesonide (PULMICORT) 0.5 MG/2ML neb solution     clindamycin (CLINDAMAX) 1 % external gel     cyclobenzaprine (FLEXERIL) 10 MG tablet     DULoxetine (CYMBALTA) 30 MG capsule     Esomeprazole Magnesium (NEXIUM PO)     gabapentin (NEURONTIN) 300 MG capsule     levothyroxine (SYNTHROID/LEVOTHROID) 75 MCG tablet     losartan (COZAAR) 50 MG tablet     Multiple Vitamins-Minerals (MULTIVITAMIN ADULT) CHEW     ondansetron (ZOFRAN) 8 MG tablet     Vitamin D (Cholecalciferol) 25 MCG (1000 UT) TABS     No current facility-administered medications for this visit.            Allergies:     Allergies   Allergen Reactions     Codeine      Penicillins Nausea and Vomiting            Social History:     Social History     Socioeconomic History     Marital status:      Spouse name: Not on file     Number of children: Not on file     Years of education: Not on file     Highest education level: Not on file   Occupational History     Not on file   Tobacco Use     Smoking status: Former     Packs/day: 0.30     Types: Cigarettes     Quit date: 2015     Years since quittin.8     Smokeless tobacco: Never   Vaping Use     Vaping Use: Former   Substance and Sexual Activity     Alcohol use: No     Alcohol/week: 0.0 standard drinks     Drug use: No     Sexual activity: Yes     Partners: Male     Birth control/protection: None   Other Topics Concern     Parent/sibling w/ CABG, MI or angioplasty before 65F 55M? Not Asked   Social History Narrative     Not on file     Social Determinants of Health     Financial  "Resource Strain: Not on file   Food Insecurity: Not on file   Transportation Needs: Not on file   Physical Activity: Not on file   Stress: Not on file   Social Connections: Not on file   Intimate Partner Violence: Not on file   Housing Stability: Not on file            Family History:     Family History   Problem Relation Age of Onset     Coronary Artery Disease Maternal Grandmother      Hyperlipidemia Maternal Grandmother      Hyperlipidemia Maternal Grandfather      Diabetes No family hx of      Hypertension No family hx of      Anesthesia Reaction No family hx of               Review of Systems:   Gen: See HPI     HEENT: No change in vision or hearing, mouth sores, dysphagia, lymph nodes  Resp: No shortness of breath, coughing, hx of asthma  CV: No chest pain, palpitations, syncope   GI: See HPI  : No dysuria, history of stones, urine color    Skin: No rash; no pruritus or psoriasis  MS: No arthralgias, myalgias, joint swelling  Neuro: No memory changes, confusion, numbness    Heme: No difficulty clotting, bruising, bleeding  Psych:  No anxiety, depression, agitation          Physical Exam:   /87 (BP Location: Right arm, Patient Position: Sitting, Cuff Size: Adult Large)   Pulse 72   Ht 1.626 m (5' 4\")   Wt 125.6 kg (277 lb)   SpO2 97%   BMI 47.55 kg/m      Gen: A&Ox3, NAD,   HEENT: non-icteri  Lung: CTA Bilatererally, no wheezing or crackles.   Lym- no palpable lymphadenopathy  Abd: central adiposity, soft,  ND, RUQ tenderness to low ribs on right side  Ext:  intact pulses, mild edema  Musculoskeletal: ambulates very slowly   Skin: No rash,  no palmar erythema, telangiectasias or jaundice  Neuro: grossly intact, no asterixis   Psych: appropriate mood and affects         Data:   Reviewed in person and significant for:    Lab Results   Component Value Date     08/16/2022     03/22/2021      Lab Results   Component Value Date    POTASSIUM 3.9 08/16/2022    POTASSIUM 4.0 03/22/2022    " POTASSIUM 4.1 03/22/2021     Lab Results   Component Value Date    CHLORIDE 105 08/16/2022    CHLORIDE 108 03/22/2022    CHLORIDE 108 03/22/2021     Lab Results   Component Value Date    CO2 20 08/16/2022    CO2 25 03/22/2022    CO2 28 03/22/2021     Lab Results   Component Value Date    BUN 6.8 08/16/2022    BUN 10 03/22/2022    BUN 11 03/22/2021     Lab Results   Component Value Date    CR 0.77 08/16/2022    CR 0.94 03/22/2021       Lab Results   Component Value Date    WBC 10.9 08/29/2022    WBC 11.4 03/22/2021     Lab Results   Component Value Date    HGB 11.9 08/29/2022    HGB 12.9 03/22/2021     Lab Results   Component Value Date    HCT 39.2 08/29/2022    HCT 41.9 03/22/2021     Lab Results   Component Value Date    MCV 93 08/29/2022    MCV 93 03/22/2021     Lab Results   Component Value Date     08/29/2022     03/22/2021       Lab Results   Component Value Date     11/17/2022    AST 27 03/22/2021     Lab Results   Component Value Date     11/17/2022    ALT 31 03/22/2021     No results found for: BILICONJ   Lab Results   Component Value Date    BILITOTAL 0.4 11/17/2022    BILITOTAL 0.2 03/22/2021       Lab Results   Component Value Date    ALBUMIN 4.3 11/17/2022    ALBUMIN 3.7 09/08/2022    ALBUMIN 3.6 03/22/2021     Lab Results   Component Value Date    PROTTOTAL 7.7 11/17/2022    PROTTOTAL 7.8 03/22/2021      Lab Results   Component Value Date    ALKPHOS 110 11/17/2022    ALKPHOS 103 03/22/2021       Lab Results   Component Value Date    INR 1.04 08/29/2022    INR 0.95 12/16/2017         Imaging:        MR ENTEROGRAPHY WITHOUT AND WITH CONTRAST  11/25/2022 10:52 AM     HISTORY: Upper abdominal pain. Chronic diarrhea.     COMPARISON: CT abdomen and pelvis on 8/16/2022     TECHNIQUE: MRI enterography with and without IV contrast. MR  enterography was performed following oral contrast administration.  This examination is tailored to evaluate for inflammatory changes in  the  bowel.     FINDINGS:     Bowel: There is no evidence of abnormal bowel wall thickening. There  is no evidence of bowel obstruction. The appendix is visualized and  appears normal.     Mesentery: Unremarkable.     Lymph nodes: There is no lymphadenopathy in the abdomen and pelvis.     Fluid collections: Trace amount of free fluid in the pelvis,  indeterminate, could be physiological.     Other solid organs: Bilateral T2 hyperintense foci in both adnexa,  measuring 1.4 cm on the left and 1.2 cm on the right (series 7 image  21 and 24, respectively), likely represent ovarian cysts. The  visualized remaining abdominal and pelvic solid organs are  unremarkable.                                                                      IMPRESSION:  1. No evidence for abnormal bowel wall thickening or enhancement, to  suggest the presence of inflammatory bowel disease.  2. Trace amount of free fluid in the pelvis, likely physiological.  3. Small T2 hyperintense foci in both adnexa, likely represent ovarian  physiological follicles, can be further evaluated with pelvic  ultrasound if clinically warranted.      liver Ultrasound Elastography, 9/19/2022 2:46 PM     COMPARISON: CT abdomen 8/16/2022     HISTORY: Fatty liver; Elevated LFTs     FINDINGS:      Ultrasound elastography of the liver was performed using a Gudino  ultrasound machine using 10 separate measurements of the liver  parenchyma with patient in supine position. Measurements were obtained  approximately 2 cm beneath Richardson's capsule, perpendicular to the  liver capsule. Images are of satisfactory quality.     The median liver stiffness is: 1.25 m/sec  The mean liver stiffness is: 1.33 m/sec  The interquartile range is: 0.3  IQR/median: 0.24.  (IQR/median value of greater than 0.15 indicates  that a dataset is unreliable)                                                                      IMPRESSION:     The median liver stiffness is 1.25 m/sec and the IQR/median  value is  0.24, though the and elastography median stiffness value suggests  probability of being normal; obtained data set is unreliable; consider  MR elastography  for further evaluation        Consensus criteria for interpreting liver stiffness values in patients  with viral hepatitis or NAFLD according to SRU consensus guidelines  (Hernandez et. al. Radiology: 2020. epub)     High probability of being normal:  <1.3 m/sec  Rules out advanced chronic liver disease in asymptomatic patients:  <1.7 m/sec  Suggestive of advanced chronic liver disease: 1.7-2.1 m/sec  Indicates advanced chronic liver disease: >2.1 m/sec  Suggestive of clinically significant portal hypertension: >2.4 m/sec         Again, thank you for allowing me to participate in the care of your patient.        Sincerely,        Michelle Knight PA-C

## 2022-12-15 NOTE — PATIENT INSTRUCTIONS
Physical Activity   - Increase physical activity level by more than 60 minutes/week as able     Recommend Mediterranean Eating Plan     Limit Carbohydrates In Diet:   What foods are Carbohydrates?    - Main groups: Grains, Fruits, Starchy Vegetables (corn, potatoes, peas, beans) and Sweets/Desserts    When you do have carbohydrates - be mindful of the portions.   - Have ones with more fiber in the them (brown rice, whole grain breads/pastas)   - Fruits - choose ones with edible skins (apples, pears, etc) or seeds (berries)     Consideration mindfulness based stress reduction given chronic pain and stressors

## 2022-12-15 NOTE — NURSING NOTE
"Chief Complaint   Patient presents with     New Patient     Elevated LFTs  NAFLD (nonalcoholic fatty liver disease)           Vitals:    12/15/22 1004   BP: 137/87   BP Location: Right arm   Patient Position: Sitting   Cuff Size: Adult Large   Pulse: 72   SpO2: 97%   Weight: 125.6 kg (277 lb)   Height: 1.626 m (5' 4\")       Body mass index is 47.55 kg/m .    AMAURY AmadorF  "

## 2022-12-15 NOTE — PROGRESS NOTES
Hepatology Clinic note  Machelle Weinre   Date of Birth 1975  Date of Service 12/14/2022    REASON FOR CONSULTATION: NAFLD  REFERRING PROVIDER: Laz Manley PA-C          Assessment/plan:   Machelle Weiner is a 47 year old female with history of elevated LFT's (dating back to 2016) and more recently (ALT 230s-90s and AST 80s-260s) and ultrasound findings of hepatic steatosis. She also had US elastography readings that median liver stiffness is 1.25 m/sec, but not valid due to variability of readings. Risk factors for fatty liver disease includes: obesity, weight gain (30 lbs in last 6 months) HTN, hyperlipidemia, hypothyroidism. We discussed the pathophysiology and natural history of nonalcoholic fatty liver disease and cirrhosis. We discussed NAFLD treatment includes slow gradual weight loss, as well as optimization of risk factors including hyperlipidemia/blood glucose. Will also rule out genetic and autoimmune causes of hepatobiliary disease.     Discussed that pain with NALFD is typically self-limiting and typically more dull/pressure rather than the intensive pain she is describing. Wonder if she has more of a costochondritis, but patient states pain feels deeper. Do not feel that liver biopsy is indicated at this time, but will consider if transaminases remain stable with lifestyle changes.     # NAFLD:    - Recommend slow gradual weight loss   - Referral for Comprehensive Weight Management with medical management   - Maintain good control of cholesterol (No contraindication to starting a statin with LFT elevations)   - Optimize blood glucose as needed. Can consider use of GLP-1 inhibitor for both insulin resistance and help with weight loss.   - Improve nutrition:  emphasizing limit carbohydrates, especially simple carbohydrates/ Mediterranean Eating Plan .  - Increase physical activity as able. Continue to follow up with rheumatology with chronic pain of unclear etiology and elevated ESR/CRP   -  Labs: Iron panel, alpha-1-antitrypsin deficiency, AMA, F-actin, TTG   - Optimize mental health   - Limit alcohol use to <3 drinks a week and less than one a day.    - Follow up in six months     Michelle Knight PA-C   [facility] Hepatology     Total time for E/M services performed on the date of the encounter 60 minutes.  This included review of previous: clinic visits, hospital records, lab results, imaging studies, and procedural documentation. Time also includes patient visit, documentation.  The findings from this review are summarized in the above note.   -----------------------------------------------------       HPI:   Machelle Weiner is a 47 year old female  presenting for the evaluation of elevated LFT's.     Per chart reviewed, elevated LFT's (dating back to 2016) and more recently (ALT 230s-90s and AST 80s-260s)    Since H Pylori treatment, eating has been off. Appetite is decreased and now forcing self to eat. Feeling nauseated a lot.     In regards to foods:  Typically doesn't eat breakfast.  Eat a lot of vegetables (brussel sprouts, cauliflower)   Cheerios / raisin bran   Chicken   Rice / pasta   Tolerates Eggs really well     Epigastric/just left upper quadrant to consistent pain. RUQ intermittent prior to cholecystectomy, before and after. This summer the RUQ pain became constant and instant (stabbing right behind ribs). Currently feels more of a twisting pain. Very much affecting quality of life.     Had EGD in September which showed EoE and H Pylroi. Had bowel movements are never normal. Typically either fluffy loose stools or very loose stools. Had colonoscopy in June 2022 which showed diverticulosis, otherwise normal.   Exhausted a lot, hard time getting out of bed.     In 2018-19, had done Keto, lost about 76 lbs. Then back and neck pain increased, became more sedentary and then started going out to eat. Weight has now peaked.   Per chart review, weight is up about 30 lbs in the  last 6 months.     Patient denies jaundice, lower extremity edema, abdominal distension or confusion.  Patient also denies melena, hematochezia or hematemesis. Patient denies  fevers, sweats or chills.    PMH: Chronic pain of both knees, hip pain: Degenerative disc disease, migraines, morbid obesity, hypothyroidism, hyperlipidemia, mild asthma, hypertension    SMH: S/p laparoscopic cholecystectomy in 2006, cryosurgery of cervix at age 16    Medications:   See below     Quit smoking in 2016. No history of significant alcohol intake. No previous IV/IN drug use.  Currently works as a . Patient currently lives with significant other and 18 year old son. No known family history of liver disease or cancer.     Previous work-up:  HCV antibody nonreactive  HBV SAb: 5.6 not immune  HBV SAg: Nonreactive  HBV CAb: Nonreactive  LYUDMILA: +1:1 60  F-actin  AMA:   Iron panel:   Ferritin 18  Iron Sats: 18%  TTG   Alpha-1-antripsin  Ceruloplasmin   TSH 3.60  Cholesterol Total   HDL    Triglycerides  Hemoglobin A1c    Medical hx Surgical hx   Past Medical History:   Diagnosis Date     Asthma, mild intermittent      Depression      Epigastric pain      Hypertension      Intractable chronic migraine without aura      Thyroid disease     Past Surgical History:   Procedure Laterality Date     COLONOSCOPY N/A 6/21/2022    Procedure: COLONOSCOPY (fv);  Surgeon: Aries Engle MD;  Location:  GI     ESOPHAGOSCOPY, GASTROSCOPY, DUODENOSCOPY (EGD), COMBINED N/A 9/22/2022    Procedure: ESOPHAGOGASTRODUODENOSCOPY, WITH BIOPSY;  Surgeon: Aries Wright MD;  Location:  GI     GYN SURGERY      cryosurgery of cervix at age 16     LAPAROSCOPIC CHOLECYSTECTOMY WITH CHOLANGIOGRAMS N/A 10/14/2016    Procedure: LAPAROSCOPIC CHOLECYSTECTOMY WITH CHOLANGIOGRAMS;  Surgeon: Cornelius Mueller MD;  Location:  OR                 Medications:     Current Outpatient Medications   Medication     albuterol (PROAIR HFA/PROVENTIL  HFA/VENTOLIN HFA) 108 (90 Base) MCG/ACT inhaler     albuterol (PROVENTIL) (2.5 MG/3ML) 0.083% neb solution     budesonide (PULMICORT) 0.5 MG/2ML neb solution     budesonide (PULMICORT) 0.5 MG/2ML neb solution     clindamycin (CLINDAMAX) 1 % external gel     cyclobenzaprine (FLEXERIL) 10 MG tablet     DULoxetine (CYMBALTA) 30 MG capsule     Esomeprazole Magnesium (NEXIUM PO)     gabapentin (NEURONTIN) 300 MG capsule     levothyroxine (SYNTHROID/LEVOTHROID) 75 MCG tablet     losartan (COZAAR) 50 MG tablet     Multiple Vitamins-Minerals (MULTIVITAMIN ADULT) CHEW     ondansetron (ZOFRAN) 8 MG tablet     Vitamin D (Cholecalciferol) 25 MCG (1000 UT) TABS     No current facility-administered medications for this visit.            Allergies:     Allergies   Allergen Reactions     Codeine      Penicillins Nausea and Vomiting            Social History:     Social History     Socioeconomic History     Marital status:      Spouse name: Not on file     Number of children: Not on file     Years of education: Not on file     Highest education level: Not on file   Occupational History     Not on file   Tobacco Use     Smoking status: Former     Packs/day: 0.30     Types: Cigarettes     Quit date: 2015     Years since quittin.8     Smokeless tobacco: Never   Vaping Use     Vaping Use: Former   Substance and Sexual Activity     Alcohol use: No     Alcohol/week: 0.0 standard drinks     Drug use: No     Sexual activity: Yes     Partners: Male     Birth control/protection: None   Other Topics Concern     Parent/sibling w/ CABG, MI or angioplasty before 65F 55M? Not Asked   Social History Narrative     Not on file     Social Determinants of Health     Financial Resource Strain: Not on file   Food Insecurity: Not on file   Transportation Needs: Not on file   Physical Activity: Not on file   Stress: Not on file   Social Connections: Not on file   Intimate Partner Violence: Not on file   Housing Stability: Not on file         "    Family History:     Family History   Problem Relation Age of Onset     Coronary Artery Disease Maternal Grandmother      Hyperlipidemia Maternal Grandmother      Hyperlipidemia Maternal Grandfather      Diabetes No family hx of      Hypertension No family hx of      Anesthesia Reaction No family hx of               Review of Systems:   Gen: See HPI     HEENT: No change in vision or hearing, mouth sores, dysphagia, lymph nodes  Resp: No shortness of breath, coughing, hx of asthma  CV: No chest pain, palpitations, syncope   GI: See HPI  : No dysuria, history of stones, urine color    Skin: No rash; no pruritus or psoriasis  MS: No arthralgias, myalgias, joint swelling  Neuro: No memory changes, confusion, numbness    Heme: No difficulty clotting, bruising, bleeding  Psych:  No anxiety, depression, agitation          Physical Exam:   /87 (BP Location: Right arm, Patient Position: Sitting, Cuff Size: Adult Large)   Pulse 72   Ht 1.626 m (5' 4\")   Wt 125.6 kg (277 lb)   SpO2 97%   BMI 47.55 kg/m      Gen: A&Ox3, NAD,   HEENT: non-icteri  Lung: CTA Bilatererally, no wheezing or crackles.   Lym- no palpable lymphadenopathy  Abd: central adiposity, soft,  ND, RUQ tenderness to low ribs on right side  Ext:  intact pulses, mild edema  Musculoskeletal: ambulates very slowly   Skin: No rash,  no palmar erythema, telangiectasias or jaundice  Neuro: grossly intact, no asterixis   Psych: appropriate mood and affects         Data:   Reviewed in person and significant for:    Lab Results   Component Value Date     08/16/2022     03/22/2021      Lab Results   Component Value Date    POTASSIUM 3.9 08/16/2022    POTASSIUM 4.0 03/22/2022    POTASSIUM 4.1 03/22/2021     Lab Results   Component Value Date    CHLORIDE 105 08/16/2022    CHLORIDE 108 03/22/2022    CHLORIDE 108 03/22/2021     Lab Results   Component Value Date    CO2 20 08/16/2022    CO2 25 03/22/2022    CO2 28 03/22/2021     Lab Results "   Component Value Date    BUN 6.8 08/16/2022    BUN 10 03/22/2022    BUN 11 03/22/2021     Lab Results   Component Value Date    CR 0.77 08/16/2022    CR 0.94 03/22/2021       Lab Results   Component Value Date    WBC 10.9 08/29/2022    WBC 11.4 03/22/2021     Lab Results   Component Value Date    HGB 11.9 08/29/2022    HGB 12.9 03/22/2021     Lab Results   Component Value Date    HCT 39.2 08/29/2022    HCT 41.9 03/22/2021     Lab Results   Component Value Date    MCV 93 08/29/2022    MCV 93 03/22/2021     Lab Results   Component Value Date     08/29/2022     03/22/2021       Lab Results   Component Value Date     11/17/2022    AST 27 03/22/2021     Lab Results   Component Value Date     11/17/2022    ALT 31 03/22/2021     No results found for: BILICONJ   Lab Results   Component Value Date    BILITOTAL 0.4 11/17/2022    BILITOTAL 0.2 03/22/2021       Lab Results   Component Value Date    ALBUMIN 4.3 11/17/2022    ALBUMIN 3.7 09/08/2022    ALBUMIN 3.6 03/22/2021     Lab Results   Component Value Date    PROTTOTAL 7.7 11/17/2022    PROTTOTAL 7.8 03/22/2021      Lab Results   Component Value Date    ALKPHOS 110 11/17/2022    ALKPHOS 103 03/22/2021       Lab Results   Component Value Date    INR 1.04 08/29/2022    INR 0.95 12/16/2017         Imaging:        MR ENTEROGRAPHY WITHOUT AND WITH CONTRAST  11/25/2022 10:52 AM     HISTORY: Upper abdominal pain. Chronic diarrhea.     COMPARISON: CT abdomen and pelvis on 8/16/2022     TECHNIQUE: MRI enterography with and without IV contrast. MR  enterography was performed following oral contrast administration.  This examination is tailored to evaluate for inflammatory changes in  the bowel.     FINDINGS:     Bowel: There is no evidence of abnormal bowel wall thickening. There  is no evidence of bowel obstruction. The appendix is visualized and  appears normal.     Mesentery: Unremarkable.     Lymph nodes: There is no lymphadenopathy in the abdomen and  pelvis.     Fluid collections: Trace amount of free fluid in the pelvis,  indeterminate, could be physiological.     Other solid organs: Bilateral T2 hyperintense foci in both adnexa,  measuring 1.4 cm on the left and 1.2 cm on the right (series 7 image  21 and 24, respectively), likely represent ovarian cysts. The  visualized remaining abdominal and pelvic solid organs are  unremarkable.                                                                      IMPRESSION:  1. No evidence for abnormal bowel wall thickening or enhancement, to  suggest the presence of inflammatory bowel disease.  2. Trace amount of free fluid in the pelvis, likely physiological.  3. Small T2 hyperintense foci in both adnexa, likely represent ovarian  physiological follicles, can be further evaluated with pelvic  ultrasound if clinically warranted.      liver Ultrasound Elastography, 9/19/2022 2:46 PM     COMPARISON: CT abdomen 8/16/2022     HISTORY: Fatty liver; Elevated LFTs     FINDINGS:      Ultrasound elastography of the liver was performed using a Angella Joy  ultrasound machine using 10 separate measurements of the liver  parenchyma with patient in supine position. Measurements were obtained  approximately 2 cm beneath Richardson's capsule, perpendicular to the  liver capsule. Images are of satisfactory quality.     The median liver stiffness is: 1.25 m/sec  The mean liver stiffness is: 1.33 m/sec  The interquartile range is: 0.3  IQR/median: 0.24.  (IQR/median value of greater than 0.15 indicates  that a dataset is unreliable)                                                                      IMPRESSION:     The median liver stiffness is 1.25 m/sec and the IQR/median value is  0.24, though the and elastography median stiffness value suggests  probability of being normal; obtained data set is unreliable; consider  MR elastography  for further evaluation        Consensus criteria for interpreting liver stiffness values in patients  with  viral hepatitis or NAFLD according to SRU consensus guidelines  (David et. al. Radiology: 2020. epub)     High probability of being normal:  <1.3 m/sec  Rules out advanced chronic liver disease in asymptomatic patients:  <1.7 m/sec  Suggestive of advanced chronic liver disease: 1.7-2.1 m/sec  Indicates advanced chronic liver disease: >2.1 m/sec  Suggestive of clinically significant portal hypertension: >2.4 m/sec

## 2022-12-16 LAB
IGA SERPL-MCNC: 240 MG/DL (ref 84–499)
MITOCHONDRIA M2 IGG SER-ACNC: <1 U/ML
TTG IGA SER-ACNC: 0.6 U/ML
TTG IGG SER-ACNC: <0.6 U/ML

## 2022-12-17 LAB — SMA IGG SER-ACNC: 8 UNITS

## 2022-12-19 LAB
A1AT PHENOTYP SERPL-IMP: NORMAL
A1AT SERPL-MCNC: 189 MG/DL
A1AT SS SERPL-MCNC: NEGATIVE G/L
A1AT SZ SERPL-MCNC: NORMAL G/L
A1AT ZZ SERPL-MCNC: NEGATIVE G/L
SPECIMEN SOURCE: NORMAL

## 2022-12-20 ENCOUNTER — HOSPITAL ENCOUNTER (OUTPATIENT)
Dept: MRI IMAGING | Facility: CLINIC | Age: 47
Discharge: HOME | End: 2022-12-20
Attending: INTERNAL MEDICINE | Admitting: INTERNAL MEDICINE
Payer: COMMERCIAL

## 2022-12-20 DIAGNOSIS — M54.6 CHRONIC THORACIC BACK PAIN, UNSPECIFIED BACK PAIN LATERALITY: ICD-10-CM

## 2022-12-20 DIAGNOSIS — G89.29 CHRONIC THORACIC BACK PAIN, UNSPECIFIED BACK PAIN LATERALITY: ICD-10-CM

## 2022-12-20 PROCEDURE — 72146 MRI CHEST SPINE W/O DYE: CPT

## 2022-12-22 ENCOUNTER — HOSPITAL ENCOUNTER (EMERGENCY)
Facility: CLINIC | Age: 47
Discharge: LEFT BEFORE DISCHARGE | End: 2022-12-22
Attending: EMERGENCY MEDICINE | Admitting: EMERGENCY MEDICINE
Payer: COMMERCIAL

## 2022-12-22 VITALS
SYSTOLIC BLOOD PRESSURE: 161 MMHG | DIASTOLIC BLOOD PRESSURE: 89 MMHG | BODY MASS INDEX: 47.46 KG/M2 | OXYGEN SATURATION: 100 % | WEIGHT: 278 LBS | HEART RATE: 72 BPM | RESPIRATION RATE: 20 BRPM | HEIGHT: 64 IN | TEMPERATURE: 98.8 F

## 2022-12-22 PROCEDURE — 250N000011 HC RX IP 250 OP 636: Performed by: EMERGENCY MEDICINE

## 2022-12-22 PROCEDURE — 93005 ELECTROCARDIOGRAM TRACING: CPT

## 2022-12-22 PROCEDURE — 999N000104 HC STATISTIC NO CHARGE

## 2022-12-22 RX ORDER — ONDANSETRON 4 MG/1
4 TABLET, ORALLY DISINTEGRATING ORAL ONCE
Status: COMPLETED | OUTPATIENT
Start: 2022-12-22 | End: 2022-12-22

## 2022-12-22 RX ADMIN — ONDANSETRON 4 MG: 4 TABLET, ORALLY DISINTEGRATING ORAL at 17:01

## 2022-12-22 NOTE — ED TRIAGE NOTES
Pt presents for evaluation of left sided chest pain that started over 2 hours ago. At work when pain started. Associated shortness of breath, pain with deep breathing and lightheadedness. Pain is constant. Radiates in to left neck, shoulder and arm. Numbness in left hand. Pain described as sharp.

## 2022-12-23 ENCOUNTER — TELEPHONE (OUTPATIENT)
Dept: FAMILY MEDICINE | Facility: CLINIC | Age: 47
End: 2022-12-23

## 2022-12-23 LAB
ATRIAL RATE - MUSE: 64 BPM
DIASTOLIC BLOOD PRESSURE - MUSE: NORMAL MMHG
INTERPRETATION ECG - MUSE: NORMAL
P AXIS - MUSE: 20 DEGREES
PR INTERVAL - MUSE: 166 MS
QRS DURATION - MUSE: 84 MS
QT - MUSE: 404 MS
QTC - MUSE: 416 MS
R AXIS - MUSE: 26 DEGREES
SYSTOLIC BLOOD PRESSURE - MUSE: NORMAL MMHG
T AXIS - MUSE: 22 DEGREES
VENTRICULAR RATE- MUSE: 64 BPM

## 2022-12-23 NOTE — TELEPHONE ENCOUNTER
Patient very upset, she went to the ER yesterday- left upper chest pain radiating to back/shoulder/ left arm, not going away.       Got to ER and sat for 2.5 hours before she even got a EKG. She feels like she had a mini heart attack.  She was was not called back for EKG until she approached the desk to see what was going on. Staff were also taking patients back for labs and imaging while she waited for care.    She states that it was not anxiety/panic attack- the symptoms were not the same.  Got there at 1430 and just before 1700 is when she approached the desk and was called back for EKG. Then sat in waiting room for another hour before she was exam room. A triage nurse took her to a room to talk and she told her that she wanted some Zofran to calm down. She was told by nurse that she had a few people ahead of her.  At 1800 she left because she was now starting to experience a panic attack and had to leave.     Informed her of her visit details and advised that a normal sinus rhythm is a good thing.     She has never felt uncomfortable with the care she has received from [place] until yesterday at the ER.  Provided patient with the Patient relations phone number and email address so that she any voice her concerns.      Patient was very upset and mentioned that she is not homicidal but she states she would probably be better off dead than experience that situation again. When asked about her thoughts on an antidepressant, she states in the past the Duloxetine was not doing anything for pain or depression, same with gabapentin.  Recommended that she schedule an appointment with her provider to follow up on these things. She agreed and we placed her on the schedule for his first opening on 01/27/2023. She felt the side effects were worse than any benefit she felt.      Patient asking what other labs may have been done that would show if she truly had a MI.  Advised typically troponin levels are drawn.     Patient  requested I forward this to her provider as an FYI and wondering if a troponin level could be ordered to reduce her after visit anxiety.     JOSE J Smith on 12/23/2022 at 3:38 PM

## 2022-12-23 NOTE — ED PROVIDER NOTES
This patient left without being seen.  I did not see this patient.     Anna Marie Jamison MD  12/22/22 [id number]

## 2022-12-26 NOTE — TELEPHONE ENCOUNTER
Please do a follow up call and let know Juice Sauceda is out of the office. We would not get Troponin levels 3 days out from symptoms if better.

## 2022-12-26 NOTE — TELEPHONE ENCOUNTER
"Routed to Dr Portillo, please review update below.    Called pt per Dr Ida Villagomez's note below, reports symptoms have improved since 12/23/22.    Pt reports just read [facility] message from Dr Portillo on 12/26/22.    \"Machelle,     I'm helping to cover for Juice while he is away.  I'm sorry your visit to the ER was so frustrating.  There was a note about the possibility of getting a troponin to see if you may have actually had a heart attack.  While that can be a helpful test, this long afterwards it would be unlikely to still be elevated unless you had a massive heart attack.  Which is unlikely.     Some things we could think about doing would be a stress test, or a repeat EKG after about 2 weeks.  (If there was a heart attack it would leave traces, but things would take a couple of weeks to settle.)     Let me know if you have any questions,     Narayan Portillo MD\"    Reports constant SOB for months, baseline from asthma, states \"not lung related.\"  Fatigued as well, but states has menses and could be from that.  Reports migraine headaches almost every day.  Mild headache now.  Mild lightheadedness for past 6-8 months.    Reports no new symptoms in past few days.    Scheduled appt for 1/12/23 with Juice Sauceda.    Nahomy Basilio RN, BSN  [employer]    "

## 2023-01-27 ENCOUNTER — TELEPHONE (OUTPATIENT)
Dept: FAMILY MEDICINE | Facility: CLINIC | Age: 48
End: 2023-01-27

## 2023-01-27 DIAGNOSIS — I10 ESSENTIAL HYPERTENSION, BENIGN: ICD-10-CM

## 2023-01-27 DIAGNOSIS — R11.0 NAUSEA: Primary | ICD-10-CM

## 2023-01-27 RX ORDER — LOSARTAN POTASSIUM 50 MG/1
TABLET ORAL
Qty: 90 TABLET | Refills: 0 | Status: SHIPPED | OUTPATIENT
Start: 2023-01-27 | End: 2023-04-24

## 2023-01-27 NOTE — TELEPHONE ENCOUNTER
Patient called requesting a refill of their ZOFRAN prescription. They report that they took their last pill today. Patient asks if the next refill could be sublingual instead of in pill form as it is hard for her to keep medicine down when she is nauseous. Patient says that she has had this medication in the sublingual form before. Routing to prescribing provider.    Jeanine Dennis

## 2023-01-27 NOTE — TELEPHONE ENCOUNTER
Patient called to ensure that we rec'd this request. Patient reports that they took their last pill today.    Jeanine Dennis

## 2023-01-27 NOTE — TELEPHONE ENCOUNTER
Routing refill request to provider for review/approval because:  Angiotensin-II Receptors Failed    Rerun Protocol (1/27/2023 2:58 PM)    Last blood pressure under 140/90 in past 12 months      BP Readings from Last 3 Encounters:   12/22/22 (!) 161/89   12/15/22 137/87   12/14/22 118/82     Cathy العلي RN

## 2023-01-30 RX ORDER — ONDANSETRON 8 MG/1
8 TABLET, ORALLY DISINTEGRATING ORAL EVERY 8 HOURS PRN
Qty: 30 TABLET | Refills: 11 | Status: ON HOLD | OUTPATIENT
Start: 2023-01-30 | End: 2024-04-25

## 2023-01-30 NOTE — TELEPHONE ENCOUNTER
[employer] message sent to patient that prescription was sent to pharmacy.     Laverne Evangelista RN

## 2023-02-21 NOTE — ED NOTES
MD at bedside.   Perhaps she needs to see nutritional education for diabetic diet?    Or we could refer her to endocrine to help manage her sugars.

## 2023-03-16 ENCOUNTER — TELEPHONE (OUTPATIENT)
Dept: GASTROENTEROLOGY | Facility: CLINIC | Age: 48
End: 2023-03-16

## 2023-04-16 ENCOUNTER — HEALTH MAINTENANCE LETTER (OUTPATIENT)
Age: 48
End: 2023-04-16

## 2023-04-21 DIAGNOSIS — I10 ESSENTIAL HYPERTENSION, BENIGN: ICD-10-CM

## 2023-04-24 RX ORDER — LOSARTAN POTASSIUM 50 MG/1
TABLET ORAL
Qty: 90 TABLET | Refills: 0 | Status: SHIPPED | OUTPATIENT
Start: 2023-04-24 | End: 2023-07-25

## 2023-04-24 NOTE — TELEPHONE ENCOUNTER
Routing refill request to provider for review/approval because:  Labs out of range:  BP    BP Readings from Last 3 Encounters:   12/22/22 (!) 161/89   12/15/22 137/87   12/14/22 118/82     Todd CALABRESE RN 4/24/2023 at 3:16 PM

## 2023-05-23 ENCOUNTER — PATIENT OUTREACH (OUTPATIENT)
Dept: CARE COORDINATION | Facility: CLINIC | Age: 48
End: 2023-05-23
Payer: COMMERCIAL

## 2023-06-28 DIAGNOSIS — E03.9 ACQUIRED HYPOTHYROIDISM: ICD-10-CM

## 2023-06-28 RX ORDER — LEVOTHYROXINE SODIUM 75 UG/1
TABLET ORAL
Qty: 90 TABLET | Refills: 0 | Status: SHIPPED | OUTPATIENT
Start: 2023-06-28 | End: 2023-09-25

## 2023-07-21 DIAGNOSIS — I10 ESSENTIAL HYPERTENSION, BENIGN: ICD-10-CM

## 2023-07-25 RX ORDER — LOSARTAN POTASSIUM 50 MG/1
TABLET ORAL
Qty: 90 TABLET | Refills: 0 | Status: SHIPPED | OUTPATIENT
Start: 2023-07-25 | End: 2023-10-05

## 2023-07-25 NOTE — TELEPHONE ENCOUNTER
Routing refill request to provider for review/approval because:  Labs out of range:  BP  BP Readings from Last 3 Encounters:   12/22/22 (!) 161/89   12/15/22 137/87   12/14/22 118/82       Megan MILLER RN

## 2023-07-25 NOTE — TELEPHONE ENCOUNTER
Brief chart review.    Last office appt to address HTN on 4/2022. Last office visit with PCP on 8/16/2022.     Will provide 90 day refill of losartan to bridge. Last BMP on 1/2023 stable.    Please help patient set up appt.    Pavan Fisher MD  [facility]  7/25/2023

## 2023-07-26 NOTE — TELEPHONE ENCOUNTER
Regarding refill request of:     losartan (COZAAR) 50 MG tablet       LMTCB.    Nahomy Basilio RN, BSN  [facility]

## 2023-07-26 NOTE — TELEPHONE ENCOUNTER
Called the pt to advise of below.  She said she knows she needs an appt.  She says she has a lot of other appts going on.  She is upset and yelling at me because she has been seeing lots of other doctors and we will not count those appts.  Advised I am sorry she is upset, but we need to see out pts at least yearly to continue to safely prescribe medications.  Advised she was given a 3 month refill, but the appts for physicals do book out 2-3 months so it would be best to schedule soon.  She apologizes for yelling at me.  She does not want to be called and wants to be mycharted for things like this - appts. Put a sticky note in her chart.

## 2023-09-17 ENCOUNTER — HEALTH MAINTENANCE LETTER (OUTPATIENT)
Age: 48
End: 2023-09-17

## 2023-09-23 DIAGNOSIS — E03.9 ACQUIRED HYPOTHYROIDISM: ICD-10-CM

## 2023-09-25 RX ORDER — LEVOTHYROXINE SODIUM 75 UG/1
TABLET ORAL
Qty: 90 TABLET | Refills: 0 | Status: SHIPPED | OUTPATIENT
Start: 2023-09-25 | End: 2023-12-26

## 2023-09-25 NOTE — TELEPHONE ENCOUNTER
Routing refill request to provider for review/approval because:  Angie given x1 and patient did not follow up, please advise  TSH   Date Value Ref Range Status   04/19/2022 3.60 0.40 - 4.00 mU/L Final   03/22/2021 5.90 (H) 0.40 - 4.00 mU/L Final

## 2023-10-03 ENCOUNTER — OFFICE VISIT (OUTPATIENT)
Dept: FAMILY MEDICINE | Facility: CLINIC | Age: 48
End: 2023-10-03
Payer: COMMERCIAL

## 2023-10-03 VITALS
BODY MASS INDEX: 40.36 KG/M2 | HEART RATE: 65 BPM | DIASTOLIC BLOOD PRESSURE: 85 MMHG | RESPIRATION RATE: 19 BRPM | HEIGHT: 64 IN | TEMPERATURE: 98.1 F | OXYGEN SATURATION: 99 % | SYSTOLIC BLOOD PRESSURE: 114 MMHG | WEIGHT: 236.4 LBS

## 2023-10-03 DIAGNOSIS — K74.00 FIBROSIS OF LIVER: ICD-10-CM

## 2023-10-03 DIAGNOSIS — R11.0 NAUSEA: ICD-10-CM

## 2023-10-03 DIAGNOSIS — I10 ESSENTIAL HYPERTENSION, BENIGN: ICD-10-CM

## 2023-10-03 DIAGNOSIS — E66.01 MORBID OBESITY (H): ICD-10-CM

## 2023-10-03 DIAGNOSIS — R10.13 EPIGASTRIC PAIN: ICD-10-CM

## 2023-10-03 DIAGNOSIS — K20.0 EOSINOPHILIC ESOPHAGITIS: Primary | ICD-10-CM

## 2023-10-03 DIAGNOSIS — F39 MOOD DISORDER (H): ICD-10-CM

## 2023-10-03 PROCEDURE — 99214 OFFICE O/P EST MOD 30 MIN: CPT | Performed by: PHYSICIAN ASSISTANT

## 2023-10-03 ASSESSMENT — PAIN SCALES - GENERAL: PAINLEVEL: SEVERE PAIN (6)

## 2023-10-03 ASSESSMENT — PATIENT HEALTH QUESTIONNAIRE - PHQ9
SUM OF ALL RESPONSES TO PHQ QUESTIONS 1-9: 18
SUM OF ALL RESPONSES TO PHQ QUESTIONS 1-9: 18
10. IF YOU CHECKED OFF ANY PROBLEMS, HOW DIFFICULT HAVE THESE PROBLEMS MADE IT FOR YOU TO DO YOUR WORK, TAKE CARE OF THINGS AT HOME, OR GET ALONG WITH OTHER PEOPLE: EXTREMELY DIFFICULT

## 2023-10-03 ASSESSMENT — ASTHMA QUESTIONNAIRES: ACT_TOTALSCORE: 20

## 2023-10-03 NOTE — PROGRESS NOTES
"  Assessment & Plan     Eosinophilic esophagitis  Epigastric pain  Nausea  Fibrosis of liver  Machelle presented today to discuss ongoing nausea, epigastric pain and right quadrant pain, ongoing. She has been seeing [place] for some of this. I reviewed the chart extensively re her work up/testing. I discussed I did not likely think that her lilver was causing pain but that I couldn't guarantee her that. Specifically I was adamant about her need to begin treating the EOE. SHe will restart nexium today and start budesonide slurry. She has follow up with GI thru [facility] later this fall. She may need repeat GI  - esomeprazole (NEXIUM) 20 MG DR capsule; Take 1 capsule (20 mg) by mouth every morning (before breakfast) Take 30-60 minutes before eating.    Mood disorder  Reviewed her hx = she contracts for safety, just sick of experiencing pain    Morbid Obesity  Continue working at loss to help with liver disease      BMI:   Estimated body mass index is 40.56 kg/m  as calculated from the following:    Height as of this encounter: 1.626 m (5' 4.02\").    Weight as of this encounter: 107.2 kg (236 lb 6.4 oz).       Depression Screening Follow Up        10/3/2023     8:22 AM   PHQ   PHQ-9 Total Score 18   Q9: Thoughts of better off dead/self-harm past 2 weeks Several days   F/U: Thoughts of suicide or self-harm No   F/U: Safety concerns No     GEOVANNA [facility] GUILLERMO Carty is a 48 year old, presenting for the following health issues:  Follow Up        10/3/2023     8:32 AM   Additional Questions   Roomed by Karen GARZA   Accompanied by Self         10/3/2023     8:32 AM   Patient Reported Additional Medications   Patient reports taking the following new medications None       HPI       Machelle Weiner is a 48 year old female who presents today for ongoing GI symptoms  Last seen here in clinic in summer '22; saw through  and then felt like needed more care so went to " "[place]  Started with them in January -- dx with GRIFFITH, mild fibrosis (stage 1-2)   --this follows a dx of EOE, chronic gastritis  Unfortunately her symptoms persist and she is feeling like she has a repeat ulcer   -pressure at the epigastric space is painful and causing nausea   -nausea is hard to treat with the odansetron (only mildly effective)   -appetite is reduced 2/2 pain (new for her)    Did start on medical marijuana (for back initially), then more regularly for stomach pains; didn't feel that the tincture/gummies were helpful enough and started smoking flower to help with sleep and pain (this offered part of an appetite stimulant)    Over past few weeks as abdominal pain have worsened she mentions even a slight amount of eating she will have a subtle improvement of symptoms     BMs are \"relatively normal\"          Review of Systems   Constitutional, HEENT, cardiovascular, pulmonary, gi and gu systems are negative, except as otherwise noted.      Objective    /85 (BP Location: Right arm, Patient Position: Sitting, Cuff Size: Adult Large)   Pulse 65   Temp 98.1  F (36.7  C) (Oral)   Resp 19   Ht 1.626 m (5' 4.02\")   Wt 107.2 kg (236 lb 6.4 oz)   LMP 09/24/2023 (Exact Date)   SpO2 99%   BMI 40.56 kg/m    Body mass index is 40.56 kg/m .  Physical Exam   GENERAL: healthy, alert and no distress  ABDOMEN: not examined  PSYCH: mentation appears normal, affect normal/bright    Diagnostic: reviewed multiple labs/imaging done                Answers submitted by the patient for this visit:  Patient Health Questionnaire (Submitted on 10/3/2023)  If you checked off any problems, how difficult have these problems made it for you to do your work, take care of things at home, or get along with other people?: Extremely difficult  PHQ9 TOTAL SCORE: 18    "

## 2023-10-05 RX ORDER — LOSARTAN POTASSIUM 50 MG/1
TABLET ORAL
Qty: 90 TABLET | Refills: 0 | Status: SHIPPED | OUTPATIENT
Start: 2023-10-05 | End: 2023-12-26

## 2023-10-26 ENCOUNTER — E-VISIT (OUTPATIENT)
Dept: FAMILY MEDICINE | Facility: CLINIC | Age: 48
End: 2023-10-26
Payer: COMMERCIAL

## 2023-10-26 ENCOUNTER — TELEPHONE (OUTPATIENT)
Dept: FAMILY MEDICINE | Facility: CLINIC | Age: 48
End: 2023-10-26

## 2023-10-26 DIAGNOSIS — U07.1 INFECTION DUE TO 2019 NOVEL CORONAVIRUS: Primary | ICD-10-CM

## 2023-10-26 DIAGNOSIS — F39 MOOD DISORDER (H): ICD-10-CM

## 2023-10-26 PROCEDURE — 99421 OL DIG E/M SVC 5-10 MIN: CPT | Performed by: FAMILY MEDICINE

## 2023-10-26 NOTE — TELEPHONE ENCOUNTER
Patient calls and is very upset she can not reach [facility] directly.  Patient states she is COVID Positive and needs a letter but refused to give any details or let me assist her.  Patient states if she can not talk to someone she is going to purposely go to the [facility] and not wear a mask so that she may infect others.  Patient hung up.

## 2023-10-26 NOTE — LETTER
October 26, 2023      Machelle Weiner  [address] [place] [phone]        To Whom It May Concern:    Machelle Weiner was seen on 10/26/2023.  Please excuse her until 10/30/23 due to illness.        Sincerely,        Narayan Portillo MD

## 2023-10-27 RX ORDER — HYDROXYZINE HYDROCHLORIDE 25 MG/1
50-100 TABLET, FILM COATED ORAL 3 TIMES DAILY PRN
Qty: 50 TABLET | Refills: 1 | Status: SHIPPED | OUTPATIENT
Start: 2023-10-27 | End: 2024-02-05

## 2023-10-27 NOTE — ADDENDUM NOTE
Addended by: ALVARO IRBY on: 10/27/2023 01:03 PM     Modules accepted: Orders     Called patient to schedule for an EGD. No answer. Left message for patient to call Endoscopy Scheduling. Phone number provided.

## 2023-11-07 ENCOUNTER — OFFICE VISIT (OUTPATIENT)
Dept: FAMILY MEDICINE | Facility: CLINIC | Age: 48
End: 2023-11-07
Payer: COMMERCIAL

## 2023-11-07 VITALS
HEIGHT: 64 IN | TEMPERATURE: 98.4 F | DIASTOLIC BLOOD PRESSURE: 94 MMHG | WEIGHT: 228.2 LBS | BODY MASS INDEX: 38.96 KG/M2 | RESPIRATION RATE: 18 BRPM | HEART RATE: 61 BPM | OXYGEN SATURATION: 100 % | SYSTOLIC BLOOD PRESSURE: 148 MMHG

## 2023-11-07 DIAGNOSIS — M54.6 CHRONIC THORACIC BACK PAIN, UNSPECIFIED BACK PAIN LATERALITY: ICD-10-CM

## 2023-11-07 DIAGNOSIS — Z00.00 ROUTINE GENERAL MEDICAL EXAMINATION AT A HEALTH CARE FACILITY: Primary | ICD-10-CM

## 2023-11-07 DIAGNOSIS — M54.50 LUMBAR BACK PAIN: ICD-10-CM

## 2023-11-07 DIAGNOSIS — E03.9 ACQUIRED HYPOTHYROIDISM: ICD-10-CM

## 2023-11-07 DIAGNOSIS — Z11.4 SCREENING FOR HIV (HUMAN IMMUNODEFICIENCY VIRUS): ICD-10-CM

## 2023-11-07 DIAGNOSIS — Z12.31 VISIT FOR SCREENING MAMMOGRAM: ICD-10-CM

## 2023-11-07 DIAGNOSIS — G89.29 CHRONIC THORACIC BACK PAIN, UNSPECIFIED BACK PAIN LATERALITY: ICD-10-CM

## 2023-11-07 DIAGNOSIS — E66.01 MORBID OBESITY (H): ICD-10-CM

## 2023-11-07 DIAGNOSIS — M79.18 BILATERAL BUTTOCK PAIN: ICD-10-CM

## 2023-11-07 DIAGNOSIS — I10 ESSENTIAL HYPERTENSION, BENIGN: ICD-10-CM

## 2023-11-07 DIAGNOSIS — R07.9 LEFT-SIDED CHEST PAIN: ICD-10-CM

## 2023-11-07 DIAGNOSIS — R11.0 NAUSEA: ICD-10-CM

## 2023-11-07 DIAGNOSIS — R06.09 DYSPNEA ON EXERTION: ICD-10-CM

## 2023-11-07 LAB
CHOLEST SERPL-MCNC: 196 MG/DL
HDLC SERPL-MCNC: 45 MG/DL
LDLC SERPL CALC-MCNC: 133 MG/DL
NONHDLC SERPL-MCNC: 151 MG/DL
TRIGL SERPL-MCNC: 91 MG/DL
TSH SERPL DL<=0.005 MIU/L-ACNC: 1.78 UIU/ML (ref 0.3–4.2)

## 2023-11-07 PROCEDURE — 90472 IMMUNIZATION ADMIN EACH ADD: CPT | Performed by: PHYSICIAN ASSISTANT

## 2023-11-07 PROCEDURE — 80061 LIPID PANEL: CPT | Performed by: PHYSICIAN ASSISTANT

## 2023-11-07 PROCEDURE — 90471 IMMUNIZATION ADMIN: CPT | Performed by: PHYSICIAN ASSISTANT

## 2023-11-07 PROCEDURE — 84443 ASSAY THYROID STIM HORMONE: CPT | Performed by: PHYSICIAN ASSISTANT

## 2023-11-07 PROCEDURE — 99396 PREV VISIT EST AGE 40-64: CPT | Mod: 25 | Performed by: PHYSICIAN ASSISTANT

## 2023-11-07 PROCEDURE — 99214 OFFICE O/P EST MOD 30 MIN: CPT | Mod: 25 | Performed by: PHYSICIAN ASSISTANT

## 2023-11-07 PROCEDURE — 90682 RIV4 VACC RECOMBINANT DNA IM: CPT | Performed by: PHYSICIAN ASSISTANT

## 2023-11-07 PROCEDURE — 36415 COLL VENOUS BLD VENIPUNCTURE: CPT | Performed by: PHYSICIAN ASSISTANT

## 2023-11-07 PROCEDURE — 90677 PCV20 VACCINE IM: CPT | Performed by: PHYSICIAN ASSISTANT

## 2023-11-07 PROCEDURE — 87389 HIV-1 AG W/HIV-1&-2 AB AG IA: CPT | Performed by: PHYSICIAN ASSISTANT

## 2023-11-07 RX ORDER — LOSARTAN POTASSIUM 50 MG/1
50 TABLET ORAL DAILY
Qty: 90 TABLET | Refills: 0 | Status: CANCELLED | OUTPATIENT
Start: 2023-11-07

## 2023-11-07 RX ORDER — TAZAROTENE 1 MG/G
CREAM TOPICAL
COMMUNITY
Start: 2022-12-01

## 2023-11-07 RX ORDER — CETIRIZINE HYDROCHLORIDE 10 MG/1
10 TABLET ORAL DAILY
COMMUNITY

## 2023-11-07 RX ORDER — LEVOTHYROXINE SODIUM 75 UG/1
TABLET ORAL
Qty: 90 TABLET | Refills: 0 | Status: CANCELLED | OUTPATIENT
Start: 2023-11-07

## 2023-11-07 RX ORDER — CYCLOBENZAPRINE HCL 10 MG
TABLET ORAL
Qty: 30 TABLET | Refills: 2 | Status: SHIPPED | OUTPATIENT
Start: 2023-11-07 | End: 2024-09-25

## 2023-11-07 RX ORDER — ONDANSETRON 8 MG/1
8 TABLET, ORALLY DISINTEGRATING ORAL EVERY 8 HOURS PRN
Qty: 30 TABLET | Refills: 11 | Status: CANCELLED | OUTPATIENT
Start: 2023-11-07

## 2023-11-07 ASSESSMENT — ENCOUNTER SYMPTOMS
JOINT SWELLING: 1
HEMATOCHEZIA: 0
CONSTIPATION: 0
NERVOUS/ANXIOUS: 1
ARTHRALGIAS: 1
BREAST MASS: 0
HEMATURIA: 0
NAUSEA: 1
CHILLS: 1
SORE THROAT: 0
DIZZINESS: 1
PARESTHESIAS: 0
DIARRHEA: 1
FREQUENCY: 1
HEARTBURN: 1
DYSURIA: 0
EYE PAIN: 0
MYALGIAS: 0
FEVER: 0
PALPITATIONS: 0
WEAKNESS: 1
SHORTNESS OF BREATH: 0
ABDOMINAL PAIN: 1
HEADACHES: 1

## 2023-11-07 ASSESSMENT — PATIENT HEALTH QUESTIONNAIRE - PHQ9
10. IF YOU CHECKED OFF ANY PROBLEMS, HOW DIFFICULT HAVE THESE PROBLEMS MADE IT FOR YOU TO DO YOUR WORK, TAKE CARE OF THINGS AT HOME, OR GET ALONG WITH OTHER PEOPLE: SOMEWHAT DIFFICULT
SUM OF ALL RESPONSES TO PHQ QUESTIONS 1-9: 12
SUM OF ALL RESPONSES TO PHQ QUESTIONS 1-9: 12

## 2023-11-07 ASSESSMENT — PAIN SCALES - GENERAL: PAINLEVEL: SEVERE PAIN (6)

## 2023-11-07 NOTE — PATIENT INSTRUCTIONS
To schedule your mammogram at any of the [facility] locations, call [phone].      Preventive Health Recommendations  Female Ages 40 to 49    Yearly exam:   See your health care provider every year in order to  Review health changes.   Discuss preventive care.    Review your medicines if your doctor prescribed any.    Get a Pap test every three years (unless you have an abnormal result and your provider advises testing more often).    If you get Pap tests with HPV test, you only need to test every 5 years, unless you have an abnormal result. You do not need a Pap test if your uterus was removed (hysterectomy) and you have not had cancer.    You should be tested each year for STDs (sexually transmitted diseases), if you're at risk.   Ask your doctor if you should have a mammogram.    Have a colonoscopy (test for colon cancer) beginning at age 45.  Ask your provider about other options like a yearly FIT test or Cologuard test every 3 years (stool tests)      Have a cholesterol test every 5 years.     Have a diabetes test (fasting glucose) after age 45. If you are at risk for diabetes, you should have this test every 3 years.    Shots: Get a flu shot each year. Get a tetanus shot every 10 years.     Nutrition:   Eat at least 5 servings of fruits and vegetables each day.  Eat whole-grain bread, whole-wheat pasta and brown rice instead of white grains and rice.  Get adequate Calcium and Vitamin D.      Lifestyle  Exercise at least 150 minutes a week (an average of 30 minutes a day, 5 days a week). This will help you control your weight and prevent disease.  Limit alcohol to one drink per day.  No smoking.   Wear sunscreen to prevent skin cancer.  See your dentist every six months for an exam and cleaning.

## 2023-11-07 NOTE — PROGRESS NOTES
SUBJECTIVE:   CC: Machelle is an 48 year old who presents for preventive health visit.       2023     2:22 PM   Additional Questions   Roomed by Vitaliy ZAMORA   Accompanied by No one         2023     2:22 PM   Patient Reported Additional Medications   Patient reports taking the following new medications biotin, b 12, propulus spray, magnesium, Vitamin D,Kriloil, COQ10       Healthy Habits:     Getting at least 3 servings of Calcium per day:  Yes    Bi-annual eye exam:  NO    Dental care twice a year:  NO    Sleep apnea or symptoms of sleep apnea:  Daytime drowsiness and Excessive snoring    Diet:  Low fat/cholesterol and Other    Frequency of exercise:  None    Taking medications regularly:  Yes    Medication side effects:  None    Additional concerns today:  Yes    Machelle Weiner is a 48 year old female who presents today for annual wellness  She had COVID recently and still suffering from some dizziness/fogginess and occasional cough    She has some additional concerns about her heart; has had a few episodes of left sided chest pain  Painful inspiration  Occasionally worse with activity  Hx of elevated lipids  Former smoker    Has been able to start budesonide after our last visit  Did not tolerate during COVID but has restarted since then  Initially did not notice a physical/symptomatic benefit  Then during covid starting using a throat spray (Bee propulus spray)    Needs updated thyroid labs  Experiencing periodic sweating, mostly from the head  --even during cold times    Today's PHQ-9 Score:       2023     2:19 PM   PHQ-9 SCORE   PHQ-9 Total Score MyChart 12 (Moderate depression)   PHQ-9 Total Score 12         Social History     Tobacco Use    Smoking status: Former     Packs/day: .3     Types: Cigarettes     Quit date: 2015     Years since quittin.7    Smokeless tobacco: Never   Substance Use Topics    Alcohol use: No     Alcohol/week: 0.0 standard drinks of alcohol              11/7/2023     2:17 PM   Alcohol Use   Prescreen: >3 drinks/day or >7 drinks/week? Not Applicable     Reviewed orders with patient.  Reviewed health maintenance and updated orders accordingly - Yes  Lab work is in process  Labs reviewed in EPIC    Breast Cancer Screening:  There is a multiple family member history. She is due    FHS-7:       5/10/2022     1:59 PM   Breast CA Risk Assessment (FHS-7)   Did any of your first-degree relatives have breast or ovarian cancer? No   Did any of your relatives have bilateral breast cancer? No   Did any man in your family have breast cancer? No   Did any woman in your family have breast and ovarian cancer? No   Did any woman in your family have breast cancer before age 50 y? Yes   Do you have 2 or more relatives with breast and/or ovarian cancer? Yes   Do you have 2 or more relatives with breast and/or bowel cancer? Yes   Pertinent mammograms are reviewed under the imaging tab.    History of abnormal Pap smear: at age 16; she is aware of need for PAP but declines at this time     Reviewed and updated as needed this visit by clinical staff   Tobacco  Allergies  Meds              Reviewed and updated as needed this visit by Provider                   Review of Systems   Constitutional:  Positive for chills. Negative for fever.   HENT:  Negative for ear pain, hearing loss and sore throat.    Eyes:  Negative for pain and visual disturbance.   Respiratory:  Negative for shortness of breath.    Cardiovascular:  Negative for chest pain, palpitations and peripheral edema.   Gastrointestinal:  Positive for abdominal pain, diarrhea, heartburn and nausea. Negative for constipation and hematochezia.   Breasts:  Negative for tenderness, breast mass and discharge.   Genitourinary:  Positive for frequency, pelvic pain and urgency. Negative for dysuria, genital sores, hematuria, vaginal bleeding and vaginal discharge.   Musculoskeletal:  Positive for arthralgias and joint swelling. Negative  "for myalgias.   Skin:  Negative for rash.   Neurological:  Positive for dizziness, weakness and headaches. Negative for paresthesias.   Psychiatric/Behavioral:  Positive for mood changes. The patient is nervous/anxious.         OBJECTIVE:   BP (!) 144/86 (BP Location: Right arm, Patient Position: Sitting, Cuff Size: Adult Large)   Pulse 61   Temp 98.4  F (36.9  C) (Oral)   Resp 18   Ht 1.626 m (5' 4\")   Wt 103.5 kg (228 lb 3.2 oz)   LMP 09/24/2023 (Exact Date)   SpO2 100%   BMI 39.17 kg/m    Physical Exam  GENERAL: healthy, alert and no distress  HENT: ear canals and TM's normal, nose and mouth without ulcers or lesions  NECK: no adenopathy, no asymmetry, masses, or scars and thyroid normal to palpation  RESP: lungs clear to auscultation - no rales, rhonchi or wheezes  CV: regular rates and rhythm; no ectopy noted  ABDOMEN: obese; there is ttp along the right quadrant without sylvia abnormality palpated; nabs  MS: No peripheral edema   SKIN: no suspicious lesions or rashes  BACK: not examined  PSYCH: mentation appears normal, affect normal/bright    Diagnostic Test Results:  Labs reviewed in Epic  Updating today    ASSESSMENT/PLAN:   1. Routine general medical examination at a health care facility  Reviewed personal and family history. Reviewed age appropriate screenings. Recommended any needed vaccinations.  - PNEUMOCOCCAL 20 VALENT CONJUGATE (PREVNAR 20)  - INFLUENZA VACCINE 18-64Y (FLUBLOK)    2. Acquired hypothyroidism  Updating.   - TSH WITH FREE T4 REFLEX; Future  - US Thyroid; Future  - TSH WITH FREE T4 REFLEX    3. Visit for screening mammogram  Due. Especially with family hx  - MA SCREENING DIGITAL BILAT - Future  (s+30); Future    4. Essential hypertension, benign  Not at goal. She has been controlled recently and I'm not sure this jump isnt a result of her recent COVID illness. Recommend recheck and adjust as needed per results    5. Nausea  Persistent despite use of the budesonide slurry (did not " "use during covid); felt some improvement with Bee based spray? Following with [facility] later this week    6. Bilateral buttock pain  7. Chronic thoracic back pain, unspecified back pain laterality  8. Lumbar back pain  Chronic stable  - cyclobenzaprine (FLEXERIL) 10 MG tablet; Take 1 tab p.o. qhs (if feeling groggy the following morning, can try taking half a tablet instead or can take earlier the night before).  Dispense: 30 tablet; Refill: 2    9. Left-sided chest pain  10. Dyspnea on exertion  She mentions new symptoms over the past year; infrequent but definitively different than her GI symptoms. Normal EKG previously however with some risk factors ok to screen belwo  - Exercise Stress Test - Adult; Future    11. Screening for HIV (human immunodeficiency virus)  Per CDC  - HIV Antigen Antibody Combo; Future  - HIV Antigen Antibody Combo    12. Morbid obesity (H)  She has been losing some weight recently but unfortunately that is 2/2 her GI symptoms. SHe is aware that loss will continue to help with her cholesterol levels and likely musculoskeletal pain  - Lipid panel reflex to direct LDL Non-fasting; Future  - Lipid panel reflex to direct LDL Non-fasting        Depression Screening Follow Up        11/7/2023     2:19 PM   PHQ   PHQ-9 Total Score 12   Q9: Thoughts of better off dead/self-harm past 2 weeks Not at all         COUNSELING:  Reviewed preventive health counseling, as reflected in patient instructions      BMI:   Estimated body mass index is 39.17 kg/m  as calculated from the following:    Height as of this encounter: 1.626 m (5' 4\").    Weight as of this encounter: 103.5 kg (228 lb 3.2 oz).         She reports that she quit smoking about 8 years ago. Her smoking use included cigarettes. She smoked an average of .3 packs per day. She has never used smokeless tobacco.          Jean Sauceda PA-C  [facility] [place]  Answers submitted by the patient for this visit:  Patient Health " Questionnaire (Submitted on 11/7/2023)  If you checked off any problems, how difficult have these problems made it for you to do your work, take care of things at home, or get along with other people?: Somewhat difficult  PHQ9 TOTAL SCORE: 12

## 2023-11-08 ENCOUNTER — MYC MEDICAL ADVICE (OUTPATIENT)
Dept: FAMILY MEDICINE | Facility: CLINIC | Age: 48
End: 2023-11-08
Payer: COMMERCIAL

## 2023-11-08 LAB — HIV 1+2 AB+HIV1 P24 AG SERPL QL IA: NONREACTIVE

## 2023-12-23 DIAGNOSIS — E03.9 ACQUIRED HYPOTHYROIDISM: ICD-10-CM

## 2023-12-23 DIAGNOSIS — I10 ESSENTIAL HYPERTENSION, BENIGN: ICD-10-CM

## 2023-12-26 RX ORDER — LOSARTAN POTASSIUM 50 MG/1
TABLET ORAL
Qty: 90 TABLET | Refills: 3 | Status: SHIPPED | OUTPATIENT
Start: 2023-12-26

## 2023-12-26 RX ORDER — LEVOTHYROXINE SODIUM 75 UG/1
TABLET ORAL
Qty: 90 TABLET | Refills: 3 | Status: SHIPPED | OUTPATIENT
Start: 2023-12-26

## 2024-02-02 DIAGNOSIS — F39 MOOD DISORDER (H): ICD-10-CM

## 2024-02-05 RX ORDER — HYDROXYZINE HYDROCHLORIDE 25 MG/1
TABLET, FILM COATED ORAL
Qty: 50 TABLET | Refills: 0 | Status: ON HOLD | OUTPATIENT
Start: 2024-02-05 | End: 2024-04-25

## 2024-04-10 ENCOUNTER — PATIENT OUTREACH (OUTPATIENT)
Dept: CARE COORDINATION | Facility: CLINIC | Age: 49
End: 2024-04-10
Payer: COMMERCIAL

## 2024-04-15 ENCOUNTER — APPOINTMENT (OUTPATIENT)
Dept: CT IMAGING | Facility: CLINIC | Age: 49
DRG: 552 | End: 2024-04-15
Attending: EMERGENCY MEDICINE
Payer: COMMERCIAL

## 2024-04-15 ENCOUNTER — HOSPITAL ENCOUNTER (INPATIENT)
Facility: CLINIC | Age: 49
LOS: 8 days | Discharge: HOME | DRG: 552 | End: 2024-04-25
Attending: EMERGENCY MEDICINE | Admitting: INTERNAL MEDICINE
Payer: COMMERCIAL

## 2024-04-15 DIAGNOSIS — M54.2 CERVICALGIA: ICD-10-CM

## 2024-04-15 DIAGNOSIS — G89.4 PAIN SYNDROME, CHRONIC: Primary | ICD-10-CM

## 2024-04-15 DIAGNOSIS — R11.0 NAUSEA: ICD-10-CM

## 2024-04-15 DIAGNOSIS — G56.90 NERVE DISORDER INVOLVING SHOULDER PAIN: ICD-10-CM

## 2024-04-15 DIAGNOSIS — F39 MOOD DISORDER (H): ICD-10-CM

## 2024-04-15 DIAGNOSIS — R52 INTRACTABLE PAIN: ICD-10-CM

## 2024-04-15 DIAGNOSIS — L71.9 ROSACEA: ICD-10-CM

## 2024-04-15 DIAGNOSIS — M25.511 ACUTE PAIN OF RIGHT SHOULDER: ICD-10-CM

## 2024-04-15 LAB
ALBUMIN SERPL BCG-MCNC: 4.4 G/DL (ref 3.5–5.2)
ALP SERPL-CCNC: 97 U/L (ref 40–150)
ALT SERPL W P-5'-P-CCNC: 14 U/L (ref 0–50)
ANION GAP SERPL CALCULATED.3IONS-SCNC: 15 MMOL/L (ref 7–15)
AST SERPL W P-5'-P-CCNC: 22 U/L (ref 0–45)
ATRIAL RATE - MUSE: 57 BPM
BASOPHILS # BLD AUTO: 0.1 10E3/UL (ref 0–0.2)
BASOPHILS NFR BLD AUTO: 2 %
BILIRUB DIRECT SERPL-MCNC: <0.2 MG/DL (ref 0–0.3)
BILIRUB SERPL-MCNC: 0.2 MG/DL
BUN SERPL-MCNC: 11 MG/DL (ref 6–20)
CALCIUM SERPL-MCNC: 9.4 MG/DL (ref 8.6–10)
CHLORIDE SERPL-SCNC: 108 MMOL/L (ref 98–107)
CREAT SERPL-MCNC: 0.96 MG/DL (ref 0.51–0.95)
D DIMER PPP FEU-MCNC: 0.54 UG/ML FEU (ref 0–0.5)
DEPRECATED HCO3 PLAS-SCNC: 15 MMOL/L (ref 22–29)
DIASTOLIC BLOOD PRESSURE - MUSE: NORMAL MMHG
EGFRCR SERPLBLD CKD-EPI 2021: 73 ML/MIN/1.73M2
EOSINOPHIL # BLD AUTO: 0.4 10E3/UL (ref 0–0.7)
EOSINOPHIL NFR BLD AUTO: 4 %
ERYTHROCYTE [DISTWIDTH] IN BLOOD BY AUTOMATED COUNT: 14.7 % (ref 10–15)
GLUCOSE SERPL-MCNC: 92 MG/DL (ref 70–99)
HCG SER QL IA.RAPID: NEGATIVE
HCT VFR BLD AUTO: 41.1 % (ref 35–47)
HGB BLD-MCNC: 13.2 G/DL (ref 11.7–15.7)
IMM GRANULOCYTES # BLD: 0 10E3/UL
IMM GRANULOCYTES NFR BLD: 1 %
INTERPRETATION ECG - MUSE: NORMAL
LYMPHOCYTES # BLD AUTO: 2.4 10E3/UL (ref 0.8–5.3)
LYMPHOCYTES NFR BLD AUTO: 27 %
MCH RBC QN AUTO: 27.4 PG (ref 26.5–33)
MCHC RBC AUTO-ENTMCNC: 32.1 G/DL (ref 31.5–36.5)
MCV RBC AUTO: 85 FL (ref 78–100)
MONOCYTES # BLD AUTO: 0.4 10E3/UL (ref 0–1.3)
MONOCYTES NFR BLD AUTO: 5 %
NEUTROPHILS # BLD AUTO: 5.4 10E3/UL (ref 1.6–8.3)
NEUTROPHILS NFR BLD AUTO: 61 %
NRBC # BLD AUTO: 0 10E3/UL
NRBC BLD AUTO-RTO: 0 /100
P AXIS - MUSE: 48 DEGREES
PLATELET # BLD AUTO: 305 10E3/UL (ref 150–450)
POTASSIUM SERPL-SCNC: 3.9 MMOL/L (ref 3.4–5.3)
PR INTERVAL - MUSE: 180 MS
PROT SERPL-MCNC: 7.6 G/DL (ref 6.4–8.3)
QRS DURATION - MUSE: 82 MS
QT - MUSE: 424 MS
QTC - MUSE: 412 MS
R AXIS - MUSE: 15 DEGREES
RBC # BLD AUTO: 4.82 10E6/UL (ref 3.8–5.2)
SODIUM SERPL-SCNC: 138 MMOL/L (ref 135–145)
SYSTOLIC BLOOD PRESSURE - MUSE: NORMAL MMHG
T AXIS - MUSE: 43 DEGREES
TROPONIN T SERPL HS-MCNC: 8 NG/L
VENTRICULAR RATE- MUSE: 57 BPM
WBC # BLD AUTO: 8.7 10E3/UL (ref 4–11)

## 2024-04-15 PROCEDURE — 250N000011 HC RX IP 250 OP 636: Performed by: EMERGENCY MEDICINE

## 2024-04-15 PROCEDURE — 96375 TX/PRO/DX INJ NEW DRUG ADDON: CPT

## 2024-04-15 PROCEDURE — 99285 EMERGENCY DEPT VISIT HI MDM: CPT | Mod: 25

## 2024-04-15 PROCEDURE — 82374 ASSAY BLOOD CARBON DIOXIDE: CPT | Performed by: EMERGENCY MEDICINE

## 2024-04-15 PROCEDURE — 250N000009 HC RX 250: Performed by: EMERGENCY MEDICINE

## 2024-04-15 PROCEDURE — 84703 CHORIONIC GONADOTROPIN ASSAY: CPT

## 2024-04-15 PROCEDURE — 250N000011 HC RX IP 250 OP 636: Performed by: INTERNAL MEDICINE

## 2024-04-15 PROCEDURE — 99222 1ST HOSP IP/OBS MODERATE 55: CPT | Performed by: INTERNAL MEDICINE

## 2024-04-15 PROCEDURE — 71275 CT ANGIOGRAPHY CHEST: CPT

## 2024-04-15 PROCEDURE — 96374 THER/PROPH/DIAG INJ IV PUSH: CPT

## 2024-04-15 PROCEDURE — 86140 C-REACTIVE PROTEIN: CPT | Performed by: PHYSICIAN ASSISTANT

## 2024-04-15 PROCEDURE — 84484 ASSAY OF TROPONIN QUANT: CPT | Performed by: EMERGENCY MEDICINE

## 2024-04-15 PROCEDURE — 96376 TX/PRO/DX INJ SAME DRUG ADON: CPT

## 2024-04-15 PROCEDURE — 250N000013 HC RX MED GY IP 250 OP 250 PS 637: Performed by: INTERNAL MEDICINE

## 2024-04-15 PROCEDURE — 85379 FIBRIN DEGRADATION QUANT: CPT | Performed by: EMERGENCY MEDICINE

## 2024-04-15 PROCEDURE — 36415 COLL VENOUS BLD VENIPUNCTURE: CPT | Performed by: EMERGENCY MEDICINE

## 2024-04-15 PROCEDURE — G0378 HOSPITAL OBSERVATION PER HR: HCPCS

## 2024-04-15 PROCEDURE — 85025 COMPLETE CBC W/AUTO DIFF WBC: CPT | Performed by: EMERGENCY MEDICINE

## 2024-04-15 PROCEDURE — 82248 BILIRUBIN DIRECT: CPT | Performed by: INTERNAL MEDICINE

## 2024-04-15 RX ORDER — ALBUTEROL SULFATE 0.83 MG/ML
2.5 SOLUTION RESPIRATORY (INHALATION) EVERY 6 HOURS PRN
Status: DISCONTINUED | OUTPATIENT
Start: 2024-04-15 | End: 2024-04-25 | Stop reason: HOSPADM

## 2024-04-15 RX ORDER — ALBUTEROL SULFATE 90 UG/1
2 AEROSOL, METERED RESPIRATORY (INHALATION)
Status: DISCONTINUED | OUTPATIENT
Start: 2024-04-15 | End: 2024-04-25 | Stop reason: HOSPADM

## 2024-04-15 RX ORDER — UBIDECARENONE 100 MG
100 CAPSULE ORAL
COMMUNITY

## 2024-04-15 RX ORDER — NALOXONE HYDROCHLORIDE 0.4 MG/ML
0.4 INJECTION, SOLUTION INTRAMUSCULAR; INTRAVENOUS; SUBCUTANEOUS
Status: DISCONTINUED | OUTPATIENT
Start: 2024-04-15 | End: 2024-04-25 | Stop reason: HOSPADM

## 2024-04-15 RX ORDER — PANTOPRAZOLE SODIUM 40 MG/1
40 TABLET, DELAYED RELEASE ORAL
Status: DISCONTINUED | OUTPATIENT
Start: 2024-04-16 | End: 2024-04-25 | Stop reason: HOSPADM

## 2024-04-15 RX ORDER — CETIRIZINE HYDROCHLORIDE 10 MG/1
10 TABLET ORAL DAILY
Status: DISCONTINUED | OUTPATIENT
Start: 2024-04-16 | End: 2024-04-25 | Stop reason: HOSPADM

## 2024-04-15 RX ORDER — ONDANSETRON 4 MG/1
8 TABLET, ORALLY DISINTEGRATING ORAL EVERY 8 HOURS PRN
Status: DISCONTINUED | OUTPATIENT
Start: 2024-04-15 | End: 2024-04-25 | Stop reason: HOSPADM

## 2024-04-15 RX ORDER — LOSARTAN POTASSIUM 50 MG/1
50 TABLET ORAL DAILY
Status: DISCONTINUED | OUTPATIENT
Start: 2024-04-16 | End: 2024-04-25 | Stop reason: HOSPADM

## 2024-04-15 RX ORDER — LIDOCAINE 4 G/G
1 PATCH TOPICAL ONCE
Status: COMPLETED | OUTPATIENT
Start: 2024-04-15 | End: 2024-04-16

## 2024-04-15 RX ORDER — DIAZEPAM 10 MG/2ML
5 INJECTION, SOLUTION INTRAMUSCULAR; INTRAVENOUS ONCE
Status: COMPLETED | OUTPATIENT
Start: 2024-04-15 | End: 2024-04-15

## 2024-04-15 RX ORDER — METHOCARBAMOL 500 MG/1
500 TABLET, FILM COATED ORAL 4 TIMES DAILY
Status: DISCONTINUED | OUTPATIENT
Start: 2024-04-15 | End: 2024-04-16

## 2024-04-15 RX ORDER — KETOROLAC TROMETHAMINE 30 MG/ML
30 INJECTION, SOLUTION INTRAMUSCULAR; INTRAVENOUS EVERY 6 HOURS PRN
Status: DISCONTINUED | OUTPATIENT
Start: 2024-04-15 | End: 2024-04-16

## 2024-04-15 RX ORDER — HYDROMORPHONE HYDROCHLORIDE 1 MG/ML
0.5 INJECTION, SOLUTION INTRAMUSCULAR; INTRAVENOUS; SUBCUTANEOUS ONCE
Status: COMPLETED | OUTPATIENT
Start: 2024-04-15 | End: 2024-04-15

## 2024-04-15 RX ORDER — HYDROXYZINE HYDROCHLORIDE 50 MG/1
50 TABLET, FILM COATED ORAL ONCE
Status: DISCONTINUED | OUTPATIENT
Start: 2024-04-15 | End: 2024-04-15

## 2024-04-15 RX ORDER — BIOTIN 10000 MCG
1 CAPSULE ORAL
COMMUNITY

## 2024-04-15 RX ORDER — LORAZEPAM 1 MG/1
1 TABLET ORAL EVERY 4 HOURS PRN
Status: DISCONTINUED | OUTPATIENT
Start: 2024-04-15 | End: 2024-04-25 | Stop reason: HOSPADM

## 2024-04-15 RX ORDER — IOPAMIDOL 755 MG/ML
500 INJECTION, SOLUTION INTRAVASCULAR ONCE
Status: COMPLETED | OUTPATIENT
Start: 2024-04-15 | End: 2024-04-15

## 2024-04-15 RX ORDER — BIOTIN 5 MG
1-2 TABLET ORAL
COMMUNITY

## 2024-04-15 RX ORDER — NALOXONE HYDROCHLORIDE 0.4 MG/ML
0.2 INJECTION, SOLUTION INTRAMUSCULAR; INTRAVENOUS; SUBCUTANEOUS
Status: DISCONTINUED | OUTPATIENT
Start: 2024-04-15 | End: 2024-04-25 | Stop reason: HOSPADM

## 2024-04-15 RX ORDER — ONDANSETRON 4 MG/1
8 TABLET, ORALLY DISINTEGRATING ORAL EVERY 8 HOURS PRN
Status: DISCONTINUED | OUTPATIENT
Start: 2024-04-15 | End: 2024-04-15

## 2024-04-15 RX ORDER — TURMERIC 400 MG
1 CAPSULE ORAL
COMMUNITY

## 2024-04-15 RX ORDER — CYCLOBENZAPRINE HCL 5 MG
10 TABLET ORAL EVERY 8 HOURS PRN
Status: DISCONTINUED | OUTPATIENT
Start: 2024-04-15 | End: 2024-04-16

## 2024-04-15 RX ORDER — ACETAMINOPHEN 500 MG
1000 TABLET ORAL EVERY 8 HOURS
Status: DISCONTINUED | OUTPATIENT
Start: 2024-04-15 | End: 2024-04-25 | Stop reason: HOSPADM

## 2024-04-15 RX ORDER — LEVOTHYROXINE SODIUM 75 UG/1
75 TABLET ORAL DAILY
Status: DISCONTINUED | OUTPATIENT
Start: 2024-04-16 | End: 2024-04-25 | Stop reason: HOSPADM

## 2024-04-15 RX ORDER — HYDROMORPHONE HYDROCHLORIDE 1 MG/ML
0.5 INJECTION, SOLUTION INTRAMUSCULAR; INTRAVENOUS; SUBCUTANEOUS
Status: DISCONTINUED | OUTPATIENT
Start: 2024-04-15 | End: 2024-04-16

## 2024-04-15 RX ORDER — HYDROXYZINE HYDROCHLORIDE 50 MG/1
50 TABLET, FILM COATED ORAL EVERY 6 HOURS PRN
Status: DISCONTINUED | OUTPATIENT
Start: 2024-04-15 | End: 2024-04-25 | Stop reason: HOSPADM

## 2024-04-15 RX ADMIN — HYDROMORPHONE HYDROCHLORIDE 0.5 MG: 1 INJECTION, SOLUTION INTRAMUSCULAR; INTRAVENOUS; SUBCUTANEOUS at 12:12

## 2024-04-15 RX ADMIN — HYDROXYZINE HYDROCHLORIDE 50 MG: 50 TABLET, FILM COATED ORAL at 21:05

## 2024-04-15 RX ADMIN — HYDROMORPHONE HYDROCHLORIDE 0.5 MG: 1 INJECTION, SOLUTION INTRAMUSCULAR; INTRAVENOUS; SUBCUTANEOUS at 15:46

## 2024-04-15 RX ADMIN — DICLOFENAC SODIUM 4 G: 10 GEL TOPICAL at 20:14

## 2024-04-15 RX ADMIN — DIAZEPAM 5 MG: 5 INJECTION INTRAMUSCULAR; INTRAVENOUS at 16:14

## 2024-04-15 RX ADMIN — HYDROMORPHONE HYDROCHLORIDE 0.5 MG: 1 INJECTION, SOLUTION INTRAMUSCULAR; INTRAVENOUS; SUBCUTANEOUS at 13:25

## 2024-04-15 RX ADMIN — IOPAMIDOL 100 ML: 755 INJECTION, SOLUTION INTRAVENOUS at 12:46

## 2024-04-15 RX ADMIN — SODIUM CHLORIDE 100 ML: 9 INJECTION, SOLUTION INTRAVENOUS at 12:46

## 2024-04-15 RX ADMIN — METHOCARBAMOL 500 MG: 500 TABLET ORAL at 20:12

## 2024-04-15 RX ADMIN — ONDANSETRON 8 MG: 4 TABLET, ORALLY DISINTEGRATING ORAL at 18:57

## 2024-04-15 RX ADMIN — KETOROLAC TROMETHAMINE 30 MG: 30 INJECTION, SOLUTION INTRAMUSCULAR; INTRAVENOUS at 22:59

## 2024-04-15 RX ADMIN — HYDROMORPHONE HYDROCHLORIDE 1 MG: 1 INJECTION, SOLUTION INTRAMUSCULAR; INTRAVENOUS; SUBCUTANEOUS at 18:39

## 2024-04-15 RX ADMIN — HYDROMORPHONE HYDROCHLORIDE 1 MG: 1 INJECTION, SOLUTION INTRAMUSCULAR; INTRAVENOUS; SUBCUTANEOUS at 21:00

## 2024-04-15 RX ADMIN — ACETAMINOPHEN 1000 MG: 500 TABLET, FILM COATED ORAL at 20:12

## 2024-04-15 ASSESSMENT — COLUMBIA-SUICIDE SEVERITY RATING SCALE - C-SSRS
2. HAVE YOU ACTUALLY HAD ANY THOUGHTS OF KILLING YOURSELF IN THE PAST MONTH?: NO
6. HAVE YOU EVER DONE ANYTHING, STARTED TO DO ANYTHING, OR PREPARED TO DO ANYTHING TO END YOUR LIFE?: NO
2. HAVE YOU ACTUALLY HAD ANY THOUGHTS OF KILLING YOURSELF IN THE PAST MONTH?: NO
1. IN THE PAST MONTH, HAVE YOU WISHED YOU WERE DEAD OR WISHED YOU COULD GO TO SLEEP AND NOT WAKE UP?: NO
6. HAVE YOU EVER DONE ANYTHING, STARTED TO DO ANYTHING, OR PREPARED TO DO ANYTHING TO END YOUR LIFE?: NO
1. IN THE PAST MONTH, HAVE YOU WISHED YOU WERE DEAD OR WISHED YOU COULD GO TO SLEEP AND NOT WAKE UP?: NO

## 2024-04-15 ASSESSMENT — ACTIVITIES OF DAILY LIVING (ADL)
ADLS_ACUITY_SCORE: 34
ADLS_ACUITY_SCORE: 35
ADLS_ACUITY_SCORE: 35
ADLS_ACUITY_SCORE: 33
ADLS_ACUITY_SCORE: 37
ADLS_ACUITY_SCORE: 35
ADLS_ACUITY_SCORE: 37
ADLS_ACUITY_SCORE: 37
ADLS_ACUITY_SCORE: 35

## 2024-04-15 NOTE — PHARMACY-ADMISSION MEDICATION HISTORY
Pharmacist Admission Medication History    Admission medication history is complete. The information provided in this note is only as accurate as the sources available at the time of the update.    Information Source(s): Patient and CareEverywhere/SureScripts via in-person    Pertinent Information: none    Changes made to PTA medication list:  Added: vitamins, supplements  Deleted: None  Changed: tazorac crm    Allergies reviewed with patient and updates made in EHR: yes    Medication History Completed By: [place] [facility] 4/15/2024 5:23 PM    PTA Med List   Medication Sig Last Dose    albuterol (PROAIR HFA/PROVENTIL HFA/VENTOLIN HFA) 108 (90 Base) MCG/ACT inhaler INHALE 2 PUFFS BY MOUTH EVERY 6 HOURS AS NEEDED FOR SHORTNESS OF BREATH OR DIFFICULT BREATHING (Patient taking differently: as needed INHALE 2 PUFFS BY MOUTH EVERY 6 HOURS AS NEEDED FOR SHORTNESS OF BREATH OR DIFFICULT BREATHING)     albuterol (PROVENTIL) (2.5 MG/3ML) 0.083% neb solution Take 1 vial (2.5 mg) by nebulization every 6 hours as needed for shortness of breath / dyspnea or wheezing     Biotin 10 MG CAPS Take 1 capsule by mouth daily at 2 pm 4/14/2024    budesonide (PULMICORT) 0.5 MG/2ML neb solution Mix 2 mg (4 mL) budesonide suspension with 10 splenda packets to create slurry and drink solution slowly over 5-10 minutes twice daily for treatment of eosinophilic esophagitis Past Week    cetirizine (ZYRTEC) 10 MG tablet Take 10 mg by mouth daily 4/15/2024 at am    cholecalciferol (VITAMIN D3) 125 mcg (5000 units) capsule Take 250 mcg by mouth daily at 2 pm 4/14/2024    clindamycin (CLINDAMAX) 1 % external gel Apply topically 2 times daily (Patient taking differently: Apply topically 2 times daily as needed)     co-enzyme Q-10 100 MG CAPS capsule Take 100 mg by mouth daily at 2 pm 4/14/2024    cyclobenzaprine (FLEXERIL) 10 MG tablet Take 1 tab p.o. qhs (if feeling groggy the following morning, can try taking half a tablet instead or can take  earlier the night before). 4/15/2024 at am    esomeprazole (NEXIUM) 20 MG DR capsule Take 1 capsule (20 mg) by mouth every morning (before breakfast) Take 30-60 minutes before eating. 4/14/2024 at am    hydrOXYzine HCl (ATARAX) 25 MG tablet TAKE 2 TO 4 TABLETS BY MOUTH THREE TIMES DAILY AS NEEDED FOR ITCHING     Krill Oil 1000 MG CAPS Take 1-2 capsules by mouth daily at 2 pm 4/14/2024    levothyroxine (SYNTHROID/LEVOTHROID) 75 MCG tablet Take 1 tablet by mouth once daily 4/15/2024 at am    losartan (COZAAR) 50 MG tablet Take 1 tablet by mouth once daily 4/15/2024 at am    MAGNESIUM PO Take 3 tablets by mouth daily at 2 pm [employer] product 4/14/2024    medical cannabis (Patient's own supply) (The purpose of this order is to document that the patient reports taking medical cannabis.  This is not a prescription, and is not used to certify that the patient has a qualifying medical condition.)     Multiple Vitamins-Minerals (MULTIVITAMIN ADULT) CHEW Take 2 chew tab by mouth daily 4/15/2024 at am    ondansetron (ZOFRAN ODT) 8 MG ODT tab Take 1 tablet (8 mg) by mouth every 8 hours as needed for nausea     tazarotene (TAZORAC) 0.1 % external cream Apply pea-sized amount to the entire face dailyPRN     Turmeric 400 MG CAPS Take 1 capsule by mouth daily at 2 pm 4/14/2024    vitamin B-Complex Take 1 tablet by mouth daily at 2 pm 4/14/2024

## 2024-04-15 NOTE — LETTER
[facility] OBSERVATION DEPT  [address]  [facility] [address]  Phone: [phone]    April 25, 2024        Machelle Weiner  [address] [place] [phone]          To whom it may concern:    RE: Machelle Weiner    She was hospitalized on 4/15/2024 and is being discharged on 4/25/2024.  She may return to work as able.  She has been recommended to not drive while on sedating and/or opiate medications.  She will follow-up with her primary care provider on 4/30/2024.  She is also being referred to physical therapy, and therefore may need to have absences 6 excuse to allow for therapy appointments.    Please contact me for questions or concerns.      Sincerely,        Narayan Arechiga, Ed.D, APRN, CNP

## 2024-04-15 NOTE — ED TRIAGE NOTES
Crying in triage, hyperventilating, emotional. X1 month of right shoulder and behind the shoulder blade pain. Feels similar to when she had an impinged nerve in her other shoulder. Hx of steroid injections. Position changes does not help. Denies decreased ROM in her neck. Taking flexeril currently which is not helping.

## 2024-04-15 NOTE — ED NOTES
[facility]  ED Nurse Handoff Report    ED Chief complaint: Shoulder Pain (/)  . ED Diagnosis:   Final diagnoses:   Acute pain of right shoulder   Intractable pain       Allergies:   Allergies   Allergen Reactions    Codeine     Penicillins Nausea and Vomiting       Code Status: Full Code    Activity level - Baseline/Home:  standby.  Activity Level - Current:   in bed.   Lift room needed: No.   Bariatric: No   Needed: No   Isolation: No.   Infection: Not Applicable.     Respiratory status: Room air    Vital Signs (within 30 minutes):   Vitals:    04/15/24 1602 04/15/24 1613 04/15/24 1628 04/15/24 1643   BP: (!) 180/97 (!) 172/84 (!) 164/80 (!) 187/86   Pulse: 55 55 56 (!) 45   Resp: 18 21 20 13   Temp:       TempSrc:       SpO2: 100% 99% 94% 99%   Weight:       Height:           Cardiac Rhythm:  ,      Pain level:    Patient confused: No.   Patient Falls Risk:  pt. Did not arrive as a fall risk.  .   Elimination Status: Has voided with assistance due to pain medications.  socks applied.     Patient Report - shoulder pain.   Focused Assessment: MusculoskeletalMusculoskeletal WDL: .WDL except; all (Pt. states the shoulder pain has been an issue for a couple days. She has a lidocaine patch on her shoulder currently that she placed on at home. She was breathing heavy and crying at times in order to work through the pain.)General Mobility: moderately impaired (Moderately impaired in the right shoulder. Pt. responded well to the pain medication and was able to stablize her breathing. She reports she was not doing any activities when this pain started.)Right Joint Tenderness: shoulder (Pt. states shoulder pain - over the scapula that radiates over the collar bone to the right side of her chest.)     Abnormal Results:   Labs Ordered and Resulted from Time of ED Arrival to Time of ED Departure   BASIC METABOLIC PANEL - Abnormal       Result Value    Sodium 138      Potassium 3.9       Chloride 108 (*)     Carbon Dioxide (CO2) 15 (*)     Anion Gap 15      Urea Nitrogen 11.0      Creatinine 0.96 (*)     GFR Estimate 73      Calcium 9.4      Glucose 92     D DIMER QUANTITATIVE - Abnormal    D-Dimer Quantitative 0.54 (*)    TROPONIN T, HIGH SENSITIVITY - Normal    Troponin T, High Sensitivity 8     ISTAT HCG QUALITATIVE PREGNANCY POCT - Normal    HCG Qualitative POCT Negative     CBC WITH PLATELETS AND DIFFERENTIAL    WBC Count 8.7      RBC Count 4.82      Hemoglobin 13.2      Hematocrit 41.1      MCV 85      MCH 27.4      MCHC 32.1      RDW 14.7      Platelet Count 305      % Neutrophils 61      % Lymphocytes 27      % Monocytes 5      % Eosinophils 4      % Basophils 2      % Immature Granulocytes 1      NRBCs per 100 WBC 0      Absolute Neutrophils 5.4      Absolute Lymphocytes 2.4      Absolute Monocytes 0.4      Absolute Eosinophils 0.4      Absolute Basophils 0.1      Absolute Immature Granulocytes 0.0      Absolute NRBCs 0.0          CT Chest Pulmonary Embolism w Contrast   Final Result   IMPRESSION:   1. No evidence for pulmonary embolism.   2. Mild mosaic attenuation in both lungs suggests air trapping.   3. Indeterminate 0.5 cm right upper lobe pulmonary nodule. Please   refer to follow-up guidelines below.      Recommendations for one or multiple incidental lung nodules < 6mm :     Low risk patients: No routine follow-up.     High risk patients: Optional follow-up CT at 12 months; if   unchanged, no further follow-up.      *Low Risk: Minimal or absent history of smoking or other known risk   factors.   *Nonsolid (ground glass) or partly solid nodules may require longer   follow-up to exclude indolent adenocarcinoma.   *Recommendations based on Guidelines for the Management of Incidental   Pulmonary Nodules Detected at CT: From the Fleischner Society 2017,   Radiology 2017.   *Guidelines apply to incidental nodules in patients who are 35 years   or older.   *Guidelines do not apply to lung  cancer screening, patients with   immunosuppression, or patients with known primary cancer.      GORDON CLARK MD            SYSTEM ID:  [id number]          Treatments provided: Pain medications, warm pack, ice pack, positioning assistance.   Family Comments: Son (keeland) is at bedside and aware of care plan  OBS brochure/video discussed/provided to patient:  Yes  ED Medications:   Medications   HYDROmorphone (PF) (DILAUDID) injection 0.5 mg (0.5 mg Intravenous $Given 4/15/24 1546)   Lidocaine (LIDOCARE) 4 % Patch 1 patch (1 patch Transdermal Not Given 4/15/24 1603)   sodium chloride 0.9 % bag 100 mL for CT scan flush use (100 mLs As instructed $Given 4/15/24 1246)   hydrOXYzine HCl (ATARAX) tablet 50 mg (has no administration in time range)   HYDROmorphone (PF) (DILAUDID) injection 0.5 mg (0.5 mg Intravenous $Given 4/15/24 1212)   iopamidol (ISOVUE-370) solution 500 mL (100 mLs Intravenous $Given 4/15/24 1246)   diazepam (VALIUM) injection 5 mg (5 mg Intravenous $Given 4/15/24 1614)       Drips infusing:  No  For the majority of the shift this patient was Green.   Interventions performed were Medications, ice pack, warm pack.    Sepsis treatment initiated: No    Cares/treatment/interventions/medications to be completed following ED care: see mar    ED Nurse Name: Tess Chamberlain RN  5:18 PM     RECEIVING UNIT ED HANDOFF REVIEW    Above ED Nurse Handoff Report was reviewed: Yes  Reviewed by: Stephon Mariscal RN on April 15, 2024 at 6:07 PM   LORE Santos called the ED to inform them the note was read: Yes

## 2024-04-15 NOTE — ED NOTES
Pt. Has Rx for ondansetron. She took her prescription med right after administration of dilaudid. Her Rx is for 8mg of ondansetron.

## 2024-04-15 NOTE — ED PROVIDER NOTES
"  History     Chief Complaint:  Back Pain      HPI   Machelle Weiner is a 49 year old female with history of TIMMY, hypertension, hyperlipidemia, and migraines who presents to the ED with back/shoulder blade pain. Patient states that she has chronic pain in her spine and abdomen, though this is usually well controlled. About one month ago she started noticing pain in her right back and shoulder blade. She then developed severe pain in this region yesterday and says the pain \"feels like being stabbed behind the right shoulder blade.\" The pain also radiates into her head and right arm. She notes that it is hard to keep still due to the pain. She has never had pain like this in the past. She has tried Flexeril, Tylenol, Advil, and a Lidocaine patch with no significant relief. Patient last took Flexeril around 0800 this morning. She endorses numbness and paresthesias in her face and hands as well. Machelle reports a known allergy to codeine and penicillin.    Independent Historian:   None - Patient Only    Review of External Notes:   Reviewed documentation related to epidural steroid injection at C7/T1 from April 2021.    Medications:    Pro-air   Proventil   Pulmicort   Zyrtec   Flexeril  Nexium   Atarax   Synthroid   Cozaar   Zofran     Past Medical History:    Arthritis   Asthma, mild intermittent   Depression   HTN  Intractable chronic migraine   Thyroid disease   Hyperlipidemia   Acquired hypothyroidism   Morbid obesity   Facet arthropathy, lumbosacral   DDD, lumbosacral   Leg length discrepancy   Chronic pain of both knees   Mood disorder   Liver disease   Anemia   TIMMY  GERD    Past Surgical History:    Colonoscopy   EGD  Cryosurgery of cervix   Laparoscopic cholecystectomy      Physical Exam   Patient Vitals for the past 24 hrs:   BP Temp Temp src Pulse Resp SpO2   04/15/24 1041 (!) 169/107 98.1  F (36.7  C) Temporal 71 20 98 %        Physical Exam  Nursing note and vitals reviewed.  HENT:   Mouth/Throat: Moist " mucous membranes.   Eyes: EOMI, nonicteric sclera  Cardiovascular: Normal rate, regular rhythm, no murmurs, rubs, or gallops  Pulmonary/Chest: Effort normal and breath sounds normal. No respiratory distress. No wheezes. No rales.   Musculoskeletal: Normal range of motion.   Pain to palpation right-sided parascapular musculature, especially in the supraspinatus and medial scapular region.  Palpation of this region elicits reproduction in significant pain.  Neurological: Alert. Moves all extremities spontaneously.   Equal  strength bilateral upper extremities.  Skin: Skin is warm and dry. No rash noted.         Emergency Department Course   ECG  ECG results from 04/15/24   EKG 12 lead     Value    Systolic Blood Pressure     Diastolic Blood Pressure     Ventricular Rate 57    Atrial Rate 57    AR Interval 180    QRS Duration 82        QTc 412    P Axis 48    R AXIS 15    T Axis 43    Interpretation ECG      Sinus bradycardia  Otherwise normal ECG  When compared with ECG of 22-DEC-2022 16:07,  No significant change was found           Imaging:  CT Chest Pulmonary Embolism w Contrast   Final Result   IMPRESSION:   1. No evidence for pulmonary embolism.   2. Mild mosaic attenuation in both lungs suggests air trapping.   3. Indeterminate 0.5 cm right upper lobe pulmonary nodule. Please   refer to follow-up guidelines below.      Recommendations for one or multiple incidental lung nodules < 6mm :     Low risk patients: No routine follow-up.     High risk patients: Optional follow-up CT at 12 months; if   unchanged, no further follow-up.      *Low Risk: Minimal or absent history of smoking or other known risk   factors.   *Nonsolid (ground glass) or partly solid nodules may require longer   follow-up to exclude indolent adenocarcinoma.   *Recommendations based on Guidelines for the Management of Incidental   Pulmonary Nodules Detected at CT: From the Fleischner Society 2017,   Radiology 2017.   *Guidelines apply  to incidental nodules in patients who are 35 years   or older.   *Guidelines do not apply to lung cancer screening, patients with   immunosuppression, or patients with known primary cancer.      GORDON CLARK MD            SYSTEM ID:  [id number]             Laboratory:  Labs Ordered and Resulted from Time of ED Arrival to Time of ED Departure   BASIC METABOLIC PANEL - Abnormal       Result Value    Sodium 138      Potassium 3.9      Chloride 108 (*)     Carbon Dioxide (CO2) 15 (*)     Anion Gap 15      Urea Nitrogen 11.0      Creatinine 0.96 (*)     GFR Estimate 73      Calcium 9.4      Glucose 92     D DIMER QUANTITATIVE - Abnormal    D-Dimer Quantitative 0.54 (*)    TROPONIN T, HIGH SENSITIVITY - Normal    Troponin T, High Sensitivity 8     ISTAT HCG QUALITATIVE PREGNANCY POCT - Normal    HCG Qualitative POCT Negative     CBC WITH PLATELETS AND DIFFERENTIAL    WBC Count 8.7      RBC Count 4.82      Hemoglobin 13.2      Hematocrit 41.1      MCV 85      MCH 27.4      MCHC 32.1      RDW 14.7      Platelet Count 305      % Neutrophils 61      % Lymphocytes 27      % Monocytes 5      % Eosinophils 4      % Basophils 2      % Immature Granulocytes 1      NRBCs per 100 WBC 0      Absolute Neutrophils 5.4      Absolute Lymphocytes 2.4      Absolute Monocytes 0.4      Absolute Eosinophils 0.4      Absolute Basophils 0.1      Absolute Immature Granulocytes 0.0      Absolute NRBCs 0.0              Emergency Department Course & Assessments:    Interventions:  Medications   HYDROmorphone (PF) (DILAUDID) injection 0.5 mg (0.5 mg Intravenous $Given 4/15/24 1546)   Lidocaine (LIDOCARE) 4 % Patch 1 patch (1 patch Transdermal Not Given 4/15/24 1603)   sodium chloride 0.9 % bag 100 mL for CT scan flush use (100 mLs As instructed $Given 4/15/24 1246)   albuterol (PROVENTIL HFA/VENTOLIN HFA) inhaler (has no administration in time range)   albuterol (PROVENTIL) neb solution 2.5 mg (has no administration in time range)   cetirizine  (zyrTEC) tablet 10 mg (has no administration in time range)   cyclobenzaprine (FLEXERIL) tablet 10 mg (has no administration in time range)   pantoprazole (PROTONIX) EC tablet 40 mg (has no administration in time range)   hydrOXYzine HCl (ATARAX) tablet 50 mg (50 mg Oral $Given 4/15/24 2105)   levothyroxine (SYNTHROID/LEVOTHROID) tablet 75 mcg (has no administration in time range)   losartan (COZAAR) tablet 50 mg (has no administration in time range)   HYDROmorphone (DILAUDID) injection 1 mg (1 mg Intravenous $Given 4/15/24 2100)   acetaminophen (TYLENOL) tablet 1,000 mg (1,000 mg Oral $Given 4/15/24 2012)   methocarbamol (ROBAXIN) tablet 500 mg (500 mg Oral $Given 4/15/24 2012)   LORazepam (ATIVAN) tablet 1 mg (has no administration in time range)   diclofenac (VOLTAREN) 1 % topical gel 4 g (4 g Topical $Given 4/15/24 2014)   naloxone (NARCAN) injection 0.2 mg (has no administration in time range)     Or   naloxone (NARCAN) injection 0.4 mg (has no administration in time range)     Or   naloxone (NARCAN) injection 0.2 mg (has no administration in time range)     Or   naloxone (NARCAN) injection 0.4 mg (has no administration in time range)   ondansetron (ZOFRAN ODT) ODT tab 8 mg (8 mg Oral $Given 4/15/24 1857)   HYDROmorphone (PF) (DILAUDID) injection 0.5 mg (0.5 mg Intravenous $Given 4/15/24 1212)   iopamidol (ISOVUE-370) solution 500 mL (100 mLs Intravenous $Given 4/15/24 1246)   diazepam (VALIUM) injection 5 mg (5 mg Intravenous $Given 4/15/24 1614)          Independent Interpretation (X-rays, CTs, rhythm strip):  None    Consultations/Discussion of Management or Tests:  Discussed with hospitalist, Dr. Chapin.        Social Determinants of Health affecting care:   None    Disposition:  The patient was admitted to the hospital under the care of Dr. Chapin.     Impression & Plan           Medical Decision Making:  Patient presents with chief complaint right-sided periscapular pain.  Pain is overall severe and  difficult to control.  Differential includes muscle spasm, cervical radiculopathy, PE, among many other etiologies.  Vital signs overall notable for hyperventilation.  Patient's complaints of paresthesias to the hands and face likely due to hyperventilation.  Her symptoms are easily reproducible with palpation to the musculature of her right medial scapula and less so to the suprascapular region.  She has normal pulses.  She has normal strength in her hand.  Symptoms do not radiate down her arm.  A D-dimer was elevated which prompted CT of the chest which was negative for acute etiology.  Small pulmonary nodule noted which can be followed by primary care.  On reevaluation, despite multiple doses of narcotics, patient still having significant pain.  I discussed my suspicion that her pain is due to significant muscle spasm, potentially with peripheral nerve involvement.  I am less suspicious for cervical radiculopathy, but certainly still possible.  Patient expressed concern for staph infection.  Discussed that this is unlikely given no fevers, no leukocytosis.  Patient is skeptical of my preliminary diagnosis. She is not on narcotics at home. Given degree of pain she is experiencing, we will plan on observation admission for further evaluation and treatment.  Case was discussed with hospital service, Dr. Chapin, who accepts pt for admission.       Diagnosis:    ICD-10-CM    1. Acute pain of right shoulder  M25.511       2. Intractable pain  R52            Scribe Disclosure:  I, Monique Barnett, am serving as a scribe at 11:22 AM on 4/15/2024 to document services personally performed by Alonso Funez MD based on my observations and the provider's statements to me.          Alonso Funez MD  04/15/24 2125

## 2024-04-15 NOTE — ED NOTES
"Pt. States her pain is \"childbirth level.\" She describes her pain is \"being stabbed in the back with an ice pick.\" Writer gave 2 ice picks and 2 pillows to the Pt. For comfort. RN notified.  "

## 2024-04-15 NOTE — H&P
Hospitalist Observation Admission Note([facility] / [facility])    Name: Machelle Weiner    MRN: [id number]          YOB: 1975    Age: 49 year old    Date of admission: 4/15/2024    Primary care provider: Jean Sauceda  Admitting physician:Tim Chapin MD               Assessment      Brief summary of admission assessment:Machelle Weiner is a 49 year old  female with a significant past medical history of chronic pain ,GRIFFITH,  TIMMY, hypertension, hyperlipidemia, and migraines who presents with right posterior shoulder pain.  She was complaining of severe sharp pain on the right radiating to the right upper extremity.  She is to have cervical radiculopathy for which she has been getting epidural steroid injections and pain clinic.    ED evaluation revealed anxiety, hyperventilation with bicarb of 15.  CT scan of the chest negative for acute intrathoracic pathology including PE or dissection.    Patient received multiple medications in the emergency department including 5 mg IV Valium, 0.5 mg of hydromorphone twice and her pain did not improve significantly.    #1.  Acute intractable pain involving the right shoulder, right posterior upper chest, back and shoulder blade area.  -- History of degenerative disc disease C4-C5, C5-C6, and C6-C7 on MRI study of 2021.  -- Suspect cervical radiculopathy pain.  -- CT scan with contrast did not reveal any acute intrathoracic pathology  -- Patient has chronic pain but does not use narcotic medications at home  -- Will admit to observation unit for pain control and monitoring   -- Will schedule Tylenol 1 g every 8 hours, as needed Dilaudid 1 mg every 2 hours as needed, Atarax 50 mg every 6 hours as needed, may try Voltaren gel 1% twice daily, Robaxin 500 mg 4 times daily.  Ativan as needed for anxiety.  Will get pain management team to assist patient for further recommendation.  Patient needs to follow-up with her pain management team as an  outpatient as well.  #2.  Severe anxiety  --She has history of depression and generalized anxiety disorder but is not on antianxiety medication.  She was very emotional and her new right sided pain is causing her a lot of distress.  She wants to get more testing to figure out the etiology of her condition.  -- Was treated with Ativan as needed.  Patient may benefit from scheduled antianxiety medication that can be done as an outpatient with her primary care provider.    Chronic medical problems  GRIFFITH   Arthritis   Asthma, mild intermittent   Depression , generalized anxiety disorder  HTN-on Cozaar  Intractable chronic migraine   Thyroid disease   Hyperlipidemia   Acquired hypothyroidism   Morbid obesity   Facet arthropathy, lumbosacral   DDD, lumbosacral   Leg length discrepancy   Chronic pain of both knees   Mood disorder   Anemia   GERD            Disposition:       >anticipate discharge to home and Anticipate discharge tomorrow  Disclaimer: This note consists of symbols derived from keyboarding, dictation and/or voice recognition software. As a result, there may be errors in the script that have gone undetected. Please consider this when interpreting information found in this chart.         Chief Complaint:   Right shoulder pain, back pain  History is obtained from the patient          History of Present Illness:   This patient is a 49 year old  female with a significant past medical history of chronic pain syndrome and multiple other problems listed above who presents with the following condition requiring a hospital admission:    Intractable right shoulder and upper back pain    Machelle is a 49-year-old female who has history of chronic pain with degenerative disc disease and cervical spine for which she gets periodic injections.  She could not remember when she had her last injection.  She has been following with pain clinic as well as [facility] doctors for her nonalcoholic steatohepatitis.  She does use  narcotics at home due to concern for side effects.  She used to have a left sided cervical radicular pain but started to have this right-sided pain which is new for her.  Her pain has been present over the last 1 month when she noticed pain in the right back and shoulder blade area.  Her pain is sharp with-stabbing character.  She tried to use her home medication including Flexeril, Tylenol, Advil and lidocaine patch with no improvement.  She took her last Flexeril this morning.  She denies any difficulty moving her right upper extremity.  She came to the emergency department and was hyperventilating and crying in triage area.  She received Dilaudid twice and Valium once.  CT scan was obtained and patient was recommended to stay in the hospital for further care.            Past Medical History:     Past Medical History:   Diagnosis Date    Arthritis     Facet Arthropathy    Asthma, mild intermittent     Depression     Depressive disorder     Recent issue, not being treated and not diagnosed by Dr perales    Epigastric pain     Hypertension     Intractable chronic migraine without aura     Thyroid disease             Past Surgical History:     Past Surgical History:   Procedure Laterality Date    BIOPSY  1991    Cervix    COLONOSCOPY N/A 06/21/2022    Procedure: COLONOSCOPY (fv);  Surgeon: Aries Engle MD;  Location:  GI    ESOPHAGOSCOPY, GASTROSCOPY, DUODENOSCOPY (EGD), COMBINED N/A 09/22/2022    Procedure: ESOPHAGOGASTRODUODENOSCOPY, WITH BIOPSY;  Surgeon: Aries Wright MD;  Location:  GI    GYN SURGERY      cryosurgery of cervix at age 16    LAPAROSCOPIC CHOLECYSTECTOMY WITH CHOLANGIOGRAMS N/A 10/14/2016    Procedure: LAPAROSCOPIC CHOLECYSTECTOMY WITH CHOLANGIOGRAMS;  Surgeon: Cornelius Mueller MD;  Location:  OR             Social History:     Social History     Tobacco Use    Smoking status: Former     Current packs/day: 0.00     Average packs/day: 0.3 packs/day for 15.0 years (4.5 ttl pk-yrs)     Types:  Cigarettes     Start date: 2000     Quit date: 2015     Years since quittin.1    Smokeless tobacco: Never   Substance Use Topics    Alcohol use: No             Family History:     Family History   Problem Relation Age of Onset    Depression Mother     Anxiety Disorder Mother     Substance Abuse Mother         Alcoholism    Obesity Mother     Coronary Artery Disease Maternal Grandmother     Hyperlipidemia Maternal Grandmother     Hypertension Maternal Grandmother     Hyperlipidemia Maternal Grandfather     Coronary Artery Disease Maternal Grandfather     Hypertension Maternal Grandfather     Substance Abuse Father         Alcoholism    Diabetes Other     Hypertension Brother     Other Cancer Cousin         Breast cancer    Other Cancer Other         Aunt (Maternal) - Ovarian Cancer    Mental Illness Other     Depression Other         Son    Anxiety Disorder Other     Asthma Other     Substance Abuse Other         Several aunts and uncles - alcoholism    Thyroid Disease Other         Several Aunts (Maternal) - hypothyroidism    Obesity Other     Anesthesia Reaction No family hx of             Allergies:     Allergies   Allergen Reactions    Codeine     Penicillins Nausea and Vomiting             Medications:         Prior to Admission medications    Medication Sig Last Dose Taking? Auth Provider Long Term End Date   albuterol (PROAIR HFA/PROVENTIL HFA/VENTOLIN HFA) 108 (90 Base) MCG/ACT inhaler INHALE 2 PUFFS BY MOUTH EVERY 6 HOURS AS NEEDED FOR SHORTNESS OF BREATH OR DIFFICULT BREATHING  Patient taking differently: as needed INHALE 2 PUFFS BY MOUTH EVERY 6 HOURS AS NEEDED FOR SHORTNESS OF BREATH OR DIFFICULT BREATHING  Yes Jean Sauceda PA-C Yes    albuterol (PROVENTIL) (2.5 MG/3ML) 0.083% neb solution Take 1 vial (2.5 mg) by nebulization every 6 hours as needed for shortness of breath / dyspnea or wheezing  Yes Jean Sauceda PA-C Yes    Biotin 10 MG CAPS Take 1 capsule by mouth daily  at 2 pm 4/14/2024 Yes Unknown, Entered By History     budesonide (PULMICORT) 0.5 MG/2ML neb solution Mix 2 mg (4 mL) budesonide suspension with 10 splenda packets to create slurry and drink solution slowly over 5-10 minutes twice daily for treatment of eosinophilic esophagitis Past Week Yes Laz Manley PA-C Yes    cetirizine (ZYRTEC) 10 MG tablet Take 10 mg by mouth daily 4/15/2024 at am Yes Reported, Patient     cholecalciferol (VITAMIN D3) 125 mcg (5000 units) capsule Take 250 mcg by mouth daily at 2 pm 4/14/2024 Yes Unknown, Entered By History     clindamycin (CLINDAMAX) 1 % external gel Apply topically 2 times daily  Patient taking differently: Apply topically 2 times daily as needed  Yes Jean Sauceda PA-C     co-enzyme Q-10 100 MG CAPS capsule Take 100 mg by mouth daily at 2 pm 4/14/2024 Yes Unknown, Entered By History     cyclobenzaprine (FLEXERIL) 10 MG tablet Take 1 tab p.o. qhs (if feeling groggy the following morning, can try taking half a tablet instead or can take earlier the night before). 4/15/2024 at am Yes Jean Sauceda PA-C     esomeprazole (NEXIUM) 20 MG DR capsule Take 1 capsule (20 mg) by mouth every morning (before breakfast) Take 30-60 minutes before eating. 4/14/2024 at am Yes Jean Sauceda PA-C     hydrOXYzine HCl (ATARAX) 25 MG tablet TAKE 2 TO 4 TABLETS BY MOUTH THREE TIMES DAILY AS NEEDED FOR ITCHING  Yes Narayan Portillo MD     Krill Oil 1000 MG CAPS Take 1-2 capsules by mouth daily at 2 pm 4/14/2024 Yes Unknown, Entered By History     levothyroxine (SYNTHROID/LEVOTHROID) 75 MCG tablet Take 1 tablet by mouth once daily 4/15/2024 at am Yes Jean Sauceda PA-C Yes    losartan (COZAAR) 50 MG tablet Take 1 tablet by mouth once daily 4/15/2024 at am Yes Jean Sauceda PA-C Yes    MAGNESIUM PO Take 3 tablets by mouth daily at 2 pm [employer] product 4/14/2024 Yes Unknown, Entered By History     medical cannabis (Patient's own supply) (The purpose  of this order is to document that the patient reports taking medical cannabis.  This is not a prescription, and is not used to certify that the patient has a qualifying medical condition.)  Yes Jean Sauceda PA-C     Multiple Vitamins-Minerals (MULTIVITAMIN ADULT) CHEW Take 2 chew tab by mouth daily 4/15/2024 at am Yes Reported, Patient     ondansetron (ZOFRAN ODT) 8 MG ODT tab Take 1 tablet (8 mg) by mouth every 8 hours as needed for nausea  Yes Laz Manley PA-C     tazarotene (TAZORAC) 0.1 % external cream Apply pea-sized amount to the entire face dailyPRN  Yes Reported, Patient     Turmeric 400 MG CAPS Take 1 capsule by mouth daily at 2 pm 4/14/2024 Yes Unknown, Entered By History     vitamin B-Complex Take 1 tablet by mouth daily at 2 pm 4/14/2024 Yes Unknown, Entered By History            Review of Systems:       A Comprehensive greater than 10 system review of systems was carried out.  Pertinent positives and negatives are noted above in HPI.  Otherwise negative for contributory information.              Physical Exam:     Vitals were reviewed    Temp:  [98.1  F (36.7  C)] 98.1  F (36.7  C)  Pulse:  [45-71] 45  Resp:  [12-43] 13  BP: (140-187)/() 187/86  SpO2:  [94 %-100 %] 99 %      GEN:   Alert, oriented x 3, appears comfortable, NAD.  NECK:Supple ,no mass or thyromegaly   HEENT:   Normocephalic/atraumatic, no scleral icterus, no nasal discharge, mouth moist.  CV:   Regular rate and rhythm, no murmur or JVD.  S1 + S2 noted, no S3 or S4.  LUNGS:   Clear to auscultation bilaterally without rales/rhonchi/wheezing/retractions.  Symmetric chest rise on inhalation noted.  ABD:  Active bowel sounds, soft, non-tender/non-distended.  No rebound/guarding/rigidity.  EXT:  No edema.  No cyanosis.  No joint synovitis noted.  SKIN:  Dry to touch, no exanthems noted in the visualized areas.  Neurologic:Grossly intact,non focal     Neuropsychiatric:  General: normal, calm and normal eye contact  Level of  consciousness: alert / normal  Affect: normal  Orientation: oriented to self, place, time and situation           Data:       All laboratory and imaging data in the past 24 hours reviewed     Results for orders placed or performed during the hospital encounter of 04/15/24   CT Chest Pulmonary Embolism w Contrast     Status: None    Narrative    CT CHEST PULMONARY EMBOLISM WITH CONTRAST April 15, 2024 12:48 PM    CLINICAL HISTORY: Chest pain. Elevated D-dimer.    TECHNIQUE: CT angiogram chest during arterial phase injection IV  contrast. 2D and 3D MIP reconstructions were performed by the CT  technologist. Dose reduction techniques were used.   CONTRAST: 100mL Isovue-370.    COMPARISON: None.    FINDINGS:  ANGIOGRAM CHEST: Pulmonary arteries are normal caliber and negative  for pulmonary emboli. The thoracic aorta is not well opacified. No  evidence for thoracic aortic aneurysm.     LUNGS AND PLEURA: Mild mosaic attenuation bilaterally suggests  air-trapping. Indeterminate 0.5 cm subpleural nodule in the right  upper lobe posteriorly (series 6 image 72). No pleural effusions.    MEDIASTINUM/AXILLAE: No lymphadenopathy in the chest. No pericardial  effusion.    CORONARY ARTERY CALCIFICATION: None.    UPPER ABDOMEN: Cholecystectomy.    MUSCULOSKELETAL: Unremarkable.      Impression    IMPRESSION:  1. No evidence for pulmonary embolism.  2. Mild mosaic attenuation in both lungs suggests air trapping.  3. Indeterminate 0.5 cm right upper lobe pulmonary nodule. Please  refer to follow-up guidelines below.    Recommendations for one or multiple incidental lung nodules < 6mm :    Low risk patients: No routine follow-up.    High risk patients: Optional follow-up CT at 12 months; if  unchanged, no further follow-up.    *Low Risk: Minimal or absent history of smoking or other known risk  factors.  *Nonsolid (ground glass) or partly solid nodules may require longer  follow-up to exclude indolent  adenocarcinoma.  *Recommendations based on Guidelines for the Management of Incidental  Pulmonary Nodules Detected at CT: From the Fleischner Society 2017,  Radiology 2017.  *Guidelines apply to incidental nodules in patients who are 35 years  or older.  *Guidelines do not apply to lung cancer screening, patients with  immunosuppression, or patients with known primary cancer.    GORDON CLARK MD         SYSTEM ID:  [id number]   Basic metabolic panel (BMP)     Status: Abnormal   Result Value Ref Range    Sodium 138 135 - 145 mmol/L    Potassium 3.9 3.4 - 5.3 mmol/L    Chloride 108 (H) 98 - 107 mmol/L    Carbon Dioxide (CO2) 15 (L) 22 - 29 mmol/L    Anion Gap 15 7 - 15 mmol/L    Urea Nitrogen 11.0 6.0 - 20.0 mg/dL    Creatinine 0.96 (H) 0.51 - 0.95 mg/dL    GFR Estimate 73 >60 mL/min/1.73m2    Calcium 9.4 8.6 - 10.0 mg/dL    Glucose 92 70 - 99 mg/dL   Troponin T, High Sensitivity (now)     Status: Normal   Result Value Ref Range    Troponin T, High Sensitivity 8 <=14 ng/L   D dimer quantitative     Status: Abnormal   Result Value Ref Range    D-Dimer Quantitative 0.54 (H) 0.00 - 0.50 ug/mL FEU    Narrative    This D-dimer assay is intended for use in conjunction with a clinical pretest probability assessment model to exclude pulmonary embolism (PE) and deep venous thrombosis (DVT) in outpatients suspected of PE or DVT. The cut-off value is 0.50 ug/mL FEU.   CBC with platelets and differential     Status: None   Result Value Ref Range    WBC Count 8.7 4.0 - 11.0 10e3/uL    RBC Count 4.82 3.80 - 5.20 10e6/uL    Hemoglobin 13.2 11.7 - 15.7 g/dL    Hematocrit 41.1 35.0 - 47.0 %    MCV 85 78 - 100 fL    MCH 27.4 26.5 - 33.0 pg    MCHC 32.1 31.5 - 36.5 g/dL    RDW 14.7 10.0 - 15.0 %    Platelet Count 305 150 - 450 10e3/uL    % Neutrophils 61 %    % Lymphocytes 27 %    % Monocytes 5 %    % Eosinophils 4 %    % Basophils 2 %    % Immature Granulocytes 1 %    NRBCs per 100 WBC 0 <1 /100    Absolute Neutrophils 5.4 1.6 - 8.3  10e3/uL    Absolute Lymphocytes 2.4 0.8 - 5.3 10e3/uL    Absolute Monocytes 0.4 0.0 - 1.3 10e3/uL    Absolute Eosinophils 0.4 0.0 - 0.7 10e3/uL    Absolute Basophils 0.1 0.0 - 0.2 10e3/uL    Absolute Immature Granulocytes 0.0 <=0.4 10e3/uL    Absolute NRBCs 0.0 10e3/uL   iStat HCG Qualitative Pregnancy, POCT     Status: Normal   Result Value Ref Range    HCG Qualitative POCT Negative Negative, Indeterminate   EKG 12 lead     Status: None   Result Value Ref Range    Systolic Blood Pressure  mmHg    Diastolic Blood Pressure  mmHg    Ventricular Rate 57 BPM    Atrial Rate 57 BPM    KY Interval 180 ms    QRS Duration 82 ms     ms    QTc 412 ms    P Axis 48 degrees    R AXIS 15 degrees    T Axis 43 degrees    Interpretation ECG       Sinus bradycardia  Otherwise normal ECG  When compared with ECG of 22-DEC-2022 16:07,  No significant change was found  Unconfirmed report - interpretation of this ECG is computer generated - see medical record for final interpretation  Confirmed by - EMERGENCY ROOM, PHYSICIAN ([id number]),  Jarrett Srinivasan ([id number]) on 4/15/2024 1:07:50 PM     CBC + differential     Status: None    Narrative    The following orders were created for panel order CBC + differential.  Procedure                               Abnormality         Status                     ---------                               -----------         ------                     CBC with platelets and d...[[id number]]                      Final result                 Please view results for these tests on the individual orders.          Recent Results (from the past 48 hour(s))   CT Chest Pulmonary Embolism w Contrast    Narrative    CT CHEST PULMONARY EMBOLISM WITH CONTRAST April 15, 2024 12:48 PM    CLINICAL HISTORY: Chest pain. Elevated D-dimer.    TECHNIQUE: CT angiogram chest during arterial phase injection IV  contrast. 2D and 3D MIP reconstructions were performed by the CT  technologist. Dose reduction techniques were used.    CONTRAST: 100mL Isovue-370.    COMPARISON: None.    FINDINGS:  ANGIOGRAM CHEST: Pulmonary arteries are normal caliber and negative  for pulmonary emboli. The thoracic aorta is not well opacified. No  evidence for thoracic aortic aneurysm.     LUNGS AND PLEURA: Mild mosaic attenuation bilaterally suggests  air-trapping. Indeterminate 0.5 cm subpleural nodule in the right  upper lobe posteriorly (series 6 image 72). No pleural effusions.    MEDIASTINUM/AXILLAE: No lymphadenopathy in the chest. No pericardial  effusion.    CORONARY ARTERY CALCIFICATION: None.    UPPER ABDOMEN: Cholecystectomy.    MUSCULOSKELETAL: Unremarkable.      Impression    IMPRESSION:  1. No evidence for pulmonary embolism.  2. Mild mosaic attenuation in both lungs suggests air trapping.  3. Indeterminate 0.5 cm right upper lobe pulmonary nodule. Please  refer to follow-up guidelines below.    Recommendations for one or multiple incidental lung nodules < 6mm :    Low risk patients: No routine follow-up.    High risk patients: Optional follow-up CT at 12 months; if  unchanged, no further follow-up.    *Low Risk: Minimal or absent history of smoking or other known risk  factors.  *Nonsolid (ground glass) or partly solid nodules may require longer  follow-up to exclude indolent adenocarcinoma.  *Recommendations based on Guidelines for the Management of Incidental  Pulmonary Nodules Detected at CT: From the Fleischner Society 2017,  Radiology 2017.  *Guidelines apply to incidental nodules in patients who are 35 years  or older.  *Guidelines do not apply to lung cancer screening, patients with  immunosuppression, or patients with known primary cancer.    GORDON CLARK MD         SYSTEM ID:  [id number]         All imaging studies reviewed by me.       Patient`s old medical records reviewed and case discussed with the ED physician.    ED course-Reviewed

## 2024-04-15 NOTE — PROGRESS NOTES
ROOM # 213-2    Living Situation (if not independent, order SW consult): Home w/ Family  Facility name:  : Son - Heather; Hub; Hub - Eddie    Activity level at baseline: Ind  Activity level on admit: SBA    Who will be transporting you at discharge: Son or     Patient registered to observation; given Patient Bill of Rights; given the opportunity to ask questions about observation status and their plan of care.  Patient has been oriented to the observation room, bathroom and call light is in place.    Discussed discharge goals and expectations with patient/family.

## 2024-04-16 ENCOUNTER — APPOINTMENT (OUTPATIENT)
Dept: MRI IMAGING | Facility: CLINIC | Age: 49
DRG: 552 | End: 2024-04-16
Attending: STUDENT IN AN ORGANIZED HEALTH CARE EDUCATION/TRAINING PROGRAM
Payer: COMMERCIAL

## 2024-04-16 PROCEDURE — 250N000011 HC RX IP 250 OP 636: Performed by: CLINICAL NURSE SPECIALIST

## 2024-04-16 PROCEDURE — 72141 MRI NECK SPINE W/O DYE: CPT

## 2024-04-16 PROCEDURE — 250N000009 HC RX 250: Performed by: CLINICAL NURSE SPECIALIST

## 2024-04-16 PROCEDURE — 250N000013 HC RX MED GY IP 250 OP 250 PS 637: Performed by: INTERNAL MEDICINE

## 2024-04-16 PROCEDURE — G0378 HOSPITAL OBSERVATION PER HR: HCPCS

## 2024-04-16 PROCEDURE — 250N000011 HC RX IP 250 OP 636: Performed by: INTERNAL MEDICINE

## 2024-04-16 PROCEDURE — 99214 OFFICE O/P EST MOD 30 MIN: CPT | Performed by: CLINICAL NURSE SPECIALIST

## 2024-04-16 PROCEDURE — 250N000013 HC RX MED GY IP 250 OP 250 PS 637: Performed by: CLINICAL NURSE SPECIALIST

## 2024-04-16 PROCEDURE — 72146 MRI CHEST SPINE W/O DYE: CPT

## 2024-04-16 PROCEDURE — 96376 TX/PRO/DX INJ SAME DRUG ADON: CPT

## 2024-04-16 PROCEDURE — 99232 SBSQ HOSP IP/OBS MODERATE 35: CPT | Performed by: STUDENT IN AN ORGANIZED HEALTH CARE EDUCATION/TRAINING PROGRAM

## 2024-04-16 PROCEDURE — 250N000012 HC RX MED GY IP 250 OP 636 PS 637: Performed by: CLINICAL NURSE SPECIALIST

## 2024-04-16 RX ORDER — METHYLPREDNISOLONE 4 MG/1
8 TABLET ORAL AT BEDTIME
Qty: 4 TABLET | Refills: 0 | Status: COMPLETED | OUTPATIENT
Start: 2024-04-16 | End: 2024-04-17

## 2024-04-16 RX ORDER — METHYLPREDNISOLONE 4 MG/1
4 TABLET ORAL ONCE
Qty: 1 TABLET | Refills: 0 | Status: COMPLETED | OUTPATIENT
Start: 2024-04-16 | End: 2024-04-16

## 2024-04-16 RX ORDER — OXYCODONE HYDROCHLORIDE 5 MG/1
5-10 TABLET ORAL EVERY 4 HOURS PRN
Status: DISCONTINUED | OUTPATIENT
Start: 2024-04-16 | End: 2024-04-18

## 2024-04-16 RX ORDER — LIDOCAINE 50 MG/G
OINTMENT TOPICAL 4 TIMES DAILY
Status: DISCONTINUED | OUTPATIENT
Start: 2024-04-16 | End: 2024-04-21

## 2024-04-16 RX ORDER — METHYLPREDNISOLONE 4 MG/1
4 TABLET ORAL
Qty: 2 TABLET | Refills: 0 | Status: COMPLETED | OUTPATIENT
Start: 2024-04-17 | End: 2024-04-18

## 2024-04-16 RX ORDER — LIDOCAINE 50 MG/G
OINTMENT TOPICAL 4 TIMES DAILY
Status: DISCONTINUED | OUTPATIENT
Start: 2024-04-16 | End: 2024-04-16

## 2024-04-16 RX ORDER — METHYLPREDNISOLONE 4 MG/1
4 TABLET ORAL
Qty: 3 TABLET | Refills: 0 | Status: COMPLETED | OUTPATIENT
Start: 2024-04-17 | End: 2024-04-19

## 2024-04-16 RX ORDER — METHYLPREDNISOLONE 4 MG/1
4 TABLET ORAL
Qty: 5 TABLET | Refills: 0 | Status: COMPLETED | OUTPATIENT
Start: 2024-04-17 | End: 2024-04-21

## 2024-04-16 RX ORDER — METHOCARBAMOL 750 MG/1
750 TABLET, FILM COATED ORAL 4 TIMES DAILY
Status: DISCONTINUED | OUTPATIENT
Start: 2024-04-16 | End: 2024-04-18

## 2024-04-16 RX ORDER — METHYLPREDNISOLONE 4 MG/1
4 TABLET ORAL AT BEDTIME
Qty: 3 TABLET | Refills: 0 | Status: COMPLETED | OUTPATIENT
Start: 2024-04-18 | End: 2024-04-20

## 2024-04-16 RX ORDER — HYDROMORPHONE HYDROCHLORIDE 1 MG/ML
0.5 INJECTION, SOLUTION INTRAMUSCULAR; INTRAVENOUS; SUBCUTANEOUS
Status: DISCONTINUED | OUTPATIENT
Start: 2024-04-16 | End: 2024-04-16

## 2024-04-16 RX ORDER — KETOROLAC TROMETHAMINE 15 MG/ML
15 INJECTION, SOLUTION INTRAMUSCULAR; INTRAVENOUS EVERY 6 HOURS PRN
Status: DISCONTINUED | OUTPATIENT
Start: 2024-04-16 | End: 2024-04-16

## 2024-04-16 RX ORDER — HYDROMORPHONE HYDROCHLORIDE 1 MG/ML
0.5 INJECTION, SOLUTION INTRAMUSCULAR; INTRAVENOUS; SUBCUTANEOUS
Status: DISCONTINUED | OUTPATIENT
Start: 2024-04-16 | End: 2024-04-24

## 2024-04-16 RX ORDER — METHYLPREDNISOLONE 4 MG/1
8 TABLET ORAL ONCE
Qty: 2 TABLET | Refills: 0 | Status: COMPLETED | OUTPATIENT
Start: 2024-04-16 | End: 2024-04-16

## 2024-04-16 RX ADMIN — HYDROMORPHONE HYDROCHLORIDE 1 MG: 1 INJECTION, SOLUTION INTRAMUSCULAR; INTRAVENOUS; SUBCUTANEOUS at 10:48

## 2024-04-16 RX ADMIN — HYDROMORPHONE HYDROCHLORIDE 1 MG: 1 INJECTION, SOLUTION INTRAMUSCULAR; INTRAVENOUS; SUBCUTANEOUS at 05:38

## 2024-04-16 RX ADMIN — HYDROMORPHONE HYDROCHLORIDE 1 MG: 1 INJECTION, SOLUTION INTRAMUSCULAR; INTRAVENOUS; SUBCUTANEOUS at 15:46

## 2024-04-16 RX ADMIN — HYDROMORPHONE HYDROCHLORIDE 1 MG: 1 INJECTION, SOLUTION INTRAMUSCULAR; INTRAVENOUS; SUBCUTANEOUS at 02:06

## 2024-04-16 RX ADMIN — HYDROXYZINE HYDROCHLORIDE 50 MG: 50 TABLET, FILM COATED ORAL at 18:47

## 2024-04-16 RX ADMIN — HYDROMORPHONE HYDROCHLORIDE 1 MG: 1 INJECTION, SOLUTION INTRAMUSCULAR; INTRAVENOUS; SUBCUTANEOUS at 08:50

## 2024-04-16 RX ADMIN — LEVOTHYROXINE SODIUM 75 MCG: 0.07 TABLET ORAL at 08:39

## 2024-04-16 RX ADMIN — METHOCARBAMOL 750 MG: 750 TABLET ORAL at 15:46

## 2024-04-16 RX ADMIN — METHYLPREDNISOLONE 8 MG: 4 TABLET ORAL at 12:20

## 2024-04-16 RX ADMIN — METHYLPREDNISOLONE 4 MG: 4 TABLET ORAL at 12:19

## 2024-04-16 RX ADMIN — DICLOFENAC SODIUM 4 G: 10 GEL TOPICAL at 12:31

## 2024-04-16 RX ADMIN — LIDOCAINE: 50 OINTMENT TOPICAL at 15:47

## 2024-04-16 RX ADMIN — HYDROXYZINE HYDROCHLORIDE 50 MG: 50 TABLET, FILM COATED ORAL at 10:49

## 2024-04-16 RX ADMIN — PANTOPRAZOLE SODIUM 40 MG: 40 TABLET, DELAYED RELEASE ORAL at 08:39

## 2024-04-16 RX ADMIN — LIDOCAINE: 50 OINTMENT TOPICAL at 20:09

## 2024-04-16 RX ADMIN — CETIRIZINE HYDROCHLORIDE 10 MG: 10 TABLET, FILM COATED ORAL at 08:39

## 2024-04-16 RX ADMIN — METHYLPREDNISOLONE 8 MG: 4 TABLET ORAL at 21:46

## 2024-04-16 RX ADMIN — HYDROXYZINE HYDROCHLORIDE 50 MG: 50 TABLET, FILM COATED ORAL at 05:38

## 2024-04-16 RX ADMIN — DICLOFENAC SODIUM 4 G: 10 GEL TOPICAL at 08:53

## 2024-04-16 RX ADMIN — LOSARTAN POTASSIUM 50 MG: 50 TABLET, FILM COATED ORAL at 08:39

## 2024-04-16 RX ADMIN — METHOCARBAMOL 500 MG: 500 TABLET ORAL at 08:39

## 2024-04-16 RX ADMIN — HYDROMORPHONE HYDROCHLORIDE 1 MG: 1 INJECTION, SOLUTION INTRAMUSCULAR; INTRAVENOUS; SUBCUTANEOUS at 20:09

## 2024-04-16 RX ADMIN — MAGNESIUM 64 MG (MAGNESIUM CHLORIDE) TABLET,DELAYED RELEASE 535 MG: at 12:19

## 2024-04-16 RX ADMIN — OXYCODONE HYDROCHLORIDE 10 MG: 5 TABLET ORAL at 18:46

## 2024-04-16 RX ADMIN — ONDANSETRON 8 MG: 4 TABLET, ORALLY DISINTEGRATING ORAL at 15:55

## 2024-04-16 RX ADMIN — HYDROMORPHONE HYDROCHLORIDE 1 MG: 1 INJECTION, SOLUTION INTRAMUSCULAR; INTRAVENOUS; SUBCUTANEOUS at 12:24

## 2024-04-16 RX ADMIN — METHOCARBAMOL 750 MG: 750 TABLET ORAL at 12:19

## 2024-04-16 RX ADMIN — ACETAMINOPHEN 1000 MG: 500 TABLET, FILM COATED ORAL at 05:38

## 2024-04-16 RX ADMIN — DICLOFENAC SODIUM 4 G: 10 GEL TOPICAL at 20:10

## 2024-04-16 RX ADMIN — ACETAMINOPHEN 1000 MG: 500 TABLET, FILM COATED ORAL at 20:09

## 2024-04-16 RX ADMIN — METHOCARBAMOL 750 MG: 750 TABLET ORAL at 20:09

## 2024-04-16 RX ADMIN — OXYCODONE HYDROCHLORIDE 10 MG: 5 TABLET ORAL at 22:56

## 2024-04-16 RX ADMIN — ACETAMINOPHEN 1000 MG: 500 TABLET, FILM COATED ORAL at 12:19

## 2024-04-16 RX ADMIN — OXYCODONE HYDROCHLORIDE 5 MG: 5 TABLET ORAL at 13:22

## 2024-04-16 RX ADMIN — DICLOFENAC SODIUM 4 G: 10 GEL TOPICAL at 18:47

## 2024-04-16 ASSESSMENT — ACTIVITIES OF DAILY LIVING (ADL)
ADLS_ACUITY_SCORE: 37
ADLS_ACUITY_SCORE: 36
ADLS_ACUITY_SCORE: 37
ADLS_ACUITY_SCORE: 37
ADLS_ACUITY_SCORE: 36
ADLS_ACUITY_SCORE: 37
ADLS_ACUITY_SCORE: 36
ADLS_ACUITY_SCORE: 37
ADLS_ACUITY_SCORE: 37
ADLS_ACUITY_SCORE: 36
ADLS_ACUITY_SCORE: 36
ADLS_ACUITY_SCORE: 37
ADLS_ACUITY_SCORE: 37
ADLS_ACUITY_SCORE: 36
ADLS_ACUITY_SCORE: 37
ADLS_ACUITY_SCORE: 36
ADLS_ACUITY_SCORE: 37

## 2024-04-16 NOTE — PLAN OF CARE
PRIMARY DIAGNOSIS: Right shoulder pain  OUTPATIENT/OBSERVATION GOALS TO BE MET BEFORE DISCHARGE:  1. Pain Status: Improved but still requiring IV narcotics.     2. Return to near baseline physical activity: No     3. Cleared for discharge by consultants (if involved): No        Discharge Planner Nurse  Safe discharge environment identified: Yes  Barriers to discharge: Yes       Entered by: Josephine Lindsay RN 04/16/2024      Pt A&Ox4, anxious, in pain. Pain controlled by dilaudid, muscle relaxer, tylenol, toradol, voltaren gel, ice. Pain RN consulted and adjusted meds - started steroid taper, added oxycodone. Ice and dilaudid q2 hours seem to work best for pain. Pt has declined ativan so far. Had MRI, waiting for results and plan.        Goal Outcome Evaluation:      Plan of Care Reviewed With: patient    Overall Patient Progress: improvingOverall Patient Progress: improving    Outcome Evaluation: MRI complete, pain managed with IV, oral and topical meds      Problem: Adult Inpatient Plan of Care  Goal: Plan of Care Review  Description: The Plan of Care Review/Shift note should be completed every shift.  The Outcome Evaluation is a brief statement about your assessment that the patient is improving, declining, or no change.  This information will be displayed automatically on your shift  note.  4/16/2024 1738 by Josephine Lindsay RN  Outcome: Progressing  Flowsheets (Taken 4/16/2024 1738)  Outcome Evaluation: MRI complete, pain managed with IV, oral and topical meds  Plan of Care Reviewed With: patient  Overall Patient Progress: improving  4/16/2024 1501 by Josephine Lindsya RN  Outcome: Progressing  Flowsheets (Taken 4/16/2024 1501)  Plan of Care Reviewed With: patient  Overall Patient Progress: improving  4/16/2024 1455 by Josephine Lindsay RN  Outcome: Progressing  Flowsheets (Taken 4/16/2024 1455)  Outcome Evaluation: Pain somewhat controlled with IV dilaudid, muscle relaxer, ice, rest. MRI planned for  "today.  Plan of Care Reviewed With: patient  Overall Patient Progress: improving  Goal: Patient-Specific Goal (Individualized)  Description: You can add care plan individualizations to a care plan. Examples of Individualization might be:  \"Parent requests to be called daily at 9am for status\", \"I have a hard time hearing out of my right ear\", or \"Do not touch me to wake me up as it startles  me\".  4/16/2024 1738 by Josephine Lindsay RN  Outcome: Progressing  4/16/2024 1501 by Josephine Lindsay RN  Outcome: Progressing  4/16/2024 1455 by Josephine Lindsay RN  Outcome: Progressing  Goal: Absence of Hospital-Acquired Illness or Injury  4/16/2024 1738 by Josephine Lindsay RN  Outcome: Progressing  4/16/2024 1501 by Josephine Lindsay RN  Outcome: Progressing  4/16/2024 1455 by Josephine Lindsay RN  Outcome: Progressing  Intervention: Identify and Manage Fall Risk  Recent Flowsheet Documentation  Taken 4/16/2024 1600 by Josephine Lindsay RN  Safety Promotion/Fall Prevention:   activity supervised   clutter free environment maintained   nonskid shoes/slippers when out of bed   patient and family education   room organization consistent   safety round/check completed   supervised activity  Taken 4/16/2024 1200 by Josephine Lindsay RN  Safety Promotion/Fall Prevention:   activity supervised   clutter free environment maintained   nonskid shoes/slippers when out of bed   patient and family education   room organization consistent   safety round/check completed   supervised activity  Taken 4/16/2024 0800 by Josephine Lindsay RN  Safety Promotion/Fall Prevention:   activity supervised   clutter free environment maintained   nonskid shoes/slippers when out of bed   patient and family education   room organization consistent   safety round/check completed   supervised activity  Intervention: Prevent Skin Injury  Recent Flowsheet Documentation  Taken 4/16/2024 1600 by Josephine Lindsay RN  Body Position: position changed " independently  Skin Protection:   transparent dressing maintained   adhesive use limited  Device Skin Pressure Protection: adhesive use limited  Taken 4/16/2024 1200 by Josephine Lindsay RN  Body Position: position changed independently  Skin Protection:   transparent dressing maintained   adhesive use limited  Device Skin Pressure Protection: adhesive use limited  Taken 4/16/2024 0800 by Josephine Lindsay RN  Body Position: position changed independently  Skin Protection:   transparent dressing maintained   adhesive use limited  Device Skin Pressure Protection: adhesive use limited  Intervention: Prevent and Manage VTE (Venous Thromboembolism) Risk  Recent Flowsheet Documentation  Taken 4/16/2024 1600 by Josephine Lindsay RN  VTE Prevention/Management: SCDs (sequential compression devices) off  Taken 4/16/2024 1200 by Josephine Lindsay RN  VTE Prevention/Management: SCDs (sequential compression devices) off  Taken 4/16/2024 0800 by Josephine Lindsay RN  VTE Prevention/Management: SCDs (sequential compression devices) off  Intervention: Prevent Infection  Recent Flowsheet Documentation  Taken 4/16/2024 1600 by Josephine Lindsay RN  Infection Prevention:   cohorting utilized   hand hygiene promoted   rest/sleep promoted  Taken 4/16/2024 1200 by Josephine Lindsay RN  Infection Prevention:   cohorting utilized   hand hygiene promoted   rest/sleep promoted  Taken 4/16/2024 0800 by Joesphine Lindsay RN  Infection Prevention:   cohorting utilized   hand hygiene promoted   rest/sleep promoted  Goal: Optimal Comfort and Wellbeing  4/16/2024 1738 by Josephine Lindsay RN  Outcome: Progressing  4/16/2024 1501 by Josephine Lindsay RN  Outcome: Progressing  4/16/2024 1455 by Josephine Lindsay RN  Outcome: Progressing  Intervention: Monitor Pain and Promote Comfort  Recent Flowsheet Documentation  Taken 4/16/2024 1600 by Josephine Lindsay RN  Pain Management Interventions:   medication (see MAR)   cold applied    repositioned   rest  Taken 4/16/2024 0800 by Josephine Lindsay RN  Pain Management Interventions:   medication (see MAR)   cold applied   repositioned   rest  Goal: Readiness for Transition of Care  4/16/2024 1738 by Josephine Lindsay RN  Outcome: Progressing  4/16/2024 1501 by Josephine Lindsay RN  Outcome: Progressing  4/16/2024 1455 by Josephine Lindsay RN  Outcome: Progressing     Problem: Pain Acute  Goal: Optimal Pain Control and Function  4/16/2024 1738 by Josephine Lindsay RN  Outcome: Progressing  4/16/2024 1501 by Josephine Lindsay RN  Outcome: Progressing  4/16/2024 1455 by Josephine Lindsay RN  Outcome: Progressing  Intervention: Develop Pain Management Plan  Recent Flowsheet Documentation  Taken 4/16/2024 1600 by Josephine Lindsay RN  Pain Management Interventions:   medication (see MAR)   cold applied   repositioned   rest  Taken 4/16/2024 0800 by Josephine Lindsay RN  Pain Management Interventions:   medication (see MAR)   cold applied   repositioned   rest  Intervention: Prevent or Manage Pain  Recent Flowsheet Documentation  Taken 4/16/2024 1600 by Josephine Lindsay RN  Sensory Stimulation Regulation:   care clustered   lighting decreased  Medication Review/Management: medications reviewed  Taken 4/16/2024 1200 by Josephine Lindsay RN  Sensory Stimulation Regulation:   care clustered   lighting decreased  Medication Review/Management: medications reviewed  Taken 4/16/2024 0800 by Josephine Lindsay RN  Sensory Stimulation Regulation:   care clustered   lighting decreased  Medication Review/Management: medications reviewed  Intervention: Optimize Psychosocial Wellbeing  Recent Flowsheet Documentation  Taken 4/16/2024 1600 by Josephine Lindsay RN  Supportive Measures:   active listening utilized   counseling provided   decision-making supported   verbalization of feelings encouraged  Taken 4/16/2024 1200 by Josephine Lindsay RN  Supportive Measures:   active listening utilized    counseling provided   decision-making supported   verbalization of feelings encouraged  Taken 4/16/2024 0800 by Josephine Lindsay RN  Supportive Measures:   active listening utilized   counseling provided   decision-making supported   verbalization of feelings encouraged     Problem: Comorbidity Management  Goal: Blood Pressure in Desired Range  4/16/2024 1738 by Josephine Lindsay RN  Outcome: Progressing  4/16/2024 1501 by Josephine Lindsay RN  Outcome: Progressing  4/16/2024 1455 by Josephine Lindsay RN  Outcome: Progressing  Intervention: Maintain Blood Pressure Management  Recent Flowsheet Documentation  Taken 4/16/2024 1600 by Josephine Lindsay RN  Medication Review/Management: medications reviewed  Taken 4/16/2024 1200 by Josephine Lindsay RN  Medication Review/Management: medications reviewed  Taken 4/16/2024 0800 by Josephine Lindsay RN  Medication Review/Management: medications reviewed

## 2024-04-16 NOTE — PLAN OF CARE
"PRIMARY DIAGNOSIS: Right shoulder pain  OUTPATIENT/OBSERVATION GOALS TO BE MET BEFORE DISCHARGE:  1. Pain Status: Improved but still requiring IV narcotics.    2. Return to near baseline physical activity: No    3. Cleared for discharge by consultants (if involved): No    Discharge Planner Nurse   Safe discharge environment identified: Yes  Barriers to discharge: Yes       Entered by: Josephine Lindsay RN 04/16/2024    Pt A&Ox4, anxious, in pain. Pain minimally controlled by dilaudid, muscle relaxer, tylenol, toradol, voltaren gel, ice. Plan for MRI today and consult by pain team.  Please review provider order for any additional goals.   Nurse to notify provider when observation goals have been met and patient is ready for discharge.    Goal Outcome Evaluation:      Plan of Care Reviewed With: patient    Overall Patient Progress: improvingOverall Patient Progress: improving    Outcome Evaluation: Pain somewhat controlled with IV dilaudid, muscle relaxer, ice, rest. MRI planned for today.    Problem: Adult Inpatient Plan of Care  Goal: Plan of Care Review  Description: The Plan of Care Review/Shift note should be completed every shift.  The Outcome Evaluation is a brief statement about your assessment that the patient is improving, declining, or no change.  This information will be displayed automatically on your shift  note.  Outcome: Progressing  Flowsheets (Taken 4/16/2024 6901)  Outcome Evaluation: Pain somewhat controlled with IV dilaudid, muscle relaxer, ice, rest. MRI planned for today.  Plan of Care Reviewed With: patient  Overall Patient Progress: improving  Goal: Patient-Specific Goal (Individualized)  Description: You can add care plan individualizations to a care plan. Examples of Individualization might be:  \"Parent requests to be called daily at 9am for status\", \"I have a hard time hearing out of my right ear\", or \"Do not touch me to wake me up as it startles  me\".  Outcome: Progressing  Goal: Absence " of Hospital-Acquired Illness or Injury  Outcome: Progressing  Intervention: Identify and Manage Fall Risk  Recent Flowsheet Documentation  Taken 4/16/2024 1200 by Josephine Lindsay RN  Safety Promotion/Fall Prevention:   activity supervised   clutter free environment maintained   nonskid shoes/slippers when out of bed   patient and family education   room organization consistent   safety round/check completed   supervised activity  Taken 4/16/2024 0800 by Josephine Lindsay RN  Safety Promotion/Fall Prevention:   activity supervised   clutter free environment maintained   nonskid shoes/slippers when out of bed   patient and family education   room organization consistent   safety round/check completed   supervised activity  Intervention: Prevent Skin Injury  Recent Flowsheet Documentation  Taken 4/16/2024 1200 by Josephine Lindsay RN  Body Position: position changed independently  Skin Protection:   transparent dressing maintained   adhesive use limited  Device Skin Pressure Protection: adhesive use limited  Taken 4/16/2024 0800 by Josephine Lindsay RN  Body Position: position changed independently  Skin Protection:   transparent dressing maintained   adhesive use limited  Device Skin Pressure Protection: adhesive use limited  Intervention: Prevent and Manage VTE (Venous Thromboembolism) Risk  Recent Flowsheet Documentation  Taken 4/16/2024 1200 by Josephine Lindsay RN  VTE Prevention/Management: SCDs (sequential compression devices) off  Taken 4/16/2024 0800 by Josephine Lindsay RN  VTE Prevention/Management: SCDs (sequential compression devices) off  Intervention: Prevent Infection  Recent Flowsheet Documentation  Taken 4/16/2024 1200 by Josephine Lindsay RN  Infection Prevention:   cohorting utilized   hand hygiene promoted   rest/sleep promoted  Taken 4/16/2024 0800 by Josephine Lindsay RN  Infection Prevention:   cohorting utilized   hand hygiene promoted   rest/sleep promoted  Goal: Optimal Comfort and  Wellbeing  Outcome: Progressing  Intervention: Monitor Pain and Promote Comfort  Recent Flowsheet Documentation  Taken 4/16/2024 0800 by Josephine Lindsay RN  Pain Management Interventions:   medication (see MAR)   cold applied   repositioned   rest  Goal: Readiness for Transition of Care  Outcome: Progressing     Problem: Pain Acute  Goal: Optimal Pain Control and Function  Outcome: Progressing  Intervention: Develop Pain Management Plan  Recent Flowsheet Documentation  Taken 4/16/2024 0800 by Josephine Lindsay RN  Pain Management Interventions:   medication (see MAR)   cold applied   repositioned   rest  Intervention: Prevent or Manage Pain  Recent Flowsheet Documentation  Taken 4/16/2024 1200 by Josephine Lindsay RN  Sensory Stimulation Regulation:   care clustered   lighting decreased  Medication Review/Management: medications reviewed  Taken 4/16/2024 0800 by Josephine Lindsay RN  Sensory Stimulation Regulation:   care clustered   lighting decreased  Medication Review/Management: medications reviewed  Intervention: Optimize Psychosocial Wellbeing  Recent Flowsheet Documentation  Taken 4/16/2024 1200 by Josephine Lindsay RN  Supportive Measures:   active listening utilized   counseling provided   decision-making supported   verbalization of feelings encouraged  Taken 4/16/2024 0800 by Josephine Lindsay RN  Supportive Measures:   active listening utilized   counseling provided   decision-making supported   verbalization of feelings encouraged     Problem: Comorbidity Management  Goal: Blood Pressure in Desired Range  Outcome: Progressing  Intervention: Maintain Blood Pressure Management  Recent Flowsheet Documentation  Taken 4/16/2024 1200 by Josephine Lindsay RN  Medication Review/Management: medications reviewed  Taken 4/16/2024 0800 by Josephine Lindsay RN  Medication Review/Management: medications reviewed

## 2024-04-16 NOTE — PLAN OF CARE
"  Problem: Adult Inpatient Plan of Care  Goal: Plan of Care Review  Description: Vitals are Temp: 98.8  F (37.1  C) Temp src: Oral BP: 118/65 Pulse: 55   Resp: 16 SpO2: 92 %.  Patient is Alert and Oriented x4. SBA with Gait Belt and Walker.Pt is a Regular.  Patient is Saline locked. Pt complaining of 8/10 pain. Tylenol, Dilaudid, and Atarax given for pain.  Medicatons caused no change in pain this time. Provider paged via [employer] recommended tarodal IV  patient reported was not effective. Ice pack administered help little , IV Dilaudid, atarax and tylenol given at 0530 patient finally  got some sleep.Patient is Saline locked.    Outcome: Progressing  Flowsheets (Taken 4/15/2024 2107)  Plan of Care Reviewed With: patient  Goal: Patient-Specific Goal (Individualized)  Description: You can add care plan individualizations to a care plan. Examples of Individualization might be:  \"Parent requests to be called daily at 9am for status\", \"I have a hard time hearing out of my right ear\", or \"Do not touch me to wake me up as it startles  me\".  Outcome: Progressing  Goal: Absence of Hospital-Acquired Illness or Injury  Outcome: Progressing  Intervention: Prevent Skin Injury  Recent Flowsheet Documentation  Taken 4/15/2024 2100 by Ezequiel Arevalo RN  Body Position: position changed independently  Goal: Optimal Comfort and Wellbeing  Outcome: Progressing  Intervention: Monitor Pain and Promote Comfort  Recent Flowsheet Documentation  Taken 4/15/2024 2100 by Ezequiel Arevalo RN  Pain Management Interventions: medication (see MAR)  Goal: Readiness for Transition of Care  Outcome: Progressing     Problem: Pain Acute  Goal: Optimal Pain Control and Function  Outcome: Progressing  Intervention: Develop Pain Management Plan  Recent Flowsheet Documentation  Taken 4/15/2024 2100 by Ezequiel Arevalo RN  Pain Management Interventions: medication (see MAR)     Problem: Comorbidity Management  Goal: Blood Pressure in Desired Range  Outcome: " Progressing   Goal Outcome Evaluation:      Plan of Care Reviewed With: patient

## 2024-04-16 NOTE — CONSULTS
SPIRIT[facility] HEALTH [facility] - Consult Note  Obs 2    Referral Source/ Reason for Visit: Emotional support.    Summary and Recommendations -     Reflectively listened to patient describe the intensity of her shoulder pain.    Plan: She has no spiritual needs.  She expressed gratitude for the support.    . BHARATI Thakkar.  Staff     [facility] available 24/7 for emergent requests/ referrals, either by paging the on-call  or by entering an ASAP/STAT consult in [employer], which will also page the on-call .      Assessment    Saw pt Machelle Weiner regarding staff consult for emotional support.    Patient/Family Understanding of Illness and Goals of Care - Patient states that she is at  due to shoulder pain and other hospital problems.    Distress and Loss - Her terrible shoulder pain is keeping her from attending to her daily activities, etc.    Strengths, Coping and Resources - She named her , a good friend, and her son as supportive people in her life.    Meaning, Beliefs and Spirituality - Patient states that she is Worship, but has no home Jew at this time.

## 2024-04-16 NOTE — PLAN OF CARE
"PRIMARY DIAGNOSIS: ACUTE PAIN  OUTPATIENT/OBSERVATION GOALS TO BE MET BEFORE DISCHARGE:  1. Pain Status: Improved but still requiring IV narcotics.    2. Return to near baseline physical activity: Yes    3. Cleared for discharge by consultants (if involved): No    Discharge Planner Nurse   Safe discharge environment identified: Yes  Barriers to discharge: Yes       Entered by: Stephon Mariscal RN 04/15/2024 7:29 PM    /70   Pulse 70   Temp 97.8  F (36.6  C) (Oral)   Resp 20   Ht 1.626 m (5' 4\")   Wt 59 kg (130 lb)   SpO2 97%   BMI 22.31 kg/m        Problem: Adult Inpatient Plan of Care  Goal: Plan of Care Review  Description: The Plan of Care Review/Shift note should be completed every shift.  The Outcome Evaluation is a brief statement about your assessment that the patient is improving, declining, or no change.  This information will be displayed automatically on your shift  note.  Outcome: Progressing  Goal: Patient-Specific Goal (Individualized)  Description: You can add care plan individualizations to a care plan. Examples of Individualization might be:  \"Parent requests to be called daily at 9am for status\", \"I have a hard time hearing out of my right ear\", or \"Do not touch me to wake me up as it startles  me\".  Outcome: Progressing  Goal: Absence of Hospital-Acquired Illness or Injury  Outcome: Progressing  Intervention: Identify and Manage Fall Risk  Recent Flowsheet Documentation  Taken 4/15/2024 1839 by Stephon Mariscal, RN  Safety Promotion/Fall Prevention:   clutter free environment maintained   lighting adjusted   nonskid shoes/slippers when out of bed   patient and family education   room organization consistent   safety round/check completed  Intervention: Prevent Skin Injury  Recent Flowsheet Documentation  Taken 4/15/2024 1839 by Stephon Mariscal, RN  Body Position: position changed independently  Skin Protection: transparent dressing maintained  Device Skin Pressure Protection: " tubing/devices free from skin contact  Taken 4/15/2024 1826 by Stephon Mariscal, RN  Body Position: position changed independently  Intervention: Prevent and Manage VTE (Venous Thromboembolism) Risk  Recent Flowsheet Documentation  Taken 4/15/2024 1839 by Stephon Mariscal, RN  VTE Prevention/Management:   compression stockings off   foot pump device off   SCDs (sequential compression devices) off  Intervention: Prevent Infection  Recent Flowsheet Documentation  Taken 4/15/2024 1839 by Stephon Mariscal, RN  Infection Prevention:   cohorting utilized   environmental surveillance performed   equipment surfaces disinfected   hand hygiene promoted   rest/sleep promoted  Goal: Optimal Comfort and Wellbeing  Outcome: Progressing  Intervention: Monitor Pain and Promote Comfort  Recent Flowsheet Documentation  Taken 4/15/2024 1826 by Stephon Mariscal, RN  Pain Management Interventions:   medication (see MAR)   heat applied  Goal: Readiness for Transition of Care  Outcome: Progressing     Problem: Pain Acute  Goal: Optimal Pain Control and Function  Outcome: Progressing  Intervention: Develop Pain Management Plan  Recent Flowsheet Documentation  Taken 4/15/2024 1826 by Stephon Mariscal, RN  Pain Management Interventions:   medication (see MAR)   heat applied  Intervention: Prevent or Manage Pain  Recent Flowsheet Documentation  Taken 4/15/2024 1839 by Stephon Mariscal, RN  Medication Review/Management: medications reviewed     Problem: Comorbidity Management  Goal: Blood Pressure in Desired Range  Outcome: Progressing  Intervention: Maintain Blood Pressure Management  Recent Flowsheet Documentation  Taken 4/15/2024 1839 by Stephon Mariscal, RN  Medication Review/Management: medications reviewed     Please review provider order for any additional goals.   Nurse to notify provider when observation goals have been met and patient is ready for discharge.

## 2024-04-16 NOTE — CONSULTS
"[facility] ACUTE PAIN SERVICE    ([facility], [facility], [facility], [facility])  Pain Consult Note    Assessment/Plan:  Machelle Weiner is a 49 year old female who was admitted on 4/15/2024.  Pain Service is asked to see the patient for acute on chronic pain.  Admitted for evaluation of worsening back/shoulder blade pain.  Patient identifies history of chronic abdominal and back pain that has been well controled.  Developed worsening pain in her right back shoulder blade area over the past month.  Pain became more severe yesterday.  CT scan of the chest negative for acute intrathoracic pathology including PE or dissection.   History of Generalized Anxiety Disorder, chronic pain, degenerative disc disease, hypertension, hyperlipidemia, GRIFFITH and migraines.   Follows at [facility] for her nonalcoholic steatohepatitis.     Patient states that she had previously had more symptoms on left cervical spine and shoulders with previous injections, trigger point therapies.  Left side has been good for the past 2 years.    Right upper shoulder pain over past month worsening that had brought her into the hospital.    Has been on gabapentin and duloxetine in the past and weaned herself off of them with concern for her liver disease as well as gabapentin had fogged up her thinking.       shows no controlled substances prescribed over this past year (benzodiazepines or opioids)    Over the past 24 hours Machelle received: 3(0.5mg) and 4(1mg) IV hydromorphone.    About 110 MME.    In addition 2(50) mg vistaril, 1(500mg) Robaxin, 1(5mg) IV valium, scheduled tylenol and one 30 mg IV toradol.      Describes pain as burning, sheering pain, \"like my arm is being ripped off\"  Currently rates pain at a \"8\" had received IV dilaudid about 40 min ago.    PLAN:  Acute pain secondary to muscle spasms with radiculopathy due to cervical spinal degenerative disc disease.    Multimodal Medication Therapy:   Adjuvants: " Tylenol 1000 mg every 6 hours, Voltaren topical gel qid, lidocaine patch administer for 12 hours one dose, add lidocaine cream qid, Robaxin 500 mg qid, Toradol 15 mg every 6 hours prn will stop, added medrol dosepak, Lorazepam 1 tablets every four hours prn (Hospital Medicine), vistaril 50 mg every 6 hours prn    Opioids: IV hydromorphone 0.5-1mg every 2 hours prn   Non-medication interventions- Ice prn  Constipation Prophylaxis- daily stool softener/laxative   Follow up /Discharge Recommendations - We recommend prescribing the following at the time of discharge:  TBD          <principal problem not specified>   Patient Active Problem List   Diagnosis    Asthma, mild intermittent    Hyperlipidemia LDL goal <160    Essential hypertension, benign    Acquired hypothyroidism    S/P laparoscopic cholecystectomy    Morbid obesity (H)    Migraine    Facet arthropathy, lumbosacral    DDD (degenerative disc disease), lumbosacral    Leg length discrepancy    Positive LYUDMILA (antinuclear antibody)    Multiple joint pain    Chronic pain of both knees    Hip pain    Mood disorder (H24)    Intractable pain    Acute pain of right shoulder        History   Drug Use No         Tobacco Use      Smoking status: Former        Packs/day: 0.00        Years: 0.3 packs/day for 15.0 years (4.5 ttl pk-yrs)        Types: Cigarettes        Start date: 2000        Quit date: 2015        Years since quittin.1      Smokeless tobacco: Never        Current Facility-Administered Medications   Medication Dose Route Frequency Provider Last Rate Last Admin    acetaminophen (TYLENOL) tablet 1,000 mg  1,000 mg Oral Q8H Tim Chapin MD   1,000 mg at 24 0538    cetirizine (zyrTEC) tablet 10 mg  10 mg Oral Daily Tim Chapin MD        diclofenac (VOLTAREN) 1 % topical gel 4 g  4 g Topical 4x Daily Tim Chapin MD   4 g at 04/15/24 2014    levothyroxine (SYNTHROID/LEVOTHROID) tablet 75 mcg  75 mcg Oral Daily Tim Chapin  "MD CECIL        losartan (COZAAR) tablet 50 mg  50 mg Oral Daily Tim Chapin MD        methocarbamol (ROBAXIN) tablet 500 mg  500 mg Oral 4x Daily Tim Chapin MD   500 mg at 04/15/24 2012    pantoprazole (PROTONIX) EC tablet 40 mg  40 mg Oral QAM AC Tim Chapin MD           Objective:  Vital signs in last 24 hours:  B/P: 127/71, T: 98.5, P: 55, R: 16   Blood pressure 127/71, pulse 55, temperature 98.5  F (36.9  C), temperature source Oral, resp. rate 16, height 1.626 m (5' 4\"), weight 59 kg (130 lb), SpO2 100%, not currently breastfeeding.      Weight:   Wt Readings from Last 2 Encounters:   04/15/24 59 kg (130 lb)   11/07/23 103.5 kg (228 lb 3.2 oz)         Intake/Output:  No intake or output data in the 24 hours ending 04/16/24 0759     Review of Systems:   As per subjective, all others negative.    Physical Exam:     General Appearance:  Alert, cooperative, moans grimaces at times   Head:  Normocephalic, without obvious abnormality, atraumatic   Eyes:  PERRL, conjunctiva/corneas clear, EOM's intact   ENT/Throat: Lips moist    Lymph/Neck: Supple, symmetrical, trachea midline   Lungs:   respirations unlabored   Abdomen:   Soft, non-tender, bowel sounds active all four quadrants,  no masses, no organomegaly   Musculoskeletal: Extremities normal, atraumatic, good strength tone, denies numbness   Skin: Skin is intact    Neurologic: Alert and oriented X 3, decreased range of motion shoulder joint due to pain         Imaging:  Personally Reviewed.    Results for orders placed or performed during the hospital encounter of 04/15/24   CT Chest Pulmonary Embolism w Contrast    Impression    IMPRESSION:  1. No evidence for pulmonary embolism.  2. Mild mosaic attenuation in both lungs suggests air trapping.  3. Indeterminate 0.5 cm right upper lobe pulmonary nodule. Please  refer to follow-up guidelines below.    Recommendations for one or multiple incidental lung nodules < 6mm :    Low risk patients: No " routine follow-up.    High risk patients: Optional follow-up CT at 12 months; if  unchanged, no further follow-up.    *Low Risk: Minimal or absent history of smoking or other known risk  factors.  *Nonsolid (ground glass) or partly solid nodules may require longer  follow-up to exclude indolent adenocarcinoma.  *Recommendations based on Guidelines for the Management of Incidental  Pulmonary Nodules Detected at CT: From the Fleischner Society 2017,  Radiology 2017.  *Guidelines apply to incidental nodules in patients who are 35 years  or older.  *Guidelines do not apply to lung cancer screening, patients with  immunosuppression, or patients with known primary cancer.    GORDON CLARK MD         SYSTEM ID:  [id number]        Lab Results:  Personally Reviewed.   Last Comprehensive Metabolic Panel:  Sodium   Date Value Ref Range Status   04/15/2024 138 135 - 145 mmol/L Final     Comment:     Reference intervals for this test were updated on 09/26/2023 to more accurately reflect our healthy population. There may be differences in the flagging of prior results with similar values performed with this method. Interpretation of those prior results can be made in the context of the updated reference intervals.    03/22/2021 137 133 - 144 mmol/L Final     Potassium   Date Value Ref Range Status   04/15/2024 3.9 3.4 - 5.3 mmol/L Final   03/22/2022 4.0 3.4 - 5.3 mmol/L Final   03/22/2021 4.1 3.4 - 5.3 mmol/L Final     Chloride   Date Value Ref Range Status   04/15/2024 108 (H) 98 - 107 mmol/L Final   03/22/2022 108 94 - 109 mmol/L Final   03/22/2021 108 94 - 109 mmol/L Final     Carbon Dioxide   Date Value Ref Range Status   03/22/2021 28 20 - 32 mmol/L Final     Carbon Dioxide (CO2)   Date Value Ref Range Status   04/15/2024 15 (L) 22 - 29 mmol/L Final   03/22/2022 25 20 - 32 mmol/L Final     Anion Gap   Date Value Ref Range Status   04/15/2024 15 7 - 15 mmol/L Final   03/22/2022 4 3 - 14 mmol/L Final   03/22/2021 1 (L) 3 - 14  mmol/L Final     Glucose   Date Value Ref Range Status   04/15/2024 92 70 - 99 mg/dL Final   03/22/2022 86 70 - 99 mg/dL Final   03/22/2021 68 (L) 70 - 99 mg/dL Final     Urea Nitrogen   Date Value Ref Range Status   04/15/2024 11.0 6.0 - 20.0 mg/dL Final   03/22/2022 10 7 - 30 mg/dL Final   03/22/2021 11 7 - 30 mg/dL Final     Creatinine   Date Value Ref Range Status   04/15/2024 0.96 (H) 0.51 - 0.95 mg/dL Final   03/22/2021 0.94 0.52 - 1.04 mg/dL Final     GFR Estimate   Date Value Ref Range Status   04/15/2024 73 >60 mL/min/1.73m2 Final   03/22/2021 74 >60 mL/min/[1.73_m2] Final     Comment:     Non  GFR Calc  Starting 12/18/2018, serum creatinine based estimated GFR (eGFR) will be   calculated using the Chronic Kidney Disease Epidemiology Collaboration   (CKD-EPI) equation.       Calcium   Date Value Ref Range Status   04/15/2024 9.4 8.6 - 10.0 mg/dL Final   03/22/2021 9.1 8.5 - 10.1 mg/dL Final        UA:   Amphetamines Urine   Date Value Ref Range Status   12/30/2021 Screen Negative Screen Negative Final     Comment:     Cutoff for a negative amphetamine is 500 ng/mL or less.     Barbiturates Qual Urine   Date Value Ref Range Status   03/29/2007 Not Detected  Final     Comment:             Reference Range:  Not Detected     Barbiturates Urine   Date Value Ref Range Status   12/30/2021 Screen Negative Screen Negative Final     Comment:     Cutoff for a negative barbiturate is 200 ng/mL or less.     Benzodiazepine Qual Urine   Date Value Ref Range Status   03/29/2007 Detected, Abnormal Result  Final     Comment:             Reference Range:  Not Detected     Cannabinoids Urine   Date Value Ref Range Status   12/30/2021 Screen Negative Screen Negative Final     Comment:     Cutoff for a negative cannabinoid is 50 ng/mL or less.     Cocaine Urine   Date Value Ref Range Status   12/30/2021 Screen Negative Screen Negative Final     Comment:     Cutoff for a negative cocaine is 300 ng/mL or less.      Opiates Qualitative Urine   Date Value Ref Range Status   03/29/2007 Not Detected  Final     Comment:             Reference Range:  Not Detected     Opiates Urine   Date Value Ref Range Status   12/30/2021 Screen Negative Screen Negative Final     Comment:     Cutoff for a negative opiate is 300 ng/mL or less.     PCP Qual Urine   Date Value Ref Range Status   03/29/2007 Not Detected  Final     Comment:             Reference Range:  Not Detected     PCP Urine   Date Value Ref Range Status   12/30/2021 Screen Negative Screen Negative Final     Comment:     Cutoff for a negative PCP is 25 ng/mL or less.            MANAGEMENT DISCUSSED with the following over the past 24 hours: RN and MD   NOTE(S)/MEDICAL RECORDS REVIEWED over the past 24 hours: Hospital Medicine, Nursing, ED Physician notes  Medical complexity over the past 24 hours:  - Parenteral (IV) CONTROLLED SUBSTANCES ordered  - Prescription DRUG MANAGEMENT performed      BLANCA Pedersen, DNP CNS  Acute Inpatient Pain Team  M-F   Paging via [employer] or adflyer

## 2024-04-16 NOTE — PROGRESS NOTES
[facility]    Medicine Progress Note - Hospitalist Service    Date of Admission:  4/15/2024  Date of Service: 04/16/2024    Assessment & Plan      Brief summary of admission assessment:Machelle Weiner is a 49 year old  female with a significant past medical history of chronic pain ,GRIFFITH,  TIMMY, hypertension, hyperlipidemia, and migraines who presents with right posterior shoulder pain.  She was complaining of severe sharp pain on the right radiating to the right upper extremity.  She is to have cervical radiculopathy for which she has been getting epidural steroid injections and pain clinic.     ED evaluation revealed anxiety, hyperventilation with bicarb of 15.  CT scan of the chest negative for acute intrathoracic pathology including PE or dissection.     Patient received multiple medications in the emergency department including 5 mg IV Valium, 0.5 mg of hydromorphone twice and her pain did not improve significantly.     #1.  Acute intractable pain involving the right shoulder, right posterior upper chest, back and shoulder blade area.  -- History of degenerative disc disease C4-C5, C5-C6, and C6-C7 on MRI study of 2021.  -- Suspect cervical radiculopathy pain.  -- CT scan with contrast did not reveal any acute intrathoracic pathology  -- Patient has chronic pain but does not use narcotic medications at home  Plan:  -- Pain service consulted  -- MR Cervical and Thoracic pending   -- Medrol trial per pain service  -- Will schedule Tylenol 1 g every 8 hours, as needed Dilaudid 1 mg every 2 hours as needed, Atarax 50 mg every 6 hours as needed, may try Voltaren gel 1% twice daily, Robaxin 500 mg 4 times daily.  Ativan as needed for anxiety.  Will get pain management team to assist patient for further recommendation.  Patient needs to follow-up with her pain management team as an outpatient as well.    #2.  Severe anxiety  --She has history of depression and generalized anxiety  disorder but is not on antianxiety medication.  She was very emotional and her new right sided pain is causing her a lot of distress.  She wants to get more testing to figure out the etiology of her condition.  -- Was treated with Ativan as needed.  Patient may benefit from scheduled antianxiety medication that can be done as an outpatient with her primary care provider.             Diet: Regular Diet Adult    DVT Prophylaxis: Pneumatic Compression Devices  Sheridan Catheter: Not present  Lines: None     Cardiac Monitoring: None  Code Status:  FULL CODE    Clinically Significant Risk Factors Present on Admission                  # Hypertension: Noted on problem list          # Asthma: noted on problem list        Disposition Plan     Medically Ready for Discharge: Anticipated Tomorrow             Yovany Livingston MD  Hospitalist Service  [facility]  Securely message with Match (more info)  Text page via [employer] Paging/Directory   ______________________________________________________________________    Interval History     Pain still 7/10 in shoulder and neck / upper back  No fevers  No CP/SOB  No nausea / vomiting   No new complaints    Physical Exam   Vital Signs: Temp: 98.2  F (36.8  C) Temp src: Oral BP: (!) 160/78 Pulse: 65   Resp: 20 SpO2: 100 % O2 Device: None (Room air)    Weight: 130 lbs 0 oz    GEN:  no apparent distress  CV:   Regular rate and rhythm, no murmur or JVD.  S1 + S2 noted, no S3 or S4.  LUNGS:  CTABL  ABD:  Active bowel sounds, soft, non-tender/non-distended.  EXT:  No edema.  No cyanosis.    Neurologic:Grossly intact,non focal. Tangela.  Neuropsychiatric: normal mood and affect  ----------------------------------------------------------------------------------------    Medical Decision Making       35 MINUTES SPENT BY ME on the date of service doing chart review, history, exam, documentation & further activities per the note.      Data   ------------------------- PAST 24 HR DATA  REVIEWED -----------------------------------------------    I have personally reviewed the following data over the past 24 hrs:    N/A  \   N/A   / N/A     N/A N/A N/A /  N/A   N/A N/A N/A \     ALT: N/A AST: N/A AP: N/A TBILI: N/A   ALB: N/A TOT PROTEIN: N/A LIPASE: N/A     Trop: N/A BNP: N/A     INR:  N/A PTT:  N/A   D-dimer:  N/A Fibrinogen:  N/A       Imaging results reviewed over the past 24 hrs:   Recent Results (from the past 24 hour(s))   CT Chest Pulmonary Embolism w Contrast    Narrative    CT CHEST PULMONARY EMBOLISM WITH CONTRAST April 15, 2024 12:48 PM    CLINICAL HISTORY: Chest pain. Elevated D-dimer.    TECHNIQUE: CT angiogram chest during arterial phase injection IV  contrast. 2D and 3D MIP reconstructions were performed by the CT  technologist. Dose reduction techniques were used.   CONTRAST: 100mL Isovue-370.    COMPARISON: None.    FINDINGS:  ANGIOGRAM CHEST: Pulmonary arteries are normal caliber and negative  for pulmonary emboli. The thoracic aorta is not well opacified. No  evidence for thoracic aortic aneurysm.     LUNGS AND PLEURA: Mild mosaic attenuation bilaterally suggests  air-trapping. Indeterminate 0.5 cm subpleural nodule in the right  upper lobe posteriorly (series 6 image 72). No pleural effusions.    MEDIASTINUM/AXILLAE: No lymphadenopathy in the chest. No pericardial  effusion.    CORONARY ARTERY CALCIFICATION: None.    UPPER ABDOMEN: Cholecystectomy.    MUSCULOSKELETAL: Unremarkable.      Impression    IMPRESSION:  1. No evidence for pulmonary embolism.  2. Mild mosaic attenuation in both lungs suggests air trapping.  3. Indeterminate 0.5 cm right upper lobe pulmonary nodule. Please  refer to follow-up guidelines below.    Recommendations for one or multiple incidental lung nodules < 6mm :    Low risk patients: No routine follow-up.    High risk patients: Optional follow-up CT at 12 months; if  unchanged, no further follow-up.    *Low Risk: Minimal or absent history of smoking or  other known risk  factors.  *Nonsolid (ground glass) or partly solid nodules may require longer  follow-up to exclude indolent adenocarcinoma.  *Recommendations based on Guidelines for the Management of Incidental  Pulmonary Nodules Detected at CT: From the Fleischner Society 2017,  Radiology 2017.  *Guidelines apply to incidental nodules in patients who are 35 years  or older.  *Guidelines do not apply to lung cancer screening, patients with  immunosuppression, or patients with known primary cancer.    GORDON CALRK MD         SYSTEM ID:  [id number]     ------------------------- ENCOUNTER LABS ----------------------------------------------------------------  Recent Labs   Lab 04/15/24  1134   WBC 8.7   HGB 13.2   MCV 85         POTASSIUM 3.9   CHLORIDE 108*   CO2 15*   BUN 11.0   CR 0.96*   ANIONGAP 15   BETINA 9.4   GLC 92   ALBUMIN 4.4   PROTTOTAL 7.6   BILITOTAL 0.2   ALKPHOS 97   ALT 14   AST 22       Most Recent 3 CBC's:  Recent Labs   Lab Test 04/15/24  1134 08/29/22  1703 08/16/22  1514   WBC 8.7 10.9 11.5*   HGB 13.2 11.9 12.2   MCV 85 93 93    294 293     Most Recent 3 BMP's:  Recent Labs   Lab Test 04/15/24  1134 08/16/22  1514 03/22/22  1627    137 137   POTASSIUM 3.9 3.9 4.0   CHLORIDE 108* 105 108   CO2 15* 20* 25   BUN 11.0 6.8 10   CR 0.96* 0.77 1.02   ANIONGAP 15 12 4   BETINA 9.4 8.9 9.3   GLC 92 82 86     Most Recent 2 LFT's:  Recent Labs   Lab Test 04/15/24  1134 12/15/22  1233   AST 22 83*   ALT 14 98*   ALKPHOS 97 134*   BILITOTAL 0.2 0.2     Most Recent 3 INR's:  Recent Labs   Lab Test 08/29/22  1703 12/16/17  1725   INR 1.04 0.95     Most Recent 3 Troponin's:  Recent Labs   Lab Test 01/11/17  2110 10/03/16  1749   TROPI <0.015  The 99th percentile for upper reference range is 0.045 ug/L.  Troponin values in   the range of 0.045 - 0.120 ug/L may be associated with risks of adverse   clinical events.   <0.015  The 99th percentile for upper reference range is 0.045 ug/L.   Troponin values in   the range of 0.045 - 0.120 ug/L may be associated with risks of adverse   clinical events.       Most Recent 3 BNP's:No lab results found.  Most Recent D-dimer:  Recent Labs   Lab Test 04/15/24  1134   DD 0.54*     Most Recent Cholesterol Panel:  Recent Labs   Lab Test 11/07/23  1528   CHOL 196   *   HDL 45*   TRIG 91     Most Recent 6 Bacteria Isolates From Any Culture (See EPIC Reports for Culture Details):  Recent Labs   Lab Test 05/15/18  1325   CULT No beta hemolytic Streptococcus Group A isolated     Most Recent TSH and T4:  Recent Labs   Lab Test 11/07/23  1528 04/19/22  1545 03/22/21  1754   TSH 1.78   < > 5.90*   T4  --   --  0.80    < > = values in this interval not displayed.     Most Recent Urinalysis:  Recent Labs   Lab Test 08/29/22  1705   COLOR Yellow   APPEARANCE Clear   URINEGLC Negative   URINEBILI Negative   URINEKETONE Negative   SG 1.022   UBLD Negative   URINEPH 5.5   PROTEIN Negative   NITRITE Negative   LEUKEST Negative   RBCU 2   WBCU 1     Most Recent ESR & CRP:  Recent Labs   Lab Test 11/17/22  1712 08/16/22  1514 03/22/22  1627   SED  --   --  40*   CRP  --   --  8.7*   CRPI 6.25*   < >  --     < > = values in this interval not displayed.

## 2024-04-16 NOTE — PLAN OF CARE
"PRIMARY DIAGNOSIS: Right shoulder pain  OUTPATIENT/OBSERVATION GOALS TO BE MET BEFORE DISCHARGE:  1. Pain Status: Improved but still requiring IV narcotics.     2. Return to near baseline physical activity: No     3. Cleared for discharge by consultants (if involved): No        Discharge Planner Nurse  Safe discharge environment identified: Yes  Barriers to discharge: Yes       Entered by: Josephine Lindsay RN 04/16/2024      Pt A&Ox4, anxious, in pain. Pain minimally controlled by dilaudid, muscle relaxer, tylenol, toradol, voltaren gel, ice. Pain RN consulted and adjusted meds - started steroid taper, added oxycodone. Ice and dilaudid q2 hours seem to work best for pain. Pt has declined ativan so far. Plan for MRI today       Problem: Adult Inpatient Plan of Care  Goal: Plan of Care Review  Description: The Plan of Care Review/Shift note should be completed every shift.  The Outcome Evaluation is a brief statement about your assessment that the patient is improving, declining, or no change.  This information will be displayed automatically on your shift  note.  4/16/2024 1501 by Josephine Lindsay RN  Outcome: Progressing  Flowsheets (Taken 4/16/2024 1501)  Plan of Care Reviewed With: patient  Overall Patient Progress: improving  4/16/2024 1455 by Josephine Lindsay RN  Outcome: Progressing  Flowsheets (Taken 4/16/2024 1455)  Outcome Evaluation: Pain somewhat controlled with IV dilaudid, muscle relaxer, ice, rest. MRI planned for today.  Plan of Care Reviewed With: patient  Overall Patient Progress: improving  Goal: Patient-Specific Goal (Individualized)  Description: You can add care plan individualizations to a care plan. Examples of Individualization might be:  \"Parent requests to be called daily at 9am for status\", \"I have a hard time hearing out of my right ear\", or \"Do not touch me to wake me up as it startles  me\".  4/16/2024 1501 by Josephine Lindsay RN  Outcome: Progressing  4/16/2024 1455 by Neftali, " Josephine GARZA RN  Outcome: Progressing  Goal: Absence of Hospital-Acquired Illness or Injury  4/16/2024 1501 by Josephine Lindsay RN  Outcome: Progressing  4/16/2024 1455 by Josephine Lindsay RN  Outcome: Progressing  Intervention: Identify and Manage Fall Risk  Recent Flowsheet Documentation  Taken 4/16/2024 1200 by Josephine Lindsay RN  Safety Promotion/Fall Prevention:   activity supervised   clutter free environment maintained   nonskid shoes/slippers when out of bed   patient and family education   room organization consistent   safety round/check completed   supervised activity  Taken 4/16/2024 0800 by Josephine Lindsay RN  Safety Promotion/Fall Prevention:   activity supervised   clutter free environment maintained   nonskid shoes/slippers when out of bed   patient and family education   room organization consistent   safety round/check completed   supervised activity  Intervention: Prevent Skin Injury  Recent Flowsheet Documentation  Taken 4/16/2024 1200 by Josephine Lindsay RN  Body Position: position changed independently  Skin Protection:   transparent dressing maintained   adhesive use limited  Device Skin Pressure Protection: adhesive use limited  Taken 4/16/2024 0800 by Josephine Lindsay RN  Body Position: position changed independently  Skin Protection:   transparent dressing maintained   adhesive use limited  Device Skin Pressure Protection: adhesive use limited  Intervention: Prevent and Manage VTE (Venous Thromboembolism) Risk  Recent Flowsheet Documentation  Taken 4/16/2024 1200 by Josephine Lindsay RN  VTE Prevention/Management: SCDs (sequential compression devices) off  Taken 4/16/2024 0800 by Josephine Lindsay RN  VTE Prevention/Management: SCDs (sequential compression devices) off  Intervention: Prevent Infection  Recent Flowsheet Documentation  Taken 4/16/2024 1200 by Josephine Lindsay RN  Infection Prevention:   cohorting utilized   hand hygiene promoted   rest/sleep promoted  Taken  4/16/2024 0800 by Josephine Lindsay RN  Infection Prevention:   cohorting utilized   hand hygiene promoted   rest/sleep promoted  Goal: Optimal Comfort and Wellbeing  4/16/2024 1501 by Josephine Lindsay RN  Outcome: Progressing  4/16/2024 1455 by Josephine Lindsay RN  Outcome: Progressing  Intervention: Monitor Pain and Promote Comfort  Recent Flowsheet Documentation  Taken 4/16/2024 0800 by Josephine Lindsay RN  Pain Management Interventions:   medication (see MAR)   cold applied   repositioned   rest  Goal: Readiness for Transition of Care  4/16/2024 1501 by Josephine Lindsay RN  Outcome: Progressing  4/16/2024 1455 by Josephine Lindsay RN  Outcome: Progressing     Problem: Pain Acute  Goal: Optimal Pain Control and Function  4/16/2024 1501 by Josephine Lindsay RN  Outcome: Progressing  4/16/2024 1455 by Josephine Lindsay RN  Outcome: Progressing  Intervention: Develop Pain Management Plan  Recent Flowsheet Documentation  Taken 4/16/2024 0800 by Josephine Lindsay RN  Pain Management Interventions:   medication (see MAR)   cold applied   repositioned   rest  Intervention: Prevent or Manage Pain  Recent Flowsheet Documentation  Taken 4/16/2024 1200 by Josephine Lindsay RN  Sensory Stimulation Regulation:   care clustered   lighting decreased  Medication Review/Management: medications reviewed  Taken 4/16/2024 0800 by Josephine Lindsay RN  Sensory Stimulation Regulation:   care clustered   lighting decreased  Medication Review/Management: medications reviewed  Intervention: Optimize Psychosocial Wellbeing  Recent Flowsheet Documentation  Taken 4/16/2024 1200 by Josephine Lindsay RN  Supportive Measures:   active listening utilized   counseling provided   decision-making supported   verbalization of feelings encouraged  Taken 4/16/2024 0800 by Josephine Lindsay RN  Supportive Measures:   active listening utilized   counseling provided   decision-making supported   verbalization of feelings encouraged      Problem: Comorbidity Management  Goal: Blood Pressure in Desired Range  4/16/2024 1501 by Josephine Lindsay, RN  Outcome: Progressing  4/16/2024 1455 by Josephine Lindsay RN  Outcome: Progressing  Intervention: Maintain Blood Pressure Management  Recent Flowsheet Documentation  Taken 4/16/2024 1200 by Josephine Lindsay, RN  Medication Review/Management: medications reviewed  Taken 4/16/2024 0800 by Josephine Lindsay RN  Medication Review/Management: medications reviewed   Goal Outcome Evaluation:      Plan of Care Reviewed With: patient    Overall Patient Progress: improvingOverall Patient Progress: improving    Outcome Evaluation: Pain somewhat controlled with IV dilaudid, muscle relaxer, ice, rest. MRI planned for today.

## 2024-04-17 LAB — CRP SERPL-MCNC: <3 MG/L

## 2024-04-17 PROCEDURE — 250N000011 HC RX IP 250 OP 636: Performed by: CLINICAL NURSE SPECIALIST

## 2024-04-17 PROCEDURE — G0378 HOSPITAL OBSERVATION PER HR: HCPCS

## 2024-04-17 PROCEDURE — 250N000011 HC RX IP 250 OP 636: Performed by: INTERNAL MEDICINE

## 2024-04-17 PROCEDURE — 99232 SBSQ HOSP IP/OBS MODERATE 35: CPT | Performed by: CLINICAL NURSE SPECIALIST

## 2024-04-17 PROCEDURE — 250N000013 HC RX MED GY IP 250 OP 250 PS 637: Performed by: INTERNAL MEDICINE

## 2024-04-17 PROCEDURE — 250N000013 HC RX MED GY IP 250 OP 250 PS 637: Performed by: CLINICAL NURSE SPECIALIST

## 2024-04-17 PROCEDURE — 120N000001 HC R&B MED SURG/OB

## 2024-04-17 PROCEDURE — 96376 TX/PRO/DX INJ SAME DRUG ADON: CPT

## 2024-04-17 PROCEDURE — 250N000012 HC RX MED GY IP 250 OP 636 PS 637: Performed by: CLINICAL NURSE SPECIALIST

## 2024-04-17 PROCEDURE — 99232 SBSQ HOSP IP/OBS MODERATE 35: CPT | Performed by: STUDENT IN AN ORGANIZED HEALTH CARE EDUCATION/TRAINING PROGRAM

## 2024-04-17 RX ORDER — CAPSAICIN 0.025 %
CREAM (GRAM) TOPICAL 3 TIMES DAILY
Status: DISCONTINUED | OUTPATIENT
Start: 2024-04-17 | End: 2024-04-20

## 2024-04-17 RX ADMIN — CETIRIZINE HYDROCHLORIDE 10 MG: 10 TABLET, FILM COATED ORAL at 08:55

## 2024-04-17 RX ADMIN — METHYLPREDNISOLONE 4 MG: 4 TABLET ORAL at 08:55

## 2024-04-17 RX ADMIN — OXYCODONE HYDROCHLORIDE 10 MG: 5 TABLET ORAL at 15:07

## 2024-04-17 RX ADMIN — LEVOTHYROXINE SODIUM 75 MCG: 0.07 TABLET ORAL at 08:55

## 2024-04-17 RX ADMIN — DICLOFENAC SODIUM 4 G: 10 GEL TOPICAL at 17:02

## 2024-04-17 RX ADMIN — DICLOFENAC SODIUM 4 G: 10 GEL TOPICAL at 12:46

## 2024-04-17 RX ADMIN — METHYLPREDNISOLONE 8 MG: 4 TABLET ORAL at 21:46

## 2024-04-17 RX ADMIN — ACETAMINOPHEN 1000 MG: 500 TABLET, FILM COATED ORAL at 20:16

## 2024-04-17 RX ADMIN — METHOCARBAMOL 750 MG: 750 TABLET ORAL at 08:54

## 2024-04-17 RX ADMIN — DICLOFENAC SODIUM 4 G: 10 GEL TOPICAL at 20:19

## 2024-04-17 RX ADMIN — HYDROMORPHONE HYDROCHLORIDE 1 MG: 1 INJECTION, SOLUTION INTRAMUSCULAR; INTRAVENOUS; SUBCUTANEOUS at 21:46

## 2024-04-17 RX ADMIN — HYDROXYZINE HYDROCHLORIDE 50 MG: 50 TABLET, FILM COATED ORAL at 08:55

## 2024-04-17 RX ADMIN — OXYCODONE HYDROCHLORIDE 10 MG: 5 TABLET ORAL at 08:55

## 2024-04-17 RX ADMIN — HYDROXYZINE HYDROCHLORIDE 50 MG: 50 TABLET, FILM COATED ORAL at 21:46

## 2024-04-17 RX ADMIN — HYDROMORPHONE HYDROCHLORIDE 1 MG: 1 INJECTION, SOLUTION INTRAMUSCULAR; INTRAVENOUS; SUBCUTANEOUS at 18:00

## 2024-04-17 RX ADMIN — ACETAMINOPHEN 1000 MG: 500 TABLET, FILM COATED ORAL at 12:45

## 2024-04-17 RX ADMIN — METHYLPREDNISOLONE 4 MG: 4 TABLET ORAL at 17:01

## 2024-04-17 RX ADMIN — HYDROMORPHONE HYDROCHLORIDE 1 MG: 1 INJECTION, SOLUTION INTRAMUSCULAR; INTRAVENOUS; SUBCUTANEOUS at 12:49

## 2024-04-17 RX ADMIN — HYDROMORPHONE HYDROCHLORIDE 1 MG: 1 INJECTION, SOLUTION INTRAMUSCULAR; INTRAVENOUS; SUBCUTANEOUS at 01:46

## 2024-04-17 RX ADMIN — OXYCODONE HYDROCHLORIDE 5 MG: 5 TABLET ORAL at 20:16

## 2024-04-17 RX ADMIN — LIDOCAINE: 50 OINTMENT TOPICAL at 08:59

## 2024-04-17 RX ADMIN — MAGNESIUM 64 MG (MAGNESIUM CHLORIDE) TABLET,DELAYED RELEASE 535 MG: at 08:55

## 2024-04-17 RX ADMIN — PANTOPRAZOLE SODIUM 40 MG: 40 TABLET, DELAYED RELEASE ORAL at 08:54

## 2024-04-17 RX ADMIN — ACETAMINOPHEN 1000 MG: 500 TABLET, FILM COATED ORAL at 06:44

## 2024-04-17 RX ADMIN — HYDROMORPHONE HYDROCHLORIDE 1 MG: 1 INJECTION, SOLUTION INTRAMUSCULAR; INTRAVENOUS; SUBCUTANEOUS at 06:44

## 2024-04-17 RX ADMIN — METHOCARBAMOL 750 MG: 750 TABLET ORAL at 17:01

## 2024-04-17 RX ADMIN — ONDANSETRON 8 MG: 4 TABLET, ORALLY DISINTEGRATING ORAL at 06:49

## 2024-04-17 RX ADMIN — METHYLPREDNISOLONE 4 MG: 4 TABLET ORAL at 12:45

## 2024-04-17 RX ADMIN — METHOCARBAMOL 750 MG: 750 TABLET ORAL at 20:16

## 2024-04-17 RX ADMIN — CAPSAICIN: 0.25 CREAM TOPICAL at 15:01

## 2024-04-17 RX ADMIN — LOSARTAN POTASSIUM 50 MG: 50 TABLET, FILM COATED ORAL at 08:55

## 2024-04-17 RX ADMIN — METHOCARBAMOL 750 MG: 750 TABLET ORAL at 12:45

## 2024-04-17 ASSESSMENT — ACTIVITIES OF DAILY LIVING (ADL)
ADLS_ACUITY_SCORE: 37
ADLS_ACUITY_SCORE: 36
ADLS_ACUITY_SCORE: 37
ADLS_ACUITY_SCORE: 37
ADLS_ACUITY_SCORE: 36
ADLS_ACUITY_SCORE: 37
ADLS_ACUITY_SCORE: 37
ADLS_ACUITY_SCORE: 36
ADLS_ACUITY_SCORE: 37
ADLS_ACUITY_SCORE: 36
ADLS_ACUITY_SCORE: 36
ADLS_ACUITY_SCORE: 37
ADLS_ACUITY_SCORE: 37
ADLS_ACUITY_SCORE: 36
ADLS_ACUITY_SCORE: 37
ADLS_ACUITY_SCORE: 37

## 2024-04-17 NOTE — PLAN OF CARE
"Vitals are Temp: 98.1  F (36.7  C) Temp src: Oral BP: 131/73 Pulse: 58   Resp: 20 SpO2: 98 %.  Patient is Alert and Oriented x4. SBA with no assistive device. Pt is a Regular diet. complaining of 9/10 pain.  Dilaudid and Oxycodone given for pain.  Medicatons decreased pain.  Patient is Saline locked.  Problem: Adult Inpatient Plan of Care  Goal: Plan of Care Review  Description: The Plan of Care Review/Shift note should be completed every shift.  The Outcome Evaluation is a brief statement about your assessment that the patient is improving, declining, or no change.  This information will be displayed automatically on your shift  note.  4/17/2024 0235 by Ezequiel Arevalo RN  Outcome: Progressing  Flowsheets (Taken 4/17/2024 0235)  Plan of Care Reviewed With: patient  Overall Patient Progress: improving  4/17/2024 0230 by Ezequiel Arevalo RN  Outcome: Progressing  Flowsheets (Taken 4/17/2024 0230)  Outcome Evaluation: A&OX4, Reported shoulder pain  Plan of Care Reviewed With: patient  Overall Patient Progress: improving  4/16/2024 2211 by Ezequiel Arevalo RN  Outcome: Progressing  Flowsheets (Taken 4/16/2024 2137)  Outcome Evaluation: P atient  Goal: Patient-Specific Goal (Individualized)  Description: You can add care plan individualizations to a care plan. Examples of Individualization might be:  \"Parent requests to be called daily at 9am for status\", \"I have a hard time hearing out of my right ear\", or \"Do not touch me to wake me up as it startles  me\".  4/17/2024 0235 by Ezequiel Arevalo RN  Outcome: Progressing  4/17/2024 0230 by Ezequiel Arevalo RN  Outcome: Progressing  4/16/2024 2211 by Ezequiel Arevalo RN  Outcome: Progressing  Goal: Absence of Hospital-Acquired Illness or Injury  4/17/2024 0235 by Ezequiel Arevalo RN  Outcome: Progressing  4/17/2024 0230 by Ezequiel Arevalo RN  Outcome: Progressing  4/16/2024 2211 by Taher, Jawaher, RN  Outcome: Progressing  Intervention: Identify and Manage Fall Risk  Recent " Flowsheet Documentation  Taken 4/17/2024 0000 by Ezequiel Arevalo RN  Safety Promotion/Fall Prevention:   clutter free environment maintained   lighting adjusted  Taken 4/16/2024 2045 by Ezequiel Arevalo RN  Safety Promotion/Fall Prevention:   clutter free environment maintained   lighting adjusted  Intervention: Prevent Skin Injury  Recent Flowsheet Documentation  Taken 4/17/2024 0000 by Ezequiel Arevalo RN  Body Position: position changed independently  Skin Protection: transparent dressing maintained  Device Skin Pressure Protection: tubing/devices free from skin contact  Taken 4/16/2024 2045 by Ezequiel Arevalo RN  Body Position: position changed independently  Skin Protection: transparent dressing maintained  Device Skin Pressure Protection: tubing/devices free from skin contact  Intervention: Prevent Infection  Recent Flowsheet Documentation  Taken 4/17/2024 0000 by Ezequiel Arevalo RN  Infection Prevention: cohorting utilized  Taken 4/16/2024 2045 by Ezequiel Arevalo RN  Infection Prevention: cohorting utilized  Goal: Optimal Comfort and Wellbeing  4/17/2024 0235 by Ezequiel Arevalo RN  Outcome: Progressing  4/17/2024 0230 by Ezequiel Arevalo RN  Outcome: Progressing  4/16/2024 2211 by Ezequiel Arevalo RN  Outcome: Progressing  Goal: Readiness for Transition of Care  4/17/2024 0235 by Ezequiel Arevalo RN  Outcome: Progressing  4/17/2024 0230 by Ezequiel Arevalo RN  Outcome: Progressing  4/16/2024 2211 by Ezequiel Arevalo RN  Outcome: Progressing     Problem: Pain Acute  Goal: Optimal Pain Control and Function  4/17/2024 0235 by Ezequiel Arevalo RN  Outcome: Progressing  4/17/2024 0230 by Ezequiel Arevalo RN  Outcome: Progressing  4/16/2024 2211 by Ezequiel Arevalo RN  Outcome: Progressing  Intervention: Prevent or Manage Pain  Recent Flowsheet Documentation  Taken 4/17/2024 0000 by Ezequiel Arevalo RN  Sensory Stimulation Regulation:   care clustered   lighting decreased  Medication Review/Management: medications  reviewed  Taken 4/16/2024 2045 by Ezequiel Arevalo RN  Sensory Stimulation Regulation:   care clustered   lighting decreased  Medication Review/Management: medications reviewed     Problem: Comorbidity Management  Goal: Blood Pressure in Desired Range  4/17/2024 0235 by Ezequiel Arevalo RN  Outcome: Progressing  4/17/2024 0230 by Ezequiel Arevalo RN  Outcome: Progressing  4/16/2024 2211 by Ezequiel Arevalo RN  Outcome: Progressing  Intervention: Maintain Blood Pressure Management  Recent Flowsheet Documentation  Taken 4/17/2024 0000 by Ezequiel Arevalo RN  Medication Review/Management: medications reviewed  Taken 4/16/2024 2045 by Ezequiel Arevalo RN  Medication Review/Management: medications reviewed   Goal Outcome Evaluation:      Plan of Care Reviewed With: patient    Overall Patient Progress: improvingOverall Patient Progress: improving    Outcome Evaluation: A&OX4, Reported shoulder pain

## 2024-04-17 NOTE — UTILIZATION REVIEW
Admission Status; Secondary Review Determination    Under the authority of the Utilization Management Committee, the utilization review process indicated a secondary review on the above patient. The review outcome is based on review of the medical records, discussions with staff, and applying clinical experience noted on the date of the review.    (x) Inpatient Status Appropriate - This patient's medical care is consistent with medical management for inpatient care and reasonable inpatient medical practice.    RATIONALE FOR DETERMINATION: Complex 49-year-old female with history of chronic abdominal and back pain that most recently has been well-controlled not on any benzodiazepines or narcotics, history of severe generalized anxiety disorder, hypertension, Hernandez and migraines.  Patient now presents with progressive worsening pain in her right back and shoulder blade area that became quite severe resulted in visit to the emergency room.  Outpatient management with Flexeril, Tylenol, Advil and lidocaine patch without significant relief.  Patiently markedly hypertensive and in acute distress.  Due to the intractability of patient's pain requiring frequent doses of intravenous narcotics in addition to oral oxycodone and IV ketorolac and frequent doses of 1000 mg acetaminophen beyond 2 night hospital stay, patient appropriate for inpatient management due to the severity of findings.    At the time of admission with the information available to the attending physician more than 2 nights Hospital complex care was anticipated, based on patient risk of adverse outcome if treated as outpatient and complex care required. Inpatient admission is appropriate based on the Medicare guidelines.    This document was produced using voice recognition software    The information on this document is developed by the utilization review team in order for the business office to ensure compliance. This only denotes the appropriateness of  proper admission status and does not reflect the quality of care rendered.    The definitions of Inpatient Status and Observation Status used in making the determination above are those provided in the CMS Coverage Manual, Chapter 1 and Chapter 6, section 70.4.    Sincerely,    Escobar Hollingsworth MD  Utilization Review  Physician Advisor  [facility].

## 2024-04-17 NOTE — PLAN OF CARE
PRIMARY DIAGNOSIS: ACUTE PAIN  OUTPATIENT/OBSERVATION GOALS TO BE MET BEFORE DISCHARGE:  1. Pain Status: Improved but still requiring IV narcotics.    2. Return to near baseline physical activity: Yes    3. Cleared for discharge by consultants (if involved): No    Discharge Planner Nurse   Safe discharge environment identified: Yes  Barriers to discharge: Yes       Entered by: Ezequiel Arevalo RN      Please review provider order for any additional goals.   Nurse to notify provider when observation goals have been met and patient is ready for discharge.Goal Outcome Evaluation:                 Outcome Evaluation: P atient

## 2024-04-17 NOTE — PROGRESS NOTES
[facility]    Medicine Progress Note - Hospitalist Service    Date of Admission:  4/15/2024  Date of Service: 04/17/2024    Assessment & Plan      Brief summary of admission assessment:Machelle Weiner is a 49 year old  female with a significant past medical history of chronic pain ,GRIFFITH,  TIMMY, hypertension, hyperlipidemia, and migraines who presents with right posterior shoulder pain.  She was complaining of severe sharp pain on the right radiating to the right upper extremity.  She is to have cervical radiculopathy for which she has been getting epidural steroid injections and pain clinic.     ED evaluation revealed anxiety, hyperventilation with bicarb of 15.  CT scan of the chest negative for acute intrathoracic pathology including PE or dissection.     Patient received multiple medications in the emergency department including 5 mg IV Valium, 0.5 mg of hydromorphone twice and her pain did not improve significantly.     #1.  Acute intractable pain involving the right shoulder, right posterior upper chest, back and shoulder blade area.  -- History of degenerative disc disease C4-C5, C5-C6, and C6-C7 on MRI study of 2021.  -- Suspect cervical radiculopathy pain.  -- CT scan with contrast did not reveal any acute intrathoracic pathology  -- Patient has chronic pain but does not use narcotic medications at home  Plan:  -- Pain service consulted  -- Orthopedic surgery consulted  -- MR Cervical and Thoracic overall unrevealing aside from chronic findings   -- Medrol trial per pain service  -- Will schedule Tylenol 1 g every 8 hours, as needed Dilaudid 1 mg every 2 hours as needed, Atarax 50 mg every 6 hours as needed, may try Voltaren gel 1% twice daily, Robaxin 500 mg 4 times daily.  Ativan as needed for anxiety.  Will get pain management team to assist patient for further recommendation.  Patient needs to follow-up with her pain management team as an outpatient as well.    #2.   Severe anxiety  --She has history of depression and generalized anxiety disorder but is not on antianxiety medication.  She was very emotional and her new right sided pain is causing her a lot of distress.  She wants to get more testing to figure out the etiology of her condition.  Plan:  -- Was treated with Ativan as needed.  Patient may benefit from scheduled antianxiety medication that can be done as an outpatient with her primary care provider.             Diet:      DVT Prophylaxis: Pneumatic Compression Devices  Sheridan Catheter: Not present  Lines: None     Cardiac Monitoring: None  Code Status: Full CodeFULL CODE    Clinically Significant Risk Factors Present on Admission                  # Hypertension: Noted on problem list          # Asthma: noted on problem list        Disposition Plan     Medically Ready for Discharge: Anticipated Tomorrow           Yovany Livingston MD  Hospitalist Service  [facility]  Securely message with [employer] (more info)  Text page via [email or web address] Paging/Directory   ______________________________________________________________________    Interval History     Still with very significant pain in LUE / scaputlar region shoulder and neck  No fevers  No CP/SOB  No nausea / vomiting   No new complaints    Physical Exam   Vital Signs: Temp: 98.5  F (36.9  C) Temp src: Oral BP: (!) 141/77 Pulse: 68   Resp: 20 SpO2: 97 % O2 Device: None (Room air)    Weight: 130 lbs 0 oz    GEN:  no apparent distress  Neurologic:Grossly intact,non focal. Tangela.  Neuropsychiatric: normal mood and affect  ----------------------------------------------------------------------------------------    Medical Decision Making       40 MINUTES SPENT BY ME on the date of service doing chart review, history, exam, documentation & further activities per the note.      Data   ------------------------- PAST 24 HR DATA REVIEWED -----------------------------------------------    I have personally reviewed the  following data over the past 24 hrs:    Procal: N/A CRP: N/A Lactic Acid: N/A         Imaging results reviewed over the past 24 hrs:   Recent Results (from the past 24 hour(s))   MR Cervical Spine w/o Contrast    Narrative    MRI CERVICAL SPINE WITHOUT CONTRAST 4/16/2024 2:24 PM     HISTORY: Uncontrolled neck/back pain.    TECHNIQUE: Multiplanar, multisequence MRI of the cervical spine  without contrast.     COMPARISON: Cervical spine MRI 3/27/2021.     FINDINGS: Straightening of the normal cervical lordosis which may be  positional. No loss of vertebral body height. No focal destructive  bony lesions appreciated. No significant STIR hyperintense endplate  edema (Modic type I changes).    No abnormal signal appreciated within the visualized spinal cord.    Level by level as follows:     C2-C3: No loss of disc height. No significant disc herniation. Normal  facets. No spinal canal or neural foraminal narrowing.     C3-C4: Mild loss of disc height. Left central disc extrusion which  appears to extend slightly above and below the disc level. Normal  facets. Mild spinal canal narrowing. No significant neural foraminal  narrowing.     C4-C5: Mild loss of disc height. Central disc extrusion which appears  to extend slightly above and below the disc level. Normal facets. Mild  spinal canal narrowing. No significant neural foraminal narrowing.     C5-C6: Mild loss of disc height. Circumferential disc bulge with  endplate osteophytic spurring. Normal facets. Mild spinal canal  narrowing. Moderate bilateral neural foraminal narrowing.     C6-C7: Mild loss of disc height. Posterior disc bulge with mild  uncinate spurring. Normal facets. No spinal canal narrowing. No  significant right neural foraminal narrowing. Mild left neural  foraminal narrowing.     C7-T1: No loss of disc height. No significant disc herniation. Normal  facets. No spinal canal or neural foraminal narrowing.     Paraspinous soft tissues are unremarkable.        Impression    IMPRESSION:    1. Degenerative changes in the mid cervical spine as detailed above.  Degenerative findings are progressed since prior MR 3/17/2021.  2. Mild spinal canal narrowings at C3-C4, C4-C5, and C5-C6.  3. Moderate bilateral neural foraminal narrowings at C5-C6. Mild left  neural foraminal narrowing at C6-C7.     RAS FISHER MD         SYSTEM ID:  [id number]   MR Thoracic Spine w/o Contrast    Narrative    MRI THORACIC SPINE WITHOUT CONTRAST April 16, 2024 2:29 PM     HISTORY: Uncontrolled back pain/numbness.    TECHNIQUE: Multiplanar multisequence MRI of the thoracic spine without  contrast.    COMPARISON: Thoracic spine MRI 12/20/2021.    FINDINGS: Thoracic spine alignment is grossly within normal limits. No  significant loss of vertebral body height. Multiple presumed Schmorl's  node deformities throughout the thoracic spine. Indeterminate T2  hyperintense lesion in the posterior aspect of the T8 vertebral body  which appears fairly isointense on T1 and measures approximately 0.7  cm. No significant STIR hyperintense endplate edema (Modic type I  changes).    Mild to moderate degenerative endplate changes and loss of disc height  in the mid and lower thoracic spine. No significant disc herniation  appreciated in the thoracic spine. Anterior osteophytic spurring in  the mid and lower thoracic spine. Asymmetric left-sided facet  hypertrophy at T2-T3 and T4-T5. No spinal canal narrowing at any  level. Moderate left neural foraminal narrowings at T2-T3 and T4-T5.    Visualized thoracic spinal cord is unremarkable.    Paraspinous soft tissues are unremarkable.      Impression    IMPRESSION:    1.  Multilevel degenerative changes throughout the thoracic spine as  detailed above.  2.  Degenerative disc changes throughout the mid and lower thoracic  spine without significant disc herniation. No significant spinal canal  narrowing.  3.  Asymmetric left-sided facet hypertrophy at T2-T3 and T4-T5  causing  moderate left neural foraminal narrowings.  4.  Indeterminate subcentimeter lesion in the T8 vertebral body. This  does not appear significantly changed since 12/20/2022. Stability is  reassuring.  5.  Degenerative findings are similar to prior MR 12/20/2022.    RAS FISHER MD         SYSTEM ID:  [id number]     ------------------------- ENCOUNTER LABS ----------------------------------------------------------------  Recent Labs   Lab 04/15/24  1134   WBC 8.7   HGB 13.2   MCV 85         POTASSIUM 3.9   CHLORIDE 108*   CO2 15*   BUN 11.0   CR 0.96*   ANIONGAP 15   BETINA 9.4   GLC 92   ALBUMIN 4.4   PROTTOTAL 7.6   BILITOTAL 0.2   ALKPHOS 97   ALT 14   AST 22       Most Recent 3 CBC's:  Recent Labs   Lab Test 04/15/24  1134 08/29/22  1703 08/16/22  1514   WBC 8.7 10.9 11.5*   HGB 13.2 11.9 12.2   MCV 85 93 93    294 293     Most Recent 3 BMP's:  Recent Labs   Lab Test 04/15/24  1134 08/16/22  1514 03/22/22  1627    137 137   POTASSIUM 3.9 3.9 4.0   CHLORIDE 108* 105 108   CO2 15* 20* 25   BUN 11.0 6.8 10   CR 0.96* 0.77 1.02   ANIONGAP 15 12 4   BETINA 9.4 8.9 9.3   GLC 92 82 86     Most Recent 2 LFT's:  Recent Labs   Lab Test 04/15/24  1134 12/15/22  1233   AST 22 83*   ALT 14 98*   ALKPHOS 97 134*   BILITOTAL 0.2 0.2     Most Recent 3 INR's:  Recent Labs   Lab Test 08/29/22  1703 12/16/17  1725   INR 1.04 0.95     Most Recent 3 Troponin's:  Recent Labs   Lab Test 01/11/17  2110 10/03/16  1749   TROPI <0.015  The 99th percentile for upper reference range is 0.045 ug/L.  Troponin values in   the range of 0.045 - 0.120 ug/L may be associated with risks of adverse   clinical events.   <0.015  The 99th percentile for upper reference range is 0.045 ug/L.  Troponin values in   the range of 0.045 - 0.120 ug/L may be associated with risks of adverse   clinical events.       Most Recent 3 BNP's:No lab results found.  Most Recent D-dimer:  Recent Labs   Lab Test 04/15/24  1134   DD 0.54*     Most  Recent Cholesterol Panel:  Recent Labs   Lab Test 11/07/23  1528   CHOL 196   *   HDL 45*   TRIG 91     Most Recent 6 Bacteria Isolates From Any Culture (See EPIC Reports for Culture Details):  Recent Labs   Lab Test 05/15/18  1325   CULT No beta hemolytic Streptococcus Group A isolated     Most Recent TSH and T4:  Recent Labs   Lab Test 11/07/23  1528 04/19/22  1545 03/22/21  1754   TSH 1.78   < > 5.90*   T4  --   --  0.80    < > = values in this interval not displayed.     Most Recent Urinalysis:  Recent Labs   Lab Test 08/29/22  1705   COLOR Yellow   APPEARANCE Clear   URINEGLC Negative   URINEBILI Negative   URINEKETONE Negative   SG 1.022   UBLD Negative   URINEPH 5.5   PROTEIN Negative   NITRITE Negative   LEUKEST Negative   RBCU 2   WBCU 1     Most Recent ESR & CRP:  Recent Labs   Lab Test 04/15/24  1134 08/16/22  1514 03/22/22  1627   SED  --   --  40*   CRP  --   --  8.7*   CRPI <3.00   < >  --     < > = values in this interval not displayed.

## 2024-04-17 NOTE — CONSULTS
[facility]    Orthopedic Consultation    Machelle Wenier MRN# [id number]   Age: 49 year old YOB: 1975     Date of Admission:  4/15/2024    Reason for consult: Right shoulder/back pain        Requesting provider: Dr Livingston       Level of consult: One time consult and  place orders           Assessment and Plan:   Assessment:   Right rhomboid/cervical pain with right arm radiculopathy  Possible subscapular bursitis      Plan:   Shoulder exam is unremarkable.  No pain with AROM.   Continue oral prednisone  There is multi-level degenerative changes and cervical straightening without significant nerve compression seen on MRI.    Chest CT unremarkable for musculoskeletal findings.   No further intervention required for the right shoulder beyond conservative measures. No further imaging recommended. If subscapular bursitis, this should improve with the steroids.     If arm radiculopathy persists/worsens, would recommend a neurosurgery consult.  Otherwise could be seen in outpatient setting.  Likely would benefit from outpatient PT and deep tissue massage.            Chief Complaint:   Right lateral neck and arm pain          History of Present Illness:   Machelle is a 49-year-old female who has history of chronic pain with degenerative disc disease and cervical spine.   She has been following with pain clinic as well as [facility] doctors for her nonalcoholic steatohepatitis.  She used to have a left sided cervical radicular pain but started to have this right-sided pain.  Her pain has been intermittent over the last 1 month when she noticed pain in her shoulder blade and into her right arm. She reports her pain being deep to her shoulder blade and notes it is both aching and feels arm goes numb occasionally.  She denies any difficulty moving her right upper extremity.  Denies right shoulder pain.  She has been following with pain clinic as well as [facility] doctors for her  nonalcoholic steatohepatitis and notes occasional radiating right sided pain from this but reports this pain is different.    Per notes, she was hyperventilating and crying in the ED.     She had a chest CT, cervical and thoracic MRIs since admission.           Past Medical History:     Past Medical History:   Diagnosis Date    Arthritis     Facet Arthropathy    Asthma, mild intermittent     Depression     Depressive disorder     Recent issue, not being treated and not diagnosed by Dr perales    Epigastric pain     Hypertension     Intractable chronic migraine without aura     Thyroid disease              Past Surgical History:     Past Surgical History:   Procedure Laterality Date    BIOPSY      Cervix    COLONOSCOPY N/A 2022    Procedure: COLONOSCOPY (fv);  Surgeon: Aries Engle MD;  Location:  GI    ESOPHAGOSCOPY, GASTROSCOPY, DUODENOSCOPY (EGD), COMBINED N/A 2022    Procedure: ESOPHAGOGASTRODUODENOSCOPY, WITH BIOPSY;  Surgeon: Aries Wright MD;  Location:  GI    GYN SURGERY      cryosurgery of cervix at age 16    LAPAROSCOPIC CHOLECYSTECTOMY WITH CHOLANGIOGRAMS N/A 10/14/2016    Procedure: LAPAROSCOPIC CHOLECYSTECTOMY WITH CHOLANGIOGRAMS;  Surgeon: Cornelius Mueller MD;  Location:  OR             Social History:     Social History     Tobacco Use    Smoking status: Former     Current packs/day: 0.00     Average packs/day: 0.3 packs/day for 15.0 years (4.5 ttl pk-yrs)     Types: Cigarettes     Start date: 2000     Quit date: 2015     Years since quittin.1    Smokeless tobacco: Never   Substance Use Topics    Alcohol use: No             Family History:     Family History   Problem Relation Age of Onset    Depression Mother     Anxiety Disorder Mother     Substance Abuse Mother         Alcoholism    Obesity Mother     Coronary Artery Disease Maternal Grandmother     Hyperlipidemia Maternal Grandmother     Hypertension Maternal Grandmother     Hyperlipidemia Maternal Grandfather      Coronary Artery Disease Maternal Grandfather     Hypertension Maternal Grandfather     Substance Abuse Father         Alcoholism    Diabetes Other     Hypertension Brother     Other Cancer Cousin         Breast cancer    Other Cancer Other         Aunt (Maternal) - Ovarian Cancer    Mental Illness Other     Depression Other         Son    Anxiety Disorder Other     Asthma Other     Substance Abuse Other         Several aunts and uncles - alcoholism    Thyroid Disease Other         Several Aunts (Maternal) - hypothyroidism    Obesity Other     Anesthesia Reaction No family hx of              Immunizations:     VACCINE/DOSE   Diptheria   DPT   DTAP   HBIG   Hepatitis A   Hepatitis B   HIB   Influenza   Measles   Meningococcal   MMR   Mumps   Pneumococcal   Polio   Rubella   Small Pox   TDAP   Varicella   Zoster             Allergies:     Allergies   Allergen Reactions    Codeine     Penicillins Nausea and Vomiting    Seasonal Allergies              Medications:     Current Facility-Administered Medications   Medication Dose Route Frequency Provider Last Rate Last Admin    acetaminophen (TYLENOL) tablet 1,000 mg  1,000 mg Oral Q8H Tim Chapin MD   1,000 mg at 04/17/24 0644    albuterol (PROVENTIL HFA/VENTOLIN HFA) inhaler  2 puff Inhalation Q2H PRN Tim Chapin MD        albuterol (PROVENTIL) neb solution 2.5 mg  2.5 mg Nebulization Q6H PRN Tim Chapin MD        capsaicin (ZOSTRIX) 0.025 % cream   Topical TID Sharla Márquez APRN CNS        cetirizine (zyrTEC) tablet 10 mg  10 mg Oral Daily Tim Chapin MD   10 mg at 04/17/24 0855    diclofenac (VOLTAREN) 1 % topical gel 4 g  4 g Topical 4x Daily Tim Chapin MD   4 g at 04/16/24 2010    HYDROmorphone (PF) (DILAUDID) injection 0.5 mg  0.5 mg Intravenous Q3H PRN Sharla Márquez APRN CNS        Or    HYDROmorphone (DILAUDID) injection 1 mg  1 mg Intravenous Q3H PRN Sharla Márquez APRN CNS   1 mg at 04/17/24 0644    hydrOXYzine  HCl (ATARAX) tablet 50 mg  50 mg Oral Q6H PRN Tim Chapin MD   50 mg at 04/17/24 0855    levothyroxine (SYNTHROID/LEVOTHROID) tablet 75 mcg  75 mcg Oral Daily Tim Chapin MD   75 mcg at 04/17/24 0855    lidocaine (XYLOCAINE) 5 % ointment   Topical 4x Daily Sharla Márquez APRN CNS   Given at 04/17/24 0859    LORazepam (ATIVAN) tablet 1 mg  1 mg Oral Q4H PRN Tim Chapin MD        losartan (COZAAR) tablet 50 mg  50 mg Oral Daily Tim Chapin MD   50 mg at 04/17/24 0855    magnesium chloride CR tablet 535 mg  535 mg Oral Daily Sharla Márquez APRN CNS   535 mg at 04/17/24 0855    methocarbamol (ROBAXIN) tablet 750 mg  750 mg Oral 4x Daily Sharla Márquez APRN CNS   750 mg at 04/17/24 0854    methylPREDNISolone (MEDROL) tablet 8 mg  8 mg Oral At Bedtime Sharla Márquez APRN CNS   8 mg at 04/16/24 2146    And    methylPREDNISolone (MEDROL) tablet 4 mg  4 mg Oral QAM AC Sharla Márquez APRN CNS   4 mg at 04/17/24 0855    And    methylPREDNISolone (MEDROL) tablet 4 mg  4 mg Oral Daily with lunch Sharla Márquez APRN CNS        And    methylPREDNISolone (MEDROL) tablet 4 mg  4 mg Oral Daily with supper Sharla Márquez APRN CNS        And    [START ON 4/18/2024] methylPREDNISolone (MEDROL) tablet 4 mg  4 mg Oral At Bedtime Sharla Márquez APRN CNS        naloxone (NARCAN) injection 0.2 mg  0.2 mg Intravenous Q2 Min PRN Tim Chapin MD        Or    naloxone (NARCAN) injection 0.4 mg  0.4 mg Intravenous Q2 Min PRN Tim Chapin MD        Or    naloxone (NARCAN) injection 0.2 mg  0.2 mg Intramuscular Q2 Min PRN Tim Chapin MD        Or    naloxone (NARCAN) injection 0.4 mg  0.4 mg Intramuscular Q2 Min PRN Tim Chapin MD        ondansetron (ZOFRAN ODT) ODT tab 8 mg  8 mg Oral Q8H PRN Tim Chapin MD   8 mg at 04/17/24 0649    oxyCODONE (ROXICODONE) tablet 5-10 mg  5-10 mg Oral Q4H PRN Sharla Márquez APRN CNS   10 mg at 04/17/24 0855    pantoprazole  (PROTONIX) EC tablet 40 mg  40 mg Oral QAM Tim Wolf MD   40 mg at 04/17/24 0854             Review of Systems:   ROS:  10 point ROS neg other than the symptoms noted above in the HPI.            Physical Exam:   All vitals have been reviewed  Patient Vitals for the past 24 hrs:   BP Temp Temp src Pulse Resp SpO2   04/17/24 0838 (!) 141/77 98.5  F (36.9  C) Oral 68 20 97 %   04/17/24 0407 127/71 98.6  F (37  C) Oral 68 20 97 %   04/16/24 2356 131/73 98.1  F (36.7  C) Oral 58 20 98 %   04/16/24 2045 (!) 141/74 98.8  F (37.1  C) Oral 55 20 96 %   04/16/24 1514 121/66 98.6  F (37  C) Oral 55 20 100 %     No intake or output data in the 24 hours ending 04/17/24 1243      Physical Exam   Temp: 98.5  F (36.9  C) Temp src: Oral BP: (!) 141/77 Pulse: 68   Resp: 20 SpO2: 97 % O2 Device: None (Room air)    Vital Signs with Ranges  Temp:  [98.1  F (36.7  C)-98.8  F (37.1  C)] 98.5  F (36.9  C)  Pulse:  [55-68] 68  Resp:  [20] 20  BP: (121-141)/(66-77) 141/77  SpO2:  [96 %-100 %] 97 %  130 lbs 0 oz    Constitutional: Upset with current cares, alert, follows commands  HEENT: Head atraumatic normocephalic. Pupils equal round and reactive to light.  Respiratory: Unlabored breathing no audible wheeze  Cardiovascular: Regular rate and rhythm per pulses  GI: Abdomen non-distended.  Lymph/Hematologic: No lymphadenopathy in areas examined  Genitourinary:  No loja  Skin: No rashes, no cyanosis, no edema.  Musculoskeletal: On exam of the cervical spine and right UE:  Skin is intact without abrasions, ecchymosis or erythema.  She is able to range her right shoulder without pain.  Able to actively abduct and flex the shoulder without shoulder pain.  She does continuously reposition her right shoulder due to arm pain.  Reports sensation is intact.  Pulses intact and equal.  Musculature tenderness over the rhomboid, upper trapezius.  No crepitation palpated in the subscapular region with shoulder ROM. Able to flex/extend neck  without increased radiculopathy.   Neurologic: normal without focal findings, mental status, speech normal  Neuropsychiatric: anxiety             Data:   All laboratory data reviewed  Results for orders placed or performed during the hospital encounter of 04/15/24   CT Chest Pulmonary Embolism w Contrast     Status: None    Narrative    CT CHEST PULMONARY EMBOLISM WITH CONTRAST April 15, 2024 12:48 PM    CLINICAL HISTORY: Chest pain. Elevated D-dimer.    TECHNIQUE: CT angiogram chest during arterial phase injection IV  contrast. 2D and 3D MIP reconstructions were performed by the CT  technologist. Dose reduction techniques were used.   CONTRAST: 100mL Isovue-370.    COMPARISON: None.    FINDINGS:  ANGIOGRAM CHEST: Pulmonary arteries are normal caliber and negative  for pulmonary emboli. The thoracic aorta is not well opacified. No  evidence for thoracic aortic aneurysm.     LUNGS AND PLEURA: Mild mosaic attenuation bilaterally suggests  air-trapping. Indeterminate 0.5 cm subpleural nodule in the right  upper lobe posteriorly (series 6 image 72). No pleural effusions.    MEDIASTINUM/AXILLAE: No lymphadenopathy in the chest. No pericardial  effusion.    CORONARY ARTERY CALCIFICATION: None.    UPPER ABDOMEN: Cholecystectomy.    MUSCULOSKELETAL: Unremarkable.      Impression    IMPRESSION:  1. No evidence for pulmonary embolism.  2. Mild mosaic attenuation in both lungs suggests air trapping.  3. Indeterminate 0.5 cm right upper lobe pulmonary nodule. Please  refer to follow-up guidelines below.    Recommendations for one or multiple incidental lung nodules < 6mm :    Low risk patients: No routine follow-up.    High risk patients: Optional follow-up CT at 12 months; if  unchanged, no further follow-up.    *Low Risk: Minimal or absent history of smoking or other known risk  factors.  *Nonsolid (ground glass) or partly solid nodules may require longer  follow-up to exclude indolent adenocarcinoma.  *Recommendations based  on Guidelines for the Management of Incidental  Pulmonary Nodules Detected at CT: From the Fleischner Society 2017,  Radiology 2017.  *Guidelines apply to incidental nodules in patients who are 35 years  or older.  *Guidelines do not apply to lung cancer screening, patients with  immunosuppression, or patients with known primary cancer.    GORDON CLARK MD         SYSTEM ID:  [id number]   MR Cervical Spine w/o Contrast     Status: None    Narrative    MRI CERVICAL SPINE WITHOUT CONTRAST 4/16/2024 2:24 PM     HISTORY: Uncontrolled neck/back pain.    TECHNIQUE: Multiplanar, multisequence MRI of the cervical spine  without contrast.     COMPARISON: Cervical spine MRI 3/27/2021.     FINDINGS: Straightening of the normal cervical lordosis which may be  positional. No loss of vertebral body height. No focal destructive  bony lesions appreciated. No significant STIR hyperintense endplate  edema (Modic type I changes).    No abnormal signal appreciated within the visualized spinal cord.    Level by level as follows:     C2-C3: No loss of disc height. No significant disc herniation. Normal  facets. No spinal canal or neural foraminal narrowing.     C3-C4: Mild loss of disc height. Left central disc extrusion which  appears to extend slightly above and below the disc level. Normal  facets. Mild spinal canal narrowing. No significant neural foraminal  narrowing.     C4-C5: Mild loss of disc height. Central disc extrusion which appears  to extend slightly above and below the disc level. Normal facets. Mild  spinal canal narrowing. No significant neural foraminal narrowing.     C5-C6: Mild loss of disc height. Circumferential disc bulge with  endplate osteophytic spurring. Normal facets. Mild spinal canal  narrowing. Moderate bilateral neural foraminal narrowing.     C6-C7: Mild loss of disc height. Posterior disc bulge with mild  uncinate spurring. Normal facets. No spinal canal narrowing. No  significant right neural  foraminal narrowing. Mild left neural  foraminal narrowing.     C7-T1: No loss of disc height. No significant disc herniation. Normal  facets. No spinal canal or neural foraminal narrowing.     Paraspinous soft tissues are unremarkable.       Impression    IMPRESSION:    1. Degenerative changes in the mid cervical spine as detailed above.  Degenerative findings are progressed since prior MR 3/17/2021.  2. Mild spinal canal narrowings at C3-C4, C4-C5, and C5-C6.  3. Moderate bilateral neural foraminal narrowings at C5-C6. Mild left  neural foraminal narrowing at C6-C7.     RAS FISHER MD         SYSTEM ID:  [id number]   MR Thoracic Spine w/o Contrast     Status: None    Narrative    MRI THORACIC SPINE WITHOUT CONTRAST April 16, 2024 2:29 PM     HISTORY: Uncontrolled back pain/numbness.    TECHNIQUE: Multiplanar multisequence MRI of the thoracic spine without  contrast.    COMPARISON: Thoracic spine MRI 12/20/2021.    FINDINGS: Thoracic spine alignment is grossly within normal limits. No  significant loss of vertebral body height. Multiple presumed Schmorl's  node deformities throughout the thoracic spine. Indeterminate T2  hyperintense lesion in the posterior aspect of the T8 vertebral body  which appears fairly isointense on T1 and measures approximately 0.7  cm. No significant STIR hyperintense endplate edema (Modic type I  changes).    Mild to moderate degenerative endplate changes and loss of disc height  in the mid and lower thoracic spine. No significant disc herniation  appreciated in the thoracic spine. Anterior osteophytic spurring in  the mid and lower thoracic spine. Asymmetric left-sided facet  hypertrophy at T2-T3 and T4-T5. No spinal canal narrowing at any  level. Moderate left neural foraminal narrowings at T2-T3 and T4-T5.    Visualized thoracic spinal cord is unremarkable.    Paraspinous soft tissues are unremarkable.      Impression    IMPRESSION:    1.  Multilevel degenerative changes throughout  the thoracic spine as  detailed above.  2.  Degenerative disc changes throughout the mid and lower thoracic  spine without significant disc herniation. No significant spinal canal  narrowing.  3.  Asymmetric left-sided facet hypertrophy at T2-T3 and T4-T5 causing  moderate left neural foraminal narrowings.  4.  Indeterminate subcentimeter lesion in the T8 vertebral body. This  does not appear significantly changed since 12/20/2022. Stability is  reassuring.  5.  Degenerative findings are similar to prior MR 12/20/2022.    RAS FISHER MD         SYSTEM ID:  [id number]   Basic metabolic panel (BMP)     Status: Abnormal   Result Value Ref Range    Sodium 138 135 - 145 mmol/L    Potassium 3.9 3.4 - 5.3 mmol/L    Chloride 108 (H) 98 - 107 mmol/L    Carbon Dioxide (CO2) 15 (L) 22 - 29 mmol/L    Anion Gap 15 7 - 15 mmol/L    Urea Nitrogen 11.0 6.0 - 20.0 mg/dL    Creatinine 0.96 (H) 0.51 - 0.95 mg/dL    GFR Estimate 73 >60 mL/min/1.73m2    Calcium 9.4 8.6 - 10.0 mg/dL    Glucose 92 70 - 99 mg/dL   Troponin T, High Sensitivity (now)     Status: Normal   Result Value Ref Range    Troponin T, High Sensitivity 8 <=14 ng/L   D dimer quantitative     Status: Abnormal   Result Value Ref Range    D-Dimer Quantitative 0.54 (H) 0.00 - 0.50 ug/mL FEU    Narrative    This D-dimer assay is intended for use in conjunction with a clinical pretest probability assessment model to exclude pulmonary embolism (PE) and deep venous thrombosis (DVT) in outpatients suspected of PE or DVT. The cut-off value is 0.50 ug/mL FEU.   CBC with platelets and differential     Status: None   Result Value Ref Range    WBC Count 8.7 4.0 - 11.0 10e3/uL    RBC Count 4.82 3.80 - 5.20 10e6/uL    Hemoglobin 13.2 11.7 - 15.7 g/dL    Hematocrit 41.1 35.0 - 47.0 %    MCV 85 78 - 100 fL    MCH 27.4 26.5 - 33.0 pg    MCHC 32.1 31.5 - 36.5 g/dL    RDW 14.7 10.0 - 15.0 %    Platelet Count 305 150 - 450 10e3/uL    % Neutrophils 61 %    % Lymphocytes 27 %    % Monocytes 5  %    % Eosinophils 4 %    % Basophils 2 %    % Immature Granulocytes 1 %    NRBCs per 100 WBC 0 <1 /100    Absolute Neutrophils 5.4 1.6 - 8.3 10e3/uL    Absolute Lymphocytes 2.4 0.8 - 5.3 10e3/uL    Absolute Monocytes 0.4 0.0 - 1.3 10e3/uL    Absolute Eosinophils 0.4 0.0 - 0.7 10e3/uL    Absolute Basophils 0.1 0.0 - 0.2 10e3/uL    Absolute Immature Granulocytes 0.0 <=0.4 10e3/uL    Absolute NRBCs 0.0 10e3/uL   iStat HCG Qualitative Pregnancy, POCT     Status: Normal   Result Value Ref Range    HCG Qualitative POCT Negative Negative, Indeterminate   Hepatic panel     Status: Normal   Result Value Ref Range    Protein Total 7.6 6.4 - 8.3 g/dL    Albumin 4.4 3.5 - 5.2 g/dL    Bilirubin Total 0.2 <=1.2 mg/dL    Alkaline Phosphatase 97 40 - 150 U/L    AST 22 0 - 45 U/L    ALT 14 0 - 50 U/L    Bilirubin Direct <0.20 0.00 - 0.30 mg/dL   CRP inflammation     Status: Normal   Result Value Ref Range    CRP Inflammation <3.00 <5.00 mg/L   EKG 12 lead     Status: None   Result Value Ref Range    Systolic Blood Pressure  mmHg    Diastolic Blood Pressure  mmHg    Ventricular Rate 57 BPM    Atrial Rate 57 BPM    FL Interval 180 ms    QRS Duration 82 ms     ms    QTc 412 ms    P Axis 48 degrees    R AXIS 15 degrees    T Axis 43 degrees    Interpretation ECG       Sinus bradycardia  Otherwise normal ECG  When compared with ECG of 22-DEC-2022 16:07,  No significant change was found  Unconfirmed report - interpretation of this ECG is computer generated - see medical record for final interpretation  Confirmed by - EMERGENCY ROOM, PHYSICIAN ([id number]),  Jarrett Srinivasan ([id number]) on 4/15/2024 1:07:50 PM     CBC + differential     Status: None    Narrative    The following orders were created for panel order CBC + differential.  Procedure                               Abnormality         Status                     ---------                               -----------         ------                     CBC with platelets and  d...[[id number]]                      Final result                 Please view results for these tests on the individual orders.          Attestation:  I have reviewed today's vital signs, notes, medications, labs and imaging with Dr. Nicohlas.  Amount of time performed on this consult: 50 minutes.    Natividad Egan PA-C

## 2024-04-17 NOTE — PLAN OF CARE
Patient is Alert and Oriented x4. SBA with no assistive device. Pt is a Regular diet. complaining of 9/10 pain and feeling nausea .Prn  IV Dilaudid, prn Zofran and scheduled tylenol given for pain. Medications decreased pain.  Patient is sleep/resting. Patient is Saline locked.  Problem: Adult Inpatient Plan of Car  Goal Outcome Evaluation:      Plan of Care Reviewed With: patient    Overall Patient Progress: improvingOverall Patient Progress: improving    Outcome Evaluation: A&OX4, Reported shoulder pain

## 2024-04-17 NOTE — PROGRESS NOTES
[facility] ACUTE PAIN SERVICE    ([facility], [facility], [facility], [facility])  Pain Progress Note    Assessment/Plan:  Machelle Weiner is a 49 year old female who was admitted on 4/15/2024.  Pain Service is asked to see the patient for acute on chronic pain.  Admitted for evaluation of worsening back/shoulder blade pain.  Patient identifies history of chronic abdominal and back pain that has been well controled.  Developed worsening pain in her right back shoulder blade area over the past month.  Pain became more severe so came to ED.   CT scan of the chest negative for acute intrathoracic pathology including PE or dissection.  MRI repeat from previous 2021 demonstrated:    History of Generalized Anxiety Disorder, chronic pain, degenerative disc disease, hypertension, hyperlipidemia, GRIFFITH and migraines.   Follows at [facility] for her nonalcoholic steatohepatitis.      Patient states that she had previously had more symptoms on left cervical spine and shoulders with previous injections, trigger point therapies.  Left side has been good for the past 2 years.    Right upper shoulder pain over past month worsening that had brought her into the hospital.    Has been on gabapentin and duloxetine in the past and weaned herself off of them with concern for her liver disease as well as gabapentin had fogged up her thinking.        shows no controlled substances prescribed over this past year (benzodiazepines or opioids)     Over the past 24 hours Machelle received: 6(1mg) IV dilaudid, 3(10mg) oxycodone 1(5mg) oxycodone around 170 MME.      Medrol dose pack, vistaril 50 mg times three, Robaxin 750 mg four doses,           PLAN:  Acute pain secondary to muscle spasms with radiculopathy due to cervical spinal degenerative disc disease.    Multimodal Medication Therapy:   Adjuvants: Tylenol 1000 mg every 6 hours, Voltaren topical gel qid, lidocaine patch administer for 12 hours one dose, add  lidocaine cream qid, Robaxin 500 mg qid, Toradol 15 mg every 6 hours prn will stop, added medrol dosepak, Lorazepam 1 tablets every four hours prn (Hospital Medicine), vistaril 50 mg every 6 hours prn,  capsacin cream qid   Opioids: IV hydromorphone 0.5-1mg every 2 hours prn   Non-medication interventions- Ice prn  Constipation Prophylaxis- daily stool softener/laxative   Follow up /Discharge Recommendations - We recommend prescribing the following at the time of discharge:  TBD  PT, myofascial therapy, Acupuncture        Subjective:  Continues with pain right upper chest goes into arm.  Feels the arm pain is worse today.    Pain medications helping for short time.      Discussed pain management with patient and RN.  Patient frustrated that she isn't getting more substantial answers to why she is in pain.  Pain comes on suddenly and intensely at times.       Does seem to get some relief from the voltaren and lidocaine topical cream/gel.  Massage may be of help.    Discussed add capsaicin topical cream to painful area to see if this can help for pain control.         <principal problem not specified>   Patient Active Problem List   Diagnosis    Asthma, mild intermittent    Hyperlipidemia LDL goal <160    Essential hypertension, benign    Acquired hypothyroidism    S/P laparoscopic cholecystectomy    Morbid obesity (H)    Migraine    Facet arthropathy, lumbosacral    DDD (degenerative disc disease), lumbosacral    Leg length discrepancy    Positive LYUDMILA (antinuclear antibody)    Multiple joint pain    Chronic pain of both knees    Hip pain    Mood disorder (H24)    Intractable pain    Acute pain of right shoulder        History   Drug Use No         Tobacco Use      Smoking status: Former        Packs/day: 0.00        Years: 0.3 packs/day for 15.0 years (4.5 ttl pk-yrs)        Types: Cigarettes        Start date: 2000        Quit date: 2015        Years since quittin.1      Smokeless tobacco:  "Never        Current Facility-Administered Medications   Medication Dose Route Frequency Provider Last Rate Last Admin    acetaminophen (TYLENOL) tablet 1,000 mg  1,000 mg Oral Q8H Tim Chapin MD   1,000 mg at 04/17/24 0644    cetirizine (zyrTEC) tablet 10 mg  10 mg Oral Daily Tim Chapin MD   10 mg at 04/17/24 0855    diclofenac (VOLTAREN) 1 % topical gel 4 g  4 g Topical 4x Daily Tim Chapin MD   4 g at 04/16/24 2010    levothyroxine (SYNTHROID/LEVOTHROID) tablet 75 mcg  75 mcg Oral Daily Tim Chapin MD   75 mcg at 04/17/24 0855    lidocaine (XYLOCAINE) 5 % ointment   Topical 4x Daily Sharla Márquez APRN CNS   Given at 04/17/24 0859    losartan (COZAAR) tablet 50 mg  50 mg Oral Daily Tim Chapin MD   50 mg at 04/17/24 0855    magnesium chloride CR tablet 535 mg  535 mg Oral Daily Sharla Márquez APRN CNS   535 mg at 04/17/24 0855    methocarbamol (ROBAXIN) tablet 750 mg  750 mg Oral 4x Daily Sharla Márquez APRN CNS   750 mg at 04/17/24 0854    methylPREDNISolone (MEDROL) tablet 8 mg  8 mg Oral At Bedtime Sharla Márquez APRN CNS   8 mg at 04/16/24 2146    And    methylPREDNISolone (MEDROL) tablet 4 mg  4 mg Oral QAM AC Sharla Márquez APRN CNS   4 mg at 04/17/24 0855    And    methylPREDNISolone (MEDROL) tablet 4 mg  4 mg Oral Daily with lunch Sharla Márquez APRN CNS        And    methylPREDNISolone (MEDROL) tablet 4 mg  4 mg Oral Daily with supper Sharla Márquez APRN CNS        And    [START ON 4/18/2024] methylPREDNISolone (MEDROL) tablet 4 mg  4 mg Oral At Bedtime Sharla Márquez APRN CNS        pantoprazole (PROTONIX) EC tablet 40 mg  40 mg Oral QAM AC Tim Chapin MD   40 mg at 04/17/24 0854       Objective:  Vital signs in last 24 hours:  B/P: 141/77, T: 98.5, P: 68, R: 20   Blood pressure (!) 141/77, pulse 68, temperature 98.5  F (36.9  C), temperature source Oral, resp. rate 20, height 1.626 m (5' 4\"), weight 59 kg (130 lb), SpO2 97%, not " currently breastfeeding.      Weight:   Wt Readings from Last 2 Encounters:   04/15/24 59 kg (130 lb)   11/07/23 103.5 kg (228 lb 3.2 oz)         Intake/Output:  No intake or output data in the 24 hours ending 04/17/24 0928     Review of Systems:   As per subjective, all others negative.    Physical Exam:     General Appearance:  Alert, cooperative,sitting up in bed, having breakfast   Head:  Normocephalic, without obvious abnormality, atraumatic   Eyes:  PERRL, conjunctiva/corneas clear, EOM's intact   ENT/Throat: Lips moist    Lymph/Neck: Supple, symmetrical, trachea midline   Lungs:    respirations unlabored   Musculoskeletal: Extremities with good strength, tone   Skin: Skin is intact    Neurologic: Alert and oriented X 3, Moves all 4 extremities         Imaging:  Personally Reviewed.    Results for orders placed or performed during the hospital encounter of 04/15/24   CT Chest Pulmonary Embolism w Contrast    Impression    IMPRESSION:  1. No evidence for pulmonary embolism.  2. Mild mosaic attenuation in both lungs suggests air trapping.  3. Indeterminate 0.5 cm right upper lobe pulmonary nodule. Please  refer to follow-up guidelines below.    Recommendations for one or multiple incidental lung nodules < 6mm :    Low risk patients: No routine follow-up.    High risk patients: Optional follow-up CT at 12 months; if  unchanged, no further follow-up.    *Low Risk: Minimal or absent history of smoking or other known risk  factors.  *Nonsolid (ground glass) or partly solid nodules may require longer  follow-up to exclude indolent adenocarcinoma.  *Recommendations based on Guidelines for the Management of Incidental  Pulmonary Nodules Detected at CT: From the Fleischner Society 2017,  Radiology 2017.  *Guidelines apply to incidental nodules in patients who are 35 years  or older.  *Guidelines do not apply to lung cancer screening, patients with  immunosuppression, or patients with known primary cancer.    GORDON  MD AMBER         SYSTEM ID:  [id number]   MR Cervical Spine w/o Contrast    Impression    IMPRESSION:    1. Degenerative changes in the mid cervical spine as detailed above.  Degenerative findings are progressed since prior MR 3/17/2021.  2. Mild spinal canal narrowings at C3-C4, C4-C5, and C5-C6.  3. Moderate bilateral neural foraminal narrowings at C5-C6. Mild left  neural foraminal narrowing at C6-C7.     RAS FISHER MD         SYSTEM ID:  [id number]   MR Thoracic Spine w/o Contrast    Impression    IMPRESSION:    1.  Multilevel degenerative changes throughout the thoracic spine as  detailed above.  2.  Degenerative disc changes throughout the mid and lower thoracic  spine without significant disc herniation. No significant spinal canal  narrowing.  3.  Asymmetric left-sided facet hypertrophy at T2-T3 and T4-T5 causing  moderate left neural foraminal narrowings.  4.  Indeterminate subcentimeter lesion in the T8 vertebral body. This  does not appear significantly changed since 12/20/2022. Stability is  reassuring.  5.  Degenerative findings are similar to prior MR 12/20/2022.    RAS FISHER MD         SYSTEM ID:  [id number]        Lab Results:  Personally Reviewed.   Last Comprehensive Metabolic Panel:  Sodium   Date Value Ref Range Status   04/15/2024 138 135 - 145 mmol/L Final     Comment:     Reference intervals for this test were updated on 09/26/2023 to more accurately reflect our healthy population. There may be differences in the flagging of prior results with similar values performed with this method. Interpretation of those prior results can be made in the context of the updated reference intervals.    03/22/2021 137 133 - 144 mmol/L Final     Potassium   Date Value Ref Range Status   04/15/2024 3.9 3.4 - 5.3 mmol/L Final   03/22/2022 4.0 3.4 - 5.3 mmol/L Final   03/22/2021 4.1 3.4 - 5.3 mmol/L Final     Chloride   Date Value Ref Range Status   04/15/2024 108 (H) 98 - 107 mmol/L Final   03/22/2022 108  94 - 109 mmol/L Final   03/22/2021 108 94 - 109 mmol/L Final     Carbon Dioxide   Date Value Ref Range Status   03/22/2021 28 20 - 32 mmol/L Final     Carbon Dioxide (CO2)   Date Value Ref Range Status   04/15/2024 15 (L) 22 - 29 mmol/L Final   03/22/2022 25 20 - 32 mmol/L Final     Anion Gap   Date Value Ref Range Status   04/15/2024 15 7 - 15 mmol/L Final   03/22/2022 4 3 - 14 mmol/L Final   03/22/2021 1 (L) 3 - 14 mmol/L Final     Glucose   Date Value Ref Range Status   04/15/2024 92 70 - 99 mg/dL Final   03/22/2022 86 70 - 99 mg/dL Final   03/22/2021 68 (L) 70 - 99 mg/dL Final     Urea Nitrogen   Date Value Ref Range Status   04/15/2024 11.0 6.0 - 20.0 mg/dL Final   03/22/2022 10 7 - 30 mg/dL Final   03/22/2021 11 7 - 30 mg/dL Final     Creatinine   Date Value Ref Range Status   04/15/2024 0.96 (H) 0.51 - 0.95 mg/dL Final   03/22/2021 0.94 0.52 - 1.04 mg/dL Final     GFR Estimate   Date Value Ref Range Status   04/15/2024 73 >60 mL/min/1.73m2 Final   03/22/2021 74 >60 mL/min/[1.73_m2] Final     Comment:     Non  GFR Calc  Starting 12/18/2018, serum creatinine based estimated GFR (eGFR) will be   calculated using the Chronic Kidney Disease Epidemiology Collaboration   (CKD-EPI) equation.       Calcium   Date Value Ref Range Status   04/15/2024 9.4 8.6 - 10.0 mg/dL Final   03/22/2021 9.1 8.5 - 10.1 mg/dL Final        UA:   Amphetamines Urine   Date Value Ref Range Status   12/30/2021 Screen Negative Screen Negative Final     Comment:     Cutoff for a negative amphetamine is 500 ng/mL or less.     Barbiturates Qual Urine   Date Value Ref Range Status   03/29/2007 Not Detected  Final     Comment:             Reference Range:  Not Detected     Barbiturates Urine   Date Value Ref Range Status   12/30/2021 Screen Negative Screen Negative Final     Comment:     Cutoff for a negative barbiturate is 200 ng/mL or less.     Benzodiazepine Qual Urine   Date Value Ref Range Status   03/29/2007 Detected,  Abnormal Result  Final     Comment:             Reference Range:  Not Detected     Cannabinoids Urine   Date Value Ref Range Status   12/30/2021 Screen Negative Screen Negative Final     Comment:     Cutoff for a negative cannabinoid is 50 ng/mL or less.     Cocaine Urine   Date Value Ref Range Status   12/30/2021 Screen Negative Screen Negative Final     Comment:     Cutoff for a negative cocaine is 300 ng/mL or less.     Opiates Qualitative Urine   Date Value Ref Range Status   03/29/2007 Not Detected  Final     Comment:             Reference Range:  Not Detected     Opiates Urine   Date Value Ref Range Status   12/30/2021 Screen Negative Screen Negative Final     Comment:     Cutoff for a negative opiate is 300 ng/mL or less.     PCP Qual Urine   Date Value Ref Range Status   03/29/2007 Not Detected  Final     Comment:             Reference Range:  Not Detected     PCP Urine   Date Value Ref Range Status   12/30/2021 Screen Negative Screen Negative Final     Comment:     Cutoff for a negative PCP is 25 ng/mL or less.          MANAGEMENT DISCUSSED with the following over the past 24 hours: RN and MD   NOTE(S)/MEDICAL RECORDS REVIEWED over the past 24 hours: Hospital Medicine, Nursing, Spiritual Health  Medical complexity over the past 24 hours:  - Parenteral (IV) CONTROLLED SUBSTANCES ordered  - Prescription DRUG MANAGEMENT performed      BLANCA Pedersen, DNP CNS  Acute Inpatient Pain Team  M-F   Paging via [employer] or [employer]

## 2024-04-18 ENCOUNTER — APPOINTMENT (OUTPATIENT)
Dept: MRI IMAGING | Facility: CLINIC | Age: 49
DRG: 552 | End: 2024-04-18
Attending: STUDENT IN AN ORGANIZED HEALTH CARE EDUCATION/TRAINING PROGRAM
Payer: COMMERCIAL

## 2024-04-18 PROCEDURE — 99232 SBSQ HOSP IP/OBS MODERATE 35: CPT | Performed by: NURSE PRACTITIONER

## 2024-04-18 PROCEDURE — 250N000013 HC RX MED GY IP 250 OP 250 PS 637: Performed by: STUDENT IN AN ORGANIZED HEALTH CARE EDUCATION/TRAINING PROGRAM

## 2024-04-18 PROCEDURE — 250N000013 HC RX MED GY IP 250 OP 250 PS 637: Performed by: NURSE PRACTITIONER

## 2024-04-18 PROCEDURE — 120N000001 HC R&B MED SURG/OB

## 2024-04-18 PROCEDURE — 250N000012 HC RX MED GY IP 250 OP 636 PS 637: Performed by: CLINICAL NURSE SPECIALIST

## 2024-04-18 PROCEDURE — 73218 MRI UPPER EXTREMITY W/O DYE: CPT | Mod: RT

## 2024-04-18 PROCEDURE — 250N000011 HC RX IP 250 OP 636: Performed by: INTERNAL MEDICINE

## 2024-04-18 PROCEDURE — 250N000009 HC RX 250: Performed by: STUDENT IN AN ORGANIZED HEALTH CARE EDUCATION/TRAINING PROGRAM

## 2024-04-18 PROCEDURE — 250N000009 HC RX 250: Performed by: NURSE PRACTITIONER

## 2024-04-18 PROCEDURE — 250N000013 HC RX MED GY IP 250 OP 250 PS 637: Performed by: INTERNAL MEDICINE

## 2024-04-18 PROCEDURE — 250N000013 HC RX MED GY IP 250 OP 250 PS 637: Performed by: CLINICAL NURSE SPECIALIST

## 2024-04-18 PROCEDURE — 99232 SBSQ HOSP IP/OBS MODERATE 35: CPT | Performed by: STUDENT IN AN ORGANIZED HEALTH CARE EDUCATION/TRAINING PROGRAM

## 2024-04-18 PROCEDURE — 73221 MRI JOINT UPR EXTREM W/O DYE: CPT | Mod: RT

## 2024-04-18 RX ORDER — AMOXICILLIN 250 MG
2 CAPSULE ORAL 2 TIMES DAILY
Status: DISCONTINUED | OUTPATIENT
Start: 2024-04-18 | End: 2024-04-22

## 2024-04-18 RX ORDER — BACLOFEN 10 MG/1
10 TABLET ORAL 3 TIMES DAILY
Status: DISCONTINUED | OUTPATIENT
Start: 2024-04-18 | End: 2024-04-22

## 2024-04-18 RX ORDER — BUDESONIDE 0.5 MG/2ML
2 INHALANT ORAL DAILY
Status: DISCONTINUED | OUTPATIENT
Start: 2024-04-18 | End: 2024-04-25 | Stop reason: HOSPADM

## 2024-04-18 RX ORDER — KETAMINE HYDROCHLORIDE 10 MG/ML
10 INJECTION INTRAMUSCULAR; INTRAVENOUS ONCE
Status: COMPLETED | OUTPATIENT
Start: 2024-04-18 | End: 2024-04-18

## 2024-04-18 RX ORDER — OXYCODONE HYDROCHLORIDE 5 MG/1
10-15 TABLET ORAL
Status: DISCONTINUED | OUTPATIENT
Start: 2024-04-18 | End: 2024-04-19

## 2024-04-18 RX ORDER — KETAMINE HYDROCHLORIDE 10 MG/ML
10 INJECTION INTRAMUSCULAR; INTRAVENOUS EVERY 6 HOURS PRN
Status: DISCONTINUED | OUTPATIENT
Start: 2024-04-18 | End: 2024-04-22

## 2024-04-18 RX ORDER — PREGABALIN 50 MG/1
50 CAPSULE ORAL 2 TIMES DAILY
Status: DISCONTINUED | OUTPATIENT
Start: 2024-04-18 | End: 2024-04-19

## 2024-04-18 RX ADMIN — OXYCODONE HYDROCHLORIDE 10 MG: 5 TABLET ORAL at 04:04

## 2024-04-18 RX ADMIN — OXYCODONE HYDROCHLORIDE 10 MG: 5 TABLET ORAL at 00:27

## 2024-04-18 RX ADMIN — MAGNESIUM 64 MG (MAGNESIUM CHLORIDE) TABLET,DELAYED RELEASE 535 MG: at 08:48

## 2024-04-18 RX ADMIN — METHYLPREDNISOLONE 4 MG: 4 TABLET ORAL at 08:48

## 2024-04-18 RX ADMIN — METHOCARBAMOL 750 MG: 750 TABLET ORAL at 12:31

## 2024-04-18 RX ADMIN — ONDANSETRON 8 MG: 4 TABLET, ORALLY DISINTEGRATING ORAL at 14:15

## 2024-04-18 RX ADMIN — PREGABALIN 50 MG: 50 CAPSULE ORAL at 20:21

## 2024-04-18 RX ADMIN — LEVOTHYROXINE SODIUM 75 MCG: 0.07 TABLET ORAL at 08:48

## 2024-04-18 RX ADMIN — SENNOSIDES AND DOCUSATE SODIUM 2 TABLET: 8.6; 5 TABLET ORAL at 20:21

## 2024-04-18 RX ADMIN — CETIRIZINE HYDROCHLORIDE 10 MG: 10 TABLET, FILM COATED ORAL at 08:48

## 2024-04-18 RX ADMIN — LOSARTAN POTASSIUM 50 MG: 50 TABLET, FILM COATED ORAL at 08:48

## 2024-04-18 RX ADMIN — ACETAMINOPHEN 1000 MG: 500 TABLET, FILM COATED ORAL at 20:21

## 2024-04-18 RX ADMIN — KETAMINE HYDROCHLORIDE 10 MG: 10 INJECTION, SOLUTION INTRAMUSCULAR; INTRAVENOUS at 22:04

## 2024-04-18 RX ADMIN — DICLOFENAC SODIUM 4 G: 10 GEL TOPICAL at 20:27

## 2024-04-18 RX ADMIN — METHYLPREDNISOLONE 4 MG: 4 TABLET ORAL at 17:07

## 2024-04-18 RX ADMIN — BACLOFEN 10 MG: 10 TABLET ORAL at 20:21

## 2024-04-18 RX ADMIN — METHYLPREDNISOLONE 4 MG: 4 TABLET ORAL at 22:03

## 2024-04-18 RX ADMIN — KETAMINE HYDROCHLORIDE 10 MG: 10 INJECTION, SOLUTION INTRAMUSCULAR; INTRAVENOUS at 14:40

## 2024-04-18 RX ADMIN — PANTOPRAZOLE SODIUM 40 MG: 40 TABLET, DELAYED RELEASE ORAL at 08:48

## 2024-04-18 RX ADMIN — OXYCODONE HYDROCHLORIDE 10 MG: 5 TABLET ORAL at 08:48

## 2024-04-18 RX ADMIN — DICLOFENAC SODIUM 4 G: 10 GEL TOPICAL at 16:06

## 2024-04-18 RX ADMIN — OXYCODONE HYDROCHLORIDE 15 MG: 5 TABLET ORAL at 22:03

## 2024-04-18 RX ADMIN — ACETAMINOPHEN 1000 MG: 500 TABLET, FILM COATED ORAL at 04:03

## 2024-04-18 RX ADMIN — ONDANSETRON 8 MG: 4 TABLET, ORALLY DISINTEGRATING ORAL at 04:10

## 2024-04-18 RX ADMIN — ACETAMINOPHEN 1000 MG: 500 TABLET, FILM COATED ORAL at 12:32

## 2024-04-18 RX ADMIN — OXYCODONE HYDROCHLORIDE 10 MG: 5 TABLET ORAL at 14:12

## 2024-04-18 RX ADMIN — METHYLPREDNISOLONE 4 MG: 4 TABLET ORAL at 12:32

## 2024-04-18 RX ADMIN — DICLOFENAC SODIUM 4 G: 10 GEL TOPICAL at 12:35

## 2024-04-18 RX ADMIN — OXYCODONE HYDROCHLORIDE 10 MG: 5 TABLET ORAL at 18:36

## 2024-04-18 RX ADMIN — DICLOFENAC SODIUM 4 G: 10 GEL TOPICAL at 08:51

## 2024-04-18 RX ADMIN — BACLOFEN 10 MG: 10 TABLET ORAL at 14:12

## 2024-04-18 RX ADMIN — BUDESONIDE 2 MG: 0.5 INHALANT RESPIRATORY (INHALATION) at 16:05

## 2024-04-18 ASSESSMENT — ACTIVITIES OF DAILY LIVING (ADL)
ADLS_ACUITY_SCORE: 23
ADLS_ACUITY_SCORE: 36
ADLS_ACUITY_SCORE: 23
WALKING_OR_CLIMBING_STAIRS_DIFFICULTY: NO
ADLS_ACUITY_SCORE: 36
DRESSING/BATHING_DIFFICULTY: NO
ADLS_ACUITY_SCORE: 23
ADLS_ACUITY_SCORE: 36
TOILETING_ISSUES: NO
ADLS_ACUITY_SCORE: 23
ADLS_ACUITY_SCORE: 23
ADLS_ACUITY_SCORE: 22
ADLS_ACUITY_SCORE: 23
ADLS_ACUITY_SCORE: 23
DOING_ERRANDS_INDEPENDENTLY_DIFFICULTY: NO
ADLS_ACUITY_SCORE: 23
FALL_HISTORY_WITHIN_LAST_SIX_MONTHS: NO
ADLS_ACUITY_SCORE: 23
ADLS_ACUITY_SCORE: 36
ADLS_ACUITY_SCORE: 23
CONCENTRATING,_REMEMBERING_OR_MAKING_DECISIONS_DIFFICULTY: NO
CHANGE_IN_FUNCTIONAL_STATUS_SINCE_ONSET_OF_CURRENT_ILLNESS/INJURY: NO
ADLS_ACUITY_SCORE: 23
WEAR_GLASSES_OR_BLIND: NO
DIFFICULTY_EATING/SWALLOWING: NO

## 2024-04-18 NOTE — PLAN OF CARE
"Care from [id number]    Inpatient Progress Note: ACUTE RIGHT SHOULDER PAIN  For complete assessment see flow sheet documentation.    BP (!) 154/78 (BP Location: Left arm)   Pulse 60   Temp 98.2  F (36.8  C) (Oral)   Resp 16   Ht 1.626 m (5' 4\")   Wt 59 kg (130 lb)   SpO2 99%   BMI 22.31 kg/m         Orientation: Alert and oriented x4  Neuro: Intact   Pain status: With severe pain, Pt staes her pain goal is 6/10. Not achieved since admission.   Activity: Independent  Resp: On room air  Cardiac: WNL  GI: Last BM 4/15/24  :  Voiding without difficulties  Skin: Intact  LDA: L PIV,  Infusions: SL  Diet: Regular Diet  Consults: Pain team,   Discharge Plan: TBD    "

## 2024-04-18 NOTE — PROGRESS NOTES
[facility] ACUTE PAIN SERVICE    [facility]   Daily PAIN Progress Note        Securely message with the [employer] Web Console (learn more here)  (When I saw the patient): 04/18/24  Assessment/Plan:  Machelle Weiner 49-year-old admitted on 4/15/2024 seeing in follow up for   Right shoulder and upper trapezius pain.  This is in the setting of chronic pain related to epigastric pain and GERD, degenerative disc disease, GRIFFITH.  Repeat of MRI imaging of thoracic spine and cervical spine indicate stable to 3 left-sided facet arthroscopy and T4-5,  C3-4,4-5, C5-6 mild canal stenosis and moderate at C5-6 and 6-7.  Today pain is somewhat improved but patient rubs shoulder and arm still having radicular pain.  Reports localized tenderness mid scapula area.  MRI of the right scapula and shoulder are pending.  She is asking for trigger point injections and I have pursued this with interventional radiology and it would appear there is no one available to do these as inpatient and was advised by staff to schedule as outpatient procedure.    Opioid risk assessment= moderate due to medication reliant behavior.  Use of polypharmacy, comorbid conditions  Creatinine clearance 68.8 mL/min  [facility] reviewed noting no controlled substances in the past 12 months  Opioid use this past 24 hours= oxycodone 10 mg (4), 5 mg (1), hydromorphone 1 mg (1)= 45 mg morphine equivalent.  Adjuvants: Methocarbamol 750 mg (3), hydroxyzine 50 mg (1), acetaminophen 1000 mg (3).  Medrol Dosepak 4 mg x 3 doses then discontinue    PLAN:  Acute pain secondary to muscle spasms with radiculopathy due to cervical spinal degenerative disc disease.    Multimodal Medication Therapy:   Adjuvants: Tylenol 1000 mg every 6 hours, Voltaren topical gel qid, lidocaine patch administer for 12 hours one dose, add lidocaine cream qid, stop Robaxin 500 mg qid, add baclofen 10 mg 3 times daily  continue medrol dosepak, Lorazepam 1 tablets every four hours prn  "(Hospital Medicine), vistaril 50 mg every 6 hours prn,  capsacin cream qid   Ketamine oral solution 10 mg now then every 6 hours as needed  Opioids: Oral oxycodone 10 to 15 mg every 3 hours as needed (this was changed from 5 to 10 mg every 4 hours as needed)   IV hydromorphone 0.5-1mg every 2 hours prn   Non-medication interventions- Ice prn  Constipation Prophylaxis- daily stool softener/laxative   Follow up /Discharge Recommendations - We recommend prescribing the following at the time of discharge:    Medications TBD  PT referral, myofascial therapy, Acupuncture   Referral for interventional radiology for trigger point injections to right trapezius recommended        Subjective:  Reports pain improved but continues to find it problematic and her chronic pain \"pales in comparison to her right shoulder and upper back pain.) Oxycodone helpful but not lasting more than 3 hours.  Has been on gabapentin in the past at 1800 mg a day with cognitive side effects.  Has not found much help with Flexeril and now methocarbamol is open to trying something else.  He is also open to trying oral solution of ketamine.  Reports she has not resumed her home medication Pulmicort solution or Nexium this was relayed to the hospitalist in charge of her care  Pain average 6-9/10, current pain 8/10  Eating well , BM yesterday, voiding without difficulty      <principal problem not specified>   Patient Active Problem List   Diagnosis    Asthma, mild intermittent    Hyperlipidemia LDL goal <160    Essential hypertension, benign    Acquired hypothyroidism    S/P laparoscopic cholecystectomy    Morbid obesity (H)    Migraine    Facet arthropathy, lumbosacral    DDD (degenerative disc disease), lumbosacral    Leg length discrepancy    Positive LYUDMILA (antinuclear antibody)    Multiple joint pain    Chronic pain of both knees    Hip pain    Mood disorder (H24)    Intractable pain    Acute pain of right shoulder        History   Drug Use No         " Tobacco Use      Smoking status: Former        Packs/day: 0.00        Years: 0.3 packs/day for 15.0 years (4.5 ttl pk-yrs)        Types: Cigarettes        Start date: 2000        Quit date: 2015        Years since quittin.1      Smokeless tobacco: Never        Current Facility-Administered Medications   Medication Dose Route Frequency Provider Last Rate Last Admin    acetaminophen (TYLENOL) tablet 1,000 mg  1,000 mg Oral Q8H Tim Chapin MD   1,000 mg at 24 1232    baclofen (LIORESAL) tablet 10 mg  10 mg Oral TID Abigail Moscoso APRN CNP        budesonide (ENTOCORT) suspension 0.5 mg  0.5 mg Oral Daily Yovany Livingston MD        capsaicin (ZOSTRIX) 0.025 % cream   Topical TID Sharla Márquez APRN CNS   Given at 24 1501    cetirizine (zyrTEC) tablet 10 mg  10 mg Oral Daily Tim hCapin MD   10 mg at 24 0848    diclofenac (VOLTAREN) 1 % topical gel 4 g  4 g Topical 4x Daily Tim Chapin MD   4 g at 24 1235    ketamine (KETALAR) ORAL for ANALGESIA 10 mg  10 mg Oral Once Abigail Moscoso APRN CNP        levothyroxine (SYNTHROID/LEVOTHROID) tablet 75 mcg  75 mcg Oral Daily Tim Chapin MD   75 mcg at 24 0848    lidocaine (XYLOCAINE) 5 % ointment   Topical 4x Daily Sharla Márquez APRN CNS   Given at 24 0859    losartan (COZAAR) tablet 50 mg  50 mg Oral Daily Tim Chapin MD   50 mg at 24 0848    magnesium chloride CR tablet 535 mg  535 mg Oral Daily Sharla Márquez APRN CNS   535 mg at 24 0848    methylPREDNISolone (MEDROL) tablet 4 mg  4 mg Oral QAM AC Sharla Márquez APRN CNS   4 mg at 24 0848    And    methylPREDNISolone (MEDROL) tablet 4 mg  4 mg Oral Daily with lunch Sharla Márquez APRN CNS   4 mg at 24 1232    And    methylPREDNISolone (MEDROL) tablet 4 mg  4 mg Oral Daily with supper Sharla Márquez APRN CNS   4 mg at 24 1701    And    methylPREDNISolone (MEDROL)  "tablet 4 mg  4 mg Oral At Bedtime Sharla Márquez, APRN CNS        pantoprazole (PROTONIX) EC tablet 40 mg  40 mg Oral QAM AC Tim Chapin MD   40 mg at 04/18/24 0848    pregabalin (LYRICA) capsule 50 mg  50 mg Oral BID Abigail Moscoso APRN CNP        senna-docusate (SENOKOT-S/PERICOLACE) 8.6-50 MG per tablet 2 tablet  2 tablet Oral BID Yovany Livingston MD           Objective:  Vital signs in last 24 hours:  B/P: 139/75, T: 99.3, P: 57, R: 16   Blood pressure 139/75, pulse 57, temperature 99.3  F (37.4  C), temperature source Oral, resp. rate 16, height 1.626 m (5' 4\"), weight 59 kg (130 lb), SpO2 96%, not currently breastfeeding.      Weight:   Wt Readings from Last 3 Encounters:   04/15/24 59 kg (130 lb)   11/07/23 103.5 kg (228 lb 3.2 oz)   10/03/23 107.2 kg (236 lb 6.4 oz)           Intake/Output:  No intake or output data in the 24 hours ending 04/18/24 1405     Review of Systems:   As per subjective, all others negative.    Physical Exam:     General Appearance:  Alert, cooperative, no distress. Patient is pleasant and engaged in conversation with writer.  Grooming is good   Head:  Normocephalic, without obvious abnormality, atraumatic   Eyes:   Conjunctiva/corneas clear, EOM's intact   ENT/Throat: Lips, mouth moist   Lymph/Neck: Symmetrical, trachea midline, no adenopathy, thyroid: not enlarged, symmetric    Lungs:   Clear to auscultation bilaterally, respirations unlabored, even chest rise. Symmetrical movement    Chest Wall:  No tenderness or deformity   Cardiovascular/Heart:  Regular rate and rhythm, S1, S2 normal,no murmur, rub or gallop.     Abdomen:   Soft, non-tender, bowel sounds active all four quadrants,  no masses, no organomegaly   Musculoskeletal: Extremities normal, atraumatic, point tenderness over right mid scapula muscular area and some referred radiation to neck with palpation.  Incision none   Skin: Skin color good, lesions none   Neurologic: Alert and oriented X 3, " Moves all 4 extremities.       Psych: Affect is good aberrant pain behaviors             Imaging:  Personally Reviewed.     Results for orders placed or performed during the hospital encounter of 04/15/24   CT Chest Pulmonary Embolism w Contrast    Impression    IMPRESSION:  1. No evidence for pulmonary embolism.  2. Mild mosaic attenuation in both lungs suggests air trapping.  3. Indeterminate 0.5 cm right upper lobe pulmonary nodule. Please  refer to follow-up guidelines below.    Recommendations for one or multiple incidental lung nodules < 6mm :    Low risk patients: No routine follow-up.    High risk patients: Optional follow-up CT at 12 months; if  unchanged, no further follow-up.    *Low Risk: Minimal or absent history of smoking or other known risk  factors.  *Nonsolid (ground glass) or partly solid nodules may require longer  follow-up to exclude indolent adenocarcinoma.  *Recommendations based on Guidelines for the Management of Incidental  Pulmonary Nodules Detected at CT: From the Fleischner Society 2017,  Radiology 2017.  *Guidelines apply to incidental nodules in patients who are 35 years  or older.  *Guidelines do not apply to lung cancer screening, patients with  immunosuppression, or patients with known primary cancer.    GORDON CLARK MD         SYSTEM ID:  [id number]   MR Cervical Spine w/o Contrast    Impression    IMPRESSION:    1. Degenerative changes in the mid cervical spine as detailed above.  Degenerative findings are progressed since prior MR 3/17/2021.  2. Mild spinal canal narrowings at C3-C4, C4-C5, and C5-C6.  3. Moderate bilateral neural foraminal narrowings at C5-C6. Mild left  neural foraminal narrowing at C6-C7.     RAS FISHER MD         SYSTEM ID:  [id number]   MR Thoracic Spine w/o Contrast    Impression    IMPRESSION:    1.  Multilevel degenerative changes throughout the thoracic spine as  detailed above.  2.  Degenerative disc changes throughout the mid and lower  thoracic  spine without significant disc herniation. No significant spinal canal  narrowing.  3.  Asymmetric left-sided facet hypertrophy at T2-T3 and T4-T5 causing  moderate left neural foraminal narrowings.  4.  Indeterminate subcentimeter lesion in the T8 vertebral body. This  does not appear significantly changed since 12/20/2022. Stability is  reassuring.  5.  Degenerative findings are similar to prior MR 12/20/2022.    RAS FISHER MD         SYSTEM ID:  [id number]          Lab Results:  Personally Reviewed.   Last Comprehensive Metabolic Panel:  Sodium   Date Value Ref Range Status   04/15/2024 138 135 - 145 mmol/L Final     Comment:     Reference intervals for this test were updated on 09/26/2023 to more accurately reflect our healthy population. There may be differences in the flagging of prior results with similar values performed with this method. Interpretation of those prior results can be made in the context of the updated reference intervals.    03/22/2021 137 133 - 144 mmol/L Final     Potassium   Date Value Ref Range Status   04/15/2024 3.9 3.4 - 5.3 mmol/L Final   03/22/2022 4.0 3.4 - 5.3 mmol/L Final   03/22/2021 4.1 3.4 - 5.3 mmol/L Final     Chloride   Date Value Ref Range Status   04/15/2024 108 (H) 98 - 107 mmol/L Final   03/22/2022 108 94 - 109 mmol/L Final   03/22/2021 108 94 - 109 mmol/L Final     Carbon Dioxide   Date Value Ref Range Status   03/22/2021 28 20 - 32 mmol/L Final     Carbon Dioxide (CO2)   Date Value Ref Range Status   04/15/2024 15 (L) 22 - 29 mmol/L Final   03/22/2022 25 20 - 32 mmol/L Final     Anion Gap   Date Value Ref Range Status   04/15/2024 15 7 - 15 mmol/L Final   03/22/2022 4 3 - 14 mmol/L Final   03/22/2021 1 (L) 3 - 14 mmol/L Final     Glucose   Date Value Ref Range Status   04/15/2024 92 70 - 99 mg/dL Final   03/22/2022 86 70 - 99 mg/dL Final   03/22/2021 68 (L) 70 - 99 mg/dL Final     Urea Nitrogen   Date Value Ref Range Status   04/15/2024 11.0 6.0 - 20.0  mg/dL Final   03/22/2022 10 7 - 30 mg/dL Final   03/22/2021 11 7 - 30 mg/dL Final     Creatinine   Date Value Ref Range Status   04/15/2024 0.96 (H) 0.51 - 0.95 mg/dL Final   03/22/2021 0.94 0.52 - 1.04 mg/dL Final     GFR Estimate   Date Value Ref Range Status   04/15/2024 73 >60 mL/min/1.73m2 Final   03/22/2021 74 >60 mL/min/[1.73_m2] Final     Comment:     Non  GFR Calc  Starting 12/18/2018, serum creatinine based estimated GFR (eGFR) will be   calculated using the Chronic Kidney Disease Epidemiology Collaboration   (CKD-EPI) equation.       Calcium   Date Value Ref Range Status   04/15/2024 9.4 8.6 - 10.0 mg/dL Final   03/22/2021 9.1 8.5 - 10.1 mg/dL Final        UA:   Amphetamines Urine   Date Value Ref Range Status   12/30/2021 Screen Negative Screen Negative Final     Comment:     Cutoff for a negative amphetamine is 500 ng/mL or less.     Barbiturates Qual Urine   Date Value Ref Range Status   03/29/2007 Not Detected  Final     Comment:             Reference Range:  Not Detected     Barbiturates Urine   Date Value Ref Range Status   12/30/2021 Screen Negative Screen Negative Final     Comment:     Cutoff for a negative barbiturate is 200 ng/mL or less.     Benzodiazepine Qual Urine   Date Value Ref Range Status   03/29/2007 Detected, Abnormal Result  Final     Comment:             Reference Range:  Not Detected     Cannabinoids Urine   Date Value Ref Range Status   12/30/2021 Screen Negative Screen Negative Final     Comment:     Cutoff for a negative cannabinoid is 50 ng/mL or less.     Cocaine Urine   Date Value Ref Range Status   12/30/2021 Screen Negative Screen Negative Final     Comment:     Cutoff for a negative cocaine is 300 ng/mL or less.     Opiates Qualitative Urine   Date Value Ref Range Status   03/29/2007 Not Detected  Final     Comment:             Reference Range:  Not Detected     Opiates Urine   Date Value Ref Range Status   12/30/2021 Screen Negative Screen Negative Final      Comment:     Cutoff for a negative opiate is 300 ng/mL or less.     PCP Qual Urine   Date Value Ref Range Status   03/29/2007 Not Detected  Final     Comment:             Reference Range:  Not Detected     PCP Urine   Date Value Ref Range Status   12/30/2021 Screen Negative Screen Negative Final     Comment:     Cutoff for a negative PCP is 25 ng/mL or less.            Critical decision making elements:  Use of high risk medications: Yes, Ongoing monitoring for adverse effects of medications by me: Yes, Relevant labs, imaging, notes reviewed by me:  Yes  Please see A&P for additional details of medical decision making.  Medical complexity over the past 24 hours:  - Decision regarding ESCALATION OF LEVEL OF CARE  - Prescription DRUG MANAGEMENT performed  Added ketamine, stopped methocarbamol added baclofen  35 MINUTES SPENT BY ME on the date of service doing chart review, history, exam, documentation & further activities per the note.  Communicated plan with hospitalist managing care? Yes  Communicated plan with bedside nurse?  Yes    Abigail Moscoso, APRN CNP, PGMT-BC, ACHPN  [facility]   Coverage Monday-Friday 8:00-4:30  No weekend coverage contact house officer    Securely message with the [employer] Web Console (learn more here)

## 2024-04-18 NOTE — PLAN OF CARE
"Goal Outcome Evaluation:      Plan of Care Reviewed With: patient    Overall Patient Progress: improvingOverall Patient Progress: improving    Outcome Evaluation: Manaing pain with PO and topical regimin - pain plan showing improvement. Pt pain goal - 6/10. on RA. AOx4. Bowel sounds audible, no BM since 4/15 - pt on scheduled mag.    Alvino Phillips RN : Progress Note  Shift : [id number]  Category of Care: Inpatient  VS: BP (!) 145/83 (BP Location: Left arm)   Pulse 61   Temp 98.8  F (37.1  C) (Oral)   Resp 16   Ht 1.626 m (5' 4\")   Wt 59 kg (130 lb)   SpO2 99%   BMI 22.31 kg/m      Shift Summary; Occurences, Discussions & Interventions of Note --  Manaing pain with PO and topical regimin - pain plan showing improvement. Pt pain goal - 6/10.   Oxycodone q4 (given at ? 0800, 1400)  Dilaudid q3 (not used today)  Tylenol Q8 scheduled (8 and 12)  Atarax Q6 (not used today)  Volteran Q4 (0800, 1200)  Lidocaine q4 (pt refused)  NEW Ketamine oral 10mg q6 (given at 1440, mixed with ginger ale)  Pt up steady with SBA (indicated for pain regimen, and R arm weakness related to pain).  Pt uses Budesonide in an uncommon way for eosinophilic esophagitis - fully verified at end of shift - briefed oncoming nurse about method of administrating this medication.   Pt without drowsiness. Alert / Oriented. On RA.   Intermittent nausea - zophran given this afternoon - will not be available until tonight (q6).    Assessment Findings of Note --  On RA. AOx4.   Bowel sounds audible, no BM since 4/15 - pt on scheduled mag    Considerations --  On clock pain management (6/10 is pain goal - 9/10 is pt's highest reported)  Pending BM    Plan --  Goal - PO pain management - but IV dilaudid is available for breakthrough. No IV dilaudid given today - pending efficacy of ketamine which was started just before change of shift.     ??? Care Plan notation attached at the bottom of this note. Space added to increase note readability for relevant " "care team members.                                                                                                                            ---  Goal Outcome Evaluation:      Plan of Care Reviewed With: patient    Overall Patient Progress: improvingOverall Patient Progress: improving    Outcome Evaluation: Manaing pain with PO and topical regimin - pain plan showing improvement. Pt pain goal - 6/10. on RA. AOx4. Bowel sounds audible, no BM since 4/15 - pt on scheduled mag.      Problem: Adult Inpatient Plan of Care  Goal: Plan of Care Review  Description: The Plan of Care Review/Shift note should be completed every shift.  The Outcome Evaluation is a brief statement about your assessment that the patient is improving, declining, or no change.  This information will be displayed automatically on your shift  note.  4/18/2024 1555 by Alvino Phillips RN  Outcome: Progressing  Flowsheets (Taken 4/18/2024 1555)  Plan of Care Reviewed With: patient  Overall Patient Progress: improving  4/18/2024 0941 by Alvino Phillips RN  Outcome: Progressing  4/18/2024 0938 by Alvino Phillips RN  Outcome: Progressing  Flowsheets (Taken 4/18/2024 0938)  Outcome Evaluation: Manaing pain with PO and topical regimin - pain plan showing improvement. Pt pain goal - 6/10. on RA. AOx4. Bowel sounds audible, no BM since 4/15 - pt on scheduled mag.  Plan of Care Reviewed With: patient  Overall Patient Progress: improving  Goal: Patient-Specific Goal (Individualized)  Description: You can add care plan individualizations to a care plan. Examples of Individualization might be:  \"Parent requests to be called daily at 9am for status\", \"I have a hard time hearing out of my right ear\", or \"Do not touch me to wake me up as it startles  me\".  4/18/2024 1555 by Alvino Phillips RN  Outcome: Progressing  4/18/2024 0941 by Alvino Phillips RN  Outcome: Progressing  4/18/2024 0938 by Alvino Phillips RN  Outcome: Progressing  Goal: Absence of Hospital-Acquired " Illness or Injury  4/18/2024 1555 by Alvino Phillips RN  Outcome: Progressing  4/18/2024 0941 by Alvino hPillips RN  Outcome: Progressing  4/18/2024 0938 by Alvino Phillips RN  Outcome: Progressing  Intervention: Identify and Manage Fall Risk  Recent Flowsheet Documentation  Taken 4/18/2024 0906 by Alvino Phillips RN  Safety Promotion/Fall Prevention:   activity supervised   clutter free environment maintained   nonskid shoes/slippers when out of bed   patient and family education   room organization consistent   safety round/check completed   supervised activity  Intervention: Prevent Skin Injury  Recent Flowsheet Documentation  Taken 4/18/2024 0906 by Alvino Phillips RN  Body Position: position changed independently  Intervention: Prevent and Manage VTE (Venous Thromboembolism) Risk  Recent Flowsheet Documentation  Taken 4/18/2024 0906 by Alvino Phillips RN  VTE Prevention/Management:   compression stockings off   foot pump device off   SCDs (sequential compression devices) off  Intervention: Prevent Infection  Recent Flowsheet Documentation  Taken 4/18/2024 0906 by Alvino Phillips RN  Infection Prevention:   cohorting utilized   hand hygiene promoted   rest/sleep promoted  Goal: Optimal Comfort and Wellbeing  4/18/2024 1555 by Alvino Phillips RN  Outcome: Progressing  4/18/2024 0941 by Alvino Phillips RN  Outcome: Progressing  4/18/2024 0938 by Alvino Phillips RN  Outcome: Progressing  Intervention: Monitor Pain and Promote Comfort  Recent Flowsheet Documentation  Taken 4/18/2024 1232 by Alvino Phillips RN  Pain Management Interventions: medication (see MAR)  Taken 4/18/2024 1200 by Alvino Phillips RN  Pain Management Interventions: (waiting for next sched and prn) other (see comments)  Taken 4/18/2024 0816 by Alvino Phillips RN  Pain Management Interventions: medication (see MAR)  Goal: Readiness for Transition of Care  4/18/2024 1555 by Alvino Phillips RN  Outcome: Progressing  4/18/2024 0941 by Alvino Phillips  RN  Outcome: Progressing  4/18/2024 0938 by Alvino Phillips RN  Outcome: Progressing     Problem: Pain Acute  Goal: Optimal Pain Control and Function  4/18/2024 1555 by Alvino Phillips RN  Outcome: Progressing  4/18/2024 0941 by Alvino Phillips RN  Outcome: Progressing  4/18/2024 0938 by Alvino Phillips RN  Outcome: Progressing  Intervention: Develop Pain Management Plan  Recent Flowsheet Documentation  Taken 4/18/2024 1232 by Alvino Phillips RN  Pain Management Interventions: medication (see MAR)  Taken 4/18/2024 1200 by Alvino Phillips RN  Pain Management Interventions: (waiting for next sched and prn) other (see comments)  Taken 4/18/2024 0816 by Alvino Phillips RN  Pain Management Interventions: medication (see MAR)  Intervention: Prevent or Manage Pain  Recent Flowsheet Documentation  Taken 4/18/2024 0906 by Alvino Phillips RN  Medication Review/Management: medications reviewed     Problem: Comorbidity Management  Goal: Blood Pressure in Desired Range  4/18/2024 1555 by Alvino Phillips RN  Outcome: Progressing  4/18/2024 0941 by Alvnio Phillips RN  Outcome: Progressing  4/18/2024 0938 by Alvino Phillips RN  Outcome: Progressing  Intervention: Maintain Blood Pressure Management  Recent Flowsheet Documentation  Taken 4/18/2024 0906 by Alvino Phillips RN  Medication Review/Management: medications reviewed  Goal: Maintenance of Asthma Control  4/18/2024 1555 by Alvino Phillips RN  Outcome: Progressing  4/18/2024 0941 by Alvino Phillips RN  Outcome: Progressing  Intervention: Maintain Asthma Symptom Control  Recent Flowsheet Documentation  Taken 4/18/2024 0906 by Alvino Phillips RN  Medication Review/Management: medications reviewed

## 2024-04-18 NOTE — PROGRESS NOTES
[facility]    Medicine Progress Note - Hospitalist Service    Date of Admission:  4/15/2024  Date of Service: 04/18/2024    Assessment & Plan      Brief summary of admission assessment:Machelle Weiner is a 49 year old  female with a significant past medical history of chronic pain ,GRIFFITH,  TIMMY, hypertension, hyperlipidemia, and migraines who presents with right posterior shoulder pain.  She was complaining of severe sharp pain on the right radiating to the right upper extremity.  She is to have cervical radiculopathy for which she has been getting epidural steroid injections and pain clinic.     ED evaluation revealed anxiety, hyperventilation with bicarb of 15.  CT scan of the chest negative for acute intrathoracic pathology including PE or dissection.     Patient received multiple medications in the emergency department including 5 mg IV Valium, 0.5 mg of hydromorphone twice and her pain did not improve significantly.     #1.  Acute intractable pain involving the right shoulder, right posterior upper chest, back and shoulder blade area.  -- History of degenerative disc disease C4-C5, C5-C6, and C6-C7 on MRI study of 2021.  -- Suspect cervical radiculopathy pain.  -- CT scan with contrast did not reveal any acute intrathoracic pathology  -- Patient has chronic pain but does not use narcotic medications at home  Plan:  -- Pain service consulted -> medrol pack started / gabapentin and Lyrica being considered  -- Orthopedic surgery consulted -> non surgical   -- MR Cervical and Thoracic overall unrevealing aside from chronic findings   -- MR Shoulder and MR Scapula pending   -- Possibly bursitis around scapular region??  -- Medrol trial per pain service  - Patient needs to follow-up with her pain management team as an outpatient as well.    #2.  Severe anxiety  --She has history of depression and generalized anxiety disorder but is not on antianxiety medication.  She was very  emotional and her new right sided pain is causing her a lot of distress.  She wants to get more testing to figure out the etiology of her condition.  Plan:  -- Was treated with Ativan as needed.  Patient may benefit from scheduled antianxiety medication that can be done as an outpatient with her primary care provider.             Diet: Regular Diet Adult    DVT Prophylaxis: Pneumatic Compression Devices  Sheridan Catheter: Not present  Lines: None     Cardiac Monitoring: None  Code Status: Full CodeFULL CODE    Clinically Significant Risk Factors Present on Admission                  # Hypertension: Noted on problem list          # Asthma: noted on problem list        Disposition Plan     Medically Ready for Discharge: Anticipated Tomorrow           Yovany Livingston MD  Hospitalist Service  [facility]  Securely message with [employer] (more info)  Text page via [employer] Paging/Directory   ______________________________________________________________________    Interval History     Still with very significant pain in LUE / scaputlar region shoulder and neck with radiculopathy in left arm.  Needing IV pain meds  No fevers  No CP/SOB  No nausea / vomiting   No new complaints  Frustrated with no solutions or diagnosis    Physical Exam   Vital Signs: Temp: 99.3  F (37.4  C) Temp src: Oral BP: 139/75 Pulse: 57   Resp: 16 SpO2: 96 % O2 Device: None (Room air)    Weight: 130 lbs 0 oz    GEN:  no apparent distress  Neurologic:Grossly intact,non focal. Tangela.  Neuropsychiatric: normal mood and affect  ----------------------------------------------------------------------------------------    Medical Decision Making       35 MINUTES SPENT BY ME on the date of service doing chart review, history, exam, documentation & further activities per the note.      Data   ------------------------- PAST 24 HR DATA REVIEWED -----------------------------------------------        Imaging results reviewed over the past 24 hrs:   No  results found for this or any previous visit (from the past 24 hour(s)).    ------------------------- ENCOUNTER LABS ----------------------------------------------------------------  Recent Labs   Lab 04/15/24  1134   WBC 8.7   HGB 13.2   MCV 85         POTASSIUM 3.9   CHLORIDE 108*   CO2 15*   BUN 11.0   CR 0.96*   ANIONGAP 15   BETINA 9.4   GLC 92   ALBUMIN 4.4   PROTTOTAL 7.6   BILITOTAL 0.2   ALKPHOS 97   ALT 14   AST 22       Most Recent 3 CBC's:  Recent Labs   Lab Test 04/15/24  1134 08/29/22  1703 08/16/22  1514   WBC 8.7 10.9 11.5*   HGB 13.2 11.9 12.2   MCV 85 93 93    294 293     Most Recent 3 BMP's:  Recent Labs   Lab Test 04/15/24  1134 08/16/22  1514 03/22/22  1627    137 137   POTASSIUM 3.9 3.9 4.0   CHLORIDE 108* 105 108   CO2 15* 20* 25   BUN 11.0 6.8 10   CR 0.96* 0.77 1.02   ANIONGAP 15 12 4   BETINA 9.4 8.9 9.3   GLC 92 82 86     Most Recent 2 LFT's:  Recent Labs   Lab Test 04/15/24  1134 12/15/22  1233   AST 22 83*   ALT 14 98*   ALKPHOS 97 134*   BILITOTAL 0.2 0.2     Most Recent 3 INR's:  Recent Labs   Lab Test 08/29/22  1703 12/16/17  1725   INR 1.04 0.95     Most Recent 3 Troponin's:  Recent Labs   Lab Test 01/11/17  2110 10/03/16  1749   TROPI <0.015  The 99th percentile for upper reference range is 0.045 ug/L.  Troponin values in   the range of 0.045 - 0.120 ug/L may be associated with risks of adverse   clinical events.   <0.015  The 99th percentile for upper reference range is 0.045 ug/L.  Troponin values in   the range of 0.045 - 0.120 ug/L may be associated with risks of adverse   clinical events.       Most Recent 3 BNP's:No lab results found.  Most Recent D-dimer:  Recent Labs   Lab Test 04/15/24  1134   DD 0.54*     Most Recent Cholesterol Panel:  Recent Labs   Lab Test 11/07/23  1528   CHOL 196   *   HDL 45*   TRIG 91     Most Recent 6 Bacteria Isolates From Any Culture (See EPIC Reports for Culture Details):  Recent Labs   Lab Test 05/15/18  1325   CULT  No beta hemolytic Streptococcus Group A isolated     Most Recent TSH and T4:  Recent Labs   Lab Test 11/07/23  1528 04/19/22  1545 03/22/21  1754   TSH 1.78   < > 5.90*   T4  --   --  0.80    < > = values in this interval not displayed.     Most Recent Urinalysis:  Recent Labs   Lab Test 08/29/22  1705   COLOR Yellow   APPEARANCE Clear   URINEGLC Negative   URINEBILI Negative   URINEKETONE Negative   SG 1.022   UBLD Negative   URINEPH 5.5   PROTEIN Negative   NITRITE Negative   LEUKEST Negative   RBCU 2   WBCU 1     Most Recent ESR & CRP:  Recent Labs   Lab Test 04/15/24  1134 08/16/22  1514 03/22/22  1627   SED  --   --  40*   CRP  --   --  8.7*   CRPI <3.00   < >  --     < > = values in this interval not displayed.

## 2024-04-18 NOTE — PLAN OF CARE
"Goal Outcome Evaluation:       Problem: Adult Inpatient Plan of Care  Goal: Plan of Care Review  Description: The Plan of Care Review/Shift note should be completed every shift.  The Outcome Evaluation is a brief statement about your assessment that the patient is improving, declining, or no change.  This information will be displayed automatically on your shift  note.  Outcome: Progressing  Goal: Patient-Specific Goal (Individualized)  Description: You can add care plan individualizations to a care plan. Examples of Individualization might be:  \"Parent requests to be called daily at 9am for status\", \"I have a hard time hearing out of my right ear\", or \"Do not touch me to wake me up as it startles  me\".  Outcome: Progressing  Goal: Absence of Hospital-Acquired Illness or Injury  Outcome: Progressing  Goal: Optimal Comfort and Wellbeing  Outcome: Progressing  Goal: Readiness for Transition of Care  Outcome: Progressing     Problem: Pain Acute  Goal: Optimal Pain Control and Function  Outcome: Progressing     Problem: Comorbidity Management  Goal: Blood Pressure in Desired Range  Outcome: Progressing     "

## 2024-04-18 NOTE — PLAN OF CARE
"INPATIENT NOTE: [id number]     PRIMARY PROBLEM: right posterior shoulder/ arm pain     Vital Signs: BP (!) 146/79 (BP Location: Left arm)   Pulse 57   Temp 98.5  F (36.9  C) (Oral)   Resp 18   Ht 1.626 m (5' 4\")   Wt 59 kg (130 lb)   SpO2 95%   BMI 22.31 kg/m           Orientation: A/O x4  Neuro: intact  Pain status: 6-10/10, rotating topical, oral and IV  pain meds  Resp: Lung sounds clear and equal bilat, denies any SOB  Cardiac: WDL,   GI: Bowel sounds normoactive.   : WDL  Skin: WDL, redness to upper right back from ice  LDA: Peripheral IV to left forearm,SL  Pertinent Labs/Imaging: MRI negative, waiting for further imaging orders  Diet: Reg  Activity: ind in room  Alarms/Safety: All alarms on and audible  Consults: ortho, pain team  Discharge Plan: TBD  Discharge Date: TBD     Pt complains of unbearable pain from right shoulder blade that extends  down to arm and is affecting the use of her hand. We have been alternating topical meds ( she prefers voltaren gel over lidocaine and capcacin), oral oxycodone and 1mg IV dilaudid to manage pain. Pt also likes to apply ice packs to  shoulder. Pt is calm unless pain gets out of control, then is  very anxiou and writhes around in bed. Declines ativan.  Pt has full movement of right shoulder/ arm/ hand.   Ortho saw and cleared. Provider to reassess tomorrow 4/18 and determine whether more imaging is warranted. No plans for discharge yet.    Josephine Lindsay RN      Goal Outcome Evaluation:      Plan of Care Reviewed With: patient    Overall Patient Progress: no changeOverall Patient Progress: no change    Outcome Evaluation: pt with continued pain relieved by iv, oral and topical meds - pain extending down into forearm today.      Problem: Adult Inpatient Plan of Care  Goal: Plan of Care Review  Description: The Plan of Care Review/Shift note should be completed every shift.  The Outcome Evaluation is a brief statement about your assessment that the patient is " "improving, declining, or no change.  This information will be displayed automatically on your shift  note.  Outcome: Progressing  Flowsheets (Taken 4/17/2024 1908)  Outcome Evaluation: pt with continued pain relieved by iv, oral and topical meds - pain extending down into forearm today.  Plan of Care Reviewed With: patient  Overall Patient Progress: no change  Goal: Patient-Specific Goal (Individualized)  Description: You can add care plan individualizations to a care plan. Examples of Individualization might be:  \"Parent requests to be called daily at 9am for status\", \"I have a hard time hearing out of my right ear\", or \"Do not touch me to wake me up as it startles  me\".  Outcome: Progressing  Goal: Absence of Hospital-Acquired Illness or Injury  Outcome: Progressing  Intervention: Identify and Manage Fall Risk  Recent Flowsheet Documentation  Taken 4/17/2024 1600 by Josephine Lindsay RN  Safety Promotion/Fall Prevention:   activity supervised   clutter free environment maintained   nonskid shoes/slippers when out of bed   patient and family education   room organization consistent   safety round/check completed   supervised activity  Taken 4/17/2024 1200 by Josephine Lindsay RN  Safety Promotion/Fall Prevention:   activity supervised   clutter free environment maintained   nonskid shoes/slippers when out of bed   patient and family education   room organization consistent   safety round/check completed   supervised activity  Taken 4/17/2024 0800 by Josephine Lindsay, RN  Safety Promotion/Fall Prevention:   activity supervised   clutter free environment maintained   nonskid shoes/slippers when out of bed   patient and family education   room organization consistent   safety round/check completed   supervised activity  Intervention: Prevent Skin Injury  Recent Flowsheet Documentation  Taken 4/17/2024 1600 by Josephine Lindsay RN  Body Position: position changed independently  Skin Protection:   transparent " dressing maintained   adhesive use limited  Device Skin Pressure Protection: adhesive use limited  Taken 4/17/2024 1200 by Josephine Lindsay RN  Body Position: position changed independently  Skin Protection:   transparent dressing maintained   adhesive use limited  Device Skin Pressure Protection: adhesive use limited  Taken 4/17/2024 0800 by Josephine Lindsay RN  Body Position: position changed independently  Skin Protection:   transparent dressing maintained   adhesive use limited  Device Skin Pressure Protection: adhesive use limited  Intervention: Prevent and Manage VTE (Venous Thromboembolism) Risk  Recent Flowsheet Documentation  Taken 4/17/2024 1600 by Josephine Lindsay RN  VTE Prevention/Management: SCDs (sequential compression devices) off  Taken 4/17/2024 1200 by Josephine Lindsay RN  VTE Prevention/Management: SCDs (sequential compression devices) off  Taken 4/17/2024 0800 by Josephine Lindsay RN  VTE Prevention/Management: SCDs (sequential compression devices) off  Intervention: Prevent Infection  Recent Flowsheet Documentation  Taken 4/17/2024 1600 by Josephine Lindsay RN  Infection Prevention:   cohorting utilized   hand hygiene promoted   rest/sleep promoted  Taken 4/17/2024 1200 by Josephine Lindsay RN  Infection Prevention:   cohorting utilized   hand hygiene promoted   rest/sleep promoted  Taken 4/17/2024 0800 by Josephine Lindsay RN  Infection Prevention:   cohorting utilized   hand hygiene promoted   rest/sleep promoted  Goal: Optimal Comfort and Wellbeing  Outcome: Progressing  Intervention: Monitor Pain and Promote Comfort  Recent Flowsheet Documentation  Taken 4/17/2024 1600 by Josephine Lindsay RN  Pain Management Interventions:   medication (see MAR)   cold applied   repositioned  Taken 4/17/2024 0800 by Josephine Lindsay RN  Pain Management Interventions:   medication (see MAR)   cold applied   repositioned  Goal: Readiness for Transition of Care  Outcome: Progressing      Problem: Pain Acute  Goal: Optimal Pain Control and Function  Outcome: Progressing  Intervention: Develop Pain Management Plan  Recent Flowsheet Documentation  Taken 4/17/2024 1600 by Josephine Lindsay RN  Pain Management Interventions:   medication (see MAR)   cold applied   repositioned  Taken 4/17/2024 0800 by Josephine Lindsay RN  Pain Management Interventions:   medication (see MAR)   cold applied   repositioned  Intervention: Prevent or Manage Pain  Recent Flowsheet Documentation  Taken 4/17/2024 1600 by Josephine Lindsay RN  Sensory Stimulation Regulation:   care clustered   lighting decreased  Medication Review/Management: medications reviewed  Taken 4/17/2024 1200 by Josephine Lindsay RN  Sensory Stimulation Regulation:   care clustered   lighting decreased  Medication Review/Management: medications reviewed  Taken 4/17/2024 0800 by Josephine Lindsay RN  Sensory Stimulation Regulation:   care clustered   lighting decreased  Medication Review/Management: medications reviewed  Intervention: Optimize Psychosocial Wellbeing  Recent Flowsheet Documentation  Taken 4/17/2024 1600 by Josephine Lindsay RN  Supportive Measures:   active listening utilized   counseling provided   decision-making supported   verbalization of feelings encouraged  Taken 4/17/2024 1200 by Josephine Lindsay RN  Supportive Measures:   active listening utilized   counseling provided   decision-making supported   verbalization of feelings encouraged  Taken 4/17/2024 0800 by Josephien Lindsay RN  Supportive Measures:   active listening utilized   counseling provided   decision-making supported   verbalization of feelings encouraged     Problem: Comorbidity Management  Goal: Blood Pressure in Desired Range  Outcome: Progressing  Intervention: Maintain Blood Pressure Management  Recent Flowsheet Documentation  Taken 4/17/2024 1600 by Josephine Lindsay RN  Medication Review/Management: medications reviewed  Taken 4/17/2024 1200 by  Josephine Lindsay, RN  Medication Review/Management: medications reviewed  Taken 4/17/2024 0800 by Josephine Lindsay, RN  Medication Review/Management: medications reviewed

## 2024-04-19 PROCEDURE — 99233 SBSQ HOSP IP/OBS HIGH 50: CPT | Performed by: STUDENT IN AN ORGANIZED HEALTH CARE EDUCATION/TRAINING PROGRAM

## 2024-04-19 PROCEDURE — 250N000013 HC RX MED GY IP 250 OP 250 PS 637: Performed by: INTERNAL MEDICINE

## 2024-04-19 PROCEDURE — 99233 SBSQ HOSP IP/OBS HIGH 50: CPT | Performed by: NURSE PRACTITIONER

## 2024-04-19 PROCEDURE — 250N000011 HC RX IP 250 OP 636: Performed by: INTERNAL MEDICINE

## 2024-04-19 PROCEDURE — 250N000013 HC RX MED GY IP 250 OP 250 PS 637: Performed by: NURSE PRACTITIONER

## 2024-04-19 PROCEDURE — 250N000013 HC RX MED GY IP 250 OP 250 PS 637: Performed by: CLINICAL NURSE SPECIALIST

## 2024-04-19 PROCEDURE — 94640 AIRWAY INHALATION TREATMENT: CPT

## 2024-04-19 PROCEDURE — 250N000011 HC RX IP 250 OP 636: Performed by: CLINICAL NURSE SPECIALIST

## 2024-04-19 PROCEDURE — 250N000013 HC RX MED GY IP 250 OP 250 PS 637: Performed by: STUDENT IN AN ORGANIZED HEALTH CARE EDUCATION/TRAINING PROGRAM

## 2024-04-19 PROCEDURE — 250N000012 HC RX MED GY IP 250 OP 636 PS 637: Performed by: CLINICAL NURSE SPECIALIST

## 2024-04-19 PROCEDURE — 99418 PROLNG IP/OBS E/M EA 15 MIN: CPT | Performed by: NURSE PRACTITIONER

## 2024-04-19 PROCEDURE — 120N000001 HC R&B MED SURG/OB

## 2024-04-19 PROCEDURE — 250N000009 HC RX 250: Performed by: STUDENT IN AN ORGANIZED HEALTH CARE EDUCATION/TRAINING PROGRAM

## 2024-04-19 PROCEDURE — 99207 PR NO BILLABLE SERVICE THIS VISIT: CPT | Performed by: NURSE PRACTITIONER

## 2024-04-19 RX ORDER — CALCIUM CARBONATE 500 MG/1
1000 TABLET, CHEWABLE ORAL 3 TIMES DAILY PRN
Status: DISCONTINUED | OUTPATIENT
Start: 2024-04-19 | End: 2024-04-21

## 2024-04-19 RX ORDER — PREGABALIN 75 MG/1
75 CAPSULE ORAL 2 TIMES DAILY
Status: DISCONTINUED | OUTPATIENT
Start: 2024-04-19 | End: 2024-04-24

## 2024-04-19 RX ORDER — PREGABALIN 25 MG/1
25 CAPSULE ORAL ONCE
Status: COMPLETED | OUTPATIENT
Start: 2024-04-19 | End: 2024-04-19

## 2024-04-19 RX ORDER — CLINDAMYCIN PHOSPHATE 10 MG/G
GEL TOPICAL 2 TIMES DAILY
COMMUNITY
Start: 2024-04-19

## 2024-04-19 RX ORDER — MAGNESIUM HYDROXIDE/ALUMINUM HYDROXICE/SIMETHICONE 120; 1200; 1200 MG/30ML; MG/30ML; MG/30ML
30 SUSPENSION ORAL EVERY 4 HOURS PRN
Status: DISCONTINUED | OUTPATIENT
Start: 2024-04-19 | End: 2024-04-25 | Stop reason: HOSPADM

## 2024-04-19 RX ORDER — OXYCODONE HYDROCHLORIDE 5 MG/1
10 TABLET ORAL EVERY 4 HOURS PRN
Status: DISCONTINUED | OUTPATIENT
Start: 2024-04-19 | End: 2024-04-22

## 2024-04-19 RX ADMIN — LORAZEPAM 1 MG: 1 TABLET ORAL at 12:30

## 2024-04-19 RX ADMIN — METHYLPREDNISOLONE 4 MG: 4 TABLET ORAL at 21:41

## 2024-04-19 RX ADMIN — PANTOPRAZOLE SODIUM 40 MG: 40 TABLET, DELAYED RELEASE ORAL at 09:32

## 2024-04-19 RX ADMIN — METHYLPREDNISOLONE 4 MG: 4 TABLET ORAL at 12:15

## 2024-04-19 RX ADMIN — ACETAMINOPHEN 1000 MG: 500 TABLET, FILM COATED ORAL at 12:15

## 2024-04-19 RX ADMIN — MAGNESIUM 64 MG (MAGNESIUM CHLORIDE) TABLET,DELAYED RELEASE 535 MG: at 09:31

## 2024-04-19 RX ADMIN — DICLOFENAC SODIUM 4 G: 10 GEL TOPICAL at 19:52

## 2024-04-19 RX ADMIN — PREGABALIN 75 MG: 75 CAPSULE ORAL at 19:52

## 2024-04-19 RX ADMIN — ACETAMINOPHEN 1000 MG: 500 TABLET, FILM COATED ORAL at 19:02

## 2024-04-19 RX ADMIN — SENNOSIDES AND DOCUSATE SODIUM 2 TABLET: 8.6; 5 TABLET ORAL at 09:31

## 2024-04-19 RX ADMIN — OXYCODONE HYDROCHLORIDE 10 MG: 5 TABLET ORAL at 02:37

## 2024-04-19 RX ADMIN — BACLOFEN 10 MG: 10 TABLET ORAL at 09:32

## 2024-04-19 RX ADMIN — OXYCODONE HYDROCHLORIDE 10 MG: 5 TABLET ORAL at 21:42

## 2024-04-19 RX ADMIN — OXYCODONE HYDROCHLORIDE 10 MG: 5 TABLET ORAL at 16:29

## 2024-04-19 RX ADMIN — PREGABALIN 50 MG: 50 CAPSULE ORAL at 09:32

## 2024-04-19 RX ADMIN — ONDANSETRON 8 MG: 4 TABLET, ORALLY DISINTEGRATING ORAL at 09:44

## 2024-04-19 RX ADMIN — BUDESONIDE 0.5 MG: 0.5 INHALANT RESPIRATORY (INHALATION) at 08:03

## 2024-04-19 RX ADMIN — SENNOSIDES AND DOCUSATE SODIUM 2 TABLET: 8.6; 5 TABLET ORAL at 21:42

## 2024-04-19 RX ADMIN — LIDOCAINE: 50 OINTMENT TOPICAL at 23:23

## 2024-04-19 RX ADMIN — HYDROMORPHONE HYDROCHLORIDE 1 MG: 1 INJECTION, SOLUTION INTRAMUSCULAR; INTRAVENOUS; SUBCUTANEOUS at 09:40

## 2024-04-19 RX ADMIN — BACLOFEN 10 MG: 10 TABLET ORAL at 14:29

## 2024-04-19 RX ADMIN — BACLOFEN 10 MG: 10 TABLET ORAL at 21:41

## 2024-04-19 RX ADMIN — DICLOFENAC SODIUM 4 G: 10 GEL TOPICAL at 09:45

## 2024-04-19 RX ADMIN — LEVOTHYROXINE SODIUM 75 MCG: 0.07 TABLET ORAL at 09:31

## 2024-04-19 RX ADMIN — PREGABALIN 25 MG: 25 CAPSULE ORAL at 13:35

## 2024-04-19 RX ADMIN — ACETAMINOPHEN 1000 MG: 500 TABLET, FILM COATED ORAL at 03:47

## 2024-04-19 RX ADMIN — DICLOFENAC SODIUM 4 G: 10 GEL TOPICAL at 16:29

## 2024-04-19 RX ADMIN — LOSARTAN POTASSIUM 50 MG: 50 TABLET, FILM COATED ORAL at 09:32

## 2024-04-19 RX ADMIN — HYDROMORPHONE HYDROCHLORIDE 0.5 MG: 1 INJECTION, SOLUTION INTRAMUSCULAR; INTRAVENOUS; SUBCUTANEOUS at 19:03

## 2024-04-19 RX ADMIN — METHYLPREDNISOLONE 4 MG: 4 TABLET ORAL at 09:30

## 2024-04-19 RX ADMIN — CETIRIZINE HYDROCHLORIDE 10 MG: 10 TABLET, FILM COATED ORAL at 09:30

## 2024-04-19 RX ADMIN — ALUMINUM HYDROXIDE, MAGNESIUM HYDROXIDE, AND DIMETHICONE 30 ML: 200; 20; 200 SUSPENSION ORAL at 05:37

## 2024-04-19 ASSESSMENT — ACTIVITIES OF DAILY LIVING (ADL)
ADLS_ACUITY_SCORE: 22

## 2024-04-19 NOTE — PLAN OF CARE
"PRIMARY DIAGNOSIS: ACUTE and chronic shoulder  PAIN  OUTPATIENT/OBSERVATION GOALS TO BE MET BEFORE DISCHARGE:  1. Pain Status: Improved-controlled with oral pain medications.    2. Return to near baseline physical activity: Yes    3. Cleared for discharge by consultants (if involved): No    Discharge Planner Nurse   Safe discharge environment identified: Yes  Barriers to discharge: Yes       Entered by: Emma Quiroz RN 04/19/2024 2:34 PM     Please review provider order for any additional goals.   Nurse to notify provider when observation goals have been met and patient is ready for discharge.  Problem: Adult Inpatient Plan of Care  Goal: Plan of Care Review  Description: The Plan of Care Review/Shift note should be completed every shift.  The Outcome Evaluation is a brief statement about your assessment that the patient is improving, declining, or no change.  This information will be displayed automatically on your shift  note.  Outcome: Progressing  Flowsheets (Taken 4/19/2024 1403)  Outcome Evaluation: pain improving slightly  Plan of Care Reviewed With: patient  Overall Patient Progress: improving  Goal: Patient-Specific Goal (Individualized)  Description: You can add care plan individualizations to a care plan. Examples of Individualization might be:  \"Parent requests to be called daily at 9am for status\", \"I have a hard time hearing out of my right ear\", or \"Do not touch me to wake me up as it startles  me\".  Outcome: Progressing  Goal: Absence of Hospital-Acquired Illness or Injury  Outcome: Progressing  Intervention: Identify and Manage Fall Risk  Recent Flowsheet Documentation  Taken 4/19/2024 1351 by Emma Quiroz RN  Safety Promotion/Fall Prevention:   safety round/check completed   room organization consistent   room near nurse's station   clutter free environment maintained  Intervention: Prevent Skin Injury  Recent Flowsheet Documentation  Taken 4/19/2024 1351 by Emma Quiroz, " RN  Body Position: position changed independently  Intervention: Prevent and Manage VTE (Venous Thromboembolism) Risk  Recent Flowsheet Documentation  Taken 4/19/2024 1351 by Emma Quiroz RN  VTE Prevention/Management: SCDs (sequential compression devices) off  Intervention: Prevent Infection  Recent Flowsheet Documentation  Taken 4/19/2024 1351 by Emma Quiroz RN  Infection Prevention:   cohorting utilized   hand hygiene promoted   rest/sleep promoted   equipment surfaces disinfected  Goal: Optimal Comfort and Wellbeing  Outcome: Progressing  Intervention: Monitor Pain and Promote Comfort  Recent Flowsheet Documentation  Taken 4/19/2024 1041 by Emma Quiroz RN  Pain Management Interventions: medication (see MAR)  Goal: Readiness for Transition of Care  Outcome: Progressing     Problem: Pain Acute  Goal: Optimal Pain Control and Function  Outcome: Progressing  Intervention: Develop Pain Management Plan  Recent Flowsheet Documentation  Taken 4/19/2024 1041 by Emma Quiroz RN  Pain Management Interventions: medication (see MAR)  Intervention: Prevent or Manage Pain  Recent Flowsheet Documentation  Taken 4/19/2024 1351 by Emma Quiroz RN  Medication Review/Management: medications reviewed  Intervention: Optimize Psychosocial Wellbeing  Recent Flowsheet Documentation  Taken 4/19/2024 1351 by Emma Quiroz RN  Supportive Measures: active listening utilized     Problem: Comorbidity Management  Goal: Blood Pressure in Desired Range  Outcome: Progressing  Intervention: Maintain Blood Pressure Management  Recent Flowsheet Documentation  Taken 4/19/2024 1351 by Emma Quiroz RN  Medication Review/Management: medications reviewed  Goal: Maintenance of Asthma Control  Outcome: Progressing  Intervention: Maintain Asthma Symptom Control  Recent Flowsheet Documentation  Taken 4/19/2024 1351 by Emma Quiroz RN  Medication Review/Management: medications reviewed   Goal Outcome  Evaluation:      Plan of Care Reviewed With: patient    Overall Patient Progress: improvingOverall Patient Progress: improving    Outcome Evaluation: pain improving slightly

## 2024-04-19 NOTE — PLAN OF CARE
"Patient alert and oriented, VSS, wearing arm sling, ambulates SBA/Ind.  Patient assessing med changes, no acute needs seen or reported, will monitor        Problem: Adult Inpatient Plan of Care  Goal: Plan of Care Review  Description: The Plan of Care Review/Shift note should be completed every shift.  The Outcome Evaluation is a brief statement about your assessment that the patient is improving, declining, or no change.  This information will be displayed automatically on your shift  note.  Outcome: Progressing  Flowsheets (Taken 4/19/2024 1756)  Outcome Evaluation: Pain improving med changes being evaluted  Plan of Care Reviewed With: patient  Overall Patient Progress: improving  Goal: Patient-Specific Goal (Individualized)  Description: You can add care plan individualizations to a care plan. Examples of Individualization might be:  \"Parent requests to be called daily at 9am for status\", \"I have a hard time hearing out of my right ear\", or \"Do not touch me to wake me up as it startles  me\".  Outcome: Progressing  Goal: Absence of Hospital-Acquired Illness or Injury  Outcome: Progressing  Intervention: Identify and Manage Fall Risk  Recent Flowsheet Documentation  Taken 4/19/2024 1617 by Tod Canales RN  Safety Promotion/Fall Prevention:   activity supervised   assistive device/personal items within reach  Intervention: Prevent Skin Injury  Recent Flowsheet Documentation  Taken 4/19/2024 1617 by Tod Canales RN  Body Position: position changed independently  Skin Protection: transparent dressing maintained  Device Skin Pressure Protection:   absorbent pad utilized/changed   adhesive use limited  Intervention: Prevent and Manage VTE (Venous Thromboembolism) Risk  Recent Flowsheet Documentation  Taken 4/19/2024 1617 by Tod Canales RN  VTE Prevention/Management: SCDs (sequential compression devices) off  Intervention: Prevent Infection  Recent Flowsheet Documentation  Taken 4/19/2024 1617 by Tod Canales RN  Infection " Prevention:   cohorting utilized   hand hygiene promoted  Goal: Optimal Comfort and Wellbeing  Outcome: Progressing  Intervention: Monitor Pain and Promote Comfort  Recent Flowsheet Documentation  Taken 4/19/2024 1617 by Tod Canales RN  Pain Management Interventions: medication (see MAR)  Goal: Readiness for Transition of Care  Outcome: Progressing  Flowsheets (Taken 4/19/2024 1756)  Concerns to be Addressed: all concerns addressed in this encounter  Intervention: Mutually Develop Transition Plan  Recent Flowsheet Documentation  Taken 4/19/2024 1756 by Tod Canales RN  Concerns to be Addressed: all concerns addressed in this encounter     Problem: Pain Acute  Goal: Optimal Pain Control and Function  Outcome: Progressing  Intervention: Develop Pain Management Plan  Recent Flowsheet Documentation  Taken 4/19/2024 1617 by Tod Canales RN  Pain Management Interventions: medication (see MAR)  Intervention: Prevent or Manage Pain  Recent Flowsheet Documentation  Taken 4/19/2024 1617 by Tod Canales RN  Sensory Stimulation Regulation: care clustered  Medication Review/Management: medications reviewed  Intervention: Optimize Psychosocial Wellbeing  Recent Flowsheet Documentation  Taken 4/19/2024 1617 by Tod Canales RN  Supportive Measures: active listening utilized     Problem: Comorbidity Management  Goal: Blood Pressure in Desired Range  Outcome: Progressing  Intervention: Maintain Blood Pressure Management  Recent Flowsheet Documentation  Taken 4/19/2024 1617 by Tod Canales RN  Medication Review/Management: medications reviewed  Goal: Maintenance of Asthma Control  Outcome: Progressing  Intervention: Maintain Asthma Symptom Control  Recent Flowsheet Documentation  Taken 4/19/2024 1617 by Tod Canales RN  Medication Review/Management: medications reviewed   Goal Outcome Evaluation:      Plan of Care Reviewed With: patient    Overall Patient Progress: improvingOverall Patient Progress: improving    Outcome Evaluation: Pain improving  med changes being evaluted

## 2024-04-19 NOTE — PLAN OF CARE
"[id number]    Inpatient Progress Note:  A & O X 4. Lung sounds clear bilaterally to auscultation. No crackles, rales, or wheezes auscultated. Denies chills, shortness of breath, lightheadedness, nausea, vomit, or diarrhea. Bowel sounds present all quads and active . Scheduled Senna-Docusate given for constipation. Patient reported passing flatus. Voiding spontaneously without difficulty.Independent in the room. Peripheral IV saline lock.  Admitting DX: Acute posterior shoulder pain. Patient on scheduled Medrol. Pain is managed with scheduled Tylenol, gabapentin , Lidocaine ointment, Capsaicin cream, Lyrica, Voltaren gel. Prn Oxycodone, Ketamine, Atarax, and Dilaudid  for pain management-See MAR. On scheduled baclofen. Patient C/O pain rating at 9-10 out of 10. Prn Oxycodone given X 1, Ketamine given X 1-See MAR. Ice pack/ heating pad offered. Patient reported relief after prn pain medications given. Prn ativan available for anxiety. MRI right shoulder completed, results pending. Orthopedic recommended non-surgical intervention. Afebrile. Pain Services following.  BP (!) 143/67 (BP Location: Left arm)   Pulse 54   Temp 98.8  F (37.1  C) (Oral)   Resp 16   Ht 1.626 m (5' 4\")   Wt 59 kg (130 lb)   SpO2 95%   BMI 22.31 kg/m     Will continue to provide supportive cares.   Marlyn Mark RN   Problem: Adult Inpatient Plan of Care  Goal: Plan of Care Review  Description: The Plan of Care Review/Shift note should be completed every shift.  The Outcome Evaluation is a brief statement about your assessment that the patient is improving, declining, or no change.  This information will be displayed automatically on your shift  note.  Outcome: Progressing  Flowsheets (Taken 4/19/2024 0059)  Plan of Care Reviewed With: patient  Goal: Patient-Specific Goal (Individualized)  Description: You can add care plan individualizations to a care plan. Examples of Individualization might be:  \"Parent requests to be called daily " "at 9am for status\", \"I have a hard time hearing out of my right ear\", or \"Do not touch me to wake me up as it startles  me\".  Outcome: Progressing  Goal: Absence of Hospital-Acquired Illness or Injury  Outcome: Progressing  Intervention: Identify and Manage Fall Risk  Recent Flowsheet Documentation  Taken 4/18/2024 2027 by Marlyn Mark RN  Safety Promotion/Fall Prevention:   safety round/check completed   room organization consistent   room near nurse's station   clutter free environment maintained  Intervention: Prevent Skin Injury  Recent Flowsheet Documentation  Taken 4/18/2024 2027 by Marlyn Mark RN  Body Position: position changed independently  Device Skin Pressure Protection: adhesive use limited  Intervention: Prevent and Manage VTE (Venous Thromboembolism) Risk  Recent Flowsheet Documentation  Taken 4/18/2024 2027 by Marlyn Mark RN  VTE Prevention/Management: SCDs (sequential compression devices) off  Intervention: Prevent Infection  Recent Flowsheet Documentation  Taken 4/18/2024 2027 by Marlyn Mark RN  Infection Prevention:   cohorting utilized   hand hygiene promoted   rest/sleep promoted   equipment surfaces disinfected  Goal: Optimal Comfort and Wellbeing  Outcome: Progressing  Intervention: Monitor Pain and Promote Comfort  Recent Flowsheet Documentation  Taken 4/18/2024 2304 by Marlyn Mark RN  Pain Management Interventions: medication (see MAR)  Taken 4/18/2024 2303 by Marlyn Mark RN  Pain Management Interventions: medication (see MAR)  Taken 4/18/2024 2119 by Marlyn Mark RN  Pain Management Interventions: medication (see MAR)  Goal: Readiness for Transition of Care  Outcome: Progressing     Problem: Pain Acute  Goal: Optimal Pain Control and Function  Outcome: Progressing  Intervention: Develop Pain Management Plan  Recent Flowsheet Documentation  Taken 4/18/2024 2304 by Marlyn Mark RN  Pain Management Interventions: medication " (see MAR)  Taken 4/18/2024 2303 by Marlyn Mark RN  Pain Management Interventions: medication (see MAR)  Taken 4/18/2024 2119 by Marlyn Mark RN  Pain Management Interventions: medication (see MAR)  Intervention: Prevent or Manage Pain  Recent Flowsheet Documentation  Taken 4/18/2024 2027 by Marlyn Mark RN  Medication Review/Management: medications reviewed  Intervention: Optimize Psychosocial Wellbeing  Recent Flowsheet Documentation  Taken 4/18/2024 2027 by Marlyn Mark RN  Supportive Measures: active listening utilized     Problem: Comorbidity Management  Goal: Blood Pressure in Desired Range  Outcome: Progressing  Intervention: Maintain Blood Pressure Management  Recent Flowsheet Documentation  Taken 4/18/2024 2027 by Marlyn Mark RN  Medication Review/Management: medications reviewed  Goal: Maintenance of Asthma Control  Outcome: Progressing  Intervention: Maintain Asthma Symptom Control  Recent Flowsheet Documentation  Taken 4/18/2024 2027 by Marlyn Mark RN  Medication Review/Management: medications reviewed       Plan of Care Reviewed With: patient                  no

## 2024-04-19 NOTE — PLAN OF CARE
"[id number]    Inpatient Progress Note:  A & O X 4. Lung sounds clear bilaterally to auscultation. No crackles, rales, or wheezes auscultated. Denies chills, shortness of breath, lightheadedness, nausea, vomit, or diarrhea. Bowel sounds present all quads and active . Scheduled Senna-Docusate given for constipation. Patient reported passing flatus. Voiding spontaneously without difficulty.Independent in the room. Peripheral IV saline lock.  Admitting DX: Acute posterior shoulder pain. Patient on scheduled Medrol. Pain is managed with scheduled Tylenol, gabapentin , Lidocaine ointment, Capsaicin cream, Lyrica, Voltaren gel. Prn Oxycodone, Ketamine, Atarax, and Dilaudid  for pain management-See MAR. On scheduled baclofen. Patient C/O pain rating at 9-10 out of 10. Prn Oxycodone given X 1, Ketamine given X 1-See MAR, Prn Maalox give for Epigastric pain.. Ice pack/ heating pad offered. Patient reported relief after prn pain medications given. Prn ativan available for anxiety. MRI right shoulder completed, results pending. Orthopedic recommended non-surgical intervention. Afebrile. Pain Services following.  /73   Pulse (!) 48   Temp 98.6  F (37  C) (Oral)   Resp 18   Ht 1.626 m (5' 4\")   Wt 59 kg (130 lb)   SpO2 98%   BMI 22.31 kg/m     Will continue to provide supportive cares.   Marlyn Mark RN   Problem: Adult Inpatient Plan of Care  Goal: Plan of Care Review  Description: The Plan of Care Review/Shift note should be completed every shift.  The Outcome Evaluation is a brief statement about your assessment that the patient is improving, declining, or no change.  This information will be displayed automatically on your shift  note.  Outcome: Progressing  Flowsheets (Taken 4/19/2024 0059)  Plan of Care Reviewed With: patient  Goal: Patient-Specific Goal (Individualized)  Description: You can add care plan individualizations to a care plan. Examples of Individualization might be:  \"Parent requests to be " "called daily at 9am for status\", \"I have a hard time hearing out of my right ear\", or \"Do not touch me to wake me up as it startles  me\".  Outcome: Progressing  Goal: Absence of Hospital-Acquired Illness or Injury  Outcome: Progressing  Intervention: Identify and Manage Fall Risk  Recent Flowsheet Documentation  Taken 4/18/2024 2027 by Marlyn Mark RN  Safety Promotion/Fall Prevention:   safety round/check completed   room organization consistent   room near nurse's station   clutter free environment maintained  Intervention: Prevent Skin Injury  Recent Flowsheet Documentation  Taken 4/18/2024 2027 by Marlyn Mark RN  Body Position: position changed independently  Device Skin Pressure Protection: adhesive use limited  Intervention: Prevent and Manage VTE (Venous Thromboembolism) Risk  Recent Flowsheet Documentation  Taken 4/18/2024 2027 by Marlyn Mark RN  VTE Prevention/Management: SCDs (sequential compression devices) off  Intervention: Prevent Infection  Recent Flowsheet Documentation  Taken 4/18/2024 2027 by Marlyn Mark RN  Infection Prevention:   cohorting utilized   hand hygiene promoted   rest/sleep promoted   equipment surfaces disinfected  Goal: Optimal Comfort and Wellbeing  Outcome: Progressing  Intervention: Monitor Pain and Promote Comfort  Recent Flowsheet Documentation  Taken 4/18/2024 2304 by Marlyn Mark RN  Pain Management Interventions: medication (see MAR)  Taken 4/18/2024 2303 by Marlyn Mark RN  Pain Management Interventions: medication (see MAR)  Taken 4/18/2024 2119 by Marlyn Mark RN  Pain Management Interventions: medication (see MAR)  Goal: Readiness for Transition of Care  Outcome: Progressing     Problem: Pain Acute  Goal: Optimal Pain Control and Function  Outcome: Progressing  Intervention: Develop Pain Management Plan  Recent Flowsheet Documentation  Taken 4/18/2024 2304 by Marlyn Mark RN  Pain Management Interventions: " medication (see MAR)  Taken 4/18/2024 2303 by Marlyn Mark RN  Pain Management Interventions: medication (see MAR)  Taken 4/18/2024 2119 by Marlyn Mark RN  Pain Management Interventions: medication (see MAR)  Intervention: Prevent or Manage Pain  Recent Flowsheet Documentation  Taken 4/18/2024 2027 by Marlyn Mark RN  Medication Review/Management: medications reviewed  Intervention: Optimize Psychosocial Wellbeing  Recent Flowsheet Documentation  Taken 4/18/2024 2027 by Marlyn Mark RN  Supportive Measures: active listening utilized     Problem: Comorbidity Management  Goal: Blood Pressure in Desired Range  Outcome: Progressing  Intervention: Maintain Blood Pressure Management  Recent Flowsheet Documentation  Taken 4/18/2024 2027 by Marlyn Mark RN  Medication Review/Management: medications reviewed  Goal: Maintenance of Asthma Control  Outcome: Progressing  Intervention: Maintain Asthma Symptom Control  Recent Flowsheet Documentation  Taken 4/18/2024 2027 by Marlyn Mark RN  Medication Review/Management: medications reviewed       Plan of Care Reviewed With: patient

## 2024-04-19 NOTE — CONSULTS
Care Management Discharge Note    Discharge Date: 04/20/2024     Discharge Disposition:  Home     Additional Information:  CM consulted to help arrange hospital follow-up appointment for pt. Spoke with pt who would like a Tuesday or Thursday appointment. Appointment scheduled with PCP for Thursday 4/25 at 1045, AVS updated. Pt denied other needs.     Leslie Haines RN BSN   Inpatient Care Coordination  [facility]   Phone [phone]

## 2024-04-19 NOTE — PROGRESS NOTES
[facility]    Medicine Progress Note - Hospitalist Service    Date of Admission:  4/15/2024  Date of Service: 04/19/2024    Assessment & Plan      Brief summary of admission assessment:Machelle Weiner is a 49 year old  female with a significant past medical history of chronic pain ,GRIFFITH,  TIMMY, hypertension, hyperlipidemia, and migraines who presents with right posterior shoulder pain.  She was complaining of severe sharp pain on the right radiating to the right upper extremity.  She is to have cervical radiculopathy for which she has been getting epidural steroid injections and pain clinic.     ED evaluation revealed anxiety, hyperventilation with bicarb of 15.  CT scan of the chest negative for acute intrathoracic pathology including PE or dissection.     Patient received multiple medications in the emergency department including 5 mg IV Valium, 0.5 mg of hydromorphone twice and her pain did not improve significantly.     #1.  Acute intractable pain involving the right shoulder, right posterior upper chest, back and shoulder blade area.  -- History of degenerative disc disease C4-C5, C5-C6, and C6-C7 on MRI study of 2021.  -- CT scan with contrast did not reveal any acute intrathoracic pathology  -- Patient has chronic pain but does not use narcotic medications at home  Plan:  -- Pain service consulted -> medrol pack started / gabapentin and Lyrica being considered  -- Orthopedic surgery consulted -> non surgical   -- MR Cervical and Thoracic overall unrevealing aside from chronic findings   -- MR Shoulder and MR Scapula -> Minimal subacromial spurring with trace subacromial subdeltoid bursal fluid. Mild supraspinatus tendinosis. No rotator cuff tendon tear. Small amount of fluid in the subscapular recess of the glenohumeral joint No acute fracture or dislocation.  -- Will need outpatient follow up with orthopedic surgery   -- Medrol trial per pain service  -- Patient needs to  follow-up with her pain management team as an outpatient as well.    #2.  Severe anxiety  --She has history of depression and generalized anxiety disorder but is not on antianxiety medication.  She was very emotional and her new right sided pain is causing her a lot of distress.  She wants to get more testing to figure out the etiology of her condition.  Plan:  -- Was treated with Ativan as needed.   -- Patient may benefit from scheduled antianxiety medication that can be done as an outpatient with her primary care provider.             Diet: Regular Diet Adult    DVT Prophylaxis: Pneumatic Compression Devices  [place]idan Catheter: Not present  Lines: None     Cardiac Monitoring: None  Code Status: Full CodeFULL CODE    Clinically Significant Risk Factors                  # Hypertension: Noted on problem list            # Asthma: noted on problem list        Disposition Plan     Medically Ready for Discharge: Anticipated Tomorrow           Yovany Livingston MD  Hospitalist Service  [facility]  Securely message with [employer] (more info)  Text page via @Pay Paging/Directory   ______________________________________________________________________    Interval History     Still with very significant pain in LUE / scaputlar region shoulder and neck with radiculopathy in left arm. Overall feels so severe to be discharged home  Tearful and had panic attack this AM but better this afternoon  Continues to need  IV pain meds  No fevers  No CP/SOB  No nausea / vomiting   No new complaints    Physical Exam   Vital Signs: Temp: 98.8  F (37.1  C) Temp src: Oral BP: 133/73 Pulse: 64   Resp: 18 SpO2: 95 % O2 Device: None (Room air)    Weight: 130 lbs 0 oz    GEN:  no apparent distress  RESP: no accessory muscle use  MSK: ROM of RUE intact. No swelling noted.   Neurologic:Grossly intact,non focal. Tangela.  Neuropsychiatric: normal mood and  affect  ----------------------------------------------------------------------------------------    Medical Decision Making       50 MINUTES SPENT BY ME on the date of service doing chart review, history, exam, documentation & further activities per the note.      Data   ------------------------- PAST 24 HR DATA REVIEWED -----------------------------------------------        Imaging results reviewed over the past 24 hrs:   Recent Results (from the past 24 hour(s))   MR Shoulder Right w/o Contrast    Narrative    EXAM: MR SHOULDER RIGHT W/O CONTRAST, MR SCAPULA RIGHT W/O CONTRAST  LOCATION: [facility]  DATE: 4/19/2024    INDICATION: Severe uncontrolled shoulder and scapular pain; Shoulder pain; Cervical radiculopathy without rotator cuff injury; No known automatically detected potential contraindications to MRI  COMPARISON: None.  TECHNIQUE: Unenhanced.    FINDINGS:    ROTATOR CUFF:  -Supraspinatus: Mild supraspinatus tendinosis. No tendon tear. No fatty atrophy.  -Infraspinatus: No tendon tear, tendinopathy or fatty atrophy.  -Subscapularis: No tendon tear, tendinopathy or fatty atrophy.  -Teres minor: No tendon tear, tendinopathy or fatty atrophy.    CORACOACROMIAL ARCH:  -Morphology: Type II acromion. Minimal subacromial spurring. Subacromial and subcoracoid space are normal.   -Bursa: Trace subacromial subdeltoid bursal fluid. No significant subcoracoid bursal fluid.    ACROMIOCLAVICULAR JOINT:   -Minimal degenerative changes. No significant effusion.     LONG HEAD OF BICEPS TENDON:   -No tendinopathy or tear. No tenosynovitis or subluxation.    GLENOHUMERAL JOINT:   -Labrum: Smooth linear hyperintensity undercutting the anterosuperior labrum, most likely subsegmental foramen. No evidence for labral tear. No paralabral cyst.   -Cartilage: Normal.   -Joint space: Small amount of fluid in the subscapular recess. Otherwise, no significant joint effusion. No synovitis.  -Glenohumeral ligaments  and capsule: No pericapsular inflammation.    BONES:   -No fracture or concerning marrow replacing lesion.  - The scapula appears normal.    SOFT TISSUES:   -Normal deltoid muscle bulk.  - Normal visualized chest wall and axilla.      Impression    IMPRESSION:  1.  Minimal subacromial spurring with trace subacromial subdeltoid bursal fluid. Correlate for subacromial impingement.    2.  Mild supraspinatus tendinosis. No rotator cuff tendon tear.    3.  Small amount of fluid in the subscapular recess of the glenohumeral joint.    4.  No acute fracture or dislocation.   MR Scapula Right w/o Contrast    Narrative    EXAM: MR SHOULDER RIGHT W/O CONTRAST, MR SCAPULA RIGHT W/O CONTRAST  LOCATION: [facility]  DATE: 4/19/2024    INDICATION: Severe uncontrolled shoulder and scapular pain; Shoulder pain; Cervical radiculopathy without rotator cuff injury; No known automatically detected potential contraindications to MRI  COMPARISON: None.  TECHNIQUE: Unenhanced.    FINDINGS:    ROTATOR CUFF:  -Supraspinatus: Mild supraspinatus tendinosis. No tendon tear. No fatty atrophy.  -Infraspinatus: No tendon tear, tendinopathy or fatty atrophy.  -Subscapularis: No tendon tear, tendinopathy or fatty atrophy.  -Teres minor: No tendon tear, tendinopathy or fatty atrophy.    CORACOACROMIAL ARCH:  -Morphology: Type II acromion. Minimal subacromial spurring. Subacromial and subcoracoid space are normal.   -Bursa: Trace subacromial subdeltoid bursal fluid. No significant subcoracoid bursal fluid.    ACROMIOCLAVICULAR JOINT:   -Minimal degenerative changes. No significant effusion.     LONG HEAD OF BICEPS TENDON:   -No tendinopathy or tear. No tenosynovitis or subluxation.    GLENOHUMERAL JOINT:   -Labrum: Smooth linear hyperintensity undercutting the anterosuperior labrum, most likely subsegmental foramen. No evidence for labral tear. No paralabral cyst.   -Cartilage: Normal.   -Joint space: Small amount of fluid in the  subscapular recess. Otherwise, no significant joint effusion. No synovitis.  -Glenohumeral ligaments and capsule: No pericapsular inflammation.    BONES:   -No fracture or concerning marrow replacing lesion.  - The scapula appears normal.    SOFT TISSUES:   -Normal deltoid muscle bulk.  - Normal visualized chest wall and axilla.      Impression    IMPRESSION:  1.  Minimal subacromial spurring with trace subacromial subdeltoid bursal fluid. Correlate for subacromial impingement.    2.  Mild supraspinatus tendinosis. No rotator cuff tendon tear.    3.  Small amount of fluid in the subscapular recess of the glenohumeral joint.    4.  No acute fracture or dislocation.       ------------------------- ENCOUNTER LABS ----------------------------------------------------------------  Recent Labs   Lab 04/15/24  1134   WBC 8.7   HGB 13.2   MCV 85         POTASSIUM 3.9   CHLORIDE 108*   CO2 15*   BUN 11.0   CR 0.96*   ANIONGAP 15   BETINA 9.4   GLC 92   ALBUMIN 4.4   PROTTOTAL 7.6   BILITOTAL 0.2   ALKPHOS 97   ALT 14   AST 22       Most Recent 3 CBC's:  Recent Labs   Lab Test 04/15/24  1134 08/29/22  1703 08/16/22  1514   WBC 8.7 10.9 11.5*   HGB 13.2 11.9 12.2   MCV 85 93 93    294 293     Most Recent 3 BMP's:  Recent Labs   Lab Test 04/15/24  1134 08/16/22  1514 03/22/22  1627    137 137   POTASSIUM 3.9 3.9 4.0   CHLORIDE 108* 105 108   CO2 15* 20* 25   BUN 11.0 6.8 10   CR 0.96* 0.77 1.02   ANIONGAP 15 12 4   BETINA 9.4 8.9 9.3   GLC 92 82 86     Most Recent 2 LFT's:  Recent Labs   Lab Test 04/15/24  1134 12/15/22  1233   AST 22 83*   ALT 14 98*   ALKPHOS 97 134*   BILITOTAL 0.2 0.2     Most Recent 3 INR's:  Recent Labs   Lab Test 08/29/22  1703 12/16/17  1725   INR 1.04 0.95     Most Recent 3 Troponin's:  Recent Labs   Lab Test 01/11/17  2110 10/03/16  1749   TROPI <0.015  The 99th percentile for upper reference range is 0.045 ug/L.  Troponin values in   the range of 0.045 - 0.120 ug/L may be  associated with risks of adverse   clinical events.   <0.015  The 99th percentile for upper reference range is 0.045 ug/L.  Troponin values in   the range of 0.045 - 0.120 ug/L may be associated with risks of adverse   clinical events.       Most Recent 3 BNP's:No lab results found.  Most Recent D-dimer:  Recent Labs   Lab Test 04/15/24  1134   DD 0.54*     Most Recent Cholesterol Panel:  Recent Labs   Lab Test 11/07/23  1528   CHOL 196   *   HDL 45*   TRIG 91     Most Recent 6 Bacteria Isolates From Any Culture (See EPIC Reports for Culture Details):  Recent Labs   Lab Test 05/15/18  1325   CULT No beta hemolytic Streptococcus Group A isolated     Most Recent TSH and T4:  Recent Labs   Lab Test 11/07/23  1528 04/19/22  1545 03/22/21  1754   TSH 1.78   < > 5.90*   T4  --   --  0.80    < > = values in this interval not displayed.     Most Recent Urinalysis:  Recent Labs   Lab Test 08/29/22  1705   COLOR Yellow   APPEARANCE Clear   URINEGLC Negative   URINEBILI Negative   URINEKETONE Negative   SG 1.022   UBLD Negative   URINEPH 5.5   PROTEIN Negative   NITRITE Negative   LEUKEST Negative   RBCU 2   WBCU 1     Most Recent ESR & CRP:  Recent Labs   Lab Test 04/15/24  1134 08/16/22  1514 03/22/22  1627   SED  --   --  40*   CRP  --   --  8.7*   CRPI <3.00   < >  --     < > = values in this interval not displayed.

## 2024-04-19 NOTE — PROVIDER NOTIFICATION
Paged X-Cover Provider regarding patient C/O epigastric pain rating at5- 7 out of 10. Denies chest pain.  Obtained Tums , and Maalox orders -See MAR

## 2024-04-19 NOTE — PROGRESS NOTES
[facility] ACUTE PAIN SERVICE    [facility]   Daily PAIN Progress Note        Securely message with the [employer] Web Console (learn more here)  (When I saw the patient): 04/19/24  Assessment/Plan:  Assessment/Plan:  Machelle Weiner 49-year-old admitted on 4/15/2024 seeing in follow up for   Right shoulder and upper trapezius pain.  This is in the setting of chronic pain related to epigastric pain and GERD, degenerative disc disease, GRIFFITH.  Repeat of MRI imaging of thoracic spine and cervical spine indicate stable to 3 left-sided facet arthroscopy and T4-5,  C3-4,4-5, C5-6 mild canal stenosis and moderate at C5-6 and 6-7.  Today pain is somewhat improved but patient rubs shoulder and arm still having radicular pain.  Reports localized tenderness mid scapula area.  MRI of the right scapula and shoulder are pending.  She is asking for trigger point injections and I have pursued this with interventional radiology and it would appear there is no one available to do these as inpatient and was advised by staff to schedule as outpatient procedure.     Opioid risk assessment= moderate due to medication reliant behavior.  Use of polypharmacy, comorbid conditions  Creatinine clearance 68.8 mL/min  [place]  reviewed noting no controlled substances in the past 12 months  Opioid use this past 24 hours= oxycodone 10 mg (4), 5 mg (1), hydromorphone 1 mg (1)= 45 mg morphine equivalent.  Adjuvants: Methocarbamol 750 mg (3), hydroxyzine 50 mg (1), acetaminophen 1000 mg (3).  Medrol Dosepak 4 mg x 3 doses then discontinue     PLAN:  Acute pain secondary to muscle spasms with radiculopathy due to cervical spinal degenerative disc disease.    Multimodal Medication Therapy:   Adjuvants: Tylenol 1000 mg every 6 hours, Voltaren topical gel qid, lidocaine patch administer for 12 hours one dose, add lidocaine cream qid,  baclofen 10 mg 3 times daily  continue medrol dosepak, Lorazepam 1 tablets every four hours prn  (Hospital Medicine), vistaril 50 mg every 6 hours prn,  capsacin cream qid Lyrica change from 50 mg bid to 75 mg bid one dose of 25 mg now  Ketamine oral solution 10 mg now then every 6 hours as needed ok to continue til discharge  Opioids: Oral oxycodone 10 to 15 mg every 3 hours as needed today change to 10 mg Q4 hrs prn   IV hydromorphone 0.5-1mg every 2 hours prn, Stop 1 mg    Non-medication interventions- Ice prn sling to off load trapezius and put in neutal position  Constipation Prophylaxis- daily stool softener/laxative   Follow up /Discharge Recommendations - We recommend prescribing the following at the time of discharge:    Medications:  Topicals diclofenac, lidocaine patch   Lyrica 75 mg bid,  Baclofen 10 mg tid   No Ketamine   Prednisone 4 mg at hs x 1 days (completing Medrol dose pack)  Opioids   consider short course of Oxycodone 10 mg (#12)  (3 days) with f/up with PCP 3-4 days post discharge   PT referral, myofascial therapy  Orthopedic referral       Subjective:  Upset tearful  severely distressed, in too much pain to discharge.  Needs pain plan for home.    Does not feel hear or listened too  Understand no surgical indication for shoulder and scapula and result MSK some degenerative  some bursitis ( fluid packet subdeltoid bursa and Glenohumeral joint subscapular   Also understands out patient follow up with orthopedics for above finding and agrees.  Still circling back to poor pain control and can not go home like this  Agrees to contact PCP for follow up appointment today.  Rating pain as severe 10/10 radiating down arm  [place] Ketamine calmmed her not sure if helped pain or if Lyrica or baclofen helping     <principal problem not specified>   Patient Active Problem List   Diagnosis    Asthma, mild intermittent    Hyperlipidemia LDL goal <160    Essential hypertension, benign    Acquired hypothyroidism    S/P laparoscopic cholecystectomy    Morbid obesity (H)    Migraine    Facet arthropathy,  lumbosacral    DDD (degenerative disc disease), lumbosacral    Leg length discrepancy    Positive LYUDMILA (antinuclear antibody)    Multiple joint pain    Chronic pain of both knees    Hip pain    Mood disorder (H24)    Intractable pain    Acute pain of right shoulder        History   Drug Use No         Tobacco Use      Smoking status: Former        Packs/day: 0.00        Years: 0.3 packs/day for 15.0 years (4.5 ttl pk-yrs)        Types: Cigarettes        Start date: 2000        Quit date: 2015        Years since quittin.1      Smokeless tobacco: Never        Current Facility-Administered Medications   Medication Dose Route Frequency Provider Last Rate Last Admin    acetaminophen (TYLENOL) tablet 1,000 mg  1,000 mg Oral Q8H Tim Chapin MD   1,000 mg at 24 0347    baclofen (LIORESAL) tablet 10 mg  10 mg Oral TID Abigail Moscoso APRN CNP   10 mg at 24 0932    budesonide (PULMICORT) neb solution 2 mg  2 mg Nebulization Daily Yovany Livingston MD   0.5 mg at 24 0803    capsaicin (ZOSTRIX) 0.025 % cream   Topical TID Sharla Márquez APRN CNS   Given at 24 1501    cetirizine (zyrTEC) tablet 10 mg  10 mg Oral Daily Tim Chapin MD   10 mg at 24 0930    diclofenac (VOLTAREN) 1 % topical gel 4 g  4 g Topical 4x Daily Abigail Moscoso APRN CNP   4 g at 24 0945    levothyroxine (SYNTHROID/LEVOTHROID) tablet 75 mcg  75 mcg Oral Daily Tim Chapin MD   75 mcg at 24 0931    lidocaine (XYLOCAINE) 5 % ointment   Topical 4x Daily Sharla Márquez APRN CNS   Given at 24 0859    losartan (COZAAR) tablet 50 mg  50 mg Oral Daily Tim Chapin MD   50 mg at 24 0932    magnesium chloride CR tablet 535 mg  535 mg Oral Daily Sharla Márquez APRN CNS   535 mg at 24 0931    methylPREDNISolone (MEDROL) tablet 4 mg  4 mg Oral QAM AC [place], Sharla R, APRN CNS   4 mg at 24 0930    And    methylPREDNISolone  "(MEDROL) tablet 4 mg  4 mg Oral Daily with lunch Sharla Márquez APRN CNS   4 mg at 04/18/24 1232    And    methylPREDNISolone (MEDROL) tablet 4 mg  4 mg Oral At Bedtime Sharla Márquez APRN CNS   4 mg at 04/18/24 2203    pantoprazole (PROTONIX) EC tablet 40 mg  40 mg Oral QAM Tim Wolf MD   40 mg at 04/19/24 0932    pregabalin (LYRICA) capsule 25 mg  25 mg Oral Once Abigail Moscoso APRN CNP        pregabalin (LYRICA) capsule 75 mg  75 mg Oral BID Abigail Moscoso APRN CNP        senna-docusate (SENOKOT-S/PERICOLACE) 8.6-50 MG per tablet 2 tablet  2 tablet Oral BID Yovany Livingston MD   2 tablet at 04/19/24 0931       Objective:  Vital signs in last 24 hours:  B/P: 149/93, T: 97.9, P: 55, R: 20   Blood pressure (!) 149/93, pulse 55, temperature 97.9  F (36.6  C), temperature source Oral, resp. rate 20, height 1.626 m (5' 4\"), weight 59 kg (130 lb), SpO2 100%, not currently breastfeeding.      Weight:   Wt Readings from Last 3 Encounters:   04/15/24 59 kg (130 lb)   11/07/23 103.5 kg (228 lb 3.2 oz)   10/03/23 107.2 kg (236 lb 6.4 oz)           Intake/Output:  No intake or output data in the 24 hours ending 04/19/24 1211     Review of Systems:   As per subjective, all others negative.    Physical Exam:     General Appearance:  Alert, cooperative, is in significant distress distress. Patient is pleasent grooming is good   Head:  Normocephalic, without obvious abnormality, atraumatic   Eyes:   Conjunctiva/corneas clear, EOM's intact   ENT/Throat: Lips, mouth moist    Lymph/Neck: Symmetrical, trachea midline, no adenopathy, thyroid: not enlarged, symmetric    Lungs:   Clear to auscultation bilaterally, respirations unlabored, even chest rise. Symmetrical movement    Chest Wall:  No tenderness or deformity   Cardiovascular/Heart:  Regular rate and rhythm, S1, S2 normal,no murmur, rub or gallop.     Abdomen:   Soft, non-tender, bowel sounds active all four quadrants,  no " masses, no organomegaly   Musculoskeletal: Extremities normal, atraumatic, guarding right arm and rubbing   Incision none   Skin: Skin color good, lesions  none    Neurologic: Alert and oriented X 3, Moves all 4 extremities        Psych: Affect is anxious distressed  aberrant pain behaviors, No             Imaging:  Personally Reviewed.       Results for orders placed or performed during the hospital encounter of 04/15/24   CT Chest Pulmonary Embolism w Contrast    Impression    IMPRESSION:  1. No evidence for pulmonary embolism.  2. Mild mosaic attenuation in both lungs suggests air trapping.  3. Indeterminate 0.5 cm right upper lobe pulmonary nodule. Please  refer to follow-up guidelines below.    Recommendations for one or multiple incidental lung nodules < 6mm :    Low risk patients: No routine follow-up.    High risk patients: Optional follow-up CT at 12 months; if  unchanged, no further follow-up.    *Low Risk: Minimal or absent history of smoking or other known risk  factors.  *Nonsolid (ground glass) or partly solid nodules may require longer  follow-up to exclude indolent adenocarcinoma.  *Recommendations based on Guidelines for the Management of Incidental  Pulmonary Nodules Detected at CT: From the Fleischner Society 2017,  Radiology 2017.  *Guidelines apply to incidental nodules in patients who are 35 years  or older.  *Guidelines do not apply to lung cancer screening, patients with  immunosuppression, or patients with known primary cancer.    GORDON CLARK MD         SYSTEM ID:  [id number]   MR Cervical Spine w/o Contrast    Impression    IMPRESSION:    1. Degenerative changes in the mid cervical spine as detailed above.  Degenerative findings are progressed since prior MR 3/17/2021.  2. Mild spinal canal narrowings at C3-C4, C4-C5, and C5-C6.  3. Moderate bilateral neural foraminal narrowings at C5-C6. Mild left  neural foraminal narrowing at C6-C7.     RAS FISHER MD         SYSTEM ID:  [id number]    MR Thoracic Spine w/o Contrast    Impression    IMPRESSION:    1.  Multilevel degenerative changes throughout the thoracic spine as  detailed above.  2.  Degenerative disc changes throughout the mid and lower thoracic  spine without significant disc herniation. No significant spinal canal  narrowing.  3.  Asymmetric left-sided facet hypertrophy at T2-T3 and T4-T5 causing  moderate left neural foraminal narrowings.  4.  Indeterminate subcentimeter lesion in the T8 vertebral body. This  does not appear significantly changed since 12/20/2022. Stability is  reassuring.  5.  Degenerative findings are similar to prior MR 12/20/2022.    RAS FISHER MD         SYSTEM ID:  [id number]   MR Scapula Right w/o Contrast    Impression    IMPRESSION:  1.  Minimal subacromial spurring with trace subacromial subdeltoid bursal fluid. Correlate for subacromial impingement.    2.  Mild supraspinatus tendinosis. No rotator cuff tendon tear.    3.  Small amount of fluid in the subscapular recess of the glenohumeral joint.    4.  No acute fracture or dislocation.   MR Shoulder Right w/o Contrast    Impression    IMPRESSION:  1.  Minimal subacromial spurring with trace subacromial subdeltoid bursal fluid. Correlate for subacromial impingement.    2.  Mild supraspinatus tendinosis. No rotator cuff tendon tear.    3.  Small amount of fluid in the subscapular recess of the glenohumeral joint.    4.  No acute fracture or dislocation.          Lab Results:  Personally Reviewed.   Last Comprehensive Metabolic Panel:  Sodium   Date Value Ref Range Status   04/15/2024 138 135 - 145 mmol/L Final     Comment:     Reference intervals for this test were updated on 09/26/2023 to more accurately reflect our healthy population. There may be differences in the flagging of prior results with similar values performed with this method. Interpretation of those prior results can be made in the context of the updated reference intervals.    03/22/2021 137 133  - 144 mmol/L Final     Potassium   Date Value Ref Range Status   04/15/2024 3.9 3.4 - 5.3 mmol/L Final   03/22/2022 4.0 3.4 - 5.3 mmol/L Final   03/22/2021 4.1 3.4 - 5.3 mmol/L Final     Chloride   Date Value Ref Range Status   04/15/2024 108 (H) 98 - 107 mmol/L Final   03/22/2022 108 94 - 109 mmol/L Final   03/22/2021 108 94 - 109 mmol/L Final     Carbon Dioxide   Date Value Ref Range Status   03/22/2021 28 20 - 32 mmol/L Final     Carbon Dioxide (CO2)   Date Value Ref Range Status   04/15/2024 15 (L) 22 - 29 mmol/L Final   03/22/2022 25 20 - 32 mmol/L Final     Anion Gap   Date Value Ref Range Status   04/15/2024 15 7 - 15 mmol/L Final   03/22/2022 4 3 - 14 mmol/L Final   03/22/2021 1 (L) 3 - 14 mmol/L Final     Glucose   Date Value Ref Range Status   04/15/2024 92 70 - 99 mg/dL Final   03/22/2022 86 70 - 99 mg/dL Final   03/22/2021 68 (L) 70 - 99 mg/dL Final     Urea Nitrogen   Date Value Ref Range Status   04/15/2024 11.0 6.0 - 20.0 mg/dL Final   03/22/2022 10 7 - 30 mg/dL Final   03/22/2021 11 7 - 30 mg/dL Final     Creatinine   Date Value Ref Range Status   04/15/2024 0.96 (H) 0.51 - 0.95 mg/dL Final   03/22/2021 0.94 0.52 - 1.04 mg/dL Final     GFR Estimate   Date Value Ref Range Status   04/15/2024 73 >60 mL/min/1.73m2 Final   03/22/2021 74 >60 mL/min/[1.73_m2] Final     Comment:     Non  GFR Calc  Starting 12/18/2018, serum creatinine based estimated GFR (eGFR) will be   calculated using the Chronic Kidney Disease Epidemiology Collaboration   (CKD-EPI) equation.       Calcium   Date Value Ref Range Status   04/15/2024 9.4 8.6 - 10.0 mg/dL Final   03/22/2021 9.1 8.5 - 10.1 mg/dL Final        UA:   Amphetamines Urine   Date Value Ref Range Status   12/30/2021 Screen Negative Screen Negative Final     Comment:     Cutoff for a negative amphetamine is 500 ng/mL or less.     Barbiturates Qual Urine   Date Value Ref Range Status   03/29/2007 Not Detected  Final     Comment:             Reference  Range:  Not Detected     Barbiturates Urine   Date Value Ref Range Status   12/30/2021 Screen Negative Screen Negative Final     Comment:     Cutoff for a negative barbiturate is 200 ng/mL or less.     Benzodiazepine Qual Urine   Date Value Ref Range Status   03/29/2007 Detected, Abnormal Result  Final     Comment:             Reference Range:  Not Detected     Cannabinoids Urine   Date Value Ref Range Status   12/30/2021 Screen Negative Screen Negative Final     Comment:     Cutoff for a negative cannabinoid is 50 ng/mL or less.     Cocaine Urine   Date Value Ref Range Status   12/30/2021 Screen Negative Screen Negative Final     Comment:     Cutoff for a negative cocaine is 300 ng/mL or less.     Opiates Qualitative Urine   Date Value Ref Range Status   03/29/2007 Not Detected  Final     Comment:             Reference Range:  Not Detected     Opiates Urine   Date Value Ref Range Status   12/30/2021 Screen Negative Screen Negative Final     Comment:     Cutoff for a negative opiate is 300 ng/mL or less.     PCP Qual Urine   Date Value Ref Range Status   03/29/2007 Not Detected  Final     Comment:             Reference Range:  Not Detected     PCP Urine   Date Value Ref Range Status   12/30/2021 Screen Negative Screen Negative Final     Comment:     Cutoff for a negative PCP is 25 ng/mL or less.            Critical decision making elements:  Use of high risk medications: Yes, Ongoing monitoring for adverse effects of medications by me: Yes, Relevant labs, imaging, notes reviewed by me:  Yes  Please see A&P for additional details of medical decision making.  Medical complexity over the past 24 hours:  - Prescription DRUG MANAGEMENT performed  - reduced opioids, increased Lyrica   85 MINUTES SPENT BY ME on the date of service doing chart review, history, exam, documentation & further activities per the note.  Communicated plan with hospitalist managing care? Yes  Communicated plan with bedside nurse?   Yes  Communicated plan, discussed rationale explained testing and coordination encouraged for hospital follow up care with patient ?  Yes  Abigail Moscoso, BLANCA CNP, PGMT-BC, ACHPN  [facility]   Coverage Monday-Friday 8:00-4:30  No weekend coverage contact house officer    Securely message with the [employer] Web Console (learn more here)

## 2024-04-19 NOTE — PLAN OF CARE
"BP (!) 143/67 (BP Location: Left arm)   Pulse 54   Temp 98.8  F (37.1  C) (Oral)   Resp 16   Ht 1.626 m (5' 4\")   Wt 59 kg (130 lb)   SpO2 95%   BMI 22.31 kg/m      PRIMARY DIAGNOSIS: ACUTE PAIN  OUTPATIENT/OBSERVATION GOALS TO BE MET BEFORE DISCHARGE:  1. Pain Status: Improved with oral pain medications. Had pain consult today with many changes to pain management regimen.    2. Return to near baseline physical activity: No    3. Cleared for discharge by consultants (if involved): No    Discharge Planner Nurse   Safe discharge environment identified: Yes  Barriers to discharge: Yes, pain control       Entered by: Dulce Maria Lindsay RN 04/18/2024 7:59 PM   Patient is A&Ox4, able to make needs known. Independently ambulates to bathroom with no assistive device. Had pain service consult with pain regimen changed - Tylenol 1000mg Q6hrs, Voltaren gel QID, Lidocaine cream QID (refused afternoon dose), Baclofen 10mg TID, Medrol Dosepak, Vistaril 50mg Qhrs PRN, capsacin cream QID, Ketamine Oral Solution 10mg Q6 hrs as needed, Oxcodone 10-15mg Q3hrs PRN, IV Dilaudid 0.5-1mg Q2hrs PRN. Ice application to right should works well in addition to the pain medications. New orders received for Senna-S to assist with constipation prevention. Received PRN Oxycodone at 1830 along with other scheduled medications.   Please review provider order for any additional goals.   Nurse to notify provider when observation goals have been met and patient is ready for discharge.    Problem: Adult Inpatient Plan of Care  Goal: Plan of Care Review  Description: The Plan of Care Review/Shift note should be completed every shift.  The Outcome Evaluation is a brief statement about your assessment that the patient is improving, declining, or no change.  This information will be displayed automatically on your shift  note.  Outcome: Progressing  Flowsheets (Taken 4/18/2024 1958)  Outcome Evaluation: Pain regimen changed today, tolerating " "changes well currently  Plan of Care Reviewed With: patient  Overall Patient Progress: improving  Goal: Patient-Specific Goal (Individualized)  Description: You can add care plan individualizations to a care plan. Examples of Individualization might be:  \"Parent requests to be called daily at 9am for status\", \"I have a hard time hearing out of my right ear\", or \"Do not touch me to wake me up as it startles  me\".  Outcome: Progressing  Goal: Absence of Hospital-Acquired Illness or Injury  Outcome: Progressing  Intervention: Prevent and Manage VTE (Venous Thromboembolism) Risk  Recent Flowsheet Documentation  Taken 4/18/2024 1630 by Dulce Maria Lindsay, RN  VTE Prevention/Management: SCDs (sequential compression devices) off  Intervention: Prevent Infection  Recent Flowsheet Documentation  Taken 4/18/2024 1630 by Dulce Maria Lindsay, RN  Infection Prevention:   cohorting utilized   hand hygiene promoted   rest/sleep promoted   equipment surfaces disinfected  Goal: Optimal Comfort and Wellbeing  Outcome: Progressing  Intervention: Monitor Pain and Promote Comfort  Recent Flowsheet Documentation  Taken 4/18/2024 1920 by Dulce Maria Lindsay, RN  Pain Management Interventions:   cold applied   repositioned  Taken 4/18/2024 1559 by Dulce Maria Lindsay, RN  Pain Management Interventions: medication (see MAR)  Goal: Readiness for Transition of Care  Outcome: Progressing     Problem: Pain Acute  Goal: Optimal Pain Control and Function  Outcome: Progressing  Intervention: Develop Pain Management Plan  Recent Flowsheet Documentation  Taken 4/18/2024 1920 by Dulce Maria Lindsay, RN  Pain Management Interventions:   cold applied   repositioned  Taken 4/18/2024 1559 by Dulce Maria Lindsay, RN  Pain Management Interventions: medication (see MAR)     Problem: Comorbidity Management  Goal: Blood Pressure in Desired Range  Outcome: Progressing  Goal: Maintenance of Asthma Control  Outcome: Progressing     Goal Outcome Evaluation:      " Plan of Care Reviewed With: patient    Overall Patient Progress: improvingOverall Patient Progress: improving    Outcome Evaluation: Pain regimen changed today, tolerating changes well currently

## 2024-04-19 NOTE — PROVIDER NOTIFICATION
Paged X-Cover Provider regarding patient noted to be bradycardic. HR fluctuating between 47-50.  No new orders.

## 2024-04-20 ENCOUNTER — APPOINTMENT (OUTPATIENT)
Dept: PHYSICAL THERAPY | Facility: CLINIC | Age: 49
DRG: 552 | End: 2024-04-20
Attending: STUDENT IN AN ORGANIZED HEALTH CARE EDUCATION/TRAINING PROGRAM
Payer: COMMERCIAL

## 2024-04-20 PROCEDURE — 97110 THERAPEUTIC EXERCISES: CPT | Mod: GP | Performed by: PHYSICAL THERAPIST

## 2024-04-20 PROCEDURE — 250N000013 HC RX MED GY IP 250 OP 250 PS 637: Performed by: PSYCHIATRY & NEUROLOGY

## 2024-04-20 PROCEDURE — 97112 NEUROMUSCULAR REEDUCATION: CPT | Mod: GP | Performed by: PHYSICAL THERAPIST

## 2024-04-20 PROCEDURE — 250N000011 HC RX IP 250 OP 636: Performed by: INTERNAL MEDICINE

## 2024-04-20 PROCEDURE — 99232 SBSQ HOSP IP/OBS MODERATE 35: CPT | Performed by: INTERNAL MEDICINE

## 2024-04-20 PROCEDURE — 250N000009 HC RX 250: Performed by: STUDENT IN AN ORGANIZED HEALTH CARE EDUCATION/TRAINING PROGRAM

## 2024-04-20 PROCEDURE — 120N000001 HC R&B MED SURG/OB

## 2024-04-20 PROCEDURE — 250N000013 HC RX MED GY IP 250 OP 250 PS 637: Performed by: CLINICAL NURSE SPECIALIST

## 2024-04-20 PROCEDURE — 250N000011 HC RX IP 250 OP 636: Performed by: CLINICAL NURSE SPECIALIST

## 2024-04-20 PROCEDURE — 97161 PT EVAL LOW COMPLEX 20 MIN: CPT | Mod: GP | Performed by: PHYSICAL THERAPIST

## 2024-04-20 PROCEDURE — 250N000013 HC RX MED GY IP 250 OP 250 PS 637: Performed by: STUDENT IN AN ORGANIZED HEALTH CARE EDUCATION/TRAINING PROGRAM

## 2024-04-20 PROCEDURE — 250N000013 HC RX MED GY IP 250 OP 250 PS 637: Performed by: INTERNAL MEDICINE

## 2024-04-20 PROCEDURE — 250N000013 HC RX MED GY IP 250 OP 250 PS 637: Performed by: NURSE PRACTITIONER

## 2024-04-20 PROCEDURE — 250N000012 HC RX MED GY IP 250 OP 636 PS 637: Performed by: CLINICAL NURSE SPECIALIST

## 2024-04-20 RX ORDER — BUSPIRONE HYDROCHLORIDE 5 MG/1
5 TABLET ORAL 3 TIMES DAILY
Status: DISCONTINUED | OUTPATIENT
Start: 2024-04-20 | End: 2024-04-24

## 2024-04-20 RX ORDER — POLYETHYLENE GLYCOL 3350 17 G/17G
17 POWDER, FOR SOLUTION ORAL 2 TIMES DAILY PRN
Status: DISCONTINUED | OUTPATIENT
Start: 2024-04-20 | End: 2024-04-25 | Stop reason: HOSPADM

## 2024-04-20 RX ADMIN — ONDANSETRON 8 MG: 4 TABLET, ORALLY DISINTEGRATING ORAL at 05:51

## 2024-04-20 RX ADMIN — SENNOSIDES AND DOCUSATE SODIUM 2 TABLET: 8.6; 5 TABLET ORAL at 20:09

## 2024-04-20 RX ADMIN — LEVOTHYROXINE SODIUM 75 MCG: 0.07 TABLET ORAL at 09:27

## 2024-04-20 RX ADMIN — ACETAMINOPHEN 1000 MG: 500 TABLET, FILM COATED ORAL at 20:08

## 2024-04-20 RX ADMIN — DICLOFENAC SODIUM 4 G: 10 GEL TOPICAL at 10:49

## 2024-04-20 RX ADMIN — LIDOCAINE: 50 OINTMENT TOPICAL at 16:54

## 2024-04-20 RX ADMIN — BACLOFEN 10 MG: 10 TABLET ORAL at 20:08

## 2024-04-20 RX ADMIN — ACETAMINOPHEN 1000 MG: 500 TABLET, FILM COATED ORAL at 13:08

## 2024-04-20 RX ADMIN — BACLOFEN 10 MG: 10 TABLET ORAL at 09:28

## 2024-04-20 RX ADMIN — LORAZEPAM 1 MG: 1 TABLET ORAL at 00:38

## 2024-04-20 RX ADMIN — HYDROMORPHONE HYDROCHLORIDE 0.5 MG: 1 INJECTION, SOLUTION INTRAMUSCULAR; INTRAVENOUS; SUBCUTANEOUS at 21:36

## 2024-04-20 RX ADMIN — BUDESONIDE 2 MG: 0.5 INHALANT RESPIRATORY (INHALATION) at 10:55

## 2024-04-20 RX ADMIN — CETIRIZINE HYDROCHLORIDE 10 MG: 10 TABLET, FILM COATED ORAL at 09:27

## 2024-04-20 RX ADMIN — METHYLPREDNISOLONE 4 MG: 4 TABLET ORAL at 21:06

## 2024-04-20 RX ADMIN — DICLOFENAC SODIUM 4 G: 10 GEL TOPICAL at 16:55

## 2024-04-20 RX ADMIN — LOSARTAN POTASSIUM 50 MG: 50 TABLET, FILM COATED ORAL at 09:27

## 2024-04-20 RX ADMIN — LIDOCAINE: 50 OINTMENT TOPICAL at 13:08

## 2024-04-20 RX ADMIN — OXYCODONE HYDROCHLORIDE 10 MG: 5 TABLET ORAL at 23:28

## 2024-04-20 RX ADMIN — PANTOPRAZOLE SODIUM 40 MG: 40 TABLET, DELAYED RELEASE ORAL at 10:43

## 2024-04-20 RX ADMIN — OXYCODONE HYDROCHLORIDE 10 MG: 5 TABLET ORAL at 15:23

## 2024-04-20 RX ADMIN — HYDROMORPHONE HYDROCHLORIDE 0.5 MG: 1 INJECTION, SOLUTION INTRAMUSCULAR; INTRAVENOUS; SUBCUTANEOUS at 05:56

## 2024-04-20 RX ADMIN — DICLOFENAC SODIUM 4 G: 10 GEL TOPICAL at 13:09

## 2024-04-20 RX ADMIN — BUSPIRONE HYDROCHLORIDE 5 MG: 5 TABLET ORAL at 20:08

## 2024-04-20 RX ADMIN — SENNOSIDES AND DOCUSATE SODIUM 2 TABLET: 8.6; 5 TABLET ORAL at 09:29

## 2024-04-20 RX ADMIN — HYDROMORPHONE HYDROCHLORIDE 0.5 MG: 1 INJECTION, SOLUTION INTRAMUSCULAR; INTRAVENOUS; SUBCUTANEOUS at 00:27

## 2024-04-20 RX ADMIN — PREGABALIN 75 MG: 75 CAPSULE ORAL at 10:43

## 2024-04-20 RX ADMIN — BACLOFEN 10 MG: 10 TABLET ORAL at 13:08

## 2024-04-20 RX ADMIN — BUSPIRONE HYDROCHLORIDE 5 MG: 5 TABLET ORAL at 15:23

## 2024-04-20 RX ADMIN — PREGABALIN 75 MG: 75 CAPSULE ORAL at 21:05

## 2024-04-20 RX ADMIN — OXYCODONE HYDROCHLORIDE 10 MG: 5 TABLET ORAL at 03:05

## 2024-04-20 RX ADMIN — DICLOFENAC SODIUM 4 G: 10 GEL TOPICAL at 21:06

## 2024-04-20 RX ADMIN — METHYLPREDNISOLONE 4 MG: 4 TABLET ORAL at 10:43

## 2024-04-20 RX ADMIN — HYDROMORPHONE HYDROCHLORIDE 0.5 MG: 1 INJECTION, SOLUTION INTRAMUSCULAR; INTRAVENOUS; SUBCUTANEOUS at 16:54

## 2024-04-20 RX ADMIN — ACETAMINOPHEN 1000 MG: 500 TABLET, FILM COATED ORAL at 03:05

## 2024-04-20 RX ADMIN — MAGNESIUM 64 MG (MAGNESIUM CHLORIDE) TABLET,DELAYED RELEASE 535 MG: at 09:27

## 2024-04-20 RX ADMIN — LIDOCAINE: 50 OINTMENT TOPICAL at 23:30

## 2024-04-20 RX ADMIN — OXYCODONE HYDROCHLORIDE 10 MG: 5 TABLET ORAL at 09:27

## 2024-04-20 ASSESSMENT — ACTIVITIES OF DAILY LIVING (ADL)
ADLS_ACUITY_SCORE: 22

## 2024-04-20 NOTE — PLAN OF CARE
"23:00-07:30    Remained in pain PRN Oxycodone po and IV Dilaudid given with fair result per patient. Ativan given 1x. VSS. Pain services following.    Problem: Adult Inpatient Plan of Care  Goal: Plan of Care Review  Description: The Plan of Care Review/Shift note should be completed every shift.  The Outcome Evaluation is a brief statement about your assessment that the patient is improving, declining, or no change.  This information will be displayed automatically on your shift  note.  Outcome: Progressing  Flowsheets (Taken 4/20/2024 0645)  Outcome Evaluation: pain improved when pain med plan in place  Plan of Care Reviewed With: patient  Overall Patient Progress: improving  Goal: Patient-Specific Goal (Individualized)  Description: You can add care plan individualizations to a care plan. Examples of Individualization might be:  \"Parent requests to be called daily at 9am for status\", \"I have a hard time hearing out of my right ear\", or \"Do not touch me to wake me up as it startles  me\".  Outcome: Progressing  Goal: Absence of Hospital-Acquired Illness or Injury  Outcome: Progressing  Intervention: Identify and Manage Fall Risk  Recent Flowsheet Documentation  Taken 4/20/2024 0151 by Joanne Hoffmann RN  Safety Promotion/Fall Prevention:   nonskid shoes/slippers when out of bed   safety round/check completed  Intervention: Prevent Skin Injury  Recent Flowsheet Documentation  Taken 4/20/2024 0040 by Joanne Hoffmann RN  Body Position: position changed independently  Intervention: Prevent Infection  Recent Flowsheet Documentation  Taken 4/20/2024 0151 by Joanne Hoffmann RN  Infection Prevention: rest/sleep promoted  Goal: Optimal Comfort and Wellbeing  Outcome: Progressing  Intervention: Monitor Pain and Promote Comfort  Recent Flowsheet Documentation  Taken 4/20/2024 0027 by Joanne Hoffmann RN  Pain Management Interventions:   medication (see MAR)   cold applied  Goal: Readiness for Transition of " Care  Outcome: Progressing     Problem: Pain Acute  Goal: Optimal Pain Control and Function  Outcome: Progressing  Intervention: Develop Pain Management Plan  Recent Flowsheet Documentation  Taken 4/20/2024 0027 by Joanne Hoffmann RN  Pain Management Interventions:   medication (see MAR)   cold applied  Intervention: Prevent or Manage Pain  Recent Flowsheet Documentation  Taken 4/20/2024 0151 by Joanne Hoffmann RN  Medication Review/Management: medications reviewed     Problem: Comorbidity Management  Goal: Blood Pressure in Desired Range  Outcome: Progressing  Intervention: Maintain Blood Pressure Management  Recent Flowsheet Documentation  Taken 4/20/2024 0151 by Joanne Hoffmann RN  Medication Review/Management: medications reviewed  Goal: Maintenance of Asthma Control  Outcome: Progressing  Intervention: Maintain Asthma Symptom Control  Recent Flowsheet Documentation  Taken 4/20/2024 0151 by Joanne Hoffmann RN  Medication Review/Management: medications reviewed     Goal Outcome Evaluation:      Plan of Care Reviewed With: patient    Overall Patient Progress: improvingOverall Patient Progress: improving    Outcome Evaluation: pain improved when pain med plan in place

## 2024-04-20 NOTE — CONSULTS
Psychiatry    ID/HPI:  The patient is a 48 yo  female seen at request of Dr. Livingston for evaluation of anxiety symptoms.  Admitted with severe rt should pain although has chronic joint and spine pain.  Has been off and on steroids per her report and no previous anxiety on it.  States she had a panic attack in hospital, rarely takes Valium on outside.  Usually takes up to 75 mg of vistaril, does not think it greatly helpful 'only a little'  Has been getting ativan 1mg daily prn.  States she was upset with delay in getting MRI of shoulder, which shows small amt of fluid in subscapular recess of joint and arthritic changes.  She had been declined for trigger point injections as not available in hospital, but seen by NP of pain service and this could be explored as OP.  Feels there is some improvement in pain level- has prn dilauded also.   Started recently on lyrica.  Had gone from her job to ED when 'couldn't take it any longer'.  Concern of staff for anxiety, depression.  She feels mostly situational with the pain.  Would like to try a non - benzodiazepine ,  worries about dependence and also effect on liver given past health issues.  Contracts for safety.  SE profile discussed re buspar and would like to try this.  Leery of ssri's given potential side effects.      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  No domestic abuse.  Denies alcohol use or abuse or illicits.  No legal issues    VSS TEmp 98.9k, RR 18, Weight 59, /85    MSE: The patient is a  heavy set female, sitting in bed with ice pack.  Alert, fully oriented, pleasant and cooperative, verbose, inter-ruptible but anxious.  Not clearly depressed, appears frustrated but no hostility, aggression or agitation, no paranoia or hallucinations. NO s/I active or death-wish or h/I.  I/J appear  intact, no refusal of treatment.  Good attention, does not appear sedated // over -medicated.  Appears intelligent, well informed      Diagnoses:  TIMMY/PD, Unspecified Depressive Disorder, Chronic Pain Disorder with acute exacerbation    Plan:  Start buspar 5mg TID.  Pain Service following.  She will return to previous care providers.  Sw can refer to counseling however.  CAll prn.

## 2024-04-20 NOTE — PROGRESS NOTES
[facility]    Medicine Progress Note - Hospitalist Service    Date of Admission:  4/15/2024  Date of Service: 04/20/2024    Assessment & Plan      Brief summary of admission assessment:Machelle Weiner is a 49 year old  female with a significant past medical history of chronic pain ,GRIFFITH,  TIMMY, hypertension, hyperlipidemia, and migraines who presents with right posterior shoulder pain.  She was complaining of severe sharp pain on the right radiating to the right upper extremity.  She is to have cervical radiculopathy for which she has been getting epidural steroid injections and pain clinic.     ED evaluation revealed anxiety, hyperventilation with bicarb of 15.  CT scan of the chest negative for acute intrathoracic pathology including PE or dissection.     Patient received multiple medications in the emergency department including 5 mg IV Valium, 0.5 mg of hydromorphone twice and her pain did not improve significantly.     #1.  Acute intractable pain involving the right shoulder, right posterior upper chest, back and shoulder blade area.  -- History of degenerative disc disease C4-C5, C5-C6, and C6-C7 on MRI study of 2021.  -- CT scan with contrast did not reveal any acute intrathoracic pathology  -- Patient has chronic pain but does not use narcotic medications at home  Plan:  -- Pain service consulted -> medrol pack started / gabapentin and Lyrica being considered  -- Orthopedic surgery consulted -> non surgical   -- MR Cervical and Thoracic overall unrevealing aside from chronic findings   -- MR Shoulder and MR Scapula -> Minimal subacromial spurring with trace subacromial subdeltoid bursal fluid. Mild supraspinatus tendinosis. No rotator cuff tendon tear. Small amount of fluid in the subscapular recess of the glenohumeral joint No acute fracture or dislocation.  -- Will need outpatient follow up with orthopedic surgery   -- Medrol trial per pain service  -- Patient needs to  follow-up with her pain management team as an outpatient as well.  Still complains of significant pain in her right shoulder.  Requesting PT to assess her again on 4/20/2024  Patient seems to be dealing with a lot of anxiety.  Psychiatric consultation requested    #2.  Severe anxiety  --She has history of depression and generalized anxiety disorder but is not on antianxiety medication.  She was very emotional and her new right sided pain is causing her a lot of distress.  She wants to get more testing to figure out the etiology of her condition.  Plan:  -- Was treated with Ativan as needed.   -- Patient may benefit from scheduled antianxiety medication that can be done as an outpatient with her primary care provider.    Psychiatric consultation requested as patient told me she was having panic attacks recently to better manage her underlying anxiety             Diet: Regular Diet Adult    DVT Prophylaxis: Pneumatic Compression Devices  Sheridan Catheter: Not present  Lines: None     Cardiac Monitoring: None  Code Status: Full CodeFULL CODE    Clinically Significant Risk Factors                  # Hypertension: Noted on problem list            # Asthma: noted on problem list        Disposition Plan     Medically Ready for Discharge: Anticipated Tomorrow       After psych evaluation    Franchesca Fuentes MD  Hospitalist Service  [facility]  Securely message with [employer] (more info)  Text page via [employer] Paging/Directory   ______________________________________________________________________    Interval History     Still with very significant pain in LUE / scaputlar region shoulder and neck with radiculopathy in left arm.   No fevers  No CP/SOB  No nausea / vomiting   No new complaints    Physical Exam   Vital Signs: Temp: 98.3  F (36.8  C) Temp src: Oral BP: 135/85 Pulse: 59   Resp: 18 SpO2: 99 % O2 Device: None (Room air)    Weight: 130 lbs 0 oz    GEN:  no apparent distress  RESP: no accessory muscle  use  MSK: ROM of RUE intact. No swelling noted.   Neurologic:Grossly intact,non focal. Tangela.  Neuropsychiatric: normal mood and affect  ----------------------------------------------------------------------------------------    Medical Decision Making       50 MINUTES SPENT BY ME on the date of service doing chart review, history, exam, documentation & further activities per the note.      Data   ------------------------- PAST 24 HR DATA REVIEWED -----------------------------------------------        Imaging results reviewed over the past 24 hrs:   No results found for this or any previous visit (from the past 24 hour(s)).      ------------------------- ENCOUNTER LABS ----------------------------------------------------------------  Recent Labs   Lab 04/15/24  1134   WBC 8.7   HGB 13.2   MCV 85         POTASSIUM 3.9   CHLORIDE 108*   CO2 15*   BUN 11.0   CR 0.96*   ANIONGAP 15   BETINA 9.4   GLC 92   ALBUMIN 4.4   PROTTOTAL 7.6   BILITOTAL 0.2   ALKPHOS 97   ALT 14   AST 22       Most Recent 3 CBC's:  Recent Labs   Lab Test 04/15/24  1134 08/29/22  1703 08/16/22  1514   WBC 8.7 10.9 11.5*   HGB 13.2 11.9 12.2   MCV 85 93 93    294 293     Most Recent 3 BMP's:  Recent Labs   Lab Test 04/15/24  1134 08/16/22  1514 03/22/22  1627    137 137   POTASSIUM 3.9 3.9 4.0   CHLORIDE 108* 105 108   CO2 15* 20* 25   BUN 11.0 6.8 10   CR 0.96* 0.77 1.02   ANIONGAP 15 12 4   BETINA 9.4 8.9 9.3   GLC 92 82 86     Most Recent 2 LFT's:  Recent Labs   Lab Test 04/15/24  1134 12/15/22  1233   AST 22 83*   ALT 14 98*   ALKPHOS 97 134*   BILITOTAL 0.2 0.2     Most Recent 3 INR's:  Recent Labs   Lab Test 08/29/22  1703 12/16/17  1725   INR 1.04 0.95     Most Recent 3 Troponin's:  Recent Labs   Lab Test 01/11/17  2110 10/03/16  1749   TROPI <0.015  The 99th percentile for upper reference range is 0.045 ug/L.  Troponin values in   the range of 0.045 - 0.120 ug/L may be associated with risks of adverse   clinical  events.   <0.015  The 99th percentile for upper reference range is 0.045 ug/L.  Troponin values in   the range of 0.045 - 0.120 ug/L may be associated with risks of adverse   clinical events.       Most Recent 3 BNP's:No lab results found.  Most Recent D-dimer:  Recent Labs   Lab Test 04/15/24  1134   DD 0.54*     Most Recent Cholesterol Panel:  Recent Labs   Lab Test 11/07/23  1528   CHOL 196   *   HDL 45*   TRIG 91     Most Recent 6 Bacteria Isolates From Any Culture (See EPIC Reports for Culture Details):  Recent Labs   Lab Test 05/15/18  1325   CULT No beta hemolytic Streptococcus Group A isolated     Most Recent TSH and T4:  Recent Labs   Lab Test 11/07/23  1528 04/19/22  1545 03/22/21  1754   TSH 1.78   < > 5.90*   T4  --   --  0.80    < > = values in this interval not displayed.     Most Recent Urinalysis:  Recent Labs   Lab Test 08/29/22  1705   COLOR Yellow   APPEARANCE Clear   URINEGLC Negative   URINEBILI Negative   URINEKETONE Negative   SG 1.022   UBLD Negative   URINEPH 5.5   PROTEIN Negative   NITRITE Negative   LEUKEST Negative   RBCU 2   WBCU 1     Most Recent ESR & CRP:  Recent Labs   Lab Test 04/15/24  1134 08/16/22  1514 03/22/22  1627   SED  --   --  40*   CRP  --   --  8.7*   CRPI <3.00   < >  --     < > = values in this interval not displayed.

## 2024-04-20 NOTE — PROGRESS NOTES
04/20/24 1600   Appointment Info   Signing Clinician's Name / Credentials (PT) Eunice Jeffers DPT   Living Environment   People in Home child(price), adult;spouse   Current Living Arrangements house   Home Accessibility stairs within home   Number of Stairs, Within Home, Primary seven   Stair Railings, Within Home, Primary railings safe and in good condition   Transportation Anticipated car, drives self   Living Environment Comments Lives with adult son and spouse in split level home; works, IND iwth all mobility/ADLs/IADLs at baseline with some assist from spouse with laundry; drives   Self-Care   Usual Activity Tolerance good   Current Activity Tolerance good   Equipment Currently Used at Home none   Fall history within last six months no   General Information   Onset of Illness/Injury or Date of Surgery 04/15/24   Referring Physician Yovany Livingston MD   Patient/Family Therapy Goals Statement (PT) to have less pain   Pertinent History of Current Problem (include personal factors and/or comorbidities that impact the POC) 49 year old  female with a significant past medical history of chronic pain ,GRIFFITH,  TIMMY, hypertension, hyperlipidemia, and migraines who presents with right posterior shoulder pain.  She was complaining of severe sharp pain on the right radiating to the right upper extremity.  She is to have cervical radiculopathy for which she has been getting epidural steroid injections and pain clinic.   General Observations Supine, NAD   Cognition   Affect/Mental Status (Cognition) anxious   Orientation Status (Cognition) oriented x 4   Follows Commands (Cognition) WFL   Pain Assessment   Patient Currently in Pain   (reports various pain levels thorughout session from 7-10/10; did not explicitedly ask for pain rating as more research coming out this is not overly helpful with chronic pain)   Integumentary/Edema   Integumentary/Edema Comments pt wearing full body onesie, difficult to assess any skin    Posture    Posture Forward head position;Protracted shoulders   Range of Motion (ROM)   Range of Motion ROM deficits secondary to pain   ROM Comment R shoulder flexion and abduction limited to ~90deg with pain, IR limited to reaching back of head, ER symmetric to LUE   Strength (Manual Muscle Testing)   Strength (Manual Muscle Testing) Deficits observed during functional mobility   Bed Mobility   Bed Mobility no deficits identified   Transfers   Transfers no deficits identified   Gait/Stairs (Locomotion)   [place] Level (Gait) independent   Balance   Balance Comments Good   Clinical Impression   Criteria for Skilled Therapeutic Intervention Yes, treatment indicated   PT Diagnosis (PT) R shoulder pain   Influenced by the following impairments ADLs, overall comfort,   Functional limitations due to impairments dressing, feeding, washing hair, sleeping (d/t discomfort0   Clinical Presentation (PT Evaluation Complexity) stable   Clinical Presentation Rationale clinical judgement   Clinical Decision Making (Complexity) low complexity   Planned Therapy Interventions (PT) home exercise program;manual therapy techniques;patient/family education;ROM (range of motion);strengthening;stretching   Risk & Benefits of therapy have been explained evaluation/treatment results reviewed;care plan/treatment goals reviewed;risks/benefits reviewed;current/potential barriers reviewed;participants voiced agreement with care plan;participants included;patient   PT Total Evaluation Time   PT Eval, Low Complexity Minutes (62264) 10   Physical Therapy Goals   PT Frequency 2x/week   PT Predicted Duration/Target Date for Goal Attainment 04/27/24   PT Goals PT Goal 1;PT Goal 2   PT: Goal 1 Pt will demo improved ROM to at least 130 degs in shoulder flexion and abduction on R to aid with ADLs   PT: Goal 2 Pt will report ability to IND dress and feed self without limits of R shoulder pain   PT Discharge Planning   PT Plan continue with ROM to  tolerance, strengthening once able to achieve full ROM,   PT Discharge Recommendation (DC Rec) home with assist;home with outpatient physical therapy   PT Rationale for DC Rec Pt will be best served in OPPT environment to address long term strengthening, ROM, pain science and neuro re-ed   PT Brief overview of current status IND with mobility   Total Session Time   Timed Code Treatment Minutes 39   Total Session Time (sum of timed and untimed services) 49

## 2024-04-20 NOTE — PLAN OF CARE
"  Problem: Adult Inpatient Plan of Care  Goal: Plan of Care Review  Description: The Plan of Care Review/Shift note should be completed every shift.  The Outcome Evaluation is a brief statement about your assessment that the patient is improving, declining, or no change.  This information will be displayed automatically on your shift  note.  4/20/2024 1736 by Mima Sim RN  Outcome: Progressing  Flowsheets (Taken 4/20/2024 1736)  Outcome Evaluation: PRN oxy given today with scheduled topical creams with pain relief. PRN dilaudid only given one time.  4/20/2024 1414 by Mima Sim RN  Outcome: Progressing  Flowsheets (Taken 4/20/2024 1414)  Plan of Care Reviewed With: patient  Goal: Patient-Specific Goal (Individualized)  Description: You can add care plan individualizations to a care plan. Examples of Individualization might be:  \"Parent requests to be called daily at 9am for status\", \"I have a hard time hearing out of my right ear\", or \"Do not touch me to wake me up as it startles  me\".  4/20/2024 1736 by Mima Sim RN  Outcome: Progressing  4/20/2024 1414 by Mima Sim RN  Outcome: Progressing  Goal: Absence of Hospital-Acquired Illness or Injury  4/20/2024 1736 by Mima Sim RN  Outcome: Progressing  4/20/2024 1414 by Mima Sim RN  Outcome: Progressing  Intervention: Identify and Manage Fall Risk  Recent Flowsheet Documentation  Taken 4/20/2024 0930 by Mima Sim RN  Safety Promotion/Fall Prevention:   safety round/check completed   supervised activity  Intervention: Prevent and Manage VTE (Venous Thromboembolism) Risk  Recent Flowsheet Documentation  Taken 4/20/2024 0930 by Mima Sim RN  VTE Prevention/Management: SCDs (sequential compression devices) off  Goal: Optimal Comfort and Wellbeing  4/20/2024 1736 by Mima Sim RN  Outcome: Progressing  4/20/2024 1414 by Mima Sim RN  Outcome: Progressing  Intervention: Monitor Pain and " Promote Comfort  Recent Flowsheet Documentation  Taken 4/20/2024 1015 by Mima Sim RN  Pain Management Interventions: distraction  Taken 4/20/2024 0927 by Mima Sim RN  Pain Management Interventions: medication (see MAR)  Goal: Readiness for Transition of Care  4/20/2024 1736 by Mima Sim RN  Outcome: Progressing  4/20/2024 1414 by Mima Sim RN  Outcome: Progressing     Problem: Pain Acute  Goal: Optimal Pain Control and Function  4/20/2024 1736 by Mima Sim RN  Outcome: Progressing  4/20/2024 1414 by Mima Sim RN  Outcome: Progressing  Intervention: Develop Pain Management Plan  Recent Flowsheet Documentation  Taken 4/20/2024 1015 by Mima Sim RN  Pain Management Interventions: distraction  Taken 4/20/2024 0927 by Mima Smi RN  Pain Management Interventions: medication (see MAR)  Intervention: Prevent or Manage Pain  Recent Flowsheet Documentation  Taken 4/20/2024 0930 by Mima Sim RN  Medication Review/Management: medications reviewed     Problem: Comorbidity Management  Goal: Blood Pressure in Desired Range  4/20/2024 1736 by Mima Sim RN  Outcome: Progressing  4/20/2024 1414 by Mima Sim RN  Outcome: Progressing  Intervention: Maintain Blood Pressure Management  Recent Flowsheet Documentation  Taken 4/20/2024 0930 by Mima Sim RN  Medication Review/Management: medications reviewed  Goal: Maintenance of Asthma Control  4/20/2024 1736 by Mima Sim RN  Outcome: Progressing  4/20/2024 1414 by Mima Sim RN  Outcome: Progressing  Intervention: Maintain Asthma Symptom Control  Recent Flowsheet Documentation  Taken 4/20/2024 0930 by Mima Sim RN  Medication Review/Management: medications reviewed   Goal Outcome Evaluation:      Plan of Care Reviewed With: patient      Outcome Evaluation: PRN oxy given today with scheduled topical creams with pain relief. PRN dilaudid only given one  "time.    Vitals: /81 (BP Location: Left arm)   Pulse 60   Temp 98.8  F (37.1  C) (Oral)   Resp 18   Ht 1.626 m (5' 4\")   Wt 59 kg (130 lb)   SpO2 97%   BMI 22.31 kg/m    Cardiac: WNL  Respiratory: WNL  Neuro: A/Ox4  GI/: WNL; reports some constipation, miralax added  Skin: WNL  LDAs: PIV SL  Diet: Regular   Activity: SBA  Pain: Right upper back/shoulder radiating down arm - prn oxy and dilaudid given with relief   Plan: pain control, encourage ambulation/range of motion of right arm, psych added buspar       "

## 2024-04-21 ENCOUNTER — APPOINTMENT (OUTPATIENT)
Dept: GENERAL RADIOLOGY | Facility: CLINIC | Age: 49
DRG: 552 | End: 2024-04-21
Attending: INTERNAL MEDICINE
Payer: COMMERCIAL

## 2024-04-21 PROCEDURE — 250N000013 HC RX MED GY IP 250 OP 250 PS 637: Performed by: CLINICAL NURSE SPECIALIST

## 2024-04-21 PROCEDURE — 250N000012 HC RX MED GY IP 250 OP 636 PS 637: Performed by: CLINICAL NURSE SPECIALIST

## 2024-04-21 PROCEDURE — 73030 X-RAY EXAM OF SHOULDER: CPT | Mod: RT

## 2024-04-21 PROCEDURE — 250N000013 HC RX MED GY IP 250 OP 250 PS 637: Performed by: INTERNAL MEDICINE

## 2024-04-21 PROCEDURE — 250N000009 HC RX 250: Performed by: NURSE PRACTITIONER

## 2024-04-21 PROCEDURE — 250N000009 HC RX 250: Performed by: STUDENT IN AN ORGANIZED HEALTH CARE EDUCATION/TRAINING PROGRAM

## 2024-04-21 PROCEDURE — 250N000013 HC RX MED GY IP 250 OP 250 PS 637: Performed by: NURSE PRACTITIONER

## 2024-04-21 PROCEDURE — 250N000011 HC RX IP 250 OP 636: Performed by: INTERNAL MEDICINE

## 2024-04-21 PROCEDURE — 120N000001 HC R&B MED SURG/OB

## 2024-04-21 PROCEDURE — 99232 SBSQ HOSP IP/OBS MODERATE 35: CPT | Performed by: INTERNAL MEDICINE

## 2024-04-21 PROCEDURE — 250N000011 HC RX IP 250 OP 636: Performed by: CLINICAL NURSE SPECIALIST

## 2024-04-21 PROCEDURE — 250N000013 HC RX MED GY IP 250 OP 250 PS 637: Performed by: PSYCHIATRY & NEUROLOGY

## 2024-04-21 PROCEDURE — 250N000013 HC RX MED GY IP 250 OP 250 PS 637: Performed by: STUDENT IN AN ORGANIZED HEALTH CARE EDUCATION/TRAINING PROGRAM

## 2024-04-21 RX ORDER — METHOCARBAMOL 750 MG/1
750 TABLET, FILM COATED ORAL 4 TIMES DAILY
Status: DISCONTINUED | OUTPATIENT
Start: 2024-04-21 | End: 2024-04-21

## 2024-04-21 RX ORDER — AMOXICILLIN 250 MG
2 CAPSULE ORAL 2 TIMES DAILY PRN
Status: DISCONTINUED | OUTPATIENT
Start: 2024-04-21 | End: 2024-04-25 | Stop reason: HOSPADM

## 2024-04-21 RX ORDER — LIDOCAINE 4 G/G
2 PATCH TOPICAL
Status: DISCONTINUED | OUTPATIENT
Start: 2024-04-21 | End: 2024-04-25 | Stop reason: HOSPADM

## 2024-04-21 RX ORDER — CALCIUM CARBONATE 500 MG/1
1000 TABLET, CHEWABLE ORAL 4 TIMES DAILY PRN
Status: DISCONTINUED | OUTPATIENT
Start: 2024-04-21 | End: 2024-04-25 | Stop reason: HOSPADM

## 2024-04-21 RX ORDER — LIDOCAINE 40 MG/G
CREAM TOPICAL
Status: DISCONTINUED | OUTPATIENT
Start: 2024-04-21 | End: 2024-04-25 | Stop reason: HOSPADM

## 2024-04-21 RX ORDER — AMOXICILLIN 250 MG
2 CAPSULE ORAL 2 TIMES DAILY
Status: DISCONTINUED | OUTPATIENT
Start: 2024-04-21 | End: 2024-04-23

## 2024-04-21 RX ORDER — AMOXICILLIN 250 MG
1 CAPSULE ORAL 2 TIMES DAILY PRN
Status: DISCONTINUED | OUTPATIENT
Start: 2024-04-21 | End: 2024-04-25 | Stop reason: HOSPADM

## 2024-04-21 RX ORDER — METHOCARBAMOL 750 MG/1
750 TABLET, FILM COATED ORAL EVERY 6 HOURS PRN
Status: DISCONTINUED | OUTPATIENT
Start: 2024-04-21 | End: 2024-04-25 | Stop reason: HOSPADM

## 2024-04-21 RX ADMIN — HYDROMORPHONE HYDROCHLORIDE 0.5 MG: 1 INJECTION, SOLUTION INTRAMUSCULAR; INTRAVENOUS; SUBCUTANEOUS at 06:04

## 2024-04-21 RX ADMIN — LOSARTAN POTASSIUM 50 MG: 50 TABLET, FILM COATED ORAL at 09:30

## 2024-04-21 RX ADMIN — MAGNESIUM 64 MG (MAGNESIUM CHLORIDE) TABLET,DELAYED RELEASE 535 MG: at 09:30

## 2024-04-21 RX ADMIN — SENNOSIDES AND DOCUSATE SODIUM 2 TABLET: 8.6; 5 TABLET ORAL at 09:30

## 2024-04-21 RX ADMIN — ACETAMINOPHEN 1000 MG: 500 TABLET, FILM COATED ORAL at 06:41

## 2024-04-21 RX ADMIN — BACLOFEN 10 MG: 10 TABLET ORAL at 22:40

## 2024-04-21 RX ADMIN — ACETAMINOPHEN 1000 MG: 500 TABLET, FILM COATED ORAL at 21:32

## 2024-04-21 RX ADMIN — OXYCODONE HYDROCHLORIDE 10 MG: 5 TABLET ORAL at 18:38

## 2024-04-21 RX ADMIN — HYDROMORPHONE HYDROCHLORIDE 0.5 MG: 1 INJECTION, SOLUTION INTRAMUSCULAR; INTRAVENOUS; SUBCUTANEOUS at 00:31

## 2024-04-21 RX ADMIN — HYDROMORPHONE HYDROCHLORIDE 0.5 MG: 1 INJECTION, SOLUTION INTRAMUSCULAR; INTRAVENOUS; SUBCUTANEOUS at 23:57

## 2024-04-21 RX ADMIN — DICLOFENAC SODIUM 4 G: 10 GEL TOPICAL at 17:50

## 2024-04-21 RX ADMIN — HYDROMORPHONE HYDROCHLORIDE 0.5 MG: 1 INJECTION, SOLUTION INTRAMUSCULAR; INTRAVENOUS; SUBCUTANEOUS at 09:35

## 2024-04-21 RX ADMIN — LEVOTHYROXINE SODIUM 75 MCG: 0.07 TABLET ORAL at 09:30

## 2024-04-21 RX ADMIN — KETAMINE HYDROCHLORIDE 10 MG: 10 INJECTION, SOLUTION INTRAMUSCULAR; INTRAVENOUS at 01:22

## 2024-04-21 RX ADMIN — OXYCODONE HYDROCHLORIDE 10 MG: 5 TABLET ORAL at 11:46

## 2024-04-21 RX ADMIN — METHYLPREDNISOLONE 4 MG: 4 TABLET ORAL at 09:30

## 2024-04-21 RX ADMIN — BUDESONIDE 2 MG: 0.5 INHALANT RESPIRATORY (INHALATION) at 09:37

## 2024-04-21 RX ADMIN — PANTOPRAZOLE SODIUM 40 MG: 40 TABLET, DELAYED RELEASE ORAL at 09:30

## 2024-04-21 RX ADMIN — PREGABALIN 75 MG: 75 CAPSULE ORAL at 21:32

## 2024-04-21 RX ADMIN — BUSPIRONE HYDROCHLORIDE 5 MG: 5 TABLET ORAL at 17:47

## 2024-04-21 RX ADMIN — BUSPIRONE HYDROCHLORIDE 5 MG: 5 TABLET ORAL at 21:32

## 2024-04-21 RX ADMIN — BUSPIRONE HYDROCHLORIDE 5 MG: 5 TABLET ORAL at 09:44

## 2024-04-21 RX ADMIN — CETIRIZINE HYDROCHLORIDE 10 MG: 10 TABLET, FILM COATED ORAL at 09:30

## 2024-04-21 RX ADMIN — OXYCODONE HYDROCHLORIDE 10 MG: 5 TABLET ORAL at 06:41

## 2024-04-21 RX ADMIN — POLYETHYLENE GLYCOL 3350 17 G: 17 POWDER, FOR SOLUTION ORAL at 18:32

## 2024-04-21 RX ADMIN — BACLOFEN 10 MG: 10 TABLET ORAL at 17:47

## 2024-04-21 RX ADMIN — DICLOFENAC SODIUM 4 G: 10 GEL TOPICAL at 21:35

## 2024-04-21 RX ADMIN — METHOCARBAMOL 750 MG: 750 TABLET ORAL at 11:46

## 2024-04-21 RX ADMIN — ACETAMINOPHEN 1000 MG: 500 TABLET, FILM COATED ORAL at 11:46

## 2024-04-21 RX ADMIN — METHOCARBAMOL 750 MG: 750 TABLET ORAL at 09:44

## 2024-04-21 RX ADMIN — SENNOSIDES AND DOCUSATE SODIUM 2 TABLET: 8.6; 5 TABLET ORAL at 22:39

## 2024-04-21 RX ADMIN — POLYETHYLENE GLYCOL 3350 17 G: 17 POWDER, FOR SOLUTION ORAL at 09:30

## 2024-04-21 RX ADMIN — DICLOFENAC SODIUM 4 G: 10 GEL TOPICAL at 11:46

## 2024-04-21 RX ADMIN — HYDROMORPHONE HYDROCHLORIDE 0.5 MG: 1 INJECTION, SOLUTION INTRAMUSCULAR; INTRAVENOUS; SUBCUTANEOUS at 17:44

## 2024-04-21 RX ADMIN — PREGABALIN 75 MG: 75 CAPSULE ORAL at 09:30

## 2024-04-21 RX ADMIN — ONDANSETRON 8 MG: 4 TABLET, ORALLY DISINTEGRATING ORAL at 06:07

## 2024-04-21 RX ADMIN — LIDOCAINE 2 PATCH: 4 PATCH TOPICAL at 11:45

## 2024-04-21 RX ADMIN — DICLOFENAC SODIUM 4 G: 10 GEL TOPICAL at 09:45

## 2024-04-21 RX ADMIN — BACLOFEN 10 MG: 10 TABLET ORAL at 07:30

## 2024-04-21 ASSESSMENT — ACTIVITIES OF DAILY LIVING (ADL)
ADLS_ACUITY_SCORE: 22
ADLS_ACUITY_SCORE: 23
ADLS_ACUITY_SCORE: 23
ADLS_ACUITY_SCORE: 22
ADLS_ACUITY_SCORE: 23
ADLS_ACUITY_SCORE: 22
ADLS_ACUITY_SCORE: 23
ADLS_ACUITY_SCORE: 22
ADLS_ACUITY_SCORE: 22
ADLS_ACUITY_SCORE: 23
ADLS_ACUITY_SCORE: 22
ADLS_ACUITY_SCORE: 23

## 2024-04-21 NOTE — PROVIDER NOTIFICATION
1353: Danielle messaged Dr. Fuentes that Pt wanted me to let her know that she feels like something happened during therapy yesterday that is causing the increased pain behind her shoulder blade. She is asking for the pain to be reassessed whether that means new imaging or a new consult or just for you to look at it and talk to her. She is also now c/o being lightheaded/dizzy like she is going to pass out. VS look good, just elevated BP.     Dr. Fuentes responded that pain team is already following and will see pt tomorrow. MD ordered x-ray and also re consulted orthopedic surgery.

## 2024-04-21 NOTE — PROGRESS NOTES
[facility]    Medicine Progress Note - Hospitalist Service    Date of Admission:  4/15/2024  Date of Service: 04/21/2024    Assessment & Plan      Brief summary of admission assessment:Machelle Weiner is a 49 year old  female with a significant past medical history of chronic pain ,GRIFFITH,  TIMMY, hypertension, hyperlipidemia, and migraines who presents with right posterior shoulder pain.  She was complaining of severe sharp pain on the right radiating to the right upper extremity.  She is to have cervical radiculopathy for which she has been getting epidural steroid injections and pain clinic.     ED evaluation revealed anxiety, hyperventilation with bicarb of 15.  CT scan of the chest negative for acute intrathoracic pathology including PE or dissection.     Patient received multiple medications in the emergency department including 5 mg IV Valium, 0.5 mg of hydromorphone twice and her pain did not improve significantly.     #1.  Acute intractable pain involving the right shoulder, right posterior upper chest, back and shoulder blade area.  -- History of degenerative disc disease C4-C5, C5-C6, and C6-C7 on MRI study of 2021.  -- CT scan with contrast did not reveal any acute intrathoracic pathology  -- Patient has chronic pain but does not use narcotic medications at home  Plan:  -- Pain service consulted -> medrol pack started / gabapentin and Lyrica being considered  -- Orthopedic surgery consulted -> non surgical   -- MR Cervical and Thoracic overall unrevealing aside from chronic findings   -- MR Shoulder and MR Scapula -> Minimal subacromial spurring with trace subacromial subdeltoid bursal fluid. Mild supraspinatus tendinosis. No rotator cuff tendon tear. Small amount of fluid in the subscapular recess of the glenohumeral joint No acute fracture or dislocation.  -- Will need outpatient follow up with orthopedic surgery   -- Medrol trial per pain service  -- Patient needs to  follow-up with her pain management team as an outpatient as well.  Still complains of significant pain in her right shoulder.  IV Dilaudid as needed for pain  Patient seems to be dealing with a lot of anxiety.  Psychiatric consultation requested started her on BuSpar 5 mg p.o. 3 times daily    #2.  Severe anxiety  --She has history of depression and generalized anxiety disorder but is not on antianxiety medication.  She was very emotional and her new right sided pain is causing her a lot of distress.  She wants to get more testing to figure out the etiology of her condition.  Plan:  -- Was treated with Ativan as needed.   -- Patient may benefit from scheduled antianxiety medication that can be done as an outpatient with her primary care provider.    Psychiatric consultation requested as patient told me she was having panic attacks recently to better manage her underlying anxiety    Psychiatry started her on BuSpar 5 mg p.o. 3 times daily on 4/20/2024.  Patient is tolerating it well             Diet: Regular Diet Adult    DVT Prophylaxis: Pneumatic Compression Devices  Sheridan Catheter: Not present  Lines: None     Cardiac Monitoring: None  Code Status: Full CodeFULL CODE    Clinically Significant Risk Factors                  # Hypertension: Noted on problem list            # Asthma: noted on problem list        Disposition Plan     Medically Ready for Discharge: Anticipated Tomorrow once pain is better controlled and patient is feeling better from pain standpoint    Discussed with bedside RN patient      Franchesca Fuentes MD  Hospitalist Service  [facility]  Securely message with [employer] (more info)  Text page via [employer] Paging/Directory   ______________________________________________________________________    Interval History     Patient still complains of significant right shoulder pain.  There is stabbing pain in 1 spot in her right-sided back.  No fevers  No CP/SOB  No nausea / vomiting   No  new complaints    Physical Exam   Vital Signs: Temp: 97.9  F (36.6  C) Temp src: Axillary BP: (!) 151/82 Pulse: 58   Resp: 20 SpO2: 98 % O2 Device: None (Room air)    Weight: 130 lbs 0 oz    GEN:  no apparent distress  RESP: no accessory muscle use  MSK: ROM of RUE intact. No swelling noted.   Neurologic:Grossly intact,non focal. Tangela.  Neuropsychiatric: normal mood and affect  ----------------------------------------------------------------------------------------    Medical Decision Making       50 MINUTES SPENT BY ME on the date of service doing chart review, history, exam, documentation & further activities per the note.      Data   ------------------------- PAST 24 HR DATA REVIEWED -----------------------------------------------        Imaging results reviewed over the past 24 hrs:   No results found for this or any previous visit (from the past 24 hour(s)).      ------------------------- ENCOUNTER LABS ----------------------------------------------------------------  Recent Labs   Lab 04/15/24  1134   WBC 8.7   HGB 13.2   MCV 85         POTASSIUM 3.9   CHLORIDE 108*   CO2 15*   BUN 11.0   CR 0.96*   ANIONGAP 15   BETINA 9.4   GLC 92   ALBUMIN 4.4   PROTTOTAL 7.6   BILITOTAL 0.2   ALKPHOS 97   ALT 14   AST 22       Most Recent 3 CBC's:  Recent Labs   Lab Test 04/15/24  1134 08/29/22  1703 08/16/22  1514   WBC 8.7 10.9 11.5*   HGB 13.2 11.9 12.2   MCV 85 93 93    294 293     Most Recent 3 BMP's:  Recent Labs   Lab Test 04/15/24  1134 08/16/22  1514 03/22/22  1627    137 137   POTASSIUM 3.9 3.9 4.0   CHLORIDE 108* 105 108   CO2 15* 20* 25   BUN 11.0 6.8 10   CR 0.96* 0.77 1.02   ANIONGAP 15 12 4   BETINA 9.4 8.9 9.3   GLC 92 82 86     Most Recent 2 LFT's:  Recent Labs   Lab Test 04/15/24  1134 12/15/22  1233   AST 22 83*   ALT 14 98*   ALKPHOS 97 134*   BILITOTAL 0.2 0.2     Most Recent 3 INR's:  Recent Labs   Lab Test 08/29/22  1703 12/16/17  1725   INR 1.04 0.95     Most Recent 3  Troponin's:  Recent Labs   Lab Test 01/11/17  2110 10/03/16  1749   TROPI <0.015  The 99th percentile for upper reference range is 0.045 ug/L.  Troponin values in   the range of 0.045 - 0.120 ug/L may be associated with risks of adverse   clinical events.   <0.015  The 99th percentile for upper reference range is 0.045 ug/L.  Troponin values in   the range of 0.045 - 0.120 ug/L may be associated with risks of adverse   clinical events.       Most Recent 3 BNP's:No lab results found.  Most Recent D-dimer:  Recent Labs   Lab Test 04/15/24  1134   DD 0.54*     Most Recent Cholesterol Panel:  Recent Labs   Lab Test 11/07/23  1528   CHOL 196   *   HDL 45*   TRIG 91     Most Recent 6 Bacteria Isolates From Any Culture (See EPIC Reports for Culture Details):  Recent Labs   Lab Test 05/15/18  1325   CULT No beta hemolytic Streptococcus Group A isolated     Most Recent TSH and T4:  Recent Labs   Lab Test 11/07/23  1528 04/19/22  1545 03/22/21  1754   TSH 1.78   < > 5.90*   T4  --   --  0.80    < > = values in this interval not displayed.     Most Recent Urinalysis:  Recent Labs   Lab Test 08/29/22  1705   COLOR Yellow   APPEARANCE Clear   URINEGLC Negative   URINEBILI Negative   URINEKETONE Negative   SG 1.022   UBLD Negative   URINEPH 5.5   PROTEIN Negative   NITRITE Negative   LEUKEST Negative   RBCU 2   WBCU 1     Most Recent ESR & CRP:  Recent Labs   Lab Test 04/15/24  1134 08/16/22  1514 03/22/22  1627   SED  --   --  40*   CRP  --   --  8.7*   CRPI <3.00   < >  --     < > = values in this interval not displayed.

## 2024-04-21 NOTE — PLAN OF CARE
19:00-07:30    Patient verbalized that pain has not getting better rather it is getting worst, PRN IV Dilaudid, po Oxycodone and po Ketamine given and ice applied. Rating pain 8-10/10 before and after pain medications. No bowel movement during the shift, scheduled stool softeners given.  Pain services, PT  and Psychiatry following.      Problem: Pain Acute  Goal: Optimal Pain Control and Function  Outcome: Not Progressing  Intervention: Develop Pain Management Plan  Recent Flowsheet Documentation  Taken 4/21/2024 0124 by Joanne Hoffmann RN  Pain Management Interventions: medication (see MAR)  Taken 4/21/2024 0031 by Joanne Hoffmann RN  Pain Management Interventions: medication (see MAR)  Taken 4/20/2024 2328 by Joanne Hoffmann RN  Pain Management Interventions: medication (see MAR)  Taken 4/20/2024 2136 by Joanne Hoffmann RN  Pain Management Interventions:   medication (see MAR)   cold applied  Intervention: Prevent or Manage Pain  Recent Flowsheet Documentation  Taken 4/20/2024 2215 by Joanne Hoffmann RN  Medication Review/Management: medications reviewed     Goal Outcome Evaluation:      Plan of Care Reviewed With: patient    Overall Patient Progress: no changeOverall Patient Progress: no change    Outcome Evaluation: per patient pain level has not lessened

## 2024-04-21 NOTE — PLAN OF CARE
"Provided cares for pt from [phone]. Pt a/o x4. C/o R shoulder/neck pain, dilaudid, oxycodone, baclofen, and voltaren gel given. Some relief. Pt stated this afternoon that she started to feel dizzy and lightheaded, pt now SBA with bed alarm on. Pt requested reassessment of pain, x-ray done, ortho re consulted, and pain team reassessment tomorrow. Pt voiding. Constipated, PRN miralax given. Monitor lightheadedness overnight. Manage pain. Wait for ortho and pain team reassessment tomorrow.     BP (!) 166/96 (BP Location: Left arm, Patient Position: Semi-Raymond's, Cuff Size: Adult Large)   Pulse (!) 47   Temp 98.2  F (36.8  C) (Oral)   Resp 20   Ht 1.626 m (5' 4\")   Wt 59 kg (130 lb)   SpO2 98%   BMI 22.31 kg/m          Problem: Adult Inpatient Plan of Care  Goal: Plan of Care Review  Description: The Plan of Care Review/Shift note should be completed every shift.  The Outcome Evaluation is a brief statement about your assessment that the patient is improving, declining, or no change.  This information will be displayed automatically on your shift  note.  4/21/2024 1619 by Hannah Delgadillo RN  Outcome: Progressing  Flowsheets (Taken 4/21/2024 1619)  Outcome Evaluation: pain managment, monitor lightheadedness  Plan of Care Reviewed With: patient  Overall Patient Progress: improving  4/21/2024 1619 by Hannah Delgadillo RN  Outcome: Progressing  Flowsheets (Taken 4/21/2024 1619)  Outcome Evaluation: pain managment, monitor lightheadedness  Plan of Care Reviewed With: patient  Overall Patient Progress: improving  Goal: Patient-Specific Goal (Individualized)  Description: You can add care plan individualizations to a care plan. Examples of Individualization might be:  \"Parent requests to be called daily at 9am for status\", \"I have a hard time hearing out of my right ear\", or \"Do not touch me to wake me up as it startles  me\".  4/21/2024 1619 by Shadick, Hannah E, RN  Outcome: Progressing  4/21/2024 1619 by " Hannah Delgadillo RN  Outcome: Progressing  Goal: Absence of Hospital-Acquired Illness or Injury  4/21/2024 1619 by Hannah Delgadillo RN  Outcome: Progressing  4/21/2024 1619 by Hannah Delgadillo RN  Outcome: Progressing  Intervention: Identify and Manage Fall Risk  Recent Flowsheet Documentation  Taken 4/21/2024 1544 by Hannah Delgadillo RN  Safety Promotion/Fall Prevention: safety round/check completed  Taken 4/21/2024 0930 by Hannah Delgadillo RN  Safety Promotion/Fall Prevention:   clutter free environment maintained   increased rounding and observation   nonskid shoes/slippers when out of bed   room door open   room near nurse's station   room organization consistent   safety round/check completed  Intervention: Prevent Skin Injury  Recent Flowsheet Documentation  Taken 4/21/2024 0930 by Hannah Delgadillo RN  Body Position: position changed independently  Intervention: Prevent and Manage VTE (Venous Thromboembolism) Risk  Recent Flowsheet Documentation  Taken 4/21/2024 0930 by Hannah Delgadillo RN  VTE Prevention/Management: SCDs (sequential compression devices) off  Intervention: Prevent Infection  Recent Flowsheet Documentation  Taken 4/21/2024 0930 by Hannah Delgadillo RN  Infection Prevention:   equipment surfaces disinfected   hand hygiene promoted   personal protective equipment utilized   rest/sleep promoted  Goal: Optimal Comfort and Wellbeing  4/21/2024 1619 by Hannah Delgadillo RN  Outcome: Progressing  4/21/2024 1619 by Hannah Delgadillo RN  Outcome: Progressing  Intervention: Monitor Pain and Promote Comfort  Recent Flowsheet Documentation  Taken 4/21/2024 0935 by Hannah Delgadillo RN  Pain Management Interventions: medication (see MAR)  Goal: Readiness for Transition of Care  4/21/2024 1619 by Hannah Delgadillo RN  Outcome: Progressing  4/21/2024 1619 by Hannah Delgadillo RN  Outcome: Progressing

## 2024-04-22 LAB
ALBUMIN SERPL BCG-MCNC: 4 G/DL (ref 3.5–5.2)
ALP SERPL-CCNC: 77 U/L (ref 40–150)
ALT SERPL W P-5'-P-CCNC: 16 U/L (ref 0–50)
ANION GAP SERPL CALCULATED.3IONS-SCNC: 11 MMOL/L (ref 7–15)
AST SERPL W P-5'-P-CCNC: 16 U/L (ref 0–45)
BILIRUB SERPL-MCNC: 0.2 MG/DL
BUN SERPL-MCNC: 14.8 MG/DL (ref 6–20)
CALCIUM SERPL-MCNC: 8.9 MG/DL (ref 8.6–10)
CHLORIDE SERPL-SCNC: 106 MMOL/L (ref 98–107)
CREAT SERPL-MCNC: 1.05 MG/DL (ref 0.51–0.95)
CRP SERPL-MCNC: <3 MG/L
DEPRECATED HCO3 PLAS-SCNC: 22 MMOL/L (ref 22–29)
EGFRCR SERPLBLD CKD-EPI 2021: 65 ML/MIN/1.73M2
ERYTHROCYTE [DISTWIDTH] IN BLOOD BY AUTOMATED COUNT: 15 % (ref 10–15)
GLUCOSE SERPL-MCNC: 86 MG/DL (ref 70–99)
HCT VFR BLD AUTO: 38.2 % (ref 35–47)
HGB BLD-MCNC: 12.1 G/DL (ref 11.7–15.7)
MCH RBC QN AUTO: 27.6 PG (ref 26.5–33)
MCHC RBC AUTO-ENTMCNC: 31.7 G/DL (ref 31.5–36.5)
MCV RBC AUTO: 87 FL (ref 78–100)
PLATELET # BLD AUTO: 247 10E3/UL (ref 150–450)
POTASSIUM SERPL-SCNC: 3.6 MMOL/L (ref 3.4–5.3)
PROT SERPL-MCNC: 6.6 G/DL (ref 6.4–8.3)
RBC # BLD AUTO: 4.39 10E6/UL (ref 3.8–5.2)
SODIUM SERPL-SCNC: 139 MMOL/L (ref 135–145)
WBC # BLD AUTO: 14.4 10E3/UL (ref 4–11)

## 2024-04-22 PROCEDURE — 250N000013 HC RX MED GY IP 250 OP 250 PS 637: Performed by: STUDENT IN AN ORGANIZED HEALTH CARE EDUCATION/TRAINING PROGRAM

## 2024-04-22 PROCEDURE — 99232 SBSQ HOSP IP/OBS MODERATE 35: CPT | Performed by: STUDENT IN AN ORGANIZED HEALTH CARE EDUCATION/TRAINING PROGRAM

## 2024-04-22 PROCEDURE — 250N000013 HC RX MED GY IP 250 OP 250 PS 637: Performed by: CLINICAL NURSE SPECIALIST

## 2024-04-22 PROCEDURE — 250N000011 HC RX IP 250 OP 636: Performed by: CLINICAL NURSE SPECIALIST

## 2024-04-22 PROCEDURE — 250N000011 HC RX IP 250 OP 636: Performed by: INTERNAL MEDICINE

## 2024-04-22 PROCEDURE — 250N000013 HC RX MED GY IP 250 OP 250 PS 637: Performed by: NURSE PRACTITIONER

## 2024-04-22 PROCEDURE — 120N000001 HC R&B MED SURG/OB

## 2024-04-22 PROCEDURE — 250N000013 HC RX MED GY IP 250 OP 250 PS 637: Performed by: INTERNAL MEDICINE

## 2024-04-22 PROCEDURE — 85027 COMPLETE CBC AUTOMATED: CPT | Performed by: INTERNAL MEDICINE

## 2024-04-22 PROCEDURE — 80053 COMPREHEN METABOLIC PANEL: CPT | Performed by: INTERNAL MEDICINE

## 2024-04-22 PROCEDURE — 86140 C-REACTIVE PROTEIN: CPT | Performed by: INTERNAL MEDICINE

## 2024-04-22 PROCEDURE — 99232 SBSQ HOSP IP/OBS MODERATE 35: CPT | Performed by: CLINICAL NURSE SPECIALIST

## 2024-04-22 PROCEDURE — 36415 COLL VENOUS BLD VENIPUNCTURE: CPT | Performed by: INTERNAL MEDICINE

## 2024-04-22 PROCEDURE — 250N000009 HC RX 250: Performed by: STUDENT IN AN ORGANIZED HEALTH CARE EDUCATION/TRAINING PROGRAM

## 2024-04-22 PROCEDURE — 250N000013 HC RX MED GY IP 250 OP 250 PS 637: Performed by: PSYCHIATRY & NEUROLOGY

## 2024-04-22 RX ORDER — OXYCODONE HYDROCHLORIDE 5 MG/1
10-15 TABLET ORAL EVERY 4 HOURS PRN
Status: DISCONTINUED | OUTPATIENT
Start: 2024-04-22 | End: 2024-04-24

## 2024-04-22 RX ADMIN — CETIRIZINE HYDROCHLORIDE 10 MG: 10 TABLET, FILM COATED ORAL at 09:35

## 2024-04-22 RX ADMIN — ACETAMINOPHEN 1000 MG: 500 TABLET, FILM COATED ORAL at 10:46

## 2024-04-22 RX ADMIN — HYDROMORPHONE HYDROCHLORIDE 0.5 MG: 1 INJECTION, SOLUTION INTRAMUSCULAR; INTRAVENOUS; SUBCUTANEOUS at 19:41

## 2024-04-22 RX ADMIN — OXYCODONE HYDROCHLORIDE 10 MG: 5 TABLET ORAL at 21:12

## 2024-04-22 RX ADMIN — LORAZEPAM 1 MG: 1 TABLET ORAL at 03:52

## 2024-04-22 RX ADMIN — PREGABALIN 75 MG: 75 CAPSULE ORAL at 09:35

## 2024-04-22 RX ADMIN — SENNOSIDES AND DOCUSATE SODIUM 2 TABLET: 8.6; 5 TABLET ORAL at 09:35

## 2024-04-22 RX ADMIN — OXYCODONE HYDROCHLORIDE 10 MG: 5 TABLET ORAL at 02:23

## 2024-04-22 RX ADMIN — ACETAMINOPHEN 1000 MG: 500 TABLET, FILM COATED ORAL at 19:41

## 2024-04-22 RX ADMIN — BUDESONIDE 2 MG: 0.5 INHALANT RESPIRATORY (INHALATION) at 10:46

## 2024-04-22 RX ADMIN — LOSARTAN POTASSIUM 50 MG: 50 TABLET, FILM COATED ORAL at 09:35

## 2024-04-22 RX ADMIN — PREGABALIN 75 MG: 75 CAPSULE ORAL at 19:41

## 2024-04-22 RX ADMIN — BUSPIRONE HYDROCHLORIDE 5 MG: 5 TABLET ORAL at 19:41

## 2024-04-22 RX ADMIN — BUSPIRONE HYDROCHLORIDE 5 MG: 5 TABLET ORAL at 09:35

## 2024-04-22 RX ADMIN — OXYCODONE HYDROCHLORIDE 10 MG: 5 TABLET ORAL at 16:17

## 2024-04-22 RX ADMIN — ONDANSETRON 8 MG: 4 TABLET, ORALLY DISINTEGRATING ORAL at 09:43

## 2024-04-22 RX ADMIN — LEVOTHYROXINE SODIUM 75 MCG: 0.07 TABLET ORAL at 09:35

## 2024-04-22 RX ADMIN — HYDROMORPHONE HYDROCHLORIDE 0.5 MG: 1 INJECTION, SOLUTION INTRAMUSCULAR; INTRAVENOUS; SUBCUTANEOUS at 14:53

## 2024-04-22 RX ADMIN — ACETAMINOPHEN 1000 MG: 500 TABLET, FILM COATED ORAL at 03:52

## 2024-04-22 RX ADMIN — OXYCODONE HYDROCHLORIDE 10 MG: 5 TABLET ORAL at 10:46

## 2024-04-22 RX ADMIN — BUSPIRONE HYDROCHLORIDE 5 MG: 5 TABLET ORAL at 14:53

## 2024-04-22 RX ADMIN — HYDROMORPHONE HYDROCHLORIDE 0.5 MG: 1 INJECTION, SOLUTION INTRAMUSCULAR; INTRAVENOUS; SUBCUTANEOUS at 09:32

## 2024-04-22 RX ADMIN — PANTOPRAZOLE SODIUM 40 MG: 40 TABLET, DELAYED RELEASE ORAL at 09:35

## 2024-04-22 RX ADMIN — MAGNESIUM 64 MG (MAGNESIUM CHLORIDE) TABLET,DELAYED RELEASE 535 MG: at 09:35

## 2024-04-22 RX ADMIN — LIDOCAINE 2 PATCH: 4 PATCH TOPICAL at 09:35

## 2024-04-22 RX ADMIN — SENNOSIDES AND DOCUSATE SODIUM 2 TABLET: 8.6; 5 TABLET ORAL at 19:41

## 2024-04-22 ASSESSMENT — ACTIVITIES OF DAILY LIVING (ADL)
ADLS_ACUITY_SCORE: 22
ADLS_ACUITY_SCORE: 23
ADLS_ACUITY_SCORE: 22
ADLS_ACUITY_SCORE: 23
ADLS_ACUITY_SCORE: 23
ADLS_ACUITY_SCORE: 22
ADLS_ACUITY_SCORE: 23
ADLS_ACUITY_SCORE: 22
ADLS_ACUITY_SCORE: 23
ADLS_ACUITY_SCORE: 22
ADLS_ACUITY_SCORE: 23
ADLS_ACUITY_SCORE: 22

## 2024-04-22 NOTE — PROGRESS NOTES
[facility]    Medicine Progress Note - Hospitalist Service    Date of Admission:  4/15/2024    Assessment & Plan   Brief summary of admission assessment:Machelle Weiner is a 49 year old  female with a significant past medical history of chronic pain ,GRIFFITH,  TIMMY, hypertension, hyperlipidemia, and migraines who presents with right posterior shoulder pain.  She was complaining of severe sharp pain on the right radiating to the right upper extremity.  She is to have cervical radiculopathy for which she has been getting epidural steroid injections and pain clinic.    Ongoing pain requiring IV pain meds limiting discharge. Pain service following. Plan to involve NSG to evaluate C spine.      Acute intractable pain involving the right shoulder, right posterior upper chest, back and shoulder blade area.  -- History of degenerative disc disease C4-C5, C5-C6, and C6-C7 on MRI study of 2021.  -- CT scan with contrast did not reveal any acute intrathoracic pathology  -- Patient has chronic pain but does not use narcotic medications at home  -- MR Cervical and Thoracic overall unrevealing aside from chronic findings   -- MR Shoulder and MR Scapula -> Minimal subacromial spurring with trace subacromial subdeltoid bursal fluid. Mild supraspinatus tendinosis. No rotator cuff tendon tear. Small amount of fluid in the subscapular recess of the glenohumeral joint No acute fracture or dislocation.  -- Pain service following -> medrol pack started, started on lyrica   -- Continue tylenol, voltaren, lidocaine patch and cream, capsacin cream   -- Ketamine and baclofen stopped   -- Remains on IV PRN dilaudid  -- Orthopedic surgery consulted -> non surgical    -Re consulted for worsening pain -> non surgical, recommend follow-up with shoulder specialist in clinic if continued pain and NSG consult to evaluate C spine  -- NSG consult placed 4/22    Severe anxiety  ED evaluation revealed anxiety,  hyperventilation with bicarb of 15.  CT scan of the chest negative for acute intrathoracic pathology including PE or dissection.  --She has history of depression and generalized anxiety disorder but is not on antianxiety medication.  She was very emotional and her new right sided pain is causing her a lot of distress.  She wants to get more testing to figure out the etiology of her condition.  -- Was treated with Ativan as needed.   -- Psychiatric consultation requested as patient told me she was having panic attacks recently to better manage her underlying anxiety  - Started her on BuSpar 5 mg p.o. 3 times daily on 4/20/2024.  Patient is tolerating it well        Diet: Regular Diet Adult    DVT Prophylaxis: Pneumatic Compression Devices  Sheridan Catheter: Not present  Lines: None     Cardiac Monitoring: None  Code Status: Full Code      Clinically Significant Risk Factors                  # Hypertension: Noted on problem list            # Asthma: noted on problem list        Disposition Plan     Medically Ready for Discharge: Anticipated in 2-4 Days             Cheryl Zaragoza DO  Hospitalist Service  [facility]  Securely message with [employer] (more info)  Text page via [employer] Paging/Directory   ______________________________________________________________________    Interval History   No acute overnight events. Ongoing shoulder pain. Was not happy with ortho eval. She was on pain meds when examined and thought it would be more helpful when she is in pain. Discussed that if her ROM is normal when pain is controlled, there is unlikely to be any benefit for ortho to re-evaluate. Was agreeable to NSG consult.     Physical Exam   Vital Signs: Temp: 98.5  F (36.9  C) Temp src: Oral BP: (!) 168/90 Pulse: 56   Resp: 20 SpO2: 99 % O2 Device: None (Room air)    Weight: 130 lbs 0 oz    Constitutional: Awake, alert, no distress, and cooperative  Cardiovascular: Regular rate and rhythm, normal S1 and  S2, no S3 or S4, and no murmur noted  Respiratory: No increased work of breathing, good air exchange, clear to auscultation bilaterally, no crackles or wheezing  Gastrointestinal: Abdomen soft, non-tender, non-distended. BS normal. No masses, organomegaly     Medical Decision Making       45 MINUTES SPENT BY ME on the date of service doing chart review, history, exam, documentation & further activities per the note.

## 2024-04-22 NOTE — PLAN OF CARE
" Pt is A&Ox4. VSS on RA. Regular diet.SBA.  PIV-SL. IV dilaudid, Oxycodone, tylenol and voltaren gel given for pain. Pt  crying and stated she had increase anxiety, PRN Ativan given. Consults: Ortho.   Goal Outcome Evaluation:      Plan of Care Reviewed With: patient        Outcome Evaluation: pain management and anxiety medication      Problem: Adult Inpatient Plan of Care  Goal: Plan of Care Review  Description: The Plan of Care Review/Shift note should be completed every shift.  The Outcome Evaluation is a brief statement about your assessment that the patient is improving, declining, or no change.  This information will be displayed automatically on your shift  note.  Outcome: Progressing  Flowsheets (Taken 4/22/2024 9665)  Outcome Evaluation: pain management and anxiety medication  Plan of Care Reviewed With: patient  Goal: Patient-Specific Goal (Individualized)  Description: You can add care plan individualizations to a care plan. Examples of Individualization might be:  \"Parent requests to be called daily at 9am for status\", \"I have a hard time hearing out of my right ear\", or \"Do not touch me to wake me up as it startles  me\".  Outcome: Progressing  Goal: Absence of Hospital-Acquired Illness or Injury  Outcome: Progressing  Intervention: Identify and Manage Fall Risk  Recent Flowsheet Documentation  Taken 4/21/2024 2200 by Patricia Crawford RN  Safety Promotion/Fall Prevention:   activity supervised   safety round/check completed  Intervention: Prevent Skin Injury  Recent Flowsheet Documentation  Taken 4/21/2024 2200 by Patricia Crawofrd RN  Body Position: position changed independently  Intervention: Prevent and Manage VTE (Venous Thromboembolism) Risk  Recent Flowsheet Documentation  Taken 4/21/2024 2200 by Patricia Crawford RN  VTE Prevention/Management: SCDs (sequential compression devices) off  Intervention: Prevent Infection  Recent Flowsheet Documentation  Taken 4/21/2024 2200 by Patricia Crawford RN  Infection " Prevention: cohorting utilized  Goal: Optimal Comfort and Wellbeing  Outcome: Progressing  Goal: Readiness for Transition of Care  Outcome: Progressing

## 2024-04-22 NOTE — PROGRESS NOTES
[facility] ACUTE PAIN SERVICE    ([facility], [facility], [facility], [facility])  Pain Progress Note    Assessment/Plan:  Machelle Weiner is a 49 year old female who was admitted on 4/15/2024.  Pain Service is asked to see the patient for follow up for right shoulder pain and upper trapezius pain.  Patient with history of chronic pain epigastric, GERD, degenerative disc disease and GRIFFITH.  Repeat of MRI imaging of thoracic spine and cervical spine indicate stable to 3 left-sided facet arthroscopy and T4-5,  C3-4,4-5, C5-6 mild canal stenosis and moderate at C5-6 and 6-7.  MRI of the right scapula and shoulder indicate.      She has requested trigger point injections and per discussion with IR would have to pursue as an Outpatient procedure.       Opioid risk assessment= moderate due to medication reliant behavior.  Use of polypharmacy, comorbid conditions  Creatinine clearance 68.8 mL/min  [place]  reviewed noting no controlled substances in the past 12 months    Opioid use this past 24 hours= oxycodone 10 mg (3), hydromorphone 0.5mg (3)= 75 mg oral morphine equivalent.  Adjuvants: lyrica 75 mg (2), baclofen 2(10mg), acetaminophen 1000 mg (3), Lorazepam 1(1mg) tablet and Buspar 3 schedule 5 mg doses.    Medrol Dosepak completed         PLAN:  Acute pain secondary to muscle spasms with radiculopathy due to cervical spinal degenerative disc disease.    Multimodal Medication Therapy:   Adjuvants: Tylenol 1000 mg every 6 hours, Voltaren topical gel qid, lidocaine patch administer for 12 hours one dose, add lidocaine cream qid,  baclofen 10 mg 3 times daily, Lorazepam 1 tablets every four hours prn (Hospital Medicine), vistaril 50 mg every 6 hours prn,  capsacin cream qid Lyrica change from 50 mg bid to 75 mg bid one dose of 25 mg now  Ketamine oral solution 10 mg now then every 6 hours as needed ok to continue til discharge  Opioids: Oral oxycodone 10 to 15 mg every 3 hours as needed  "today    IV hydromorphone 0.5 every 3 hours prn   Non-medication interventions- Ice prn sling to off load trapezius and put in neutal position  Constipation Prophylaxis- daily stool softener/laxative   Follow up /Discharge Recommendations - We recommend prescribing the following at the time of discharge:    Medications:  Topicals diclofenac, lidocaine patch   Lyrica 75 mg bid, will continue not sure how helpful.  Baclofen 10 mg tid will stop patient feels she was having side effects, not helpful  No Ketamine felt had side effects without benefit  Prednisone 4 mg at hs x 1 days (completing Medrol dose pack)  Opioids   consider short course of Oxycodone 10-15 mg qid prn prescription for up to 6 tablets per for 5 days  #30 tablets f/up with PCP 3-4 days post discharge   PT referral, myofascial therapy  Orthopedic referral        Subjective:  Pain increased over the past 24 hours.  Had been trying to limit pain medications to identify what is helping.  States that has been having increased pain with the reduction in the IV dilaudid to 0.5 mg from 1 mg.  Taking the IV dilaudid for pain at \"9-10\" with not getting much of a reduction with the 0.5 mg dose.    Oxycodone 10 mg not helpful when pain is greater than \"8\"    Pain continues in upper thoracic spine, scapular area, intense deep pain.    Hasn't noticed much benefit from the muscle relaxants such as Robaxin or flexeril, baclofen causing side effects.  Felt like she was \"high\" with the ketamine and does not want to continue that.       Discussed plan to adjust oxycodone to 10-15 mg dose today.  We will give 0.5 mg IV dilaudid now.         <principal problem not specified>   Patient Active Problem List   Diagnosis    Asthma, mild intermittent    Hyperlipidemia LDL goal <160    Essential hypertension, benign    Acquired hypothyroidism    S/P laparoscopic cholecystectomy    Morbid obesity (H)    Migraine    Facet arthropathy, lumbosacral    DDD (degenerative disc " disease), lumbosacral    Leg length discrepancy    Positive LYUDMILA (antinuclear antibody)    Multiple joint pain    Chronic pain of both knees    Hip pain    Mood disorder (H24)    Intractable pain    Acute pain of right shoulder        History   Drug Use No         Tobacco Use      Smoking status: Former        Packs/day: 0.00        Years: 0.3 packs/day for 15.0 years (4.5 ttl pk-yrs)        Types: Cigarettes        Start date: 2000        Quit date: 2015        Years since quittin.1      Smokeless tobacco: Never        Current Facility-Administered Medications   Medication Dose Route Frequency Provider Last Rate Last Admin    acetaminophen (TYLENOL) tablet 1,000 mg  1,000 mg Oral Q8H Tim Chapin MD   1,000 mg at 24 0352    baclofen (LIORESAL) tablet 10 mg  10 mg Oral TID Abigail Moscoso APRN CNP   10 mg at 24 2240    budesonide (PULMICORT) neb solution 2 mg  2 mg Nebulization Daily Yovany Livingston MD   2 mg at 24 0937    busPIRone (BUSPAR) tablet 5 mg  5 mg Oral TID Jody Gomez MD   5 mg at 24 213    cetirizine (zyrTEC) tablet 10 mg  10 mg Oral Daily Tim Chapin MD   10 mg at 24 0930    diclofenac (VOLTAREN) 1 % topical gel 4 g  4 g Topical 4x Daily Abigail Moscoso APRN CNP   4 g at 24    levothyroxine (SYNTHROID/LEVOTHROID) tablet 75 mcg  75 mcg Oral Daily Tmi Chapin MD   75 mcg at 24 0930    Lidocaine (LIDOCARE) 4 % Patch 2 patch  2 patch Transdermal Q24H Franchesca Fuentes MD   2 patch at 24 1145    losartan (COZAAR) tablet 50 mg  50 mg Oral Daily Tim Chapin MD   50 mg at 24 0930    magnesium chloride CR tablet 535 mg  535 mg Oral Daily Sharla Márquez APRN CNS   535 mg at 24 0930    pantoprazole (PROTONIX) EC tablet 40 mg  40 mg Oral QAM AC Tim Chapin MD   40 mg at 24 0930    pregabalin (LYRICA) capsule 75 mg  75 mg Oral BID Danis  "Abigail Reddline, APRN CNP   75 mg at 04/21/24 2132    senna-docusate (SENOKOT-S/PERICOLACE) 8.6-50 MG per tablet 2 tablet  2 tablet Oral BID Franchesca Fuentes MD   2 tablet at 04/21/24 2239    senna-docusate (SENOKOT-S/PERICOLACE) 8.6-50 MG per tablet 2 tablet  2 tablet Oral BID Yovany Livingston MD   2 tablet at 04/21/24 0930    sodium chloride (PF) 0.9% PF flush 3 mL  3 mL Intracatheter Q8H Franchesca Fuentes MD   3 mL at 04/21/24 2358       Objective:  Vital signs in last 24 hours:  B/P: 168/90, T: 98.5, P: 56, R: 20   Blood pressure (!) 168/90, pulse 56, temperature 98.5  F (36.9  C), temperature source Oral, resp. rate 20, height 1.626 m (5' 4\"), weight 59 kg (130 lb), SpO2 99%, not currently breastfeeding.      Weight:   Wt Readings from Last 2 Encounters:   04/15/24 59 kg (130 lb)   11/07/23 103.5 kg (228 lb 3.2 oz)         Intake/Output:  No intake or output data in the 24 hours ending 04/22/24 0826     Review of Systems:   As per subjective, all others negative.    Physical Exam:     General Appearance:  Alert, anxious sitting up and rocking back and forth in bed   Head:  Normocephalic, without obvious abnormality, atraumatic   Eyes:  PERRL, conjunctiva/corneas clear, EOM's intact   ENT/Throat: Lips moist    Lymph/Neck: Supple, symmetrical, trachea midline   Lungs:   respirations unlabored   Abdomen:   Soft, non-tender, non distended   Musculoskeletal: Decreased range of motion of right shoulder due to pain   Skin: Rash upper right shoulder   Neurologic: Alert and oriented X 3, anxious         Imaging:  Personally Reviewed.    Results for orders placed or performed during the hospital encounter of 04/15/24   CT Chest Pulmonary Embolism w Contrast    Impression    IMPRESSION:  1. No evidence for pulmonary embolism.  2. Mild mosaic attenuation in both lungs suggests air trapping.  3. Indeterminate 0.5 cm right upper lobe pulmonary nodule. Please  refer to follow-up guidelines below.    Recommendations for one or " multiple incidental lung nodules < 6mm :    Low risk patients: No routine follow-up.    High risk patients: Optional follow-up CT at 12 months; if  unchanged, no further follow-up.    *Low Risk: Minimal or absent history of smoking or other known risk  factors.  *Nonsolid (ground glass) or partly solid nodules may require longer  follow-up to exclude indolent adenocarcinoma.  *Recommendations based on Guidelines for the Management of Incidental  Pulmonary Nodules Detected at CT: From the Fleischner Society 2017,  Radiology 2017.  *Guidelines apply to incidental nodules in patients who are 35 years  or older.  *Guidelines do not apply to lung cancer screening, patients with  immunosuppression, or patients with known primary cancer.    GORDON CLARK MD         SYSTEM ID:  [id number]   MR Cervical Spine w/o Contrast    Impression    IMPRESSION:    1. Degenerative changes in the mid cervical spine as detailed above.  Degenerative findings are progressed since prior MR 3/17/2021.  2. Mild spinal canal narrowings at C3-C4, C4-C5, and C5-C6.  3. Moderate bilateral neural foraminal narrowings at C5-C6. Mild left  neural foraminal narrowing at C6-C7.     RAS FISHER MD         SYSTEM ID:  [id number]   MR Thoracic Spine w/o Contrast    Impression    IMPRESSION:    1.  Multilevel degenerative changes throughout the thoracic spine as  detailed above.  2.  Degenerative disc changes throughout the mid and lower thoracic  spine without significant disc herniation. No significant spinal canal  narrowing.  3.  Asymmetric left-sided facet hypertrophy at T2-T3 and T4-T5 causing  moderate left neural foraminal narrowings.  4.  Indeterminate subcentimeter lesion in the T8 vertebral body. This  does not appear significantly changed since 12/20/2022. Stability is  reassuring.  5.  Degenerative findings are similar to prior MR 12/20/2022.    RAS FISHER MD         SYSTEM ID:  [id number]   MR Scapula Right w/o Contrast    Impression     IMPRESSION:  1.  Minimal subacromial spurring with trace subacromial subdeltoid bursal fluid. Correlate for subacromial impingement.    2.  Mild supraspinatus tendinosis. No rotator cuff tendon tear.    3.  Small amount of fluid in the subscapular recess of the glenohumeral joint.    4.  No acute fracture or dislocation.   MR Shoulder Right w/o Contrast    Impression    IMPRESSION:  1.  Minimal subacromial spurring with trace subacromial subdeltoid bursal fluid. Correlate for subacromial impingement.    2.  Mild supraspinatus tendinosis. No rotator cuff tendon tear.    3.  Small amount of fluid in the subscapular recess of the glenohumeral joint.    4.  No acute fracture or dislocation.   XR Shoulder Right 2 Views    Impression    IMPRESSION: Mild degenerative arthrosis of the acromioclavicular joint. Glenohumeral joint is normally aligned. No definite fracture.        Lab Results:  Personally Reviewed.   Last Comprehensive Metabolic Panel:  Sodium   Date Value Ref Range Status   04/22/2024 139 135 - 145 mmol/L Final     Comment:     Reference intervals for this test were updated on 09/26/2023 to more accurately reflect our healthy population. There may be differences in the flagging of prior results with similar values performed with this method. Interpretation of those prior results can be made in the context of the updated reference intervals.    03/22/2021 137 133 - 144 mmol/L Final     Potassium   Date Value Ref Range Status   04/22/2024 3.6 3.4 - 5.3 mmol/L Final   03/22/2022 4.0 3.4 - 5.3 mmol/L Final   03/22/2021 4.1 3.4 - 5.3 mmol/L Final     Chloride   Date Value Ref Range Status   04/22/2024 106 98 - 107 mmol/L Final   03/22/2022 108 94 - 109 mmol/L Final   03/22/2021 108 94 - 109 mmol/L Final     Carbon Dioxide   Date Value Ref Range Status   03/22/2021 28 20 - 32 mmol/L Final     Carbon Dioxide (CO2)   Date Value Ref Range Status   04/22/2024 22 22 - 29 mmol/L Final   03/22/2022 25 20 - 32 mmol/L Final      Anion Gap   Date Value Ref Range Status   04/22/2024 11 7 - 15 mmol/L Final   03/22/2022 4 3 - 14 mmol/L Final   03/22/2021 1 (L) 3 - 14 mmol/L Final     Glucose   Date Value Ref Range Status   04/22/2024 86 70 - 99 mg/dL Final   03/22/2022 86 70 - 99 mg/dL Final   03/22/2021 68 (L) 70 - 99 mg/dL Final     Urea Nitrogen   Date Value Ref Range Status   04/22/2024 14.8 6.0 - 20.0 mg/dL Final   03/22/2022 10 7 - 30 mg/dL Final   03/22/2021 11 7 - 30 mg/dL Final     Creatinine   Date Value Ref Range Status   04/22/2024 1.05 (H) 0.51 - 0.95 mg/dL Final   03/22/2021 0.94 0.52 - 1.04 mg/dL Final     GFR Estimate   Date Value Ref Range Status   04/22/2024 65 >60 mL/min/1.73m2 Final   03/22/2021 74 >60 mL/min/[1.73_m2] Final     Comment:     Non  GFR Calc  Starting 12/18/2018, serum creatinine based estimated GFR (eGFR) will be   calculated using the Chronic Kidney Disease Epidemiology Collaboration   (CKD-EPI) equation.       Calcium   Date Value Ref Range Status   04/22/2024 8.9 8.6 - 10.0 mg/dL Final   03/22/2021 9.1 8.5 - 10.1 mg/dL Final        UA:   Amphetamines Urine   Date Value Ref Range Status   12/30/2021 Screen Negative Screen Negative Final     Comment:     Cutoff for a negative amphetamine is 500 ng/mL or less.     Barbiturates Qual Urine   Date Value Ref Range Status   03/29/2007 Not Detected  Final     Comment:             Reference Range:  Not Detected     Barbiturates Urine   Date Value Ref Range Status   12/30/2021 Screen Negative Screen Negative Final     Comment:     Cutoff for a negative barbiturate is 200 ng/mL or less.     Benzodiazepine Qual Urine   Date Value Ref Range Status   03/29/2007 Detected, Abnormal Result  Final     Comment:             Reference Range:  Not Detected     Cannabinoids Urine   Date Value Ref Range Status   12/30/2021 Screen Negative Screen Negative Final     Comment:     Cutoff for a negative cannabinoid is 50 ng/mL or less.     Cocaine Urine   Date Value  Ref Range Status   12/30/2021 Screen Negative Screen Negative Final     Comment:     Cutoff for a negative cocaine is 300 ng/mL or less.     Opiates Qualitative Urine   Date Value Ref Range Status   03/29/2007 Not Detected  Final     Comment:             Reference Range:  Not Detected     Opiates Urine   Date Value Ref Range Status   12/30/2021 Screen Negative Screen Negative Final     Comment:     Cutoff for a negative opiate is 300 ng/mL or less.     PCP Qual Urine   Date Value Ref Range Status   03/29/2007 Not Detected  Final     Comment:             Reference Range:  Not Detected     PCP Urine   Date Value Ref Range Status   12/30/2021 Screen Negative Screen Negative Final     Comment:     Cutoff for a negative PCP is 25 ng/mL or less.              MANAGEMENT DISCUSSED with the following over the past 24 hours: RN   NOTE(S)/MEDICAL RECORDS REVIEWED over the past 24 hours: Pain Provider, Ortho, Nursing, Hospital Medicine, Psychiatry  Medical complexity over the past 24 hours:  - Parenteral (IV) CONTROLLED SUBSTANCES ordered  - Prescription DRUG MANAGEMENT performed      BLANCA Pedersen, DNP CNS  Acute Inpatient Pain Team  M-F   Paging via [employer] or [employer]

## 2024-04-22 NOTE — CONSULTS
Orthopedic Surgery  Machelle Weiner  04/22/2024     Admit Date:  4/15/2024  Right upper trapezius and rhomboid pain, subscapular pain with radiating arm symptoms    Orthopedics was reconsulted for reevaluation of reported right shoulder pain.  Patient states that her symptoms are worsening but continue to involve her right upper neck and subscapular region into the right lateral shoulder down into her fingers.  She reports she has numbness in her first through third fingers.  She has a history of spine issues and states that the symptoms feel different than her previous symptoms.  She has had thorough imaging since admission including a right shoulder x-ray and MRI, right scapular MRI, cervical and thoracic MRIs and chest CT. The pain team has also been consulted.   Tolerating oral intake.    Denies nausea or vomiting  Denies chest pain or shortness of breath    Temp:  [97.7  F (36.5  C)-98.2  F (36.8  C)] 98.1  F (36.7  C)  Pulse:  [47-62] 62  Resp:  [18-22] 22  BP: (148-171)/(82-96) 148/84  SpO2:  [95 %-99 %] 98 %    Alert and oriented  Patient resting comfortably in bed upon my arrival in the room.  She was talking on the phone with her right shoulder fully flexed and hand behind her neck.  Patient was in no acute distress and was talking calmly on the phone.   On physical exam of the right shoulder, I am able to passively range the right shoulder with forward flexion to 140 degrees abduction to 120 degrees.  Impingement testing is negative for pain.  Denies pain with resisted impingement testing.  Patient able to resist forward flexion.  There is no sign of distress with passive range of motion of the right shoulder.  Patient reports numbness into her first and third fingers.  Distal pulses are intact on the right upper extremity.     Labs:  Recent Labs   Lab Test 04/22/24  0515 04/15/24  1134 08/29/22  1703   WBC 14.4* 8.7 10.9   HGB 12.1 13.2 11.9    305 294     Recent Labs   Lab Test 08/29/22  1703  12/16/17  1725   INR 1.04 0.95     Recent Labs   Lab Test 04/22/24  0515 04/15/24  1134 11/17/22  1712   CRPI <3.00 <3.00 6.25*     MRI shoulder and scapula:  -No fracture or concerning marrow replacing lesion.  - The scapula appears normal.  -Normal deltoid muscle bulk.  - Normal visualized chest wall and axilla.                                                                    IMPRESSION:  1.  Minimal subacromial spurring with trace subacromial subdeltoid bursal fluid. Correlate for subacromial impingement.  2.  Mild supraspinatus tendinosis. No rotator cuff tendon tear.  3.  Small amount of fluid in the subscapular recess of the glenohumeral joint.  4.  No acute fracture or dislocation    Xray right shoulder:  IMPRESSION: Mild degenerative arthrosis of the acromioclavicular joint. Glenohumeral joint is normally aligned. No definite fracture.     Chest CT unremarkable for musculoskeletal findings.     1. PLAN:   Shoulder exam remains negative for any significant findings.  I do not believe mild supraspinatus tendinosis is causing her reported symptoms as her pain complaints include the paraspinal musculature, subscapular region, right deltoid region and numbness into her right fingers.  Patient became very upset when I tried explaining this to her accusing me of not listening to her and states her shoulder is not painful because she is on high levels of narcotics.  She demanded I return when she is off narcotics and left the bed and went into the bathroom ending the exam due to her frustration.    Continue physical therapy for the right UE  Follow-up with a shoulder specialist in clinic if continued pain.  Only other recommendation would be to consult neurosurgery to evaluate her cervical spine.    Orthopedics will sign off.       Natividad Egan PA-C

## 2024-04-23 PROCEDURE — 250N000011 HC RX IP 250 OP 636: Performed by: CLINICAL NURSE SPECIALIST

## 2024-04-23 PROCEDURE — 99221 1ST HOSP IP/OBS SF/LOW 40: CPT | Performed by: PHYSICIAN ASSISTANT

## 2024-04-23 PROCEDURE — 250N000013 HC RX MED GY IP 250 OP 250 PS 637: Performed by: PSYCHIATRY & NEUROLOGY

## 2024-04-23 PROCEDURE — 250N000013 HC RX MED GY IP 250 OP 250 PS 637: Performed by: INTERNAL MEDICINE

## 2024-04-23 PROCEDURE — 250N000009 HC RX 250: Performed by: STUDENT IN AN ORGANIZED HEALTH CARE EDUCATION/TRAINING PROGRAM

## 2024-04-23 PROCEDURE — 250N000013 HC RX MED GY IP 250 OP 250 PS 637: Performed by: CLINICAL NURSE SPECIALIST

## 2024-04-23 PROCEDURE — 250N000011 HC RX IP 250 OP 636: Performed by: INTERNAL MEDICINE

## 2024-04-23 PROCEDURE — 99232 SBSQ HOSP IP/OBS MODERATE 35: CPT | Performed by: CLINICAL NURSE SPECIALIST

## 2024-04-23 PROCEDURE — 250N000013 HC RX MED GY IP 250 OP 250 PS 637: Performed by: NURSE PRACTITIONER

## 2024-04-23 PROCEDURE — 120N000001 HC R&B MED SURG/OB

## 2024-04-23 RX ORDER — POLYETHYLENE GLYCOL 3350 17 G/17G
17 POWDER, FOR SOLUTION ORAL DAILY
Status: DISCONTINUED | OUTPATIENT
Start: 2024-04-23 | End: 2024-04-24

## 2024-04-23 RX ORDER — SENNOSIDES 8.6 MG
2 TABLET ORAL 2 TIMES DAILY
Status: DISCONTINUED | OUTPATIENT
Start: 2024-04-23 | End: 2024-04-25 | Stop reason: HOSPADM

## 2024-04-23 RX ORDER — BISACODYL 10 MG
10 SUPPOSITORY, RECTAL RECTAL DAILY PRN
Status: DISCONTINUED | OUTPATIENT
Start: 2024-04-23 | End: 2024-04-25 | Stop reason: HOSPADM

## 2024-04-23 RX ADMIN — SENNOSIDES AND DOCUSATE SODIUM 2 TABLET: 8.6; 5 TABLET ORAL at 09:12

## 2024-04-23 RX ADMIN — OXYCODONE HYDROCHLORIDE 15 MG: 5 TABLET ORAL at 03:39

## 2024-04-23 RX ADMIN — LOSARTAN POTASSIUM 50 MG: 50 TABLET, FILM COATED ORAL at 09:12

## 2024-04-23 RX ADMIN — OXYCODONE HYDROCHLORIDE 15 MG: 5 TABLET ORAL at 23:42

## 2024-04-23 RX ADMIN — BUSPIRONE HYDROCHLORIDE 5 MG: 5 TABLET ORAL at 20:59

## 2024-04-23 RX ADMIN — PANTOPRAZOLE SODIUM 40 MG: 40 TABLET, DELAYED RELEASE ORAL at 09:12

## 2024-04-23 RX ADMIN — BUDESONIDE 2 MG: 0.5 INHALANT RESPIRATORY (INHALATION) at 10:39

## 2024-04-23 RX ADMIN — PREGABALIN 75 MG: 75 CAPSULE ORAL at 20:59

## 2024-04-23 RX ADMIN — SENNOSIDES 2 TABLET: 8.6 TABLET, FILM COATED ORAL at 20:59

## 2024-04-23 RX ADMIN — BISACODYL 10 MG: 10 SUPPOSITORY RECTAL at 12:09

## 2024-04-23 RX ADMIN — CETIRIZINE HYDROCHLORIDE 10 MG: 10 TABLET, FILM COATED ORAL at 09:12

## 2024-04-23 RX ADMIN — PREGABALIN 75 MG: 75 CAPSULE ORAL at 09:12

## 2024-04-23 RX ADMIN — HYDROMORPHONE HYDROCHLORIDE 0.5 MG: 1 INJECTION, SOLUTION INTRAMUSCULAR; INTRAVENOUS; SUBCUTANEOUS at 01:06

## 2024-04-23 RX ADMIN — BUSPIRONE HYDROCHLORIDE 5 MG: 5 TABLET ORAL at 14:29

## 2024-04-23 RX ADMIN — MAGNESIUM 64 MG (MAGNESIUM CHLORIDE) TABLET,DELAYED RELEASE 535 MG: at 09:13

## 2024-04-23 RX ADMIN — POLYETHYLENE GLYCOL 3350 17 G: 17 POWDER, FOR SOLUTION ORAL at 12:08

## 2024-04-23 RX ADMIN — ACETAMINOPHEN 1000 MG: 500 TABLET, FILM COATED ORAL at 03:39

## 2024-04-23 RX ADMIN — LEVOTHYROXINE SODIUM 75 MCG: 0.07 TABLET ORAL at 09:13

## 2024-04-23 RX ADMIN — OXYCODONE HYDROCHLORIDE 15 MG: 5 TABLET ORAL at 09:12

## 2024-04-23 RX ADMIN — ACETAMINOPHEN 1000 MG: 500 TABLET, FILM COATED ORAL at 12:08

## 2024-04-23 RX ADMIN — BUSPIRONE HYDROCHLORIDE 5 MG: 5 TABLET ORAL at 09:13

## 2024-04-23 RX ADMIN — ACETAMINOPHEN 1000 MG: 500 TABLET, FILM COATED ORAL at 20:59

## 2024-04-23 RX ADMIN — OXYCODONE HYDROCHLORIDE 15 MG: 5 TABLET ORAL at 16:55

## 2024-04-23 RX ADMIN — ONDANSETRON 8 MG: 4 TABLET, ORALLY DISINTEGRATING ORAL at 12:17

## 2024-04-23 ASSESSMENT — ACTIVITIES OF DAILY LIVING (ADL)
ADLS_ACUITY_SCORE: 22

## 2024-04-23 NOTE — CARE PLAN
Patient complaint redness on her rt shoulder due to lidocaine patch,cross cover notified removed lidocaine and put ice pack.

## 2024-04-23 NOTE — PROGRESS NOTES
Order for cervical epidural steroid injection reviewed, provider can perform tomorrow morning. Bedside RN notified.

## 2024-04-23 NOTE — PLAN OF CARE
"Goal Outcome Evaluation:      Plan of Care Reviewed With: patient    Overall Patient Progress: no changeOverall Patient Progress: no change     VSS, pt is on RA. Pain 9/10, given oxy x1 & dilaudid x1, pt states ice also helps w/ pain. UAL to BR, steady on her feet. Pain team consulting on pt. Pt has rash to R) shoulder from lido patches. Ortho has signed off. Plan: neurosurg to see pt re: possible nerve pain from neck into shoulder. Continue w/ PT, pain control. Will cont w/ POC.       Problem: Adult Inpatient Plan of Care  Goal: Plan of Care Review  Description: The Plan of Care Review/Shift note should be completed every shift.  The Outcome Evaluation is a brief statement about your assessment that the patient is improving, declining, or no change.  This information will be displayed automatically on your shift  note.  Outcome: Not Progressing  Flowsheets (Taken 4/23/2024 0543)  Plan of Care Reviewed With: patient  Overall Patient Progress: no change  Goal: Patient-Specific Goal (Individualized)  Description: You can add care plan individualizations to a care plan. Examples of Individualization might be:  \"Parent requests to be called daily at 9am for status\", \"I have a hard time hearing out of my right ear\", or \"Do not touch me to wake me up as it startles  me\".  Outcome: Not Progressing  Goal: Absence of Hospital-Acquired Illness or Injury  Outcome: Not Progressing  Intervention: Identify and Manage Fall Risk  Recent Flowsheet Documentation  Taken 4/23/2024 0100 by Lisa Rivera, RN  Safety Promotion/Fall Prevention:   activity supervised   clutter free environment maintained   nonskid shoes/slippers when out of bed   safety round/check completed  Intervention: Prevent Skin Injury  Recent Flowsheet Documentation  Taken 4/23/2024 0106 by Lisa Rivera, RN  Body Position: position changed independently  Taken 4/23/2024 0100 by Lisa Rivera RN  Body Position: position changed independently  Goal: Optimal " Comfort and Wellbeing  Outcome: Not Progressing  Intervention: Monitor Pain and Promote Comfort  Recent Flowsheet Documentation  Taken 4/23/2024 0339 by Lisa Rivera RN  Pain Management Interventions: medication (see MAR)  Taken 4/23/2024 0106 by Lisa Rivera RN  Pain Management Interventions:   medication (see MAR)   cold applied  Goal: Readiness for Transition of Care  Outcome: Not Progressing     Problem: Pain Acute  Goal: Optimal Pain Control and Function  Outcome: Not Progressing  Intervention: Develop Pain Management Plan  Recent Flowsheet Documentation  Taken 4/23/2024 0339 by Lisa Rivera RN  Pain Management Interventions: medication (see MAR)  Taken 4/23/2024 0106 by Lisa Rivera RN  Pain Management Interventions:   medication (see MAR)   cold applied  Intervention: Prevent or Manage Pain  Recent Flowsheet Documentation  Taken 4/23/2024 0100 by Lisa Rivera RN  Medication Review/Management: medications reviewed     Problem: Comorbidity Management  Goal: Blood Pressure in Desired Range  Outcome: Not Progressing  Intervention: Maintain Blood Pressure Management  Recent Flowsheet Documentation  Taken 4/23/2024 0100 by Lisa Rivera RN  Medication Review/Management: medications reviewed  Goal: Maintenance of Asthma Control  Outcome: Not Progressing  Intervention: Maintain Asthma Symptom Control  Recent Flowsheet Documentation  Taken 4/23/2024 0100 by Lisa Rivera RN  Medication Review/Management: medications reviewed

## 2024-04-23 NOTE — PROGRESS NOTES
[facility] ACUTE PAIN SERVICE    ([facility], [facility], [facility], Critical access hospital)  Pain Progress Note    Assessment/Plan:  Machelle Weiner is a 49 year old female who was admitted on 4/15/2024.  Pain Service is asked to see the patient for  follow up for right shoulder pain and upper trapezius pain.  Patient with history of chronic pain epigastric, GERD, degenerative disc disease and GRIFFITH.  Repeat of MRI imaging of thoracic spine and cervical spine indicate stable to 3 left-sided facet arthroscopy and T4-5,  C3-4,4-5, C5-6 mild canal stenosis and moderate at C5-6 and 6-7.    MRI shoulder and scapula:  -No fracture or concerning marrow replacing lesion.  - The scapula appears normal.  -Normal deltoid muscle bulk.  - Normal visualized chest wall and axilla.                                                                    IMPRESSION:  1.  Minimal subacromial spurring with trace subacromial subdeltoid bursal fluid. Correlate for subacromial impingement.  2.  Mild supraspinatus tendinosis. No rotator cuff tendon tear.  3.  Small amount of fluid in the subscapular recess of the glenohumeral joint.  4.  No acute fracture or dislocation     Xray right shoulder:  IMPRESSION: Mild degenerative arthrosis of the acromioclavicular joint. Glenohumeral joint is normally aligned. No definite fracture.     Ortho Consult:  not finding anything acute to explain ongoing pain, recommending ongoing PT, follow up with shoulder specialist in clinic if pain persist or Consult Neurosurgery to evaluate cervical spine.           She has requested trigger point injections and per discussion with IR would have to pursue as an Outpatient procedure.       [facility] reviewed noting no controlled substances in the past 12 months     Opioid use this past 24 hours= oxycodone 15(1) 10 mg (3), hydromorphone 0.5mg (4)= 100 mg oral morphine equivalent.  Adjuvants: lyrica 75 mg (2), acetaminophen 1000 mg (3), and Buspar 3  "schedule 5 mg doses.    Medrol Dosepak completed            PLAN:  Acute pain secondary to muscle spasms with radiculopathy due to cervical spinal degenerative disc disease.    Multimodal Medication Therapy:   Adjuvants: Tylenol 1000 mg every 6 hours, Voltaren topical gel qid, lidocaine patch administer for 12 hours one dose, add lidocaine cream qid,  baclofen 10 mg 3 times daily, Lorazepam 1 tablets every four hours prn (Hospital Medicine), vistaril 50 mg every 6 hours prn,  capsacin cream qid Lyrica change from 50 mg bid to 75 mg bid one dose of 25 mg now  Ketamine oral solution 10 mg now then every 6 hours as needed ok to continue til discharge  Opioids: Oral oxycodone 10 to 15 mg every 3 hours as needed today    IV hydromorphone 0.5 every 3 hours prn   Non-medication interventions- Ice prn sling to off load trapezius and put in neutral position  Constipation Prophylaxis- daily stool softener/laxative   Follow up /Discharge Recommendations - We recommend prescribing the following at the time of discharge:    Medications:  Topicals diclofenac, lidocaine patch   Lyrica 75 mg bid, will continue not sure how helpful.  Baclofen 10 mg tid will stop patient feels she was having side effects, not helpful  No Ketamine felt had side effects without benefit  Prednisone 4 mg at hs x 1 days (completing Medrol dose pack)  Opioids   consider short course of Oxycodone 10-15 mg qid prn prescription for up to 6 tablets per for 5 days  #30 tablets f/up with PCP 3-4 days post discharge   PT referral, myofascial therapy  Orthopedic referral             Subjective:  Patient being examined by Neurosurgery.    Pain in upper arm, deltoid area about a \"7\" pain in back of shoulder/scapula area an \"8\"  Is noticing relief from the oxycodone, not sure if lyrica is helping or not.  Concerned about side effects and plans to talk about safety with her Hepatologist.  Pain increases with movement, is interested in a shoulder immobilizer for " support    More clear with medications adjusted since yesterday     Has been having to manually remove stool  Discussed with patient and RN.  Will try a suppository today and made bowel medication adjustments          <principal problem not specified>   Patient Active Problem List   Diagnosis    Asthma, mild intermittent    Hyperlipidemia LDL goal <160    Essential hypertension, benign    Acquired hypothyroidism    S/P laparoscopic cholecystectomy    Morbid obesity (H)    Migraine    Facet arthropathy, lumbosacral    DDD (degenerative disc disease), lumbosacral    Leg length discrepancy    Positive LYUDMILA (antinuclear antibody)    Multiple joint pain    Chronic pain of both knees    Hip pain    Mood disorder (H24)    Intractable pain    Acute pain of right shoulder        History   Drug Use No         Tobacco Use      Smoking status: Former        Packs/day: 0.00        Years: 0.3 packs/day for 15.0 years (4.5 ttl pk-yrs)        Types: Cigarettes        Start date: 2000        Quit date: 2015        Years since quittin.1      Smokeless tobacco: Never        Current Facility-Administered Medications   Medication Dose Route Frequency Provider Last Rate Last Admin    acetaminophen (TYLENOL) tablet 1,000 mg  1,000 mg Oral Q8H Tim Chapin MD   1,000 mg at 24 0339    budesonide (PULMICORT) neb solution 2 mg  2 mg Nebulization Daily Yovany Livingston MD   2 mg at 24 1046    busPIRone (BUSPAR) tablet 5 mg  5 mg Oral TID Jody Gomez MD   5 mg at 24 194    cetirizine (zyrTEC) tablet 10 mg  10 mg Oral Daily Tim Chapin MD   10 mg at 24 0935    diclofenac (VOLTAREN) 1 % topical gel 4 g  4 g Topical 4x Daily Abigail Moscoso APRN CNP   4 g at 24 213    levothyroxine (SYNTHROID/LEVOTHROID) tablet 75 mcg  75 mcg Oral Daily Tim Chapin MD   75 mcg at 24 0935    Lidocaine (LIDOCARE) 4 % Patch 2 patch  2 patch Transdermal Q24H Gina  "MD Franchesca   2 patch at 04/22/24 0935    losartan (COZAAR) tablet 50 mg  50 mg Oral Daily Tim Chapin MD   50 mg at 04/22/24 0935    magnesium chloride CR tablet 535 mg  535 mg Oral Daily Clivearielle Sharla MAICO, APRN CNS   535 mg at 04/22/24 0935    pantoprazole (PROTONIX) EC tablet 40 mg  40 mg Oral QAM AC Tim Chapin MD   40 mg at 04/22/24 0935    pregabalin (LYRICA) capsule 75 mg  75 mg Oral BID Abigail Moscoso, APRVIVIEN CNP   75 mg at 04/22/24 1941    senna-docusate (SENOKOT-S/PERICOLACE) 8.6-50 MG per tablet 2 tablet  2 tablet Oral BID Franchesca Fuentes MD   2 tablet at 04/22/24 1941    sodium chloride (PF) 0.9% PF flush 3 mL  3 mL Intracatheter Q8H Franchesca Fuentes MD   3 mL at 04/22/24 2113       Objective:  Vital signs in last 24 hours:  B/P: 157/86, T: 98.3, P: 52, R: 18   Blood pressure (!) 157/86, pulse 52, temperature 98.3  F (36.8  C), temperature source Oral, resp. rate 18, height 1.626 m (5' 4\"), weight 59 kg (130 lb), SpO2 98%, not currently breastfeeding.      Weight:   Wt Readings from Last 2 Encounters:   04/15/24 59 kg (130 lb)   11/07/23 103.5 kg (228 lb 3.2 oz)         Intake/Output:    Intake/Output Summary (Last 24 hours) at 4/23/2024 0808  Last data filed at 4/23/2024 0105  Gross per 24 hour   Intake 256 ml   Output --   Net 256 ml        Review of Systems:   As per subjective, all others negative.    Physical Exam:     General Appearance:  Alert, cooperative, holds right arm   Head:  Normocephalic, without obvious abnormality, atraumatic   Eyes:  PERRL, conjunctiva/corneas clear, EOM's intact   ENT/Throat: Lips moist    Lymph/Neck: Supple, symmetrical, trachea midline   Lungs:   respirations unlabored   Abdomen:   Soft, non-tender, non distended   Musculoskeletal: Right upper arm, upper shoulder, scapular pain with movement   Skin: Skin rash upper shoulder    Neurologic: Alert and oriented X 3         Imaging:  Personally Reviewed.    Results for orders placed or performed " during the hospital encounter of 04/15/24   CT Chest Pulmonary Embolism w Contrast    Impression    IMPRESSION:  1. No evidence for pulmonary embolism.  2. Mild mosaic attenuation in both lungs suggests air trapping.  3. Indeterminate 0.5 cm right upper lobe pulmonary nodule. Please  refer to follow-up guidelines below.    Recommendations for one or multiple incidental lung nodules < 6mm :    Low risk patients: No routine follow-up.    High risk patients: Optional follow-up CT at 12 months; if  unchanged, no further follow-up.    *Low Risk: Minimal or absent history of smoking or other known risk  factors.  *Nonsolid (ground glass) or partly solid nodules may require longer  follow-up to exclude indolent adenocarcinoma.  *Recommendations based on Guidelines for the Management of Incidental  Pulmonary Nodules Detected at CT: From the Fleischner Society 2017,  Radiology 2017.  *Guidelines apply to incidental nodules in patients who are 35 years  or older.  *Guidelines do not apply to lung cancer screening, patients with  immunosuppression, or patients with known primary cancer.    GORDON CLARK MD         SYSTEM ID:  [id number]   MR Cervical Spine w/o Contrast    Impression    IMPRESSION:    1. Degenerative changes in the mid cervical spine as detailed above.  Degenerative findings are progressed since prior MR 3/17/2021.  2. Mild spinal canal narrowings at C3-C4, C4-C5, and C5-C6.  3. Moderate bilateral neural foraminal narrowings at C5-C6. Mild left  neural foraminal narrowing at C6-C7.     RAS FISHER MD         SYSTEM ID:  [id number]   MR Thoracic Spine w/o Contrast    Impression    IMPRESSION:    1.  Multilevel degenerative changes throughout the thoracic spine as  detailed above.  2.  Degenerative disc changes throughout the mid and lower thoracic  spine without significant disc herniation. No significant spinal canal  narrowing.  3.  Asymmetric left-sided facet hypertrophy at T2-T3 and T4-T5 causing  moderate  left neural foraminal narrowings.  4.  Indeterminate subcentimeter lesion in the T8 vertebral body. This  does not appear significantly changed since 12/20/2022. Stability is  reassuring.  5.  Degenerative findings are similar to prior MR 12/20/2022.    RAS FISHER MD         SYSTEM ID:  [id number]   MR Scapula Right w/o Contrast    Impression    IMPRESSION:  1.  Minimal subacromial spurring with trace subacromial subdeltoid bursal fluid. Correlate for subacromial impingement.    2.  Mild supraspinatus tendinosis. No rotator cuff tendon tear.    3.  Small amount of fluid in the subscapular recess of the glenohumeral joint.    4.  No acute fracture or dislocation.   MR Shoulder Right w/o Contrast    Impression    IMPRESSION:  1.  Minimal subacromial spurring with trace subacromial subdeltoid bursal fluid. Correlate for subacromial impingement.    2.  Mild supraspinatus tendinosis. No rotator cuff tendon tear.    3.  Small amount of fluid in the subscapular recess of the glenohumeral joint.    4.  No acute fracture or dislocation.   XR Shoulder Right 2 Views    Impression    IMPRESSION: Mild degenerative arthrosis of the acromioclavicular joint. Glenohumeral joint is normally aligned. No definite fracture.        Lab Results:  Personally Reviewed.   Last Comprehensive Metabolic Panel:  Sodium   Date Value Ref Range Status   04/22/2024 139 135 - 145 mmol/L Final     Comment:     Reference intervals for this test were updated on 09/26/2023 to more accurately reflect our healthy population. There may be differences in the flagging of prior results with similar values performed with this method. Interpretation of those prior results can be made in the context of the updated reference intervals.    03/22/2021 137 133 - 144 mmol/L Final     Potassium   Date Value Ref Range Status   04/22/2024 3.6 3.4 - 5.3 mmol/L Final   03/22/2022 4.0 3.4 - 5.3 mmol/L Final   03/22/2021 4.1 3.4 - 5.3 mmol/L Final     Chloride   Date Value  Ref Range Status   04/22/2024 106 98 - 107 mmol/L Final   03/22/2022 108 94 - 109 mmol/L Final   03/22/2021 108 94 - 109 mmol/L Final     Carbon Dioxide   Date Value Ref Range Status   03/22/2021 28 20 - 32 mmol/L Final     Carbon Dioxide (CO2)   Date Value Ref Range Status   04/22/2024 22 22 - 29 mmol/L Final   03/22/2022 25 20 - 32 mmol/L Final     Anion Gap   Date Value Ref Range Status   04/22/2024 11 7 - 15 mmol/L Final   03/22/2022 4 3 - 14 mmol/L Final   03/22/2021 1 (L) 3 - 14 mmol/L Final     Glucose   Date Value Ref Range Status   04/22/2024 86 70 - 99 mg/dL Final   03/22/2022 86 70 - 99 mg/dL Final   03/22/2021 68 (L) 70 - 99 mg/dL Final     Urea Nitrogen   Date Value Ref Range Status   04/22/2024 14.8 6.0 - 20.0 mg/dL Final   03/22/2022 10 7 - 30 mg/dL Final   03/22/2021 11 7 - 30 mg/dL Final     Creatinine   Date Value Ref Range Status   04/22/2024 1.05 (H) 0.51 - 0.95 mg/dL Final   03/22/2021 0.94 0.52 - 1.04 mg/dL Final     GFR Estimate   Date Value Ref Range Status   04/22/2024 65 >60 mL/min/1.73m2 Final   03/22/2021 74 >60 mL/min/[1.73_m2] Final     Comment:     Non  GFR Calc  Starting 12/18/2018, serum creatinine based estimated GFR (eGFR) will be   calculated using the Chronic Kidney Disease Epidemiology Collaboration   (CKD-EPI) equation.       Calcium   Date Value Ref Range Status   04/22/2024 8.9 8.6 - 10.0 mg/dL Final   03/22/2021 9.1 8.5 - 10.1 mg/dL Final        UA:   Amphetamines Urine   Date Value Ref Range Status   12/30/2021 Screen Negative Screen Negative Final     Comment:     Cutoff for a negative amphetamine is 500 ng/mL or less.     Barbiturates Qual Urine   Date Value Ref Range Status   03/29/2007 Not Detected  Final     Comment:             Reference Range:  Not Detected     Barbiturates Urine   Date Value Ref Range Status   12/30/2021 Screen Negative Screen Negative Final     Comment:     Cutoff for a negative barbiturate is 200 ng/mL or less.     Benzodiazepine  Qual Urine   Date Value Ref Range Status   03/29/2007 Detected, Abnormal Result  Final     Comment:             Reference Range:  Not Detected     Cannabinoids Urine   Date Value Ref Range Status   12/30/2021 Screen Negative Screen Negative Final     Comment:     Cutoff for a negative cannabinoid is 50 ng/mL or less.     Cocaine Urine   Date Value Ref Range Status   12/30/2021 Screen Negative Screen Negative Final     Comment:     Cutoff for a negative cocaine is 300 ng/mL or less.     Opiates Qualitative Urine   Date Value Ref Range Status   03/29/2007 Not Detected  Final     Comment:             Reference Range:  Not Detected     Opiates Urine   Date Value Ref Range Status   12/30/2021 Screen Negative Screen Negative Final     Comment:     Cutoff for a negative opiate is 300 ng/mL or less.     PCP Qual Urine   Date Value Ref Range Status   03/29/2007 Not Detected  Final     Comment:             Reference Range:  Not Detected     PCP Urine   Date Value Ref Range Status   12/30/2021 Screen Negative Screen Negative Final     Comment:     Cutoff for a negative PCP is 25 ng/mL or less.              MANAGEMENT DISCUSSED with the following over the past 24 hours: RN   NOTE(S)/MEDICAL RECORDS REVIEWED over the past 24 hours: Neurosurgery, Ortho, Nursing, Hospital Medicine  Medical complexity over the past 24 hours:  - Parenteral (IV) CONTROLLED SUBSTANCES ordered  - Prescription DRUG MANAGEMENT performed      BLANCA Pedersen, DNP CNS  Acute Inpatient Pain Team  M-F   Paging via [employer] or [employer]

## 2024-04-23 NOTE — PLAN OF CARE
Goal Outcome Evaluation:    Patient alert and oriented x 4,Independent her room refused bed alarm,complaint of r shoulder pain managed PRN oxycodone and IV dilaudid.  Problem: Adult Inpatient Plan of Care  Goal: Absence of Hospital-Acquired Illness or Injury  Intervention: Identify and Manage Fall Risk  Recent Flowsheet Documentation  Taken 4/22/2024 1546 by Vicente Carbajal RN  Safety Promotion/Fall Prevention:   activity supervised   safety round/check completed  Intervention: Prevent Skin Injury  Recent Flowsheet Documentation  Taken 4/22/2024 1546 by Vicente Carbajal RN  Body Position: position changed independently  Skin Protection: transparent dressing maintained  Device Skin Pressure Protection:   absorbent pad utilized/changed   adhesive use limited  Intervention: Prevent and Manage VTE (Venous Thromboembolism) Risk  Recent Flowsheet Documentation  Taken 4/22/2024 1546 by Vicente Carbajal RN  VTE Prevention/Management: SCDs (sequential compression devices) off  Intervention: Prevent Infection  Recent Flowsheet Documentation  Taken 4/22/2024 1546 by Vicente Carbajal RN  Infection Prevention: cohorting utilized

## 2024-04-23 NOTE — PROGRESS NOTES
[facility]    Medicine Progress Note - Hospitalist Service  Date of Admission:  4/15/2024   # 8    Assessment & Plan   Brief summary of admission assessment:Machelle Weiner is a 49 year old  female with a significant past medical history of chronic pain ,GRIFFITH,  TIMMY, hypertension, hyperlipidemia, and migraines who presents with right posterior shoulder pain.  She was complaining of severe sharp pain on the right radiating to the right upper extremity.  She is to have cervical radiculopathy for which she has been getting epidural steroid injections and pain clinic.    Ongoing pain requiring IV pain meds limiting discharge. Pain service following. Plan to involve NSG to evaluate C spine.    Interval events:     Acute intractable pain involving the right shoulder, right posterior upper chest, back and shoulder blade area: Unclear pain originating site but does follow C6-C8/T1 dermatome.  She has been seen at [facility] pain clinic in the past and has known disc disease. History of degenerative disc disease C4-C5, C5-C6, and C6-C7 on MRI study of 2021. Current CT scan with contrast did not reveal any acute intrathoracic pathology. MR Cervical and Thoracic overall unrevealing aside from chronic findings. MR Shoulder and MR Scapula -> Minimal subacromial spurring with trace subacromial subdeltoid bursal fluid. Mild supraspinatus tendinosis. No rotator cuff tendon tear. Small amount of fluid in the subscapular recess of the glenohumeral joint No acute fracture or dislocation. Patient has chronic pain but does not use narcotic medications at home.  Seen by both Orthopedic and NS this stay.  No acute surgical indication.  Consideration for CRPS was given but no known trauma, no significant clinical findings to support dx -- no skin changes, temperature or hair changes.  No significant swelling. Appreciate Pain Service following.     Pain management plan:  Adjuvants: Tylenol 1000 mg every 6  hours, Voltaren topical gel qid, lidocaine patch administer for 12 hours one dose, add lidocaine cream qid,  baclofen 10 mg 3 times daily, Lorazepam 1 tablets every four hours prn (Hospital Medicine), vistaril 50 mg every 6 hours prn,  capsacin cream qid Lyrica change from 50 mg bid to 75 mg bid one dose of 25 mg now  Ketamine oral solution 10 mg now then every 6 hours as needed ok to continue til discharge  Opioids: Oral oxycodone 10 to 15 mg every 3 hours as needed today. IV hydromorphone 0.5 every 3 hours prn   Non-medication interventions- Ice prn sling to off load trapezius and put in neutral position. ROM to shoulder, elbow, forearm, and hand TID and PRN.  Out of sling TID and PRN.   Have ordered cervical YAMILEX for 4/24/2024.  Have ordered EMG 4/23/2024 pending completion.   Constipation Prophylaxis- daily stool softener/laxative   Follow up /Discharge Recommendations -   Medications:  Topicals diclofenac, lidocaine patch   Lyrica 75 mg bid, will continue not sure how helpful.  Baclofen 10 mg tid will stop patient feels she was having side effects, not helpful  No Ketamine felt had side effects without benefit  Prednisone 4 mg at hs x 1 days (completing Medrol dose pack)  Opioids   consider short course of Oxycodone 10-15 mg qid prn prescription for up to 6 tablets per for 5 days  #30 tablets f/up with PCP 3-4 days post discharge   PT referral, myofascial therapy      Severe anxiety:  ED evaluation revealed anxiety, hyperventilation with bicarb of 15.  CT scan of the chest negative for acute intrathoracic pathology including PE or dissection.  She has history of depression and generalized anxiety disorder but is not on antianxiety medication.  She was very emotional and her new right sided pain is causing her a lot of distress.  She wants to get more testing to figure out the etiology of her condition. Was treated with Ativan as needed. Psychiatric consultation requested as patient told me she was having panic  attacks recently to better manage her underlying anxiety.  Started her on BuSpar 5 mg p.o. 3 times daily on 4/20/2024.  Patient is tolerating it well    Continue BuSpar.     HTN: Continue losartan    Asthma:  Continue PRN albuterol.         Diet: Regular Diet Adult    DVT Prophylaxis: Pneumatic Compression Devices  Sheridan Catheter: Not present  Lines: None     Cardiac Monitoring: None  Code Status: Full Code      Clinically Significant Risk Factors                  # Hypertension: Noted on problem list            # Asthma: noted on problem list        Disposition Plan     Medically Ready for Discharge: Anticipated in 2-4 Days             BLANCA Joya CNP  Hospitalist Service  [facility]  Securely message with [employer] (more info)  Text page via [employer] Paging/Directory   ______________________________________________________________________    Interval History   No acute overnight events. Ongoing shoulder pain. Was not happy with ortho eval. She was on pain meds when examined and thought it would be more helpful when she is in pain. Discussed that if her ROM is normal when pain is controlled, there is unlikely to be any benefit for ortho to re-evaluate. Was agreeable to NSG consult.     Physical Exam   Vital Signs: Temp: 98.4  F (36.9  C) Temp src: Oral BP: 136/84 Pulse: 58   Resp: 16 SpO2: 96 % O2 Device: None (Room air)    Weight: 130 lbs 0 oz    GEN:   Alert, oriented x 3, appears comfortable, NAD.  NECK:   Supple ,no mass or thyromegaly   HEENT:  Normocephalic/atraumatic, no scleral icterus, no nasal discharge, mouth moist.  CV:   Regular rate and rhythm, no murmur or JVD.  S1 + S2 noted, no S3 or S4.  LUNGS:   Clear to auscultation bilaterally without rales/rhonchi/wheezing/retractions.  Symmetric chest rise on inhalation noted.  ABD:   Active bowel sounds, soft, non-tender/non-distended.  No rebound/guarding/rigidity.  EXT:   No edema.  No cyanosis.  No joint synovitis noted. Able  to passively range the R shoulder with forward flexion to 140 degrees with abduction 120 degrees.  Impingement testing negative for pain.  SKIN:   Dry to touch, no exanthems noted in the visualized areas.  Neurologic: Grossly intact,non focal. Spine: Pain in RUE with C6-C8/T1 distribution.   Neuropsychiatric:  General: normal, calm and normal eye contact  Level of consciousness: alert / normal  Affect: normal  Orientation: oriented to self, place, time and situation     Medical Decision Making       45 MINUTES SPENT BY ME on the date of service doing chart review, history, exam, documentation & further activities per the note.

## 2024-04-23 NOTE — CONSULTS
[facility]    Neurosurgery Consultation     Date of Admission:  4/15/2024  Date of Consult (When I saw the patient): 04/23/24    Assessment & Plan   Machelle Weiner is a 49 year old female who was admitted on 4/15/2024. Machelle Wenier is a 49 year old female  with a significant past medical history of chronic pain, severe anxiety, GRIFFITH,  TIMMY, hypertension, hyperlipidemia, and migraines who presented 4/15 with right posterior shoulder pain. Has had extensive work up with Ortho and also imaging on cervical and thoracic spine.     Patient admits pain includes her right shoulder, is TTP and aggravated with any movement. Patient has history of cervical and lumbar spine issues. She has had injections with Dr. Rice and her pain clinic including both cervical and lumbar. She denies cervical spine pain and radicular symptoms feeling similar. She has had numbness down her right arm into her right thumb, index and middle fingers for quite some time that is actually improved compared to prior. Denies weakness at any muscle group in arms but is pain limiting with left deltoid due to shoulder pain. Denies neck pain, RUE pain, BLE symptoms, incontinence.     Narrative & Impression   MRI CERVICAL SPINE WITHOUT CONTRAST 4/16/2024 2:24 PM                                                              IMPRESSION:    1. Degenerative changes in the mid cervical spine as detailed above.  Degenerative findings are progressed since prior MR 3/17/2021.  2. Mild spinal canal narrowings at C3-C4, C4-C5, and C5-C6.  3. Moderate bilateral neural foraminal narrowings at C5-C6. Mild left  neural foraminal narrowing at C6-C7.      RAS FISHER MD        MRI THORACIC SPINE WITHOUT CONTRAST April 16, 2024 2:29 PM                                                              IMPRESSION:    1.  Multilevel degenerative changes throughout the thoracic spine as  detailed above.  2.  Degenerative disc changes throughout the  mid and lower thoracic  spine without significant disc herniation. No significant spinal canal  narrowing.  3.  Asymmetric left-sided facet hypertrophy at T2-T3 and T4-T5 causing  moderate left neural foraminal narrowings.  4.  Indeterminate subcentimeter lesion in the T8 vertebral body. This  does not appear significantly changed since 12/20/2022. Stability is  reassuring.  5.  Degenerative findings are similar to prior MR 12/20/2022.     RAS FISHER MD     EXAM: MR SHOULDER RIGHT W/O CONTRAST, MR SCAPULA RIGHT W/O CONTRAST  LOCATION: [facility]  DATE: 4/19/2024                                                    IMPRESSION:  1.  Minimal subacromial spurring with trace subacromial subdeltoid bursal fluid. Correlate for subacromial impingement.     2.  Mild supraspinatus tendinosis. No rotator cuff tendon tear.     3.  Small amount of fluid in the subscapular recess of the glenohumeral joint.     4.  No acute fracture or dislocation.    Clinical history, imaging and plans reviewed myself as well as with Dr. Ivey.     PLAN:   -No urgent neurosurgical intervention indicated  -Agree with Ortho to see outpatient shoulder specialist  -Looks like hospitalist has ordered cervical YAMILEX and EMG    -Continue with pain clinic who has already performed injections as indicated on her cervical and lumbar region  -Follow up in clinic 4-6 weeks    I have discussed the following assessment and plan with Dr. Ivey who is in agreement with the initial plan and will follow up with further consultation recommendations.    Pat Castillo PA-C  [facility] Neurosurgery  [address] [place] [id number]  Tel [phone]  Fax [phone]  Text page via [employer] Paging/Directory      Code Status    Full Code    Reason for Consult   Reason for consult: I was asked by Dr. Zaragoza to evaluate this patient for right shoulder pain.    Primary Care Physician   Jean Sauceda    Chief Complaint    Right shoulder pain     History is obtained from the patient, electronic health record, and emergency department physician    History of Present Illness   Machelle Weiner is a 49 year old female  with a significant past medical history of chronic pain, severe anxiety, GRIFFITH,  TIMMY, hypertension, hyperlipidemia, and migraines who presented 4/15 with right posterior shoulder pain. Has had extensive work up with Ortho and also imaging on cervical and thoracic spine.     Patient admits pain includes her right shoulder, is TTP and aggravated with any movement. Patient has history of cervical and lumbar spine issues. She has had injections with Dr. Rice and her pain clinic including both cervical and lumbar. She denies cervical spine pain and radicular symptoms feeling similar. She has had numbness down her right arm into her right thumb, index and middle fingers for quite some time that is actually improved compared to prior. Denies weakness at any muscle group in arms but is pain limiting with left deltoid due to shoulder pain. Denies neck pain, RUE pain, BLE symptoms, incontinence.     Narrative & Impression   MRI CERVICAL SPINE WITHOUT CONTRAST 4/16/2024 2:24 PM                                                              IMPRESSION:    1. Degenerative changes in the mid cervical spine as detailed above.  Degenerative findings are progressed since prior MR 3/17/2021.  2. Mild spinal canal narrowings at C3-C4, C4-C5, and C5-C6.  3. Moderate bilateral neural foraminal narrowings at C5-C6. Mild left  neural foraminal narrowing at C6-C7.      RAS FISHER MD        MRI THORACIC SPINE WITHOUT CONTRAST April 16, 2024 2:29 PM                                                              IMPRESSION:    1.  Multilevel degenerative changes throughout the thoracic spine as  detailed above.  2.  Degenerative disc changes throughout the mid and lower thoracic  spine without significant disc herniation. No significant spinal  canal  narrowing.  3.  Asymmetric left-sided facet hypertrophy at T2-T3 and T4-T5 causing  moderate left neural foraminal narrowings.  4.  Indeterminate subcentimeter lesion in the T8 vertebral body. This  does not appear significantly changed since 12/20/2022. Stability is  reassuring.  5.  Degenerative findings are similar to prior MR 12/20/2022.     RAS FISHER MD     EXAM: MR SHOULDER RIGHT W/O CONTRAST, MR SCAPULA RIGHT W/O CONTRAST  LOCATION: [facility]  DATE: 4/19/2024                                                    IMPRESSION:  1.  Minimal subacromial spurring with trace subacromial subdeltoid bursal fluid. Correlate for subacromial impingement.     2.  Mild supraspinatus tendinosis. No rotator cuff tendon tear.     3.  Small amount of fluid in the subscapular recess of the glenohumeral joint.     4.  No acute fracture or dislocation.    Past Medical History   I have reviewed this patient's medical history and updated it with pertinent information if needed.   Past Medical History:   Diagnosis Date    Arthritis     Facet Arthropathy    Asthma, mild intermittent     Depression     Depressive disorder     Recent issue, not being treated and not diagnosed by Dr perales    Epigastric pain     Hypertension     Intractable chronic migraine without aura     Thyroid disease        Past Surgical History   I have reviewed this patient's surgical history and updated it with pertinent information if needed.  Past Surgical History:   Procedure Laterality Date    BIOPSY  1991    Cervix    COLONOSCOPY N/A 06/21/2022    Procedure: COLONOSCOPY (fv);  Surgeon: Aries Engle MD;  Location:  GI    ESOPHAGOSCOPY, GASTROSCOPY, DUODENOSCOPY (EGD), COMBINED N/A 09/22/2022    Procedure: ESOPHAGOGASTRODUODENOSCOPY, WITH BIOPSY;  Surgeon: Aries Wright MD;  Location:  GI    GYN SURGERY      cryosurgery of cervix at age 16    LAPAROSCOPIC CHOLECYSTECTOMY WITH CHOLANGIOGRAMS N/A 10/14/2016    Procedure:  LAPAROSCOPIC CHOLECYSTECTOMY WITH CHOLANGIOGRAMS;  Surgeon: Cornelius Mueller MD;  Location:  OR       Prior to Admission Medications   Prior to Admission Medications   Prescriptions Last Dose Informant Patient Reported? Taking?   Biotin 10 MG CAPS 4/14/2024  Yes Yes   Sig: Take 1 capsule by mouth daily at 2 pm   Krill Oil 1000 MG CAPS 4/14/2024  Yes Yes   Sig: Take 1-2 capsules by mouth daily at 2 pm   MAGNESIUM PO 4/14/2024  Yes Yes   Sig: Take 3 tablets by mouth daily at 2 pm [employer] product   Multiple Vitamins-Minerals (MULTIVITAMIN ADULT) CHEW 4/15/2024 at am  Yes Yes   Sig: Take 2 chew tab by mouth daily   Turmeric 400 MG CAPS 4/14/2024  Yes Yes   Sig: Take 1 capsule by mouth daily at 2 pm   albuterol (PROAIR HFA/PROVENTIL HFA/VENTOLIN HFA) 108 (90 Base) MCG/ACT inhaler   No Yes   Sig: INHALE 2 PUFFS BY MOUTH EVERY 6 HOURS AS NEEDED FOR SHORTNESS OF BREATH OR DIFFICULT BREATHING   Patient taking differently: as needed INHALE 2 PUFFS BY MOUTH EVERY 6 HOURS AS NEEDED FOR SHORTNESS OF BREATH OR DIFFICULT BREATHING   albuterol (PROVENTIL) (2.5 MG/3ML) 0.083% neb solution   No Yes   Sig: Take 1 vial (2.5 mg) by nebulization every 6 hours as needed for shortness of breath / dyspnea or wheezing   budesonide (PULMICORT) 0.5 MG/2ML neb solution Past Week  No Yes   Sig: Mix 2 mg (4 mL) budesonide suspension with 10 splenda packets to create slurry and drink solution slowly over 5-10 minutes twice daily for treatment of eosinophilic esophagitis   cetirizine (ZYRTEC) 10 MG tablet 4/15/2024 at am  Yes Yes   Sig: Take 10 mg by mouth daily   cholecalciferol (VITAMIN D3) 125 mcg (5000 units) capsule 4/14/2024  Yes Yes   Sig: Take 250 mcg by mouth daily at 2 pm   clindamycin (CLEOCIN-T) 1 % external gel   Yes Yes   Sig: Apply topically 2 times daily   clindamycin (CLINDAMAX) 1 % external gel   No No   Sig: Apply topically 2 times daily   Patient taking differently: Apply topically 2 times daily as needed   co-enzyme Q-10 100  MG CAPS capsule 4/14/2024  Yes Yes   Sig: Take 100 mg by mouth daily at 2 pm   cyclobenzaprine (FLEXERIL) 10 MG tablet 4/15/2024 at am  No Yes   Sig: Take 1 tab p.o. qhs (if feeling groggy the following morning, can try taking half a tablet instead or can take earlier the night before).   esomeprazole (NEXIUM) 20 MG DR capsule 4/14/2024 at am  No Yes   Sig: Take 1 capsule (20 mg) by mouth every morning (before breakfast) Take 30-60 minutes before eating.   hydrOXYzine HCl (ATARAX) 25 MG tablet   No Yes   Sig: TAKE 2 TO 4 TABLETS BY MOUTH THREE TIMES DAILY AS NEEDED FOR ITCHING   levothyroxine (SYNTHROID/LEVOTHROID) 75 MCG tablet 4/15/2024 at am  No Yes   Sig: Take 1 tablet by mouth once daily   losartan (COZAAR) 50 MG tablet 4/15/2024 at am  No Yes   Sig: Take 1 tablet by mouth once daily   medical cannabis (Patient's own supply)   Yes Yes   Sig: (The purpose of this order is to document that the patient reports taking medical cannabis.  This is not a prescription, and is not used to certify that the patient has a qualifying medical condition.)   ondansetron (ZOFRAN ODT) 8 MG ODT tab   No Yes   Sig: Take 1 tablet (8 mg) by mouth every 8 hours as needed for nausea   tazarotene (TAZORAC) 0.1 % external cream   Yes Yes   Sig: Apply pea-sized amount to the entire face dailyPRN   vitamin B-Complex 4/14/2024  Yes Yes   Sig: Take 1 tablet by mouth daily at 2 pm      Facility-Administered Medications: None     Allergies   Allergies   Allergen Reactions    Codeine     Penicillins Nausea and Vomiting    Seasonal Allergies        Social History   I have reviewed this patient's social history and updated it with pertinent information if needed. Machelle Weiner  reports that she quit smoking about 9 years ago. Her smoking use included cigarettes. She started smoking about 24 years ago. She has a 4.5 pack-year smoking history. She has never used smokeless tobacco. She reports that she does not drink alcohol and does not use  "drugs.    Family History   I have reviewed this patient's family history and updated it with pertinent information if needed.   Family History   Problem Relation Age of Onset    Depression Mother     Anxiety Disorder Mother     Substance Abuse Mother         Alcoholism    Obesity Mother     Coronary Artery Disease Maternal Grandmother     Hyperlipidemia Maternal Grandmother     Hypertension Maternal Grandmother     Hyperlipidemia Maternal Grandfather     Coronary Artery Disease Maternal Grandfather     Hypertension Maternal Grandfather     Substance Abuse Father         Alcoholism    Diabetes Other     Hypertension Brother     Other Cancer Cousin         Breast cancer    Other Cancer Other         Aunt (Maternal) - Ovarian Cancer    Mental Illness Other     Depression Other         Son    Anxiety Disorder Other     Asthma Other     Substance Abuse Other         Several aunts and uncles - alcoholism    Thyroid Disease Other         Several Aunts (Maternal) - hypothyroidism    Obesity Other     Anesthesia Reaction No family hx of        Review of Systems    ROS: 10 point ROS neg other than the symptoms noted above in the HPI.      Physical Exam   Temp: 98  F (36.7  C) Temp src: Axillary BP: (!) 139/102 Pulse: 72   Resp: 16 SpO2: 98 % O2 Device: None (Room air)    Vital Signs with Ranges  Temp:  [98  F (36.7  C)-98.4  F (36.9  C)] 98  F (36.7  C)  Pulse:  [52-72] 72  Resp:  [16-20] 16  BP: (132-163)/() 139/102  SpO2:  [94 %-98 %] 98 %  130 lbs 0 oz     , Blood pressure (!) 139/102, pulse 72, temperature 98  F (36.7  C), temperature source Axillary, resp. rate 16, height 1.626 m (5' 4\"), weight 59 kg (130 lb), SpO2 98%, not currently breastfeeding.  130 lbs 0 oz    NEUROLOGICAL EXAMINATION:   Mental status:  Alert and appropriate, speech is fluent.  Cranial nerves:  II-XII intact.   Motor:  Strength is 5/5 throughout the upper and lower extremities. +TTP around right shoulder.   Shoulder Abduction:  Right:  5/5, " pain limiting   Left:  5/5  Biceps:                      Right:  5/5   Left:  5/5  Triceps:                     Right:  5/5   Left:  5/5  Wrist Extensors:       Right:  5/5   Left:  5/5  Wrist Flexors:           Right:  5/5   Left:  5/5  interosseus :            Right:  5/5   Left:  5/5   Hip Flexor:                Right: 5/5  Left:  5/5  Hip Adductor:             Right:  5/5  Left:  5/5  Hip Abductor:             Right:  5/5  Left:  5/5  Gastroc Soleus:        Right:  5/5  Left:  5/5  Tib/Ant:                      Right:  5/5  Left:  5/5  EHL:                     Right:  5/5  Left:  5/5  Sensation:  numbness right thumb, index and middle finger (has been her baseline for months now, NOT new). Remainder sensation intact   Reflexes:   Negative Babinski.  Negative Clonus.  DTR 2 BL bicep, 1 BL tricep, 1 BL patellar.    Cervical examination reveals good range of motion.  No tenderness to palpation of the cervical spine or paraspinous muscles bilaterally. Negative spurlings BL     Data   All new lab and imaging data was personally reviewed by me.  MRI CERVICAL SPINE WITHOUT CONTRAST 4/16/2024 2:24 PM      HISTORY: Uncontrolled neck/back pain.     TECHNIQUE: Multiplanar, multisequence MRI of the cervical spine  without contrast.      COMPARISON: Cervical spine MRI 3/27/2021.      FINDINGS: Straightening of the normal cervical lordosis which may be  positional. No loss of vertebral body height. No focal destructive  bony lesions appreciated. No significant STIR hyperintense endplate  edema (Modic type I changes).     No abnormal signal appreciated within the visualized spinal cord.     Level by level as follows:      C2-C3: No loss of disc height. No significant disc herniation. Normal  facets. No spinal canal or neural foraminal narrowing.      C3-C4: Mild loss of disc height. Left central disc extrusion which  appears to extend slightly above and below the disc level. Normal  facets. Mild spinal canal narrowing. No  significant neural foraminal  narrowing.      C4-C5: Mild loss of disc height. Central disc extrusion which appears  to extend slightly above and below the disc level. Normal facets. Mild  spinal canal narrowing. No significant neural foraminal narrowing.      C5-C6: Mild loss of disc height. Circumferential disc bulge with  endplate osteophytic spurring. Normal facets. Mild spinal canal  narrowing. Moderate bilateral neural foraminal narrowing.      C6-C7: Mild loss of disc height. Posterior disc bulge with mild  uncinate spurring. Normal facets. No spinal canal narrowing. No  significant right neural foraminal narrowing. Mild left neural  foraminal narrowing.      C7-T1: No loss of disc height. No significant disc herniation. Normal  facets. No spinal canal or neural foraminal narrowing.      Paraspinous soft tissues are unremarkable.                                                                       IMPRESSION:    1. Degenerative changes in the mid cervical spine as detailed above.  Degenerative findings are progressed since prior MR 3/17/2021.  2. Mild spinal canal narrowings at C3-C4, C4-C5, and C5-C6.  3. Moderate bilateral neural foraminal narrowings at C5-C6. Mild left  neural foraminal narrowing at C6-C7.      RAS FISHER MD        MRI THORACIC SPINE WITHOUT CONTRAST April 16, 2024 2:29 PM      HISTORY: Uncontrolled back pain/numbness.     TECHNIQUE: Multiplanar multisequence MRI of the thoracic spine without  contrast.     COMPARISON: Thoracic spine MRI 12/20/2021.     FINDINGS: Thoracic spine alignment is grossly within normal limits. No  significant loss of vertebral body height. Multiple presumed Schmorl's  node deformities throughout the thoracic spine. Indeterminate T2  hyperintense lesion in the posterior aspect of the T8 vertebral body  which appears fairly isointense on T1 and measures approximately 0.7  cm. No significant STIR hyperintense endplate edema (Modic type I  changes).     Mild to  moderate degenerative endplate changes and loss of disc height  in the mid and lower thoracic spine. No significant disc herniation  appreciated in the thoracic spine. Anterior osteophytic spurring in  the mid and lower thoracic spine. Asymmetric left-sided facet  hypertrophy at T2-T3 and T4-T5. No spinal canal narrowing at any  level. Moderate left neural foraminal narrowings at T2-T3 and T4-T5.     Visualized thoracic spinal cord is unremarkable.     Paraspinous soft tissues are unremarkable.                                                                      IMPRESSION:    1.  Multilevel degenerative changes throughout the thoracic spine as  detailed above.  2.  Degenerative disc changes throughout the mid and lower thoracic  spine without significant disc herniation. No significant spinal canal  narrowing.  3.  Asymmetric left-sided facet hypertrophy at T2-T3 and T4-T5 causing  moderate left neural foraminal narrowings.  4.  Indeterminate subcentimeter lesion in the T8 vertebral body. This  does not appear significantly changed since 12/20/2022. Stability is  reassuring.  5.  Degenerative findings are similar to prior MR 12/20/2022.     RAS FISHER MD     EXAM: MR SHOULDER RIGHT W/O CONTRAST, MR SCAPULA RIGHT W/O CONTRAST  LOCATION: [facility]  DATE: 4/19/2024     INDICATION: Severe uncontrolled shoulder and scapular pain; Shoulder pain; Cervical radiculopathy without rotator cuff injury; No known automatically detected potential contraindications to MRI  COMPARISON: None.  TECHNIQUE: Unenhanced.     FINDINGS:     ROTATOR CUFF:  -Supraspinatus: Mild supraspinatus tendinosis. No tendon tear. No fatty atrophy.  -Infraspinatus: No tendon tear, tendinopathy or fatty atrophy.  -Subscapularis: No tendon tear, tendinopathy or fatty atrophy.  -Teres minor: No tendon tear, tendinopathy or fatty atrophy.     CORACOACROMIAL ARCH:  -Morphology: Type II acromion. Minimal subacromial spurring.  Subacromial and subcoracoid space are normal.   -Bursa: Trace subacromial subdeltoid bursal fluid. No significant subcoracoid bursal fluid.     ACROMIOCLAVICULAR JOINT:   -Minimal degenerative changes. No significant effusion.      LONG HEAD OF BICEPS TENDON:   -No tendinopathy or tear. No tenosynovitis or subluxation.     GLENOHUMERAL JOINT:   -Labrum: Smooth linear hyperintensity undercutting the anterosuperior labrum, most likely subsegmental foramen. No evidence for labral tear. No paralabral cyst.   -Cartilage: Normal.   -Joint space: Small amount of fluid in the subscapular recess. Otherwise, no significant joint effusion. No synovitis.  -Glenohumeral ligaments and capsule: No pericapsular inflammation.     BONES:   -No fracture or concerning marrow replacing lesion.  - The scapula appears normal.     SOFT TISSUES:   -Normal deltoid muscle bulk.  - Normal visualized chest wall and axilla.                                                                      IMPRESSION:  1.  Minimal subacromial spurring with trace subacromial subdeltoid bursal fluid. Correlate for subacromial impingement.     2.  Mild supraspinatus tendinosis. No rotator cuff tendon tear.     3.  Small amount of fluid in the subscapular recess of the glenohumeral joint.     4.  No acute fracture or dislocation.  CBC RESULTS:   Recent Labs   Lab Test 04/22/24  0515   WBC 14.4*   RBC 4.39   HGB 12.1   HCT 38.2   MCV 87   MCH 27.6   MCHC 31.7   RDW 15.0        Basic Metabolic Panel:  Lab Results   Component Value Date     04/22/2024     03/22/2021      Lab Results   Component Value Date    POTASSIUM 3.6 04/22/2024    POTASSIUM 4.0 03/22/2022    POTASSIUM 4.1 03/22/2021     Lab Results   Component Value Date    CHLORIDE 106 04/22/2024    CHLORIDE 108 03/22/2022    CHLORIDE 108 03/22/2021     Lab Results   Component Value Date    BETINA 8.9 04/22/2024    BETINA 9.1 03/22/2021     Lab Results   Component Value Date    CO2 22 04/22/2024     CO2 25 03/22/2022    CO2 28 03/22/2021     Lab Results   Component Value Date    BUN 14.8 04/22/2024    BUN 10 03/22/2022    BUN 11 03/22/2021     Lab Results   Component Value Date    CR 1.05 04/22/2024    CR 0.94 03/22/2021     Lab Results   Component Value Date    GLC 86 04/22/2024    GLC 86 03/22/2022    GLC 68 03/22/2021     INR:  Lab Results   Component Value Date    INR 1.04 08/29/2022    INR 0.95 12/16/2017

## 2024-04-23 NOTE — PLAN OF CARE
"VSS ex HTN. Pain team following and adjusted medications for R shoulder pain -- dilaudid IV given x 2 this shift for breakthrough pain. Up ind, steady gait observed - patient instructed to call if dizzy/lightheaded and agreeable. Senna/miralax given, last BM yesterday but small per patient. Ortho signed off. Neurosurg to see tomorrow. Pain team following.     Goal Outcome Evaluation:    Plan of Care Reviewed With: patient    Overall Patient Progress: improving    Outcome Evaluation: Continued difficulty managing pain. Pain team following. Ortho signed off. Neurosurg to see tomorrow.      Problem: Adult Inpatient Plan of Care  Goal: Plan of Care Review  Description: The Plan of Care Review/Shift note should be completed every shift.  The Outcome Evaluation is a brief statement about your assessment that the patient is improving, declining, or no change.  This information will be displayed automatically on your shift  note.  Outcome: Progressing  Flowsheets (Taken 4/22/2024 2020)  Outcome Evaluation: Continued difficulty managing pain. Pain team following. Ortho signed off. Neurosurg to see tomorrow.  Plan of Care Reviewed With: patient  Overall Patient Progress: improving  Goal: Patient-Specific Goal (Individualized)  Description: You can add care plan individualizations to a care plan. Examples of Individualization might be:  \"Parent requests to be called daily at 9am for status\", \"I have a hard time hearing out of my right ear\", or \"Do not touch me to wake me up as it startles  me\".  Outcome: Progressing  Goal: Absence of Hospital-Acquired Illness or Injury  Outcome: Progressing  Intervention: Prevent Skin Injury  Recent Flowsheet Documentation  Taken 4/22/2024 1328 by Cheryl Platt RN  Body Position: position changed independently  Goal: Optimal Comfort and Wellbeing  Outcome: Progressing  Intervention: Monitor Pain and Promote Comfort  Recent Flowsheet Documentation  Taken 4/22/2024 1245 by Giulia, " Cheryl MELARA RN  Pain Management Interventions:   cold applied   distraction  Taken 4/22/2024 1130 by Cheryl Platt RN  Pain Management Interventions: medication (see MAR)  Goal: Readiness for Transition of Care  Outcome: Progressing     Problem: Pain Acute  Goal: Optimal Pain Control and Function  Outcome: Progressing  Intervention: Develop Pain Management Plan  Recent Flowsheet Documentation  Taken 4/22/2024 1245 by Cheryl Platt RN  Pain Management Interventions:   cold applied   distraction  Taken 4/22/2024 1130 by Cheryl Platt RN  Pain Management Interventions: medication (see MAR)  Intervention: Prevent or Manage Pain  Recent Flowsheet Documentation  Taken 4/22/2024 0935 by Cheryl Platt RN  Sensory Stimulation Regulation: care clustered     Problem: Comorbidity Management  Goal: Blood Pressure in Desired Range  Outcome: Progressing  Goal: Maintenance of Asthma Control  Outcome: Progressing

## 2024-04-23 NOTE — PLAN OF CARE
Goal Outcome Evaluation:      Plan of Care Reviewed With: patient    Overall Patient Progress: improvingOverall Patient Progress: improving    Outcome Evaluation: Pt taking oxy 15mg x 2 today. Tylenol scheduled. Up to have medium BM and requring bowel meds including supp today. Pt digitally disimpacting herself today. Poor appetite. Alert and oriented. Cervical injection tomorrow am and EMG tomorrow. Ambulate with assist.      Problem: Adult Inpatient Plan of Care  Goal: Plan of Care Review  Description: The Plan of Care Review/Shift note should be completed every shift.  The Outcome Evaluation is a brief statement about your assessment that the patient is improving, declining, or no change.  This information will be displayed automatically on your shift  note.  Outcome: Progressing  Flowsheets (Taken 4/23/2024 1812)  Outcome Evaluation: Pt taking oxy 15mg x 2 today. Tylenol scheduled. Up to have medium BM and requring bowel meds including supp today. Pt digitally disimpacting herself today. Poor appetite. Alert and oriented. Cervical injection tomorrow am and EMG tomorrow. Ambulate with assist.  Plan of Care Reviewed With: patient  Overall Patient Progress: improving

## 2024-04-24 ENCOUNTER — APPOINTMENT (OUTPATIENT)
Dept: PHYSICAL THERAPY | Facility: CLINIC | Age: 49
DRG: 552 | End: 2024-04-24
Payer: COMMERCIAL

## 2024-04-24 ENCOUNTER — APPOINTMENT (OUTPATIENT)
Dept: GENERAL RADIOLOGY | Facility: CLINIC | Age: 49
DRG: 552 | End: 2024-04-24
Attending: NURSE PRACTITIONER
Payer: COMMERCIAL

## 2024-04-24 PROCEDURE — 250N000009 HC RX 250: Performed by: PHYSICIAN ASSISTANT

## 2024-04-24 PROCEDURE — 250N000013 HC RX MED GY IP 250 OP 250 PS 637: Performed by: CLINICAL NURSE SPECIALIST

## 2024-04-24 PROCEDURE — 250N000011 HC RX IP 250 OP 636: Performed by: PHYSICIAN ASSISTANT

## 2024-04-24 PROCEDURE — 255N000002 HC RX 255 OP 636: Performed by: PHYSICIAN ASSISTANT

## 2024-04-24 PROCEDURE — 99232 SBSQ HOSP IP/OBS MODERATE 35: CPT | Performed by: NURSE PRACTITIONER

## 2024-04-24 PROCEDURE — 250N000011 HC RX IP 250 OP 636: Performed by: CLINICAL NURSE SPECIALIST

## 2024-04-24 PROCEDURE — 97110 THERAPEUTIC EXERCISES: CPT | Mod: GP | Performed by: PHYSICAL THERAPIST

## 2024-04-24 PROCEDURE — 250N000009 HC RX 250: Performed by: STUDENT IN AN ORGANIZED HEALTH CARE EDUCATION/TRAINING PROGRAM

## 2024-04-24 PROCEDURE — 99231 SBSQ HOSP IP/OBS SF/LOW 25: CPT | Performed by: PHYSICIAN ASSISTANT

## 2024-04-24 PROCEDURE — 64479 NJX AA&/STRD TFRM EPI C/T 1: CPT | Mod: RT

## 2024-04-24 PROCEDURE — 250N000013 HC RX MED GY IP 250 OP 250 PS 637: Performed by: INTERNAL MEDICINE

## 2024-04-24 PROCEDURE — 250N000013 HC RX MED GY IP 250 OP 250 PS 637: Performed by: PSYCHIATRY & NEUROLOGY

## 2024-04-24 PROCEDURE — 250N000013 HC RX MED GY IP 250 OP 250 PS 637: Performed by: NURSE PRACTITIONER

## 2024-04-24 PROCEDURE — 250N000011 HC RX IP 250 OP 636: Performed by: INTERNAL MEDICINE

## 2024-04-24 PROCEDURE — 120N000001 HC R&B MED SURG/OB

## 2024-04-24 RX ORDER — OXYCODONE HYDROCHLORIDE 5 MG/1
10 TABLET ORAL EVERY 4 HOURS PRN
Status: DISCONTINUED | OUTPATIENT
Start: 2024-04-24 | End: 2024-04-25 | Stop reason: HOSPADM

## 2024-04-24 RX ORDER — BETAMETHASONE SODIUM PHOSPHATE AND BETAMETHASONE ACETATE 3; 3 MG/ML; MG/ML
12 INJECTION, SUSPENSION INTRA-ARTICULAR; INTRALESIONAL; INTRAMUSCULAR; SOFT TISSUE ONCE
Status: COMPLETED | OUTPATIENT
Start: 2024-04-24 | End: 2024-04-24

## 2024-04-24 RX ORDER — FENTANYL CITRATE 50 UG/ML
50 INJECTION, SOLUTION INTRAMUSCULAR; INTRAVENOUS ONCE
Status: COMPLETED | OUTPATIENT
Start: 2024-04-24 | End: 2024-04-24

## 2024-04-24 RX ORDER — POLYETHYLENE GLYCOL 3350 17 G/17G
17 POWDER, FOR SOLUTION ORAL 2 TIMES DAILY
Status: DISCONTINUED | OUTPATIENT
Start: 2024-04-24 | End: 2024-04-25 | Stop reason: HOSPADM

## 2024-04-24 RX ORDER — LACTULOSE 10 G/15ML
10 SOLUTION ORAL ONCE
Qty: 30 ML | Refills: 0 | Status: COMPLETED | OUTPATIENT
Start: 2024-04-24 | End: 2024-04-24

## 2024-04-24 RX ORDER — PREGABALIN 100 MG/1
100 CAPSULE ORAL 2 TIMES DAILY
Status: DISCONTINUED | OUTPATIENT
Start: 2024-04-24 | End: 2024-04-25 | Stop reason: HOSPADM

## 2024-04-24 RX ORDER — LIDOCAINE HYDROCHLORIDE 10 MG/ML
5 INJECTION, SOLUTION EPIDURAL; INFILTRATION; INTRACAUDAL; PERINEURAL ONCE
Status: COMPLETED | OUTPATIENT
Start: 2024-04-24 | End: 2024-04-24

## 2024-04-24 RX ORDER — IOPAMIDOL 408 MG/ML
2 INJECTION, SOLUTION INTRATHECAL ONCE
Status: COMPLETED | OUTPATIENT
Start: 2024-04-24 | End: 2024-04-24

## 2024-04-24 RX ORDER — BUSPIRONE HYDROCHLORIDE 10 MG/1
10 TABLET ORAL 3 TIMES DAILY
Status: DISCONTINUED | OUTPATIENT
Start: 2024-04-24 | End: 2024-04-25 | Stop reason: HOSPADM

## 2024-04-24 RX ADMIN — PREGABALIN 100 MG: 100 CAPSULE ORAL at 19:55

## 2024-04-24 RX ADMIN — MAGNESIUM 64 MG (MAGNESIUM CHLORIDE) TABLET,DELAYED RELEASE 535 MG: at 09:39

## 2024-04-24 RX ADMIN — LEVOTHYROXINE SODIUM 75 MCG: 0.07 TABLET ORAL at 09:39

## 2024-04-24 RX ADMIN — OXYCODONE HYDROCHLORIDE 15 MG: 5 TABLET ORAL at 09:39

## 2024-04-24 RX ADMIN — LACTULOSE 10 G: 20 SOLUTION ORAL at 14:52

## 2024-04-24 RX ADMIN — CETIRIZINE HYDROCHLORIDE 10 MG: 10 TABLET, FILM COATED ORAL at 09:39

## 2024-04-24 RX ADMIN — SENNOSIDES 2 TABLET: 8.6 TABLET, FILM COATED ORAL at 09:39

## 2024-04-24 RX ADMIN — LOSARTAN POTASSIUM 50 MG: 50 TABLET, FILM COATED ORAL at 09:39

## 2024-04-24 RX ADMIN — BUSPIRONE HYDROCHLORIDE 5 MG: 5 TABLET ORAL at 09:39

## 2024-04-24 RX ADMIN — SENNOSIDES 2 TABLET: 8.6 TABLET, FILM COATED ORAL at 19:55

## 2024-04-24 RX ADMIN — POLYETHYLENE GLYCOL 3350 17 G: 17 POWDER, FOR SOLUTION ORAL at 11:43

## 2024-04-24 RX ADMIN — BUSPIRONE HYDROCHLORIDE 10 MG: 10 TABLET ORAL at 19:55

## 2024-04-24 RX ADMIN — FENTANYL CITRATE 50 MCG: 50 INJECTION, SOLUTION INTRAMUSCULAR; INTRAVENOUS at 11:09

## 2024-04-24 RX ADMIN — BUSPIRONE HYDROCHLORIDE 5 MG: 5 TABLET ORAL at 14:52

## 2024-04-24 RX ADMIN — BETAMETHASONE SODIUM PHOSPHATE AND BETAMETHASONE ACETATE 12 MG: 3; 3 INJECTION, SUSPENSION INTRA-ARTICULAR; INTRALESIONAL; INTRAMUSCULAR at 11:17

## 2024-04-24 RX ADMIN — ACETAMINOPHEN 1000 MG: 500 TABLET, FILM COATED ORAL at 04:42

## 2024-04-24 RX ADMIN — BUDESONIDE 2 MG: 0.5 INHALANT RESPIRATORY (INHALATION) at 11:43

## 2024-04-24 RX ADMIN — PANTOPRAZOLE SODIUM 40 MG: 40 TABLET, DELAYED RELEASE ORAL at 09:39

## 2024-04-24 RX ADMIN — OXYCODONE HYDROCHLORIDE 15 MG: 5 TABLET ORAL at 04:42

## 2024-04-24 RX ADMIN — IOPAMIDOL 2 ML: 408 INJECTION, SOLUTION INTRATHECAL at 11:17

## 2024-04-24 RX ADMIN — LIDOCAINE HYDROCHLORIDE 5 ML: 10 INJECTION, SOLUTION EPIDURAL; INFILTRATION; INTRACAUDAL; PERINEURAL at 11:17

## 2024-04-24 RX ADMIN — HYDROXYZINE HYDROCHLORIDE 50 MG: 50 TABLET, FILM COATED ORAL at 22:51

## 2024-04-24 RX ADMIN — ACETAMINOPHEN 1000 MG: 500 TABLET, FILM COATED ORAL at 19:55

## 2024-04-24 RX ADMIN — POLYETHYLENE GLYCOL 3350 17 G: 17 POWDER, FOR SOLUTION ORAL at 19:55

## 2024-04-24 RX ADMIN — HYDROMORPHONE HYDROCHLORIDE 0.5 MG: 1 INJECTION, SOLUTION INTRAMUSCULAR; INTRAVENOUS; SUBCUTANEOUS at 02:32

## 2024-04-24 RX ADMIN — ACETAMINOPHEN 1000 MG: 500 TABLET, FILM COATED ORAL at 11:52

## 2024-04-24 RX ADMIN — ONDANSETRON 8 MG: 4 TABLET, ORALLY DISINTEGRATING ORAL at 11:52

## 2024-04-24 RX ADMIN — PREGABALIN 75 MG: 75 CAPSULE ORAL at 09:39

## 2024-04-24 RX ADMIN — OXYCODONE HYDROCHLORIDE 10 MG: 5 TABLET ORAL at 22:51

## 2024-04-24 ASSESSMENT — ACTIVITIES OF DAILY LIVING (ADL)
ADLS_ACUITY_SCORE: 22

## 2024-04-24 NOTE — PROGRESS NOTES
RADIOLOGY PROCEDURE NOTE  Patient name: Machelle Weiner  MRN: [id number]  : 1975    Pre-procedure diagnosis: Cervicalgia  Post-procedure diagnosis: Same    Procedure Date/Time: 2024  11:22 AM  Procedure: Cervical YAMILEX at C7-T1 using 20 gauge Touhy and air loss of resistence  Estimated blood loss: None  Specimen(s) collected with description: none  The patient tolerated the procedure well with no immediate complications.  Significant findings:none    See imaging dictation for procedural details.    Provider name: Bishop Ely PA-C  Assistant(s):None

## 2024-04-24 NOTE — PROGRESS NOTES
Care Management Follow Up    Length of Stay (days): 7    Expected Discharge Date: 04/25/2024     Concerns to be Addressed: all concerns addressed in this encounter     Patient plan of care discussed at interdisciplinary rounds: Yes    Additional Information:  CM following for discharge planning, pt has follow-up appointment scheduled for 4/25, called and changed appointment to Tuesday 4/30 at 10:45am. AVS updated.     Leslie Haines RN BSN   Inpatient Care Coordination  [facility]   Phone [phone]

## 2024-04-24 NOTE — PROGRESS NOTES
"BRIEF NUTRITION ASSESSMENT    REASON FOR ASSESSMENT:  Machelle Weiner is a 49 year old female seen by Registered Dietitian for LOS.     PMH of chronic pain, TIMMY, GRIFFITH, HTN, HLD, migraines. Presents with right shoulder pain.    NUTRITION HISTORY:  - Spoke with patient at bedside. Per patient, she has had intentional weight loss recently. She said she has a team that has been helping her lose weight recently d/t her GRIFFITH. She sees an outpatient dietitian at [facility] that may start pt on Monjero. She says she has a good appetite (sometimes fluctuates) but has been doing well this admit. She had no questions or concerns for writer at this time.     - Per outpatient RD note 6/22/2023, \"Machelle Weiner reports to have followed a keto diet, used WW and many other dieting attempts in the past. Patient also notes that if she would consume more than 1200 calories per day, she would not be able to achieve desired weight loss. Patient continues to read nutrition labels and very knowledge about important information to look at. More recently, patient began making dietary modifications after her January appointment with Dr. Regan. The velazquez dietary modification included greatly reducing the frequency of dining out (fast food). Reports a sweet tooth at night.\"    CURRENT DIET AND INTAKE:  Diet:  Regular              - 100% intakes documented per flowsheets  - Ordering 1-2 meals/day per [facility]    ANTHROPOMETRICS:  Height: 5' 4\"  Weight: 104.9 kg,  231 lbs 4.8 oz  Body mass index is 39.7 kg/m .   Weight Status: Obesity Grade II BMI 35-39.9  IBW:  54.5 kg  %IBW: 192%  Weight History: wt trending down for the past year (per patient, intentional wt loss)  Wt Readings from Last 10 Encounters:   04/24/24 104.9 kg (231 lb 4.8 oz)   11/07/23 103.5 kg (228 lb 3.2 oz)   10/03/23 107.2 kg (236 lb 6.4 oz)   12/22/22 126.1 kg (278 lb)   12/15/22 125.6 kg (277 lb)   12/14/22 126 kg (277 lb 12.8 oz)   09/22/22 129.2 kg (284 lb " 13.4 oz)   09/08/22 127.6 kg (281 lb 4.8 oz)   08/24/22 127 kg (280 lb)   08/16/22 127 kg (280 lb)     LABS:  Labs noted    MALNUTRITION:  Patient does not meet two of the following criteria necessary for diagnosing malnutrition.     % Weight Loss:  Weight loss does not meet criteria for malnutrition (intentional)  % Intake:  Decreased intake does not meet criteria for malnutrition  Subcutaneous Fat Loss:  None observed  Muscle Loss:  None observed  Fluid Retention:  None noted    NUTRITION INTERVENTION:  Nutrition Diagnosis:  No nutrition diagnosis at this time.    Implementation:  Nutrition Education:  Per Provider order if indicated    FOLLOW UP/MONITORING:   Will re-evaluate in 7 - 10 days, or sooner, if re-consulted.     Kayla Pérez RD, LD  Clinical Dietitian - [facility]

## 2024-04-24 NOTE — PROGRESS NOTES
[facility]    Medicine Progress Note - Hospitalist Service  Date of Admission:  4/15/2024   # 8    Assessment & Plan   Brief summary of admission assessment:Machelle Weiner is a 49 year old  female with a significant past medical history of chronic pain ,GRIFFITH,  TIMMY, hypertension, hyperlipidemia, and migraines who presents with right posterior shoulder pain.  She was complaining of severe sharp pain on the right radiating to the right upper extremity.  She is to have cervical radiculopathy for which she has been getting epidural steroid injections and pain clinic.    Ongoing pain requiring IV pain meds limiting discharge. Pain service following. Plan to involve NSG to evaluate C spine.    Interval events:     Acute intractable pain involving the right shoulder, right posterior upper chest, back and shoulder blade area: Unclear pain originating site but does follow C6-C8/T1 dermatome.  She has been seen at [facility] pain clinic in the past and has known disc disease. History of degenerative disc disease C4-C5, C5-C6, and C6-C7 on MRI study of 2021. Current CT scan with contrast did not reveal any acute intrathoracic pathology. MR Cervical and Thoracic overall unrevealing aside from chronic findings. MR Shoulder and MR Scapula -> Minimal subacromial spurring with trace subacromial subdeltoid bursal fluid. Mild supraspinatus tendinosis. No rotator cuff tendon tear. Small amount of fluid in the subscapular recess of the glenohumeral joint No acute fracture or dislocation. Patient has chronic pain but does not use narcotic medications at home.  Seen by both Orthopedic and NS this stay.  No acute surgical indication.  Consideration for CRPS was given but no known trauma, no significant clinical findings to support dx -- no skin changes, temperature or hair changes.  No significant swelling. Appreciate Pain Service following.     - S/P cervical YAMILEX at C7-T1 today  - Met with patient and Pain  "Management team.  Continue adjunct use.  Increase Lyrica today.  No indication for opioid use at this time and need to wean off gradually.  Use ~ 90 mg of Oxycodone yesterday.  Endorses constipation and was reportedly disimpacting self.    - Inquired about long acting opiates.  No indication.  Start tapering by 10 mg a day.    - Will send out with enough medication to last until PCP follow up on 4/30/2024.  - Referral to [facility] program at discharge.  - Continue Senna.  Increase Miralax to BID dose.  Lactulose x 1 today.  Fleets enema if unsuccessful.   - Ortho and NS follow up as outpatient.       Severe anxiety:  ED evaluation revealed anxiety, hyperventilation with bicarb of 15.  CT scan of the chest negative for acute intrathoracic pathology including PE or dissection.  She has history of depression and generalized anxiety disorder but is not on antianxiety medication.  She was very emotional and her new right sided pain is causing her a lot of distress.  She wants to get more testing to figure out the etiology of her condition. Was treated with Ativan as needed. Psychiatric consultation requested as patient told me she was having panic attacks recently to better manage her underlying anxiety.  Started her on BuSpar 5 mg p.o. 3 times daily on 4/20/2024.  Patient is tolerating it well    -Continue BuSpar. Increase dose to 10 mg PO TID.     HTN: Continue losartan    Asthma:  Continue PRN albuterol.         Diet: Regular Diet Adult    DVT Prophylaxis: Pneumatic Compression Devices  Sheridan Catheter: Not present  Lines: None     Cardiac Monitoring: None  Code Status: Full Code      Clinically Significant Risk Factors                  # Hypertension: Noted on problem list        # Obesity: Estimated body mass index is 39.7 kg/m  as calculated from the following:    Height as of this encounter: 1.626 m (5' 4\").    Weight as of this encounter: 104.9 kg (231 lb 4.8 oz).      # Asthma: noted on problem list    "     Disposition Plan     Medically Ready for Discharge: Anticipated Tomorrow             BLANCA Joya CNP  Hospitalist Service  [facility]  Securely message with [employer] (more info)  Text page via [email or web address] Paging/Directory   ______________________________________________________________________    Interval History   No acute overnight events. Ongoing shoulder pain. Was not happy with ortho eval. She was on pain meds when examined and thought it would be more helpful when she is in pain. Discussed that if her ROM is normal when pain is controlled, there is unlikely to be any benefit for ortho to re-evaluate. Was agreeable to NSG consult.     Physical Exam   Vital Signs: Temp: 99  F (37.2  C) Temp src: Oral BP: (!) 143/95 Pulse: 69   Resp: 20 SpO2: 96 % O2 Device: None (Room air)    Weight: 231 lbs 4.8 oz    GEN:   Alert, oriented x 3, appears comfortable, NAD.  NECK:   Supple ,no mass or thyromegaly   HEENT:  Normocephalic/atraumatic, no scleral icterus, no nasal discharge, mouth moist.  CV:   Regular rate and rhythm, no murmur or JVD.  S1 + S2 noted, no S3 or S4.  LUNGS:   Clear to auscultation bilaterally without rales/rhonchi/wheezing/retractions.  Symmetric chest rise on inhalation noted.  ABD:   Active bowel sounds, soft, non-tender/non-distended.  No rebound/guarding/rigidity.  EXT:   No edema.  No cyanosis.  No joint synovitis noted. Able to passively range the R shoulder with forward flexion to 140 degrees with abduction 120 degrees.  Impingement testing negative for pain.  SKIN:   Dry to touch, no exanthems noted in the visualized areas.  Neurologic: Grossly intact,non focal. Spine: Pain in RUE with C6-C8/T1 distribution.   Neuropsychiatric:  General: normal, calm and normal eye contact  Level of consciousness: alert / normal  Affect: normal  Orientation: oriented to self, place, time and situation     Medical Decision Making       45 MINUTES SPENT BY ME on the date of service  doing chart review, history, exam, documentation & further activities per the note.

## 2024-04-24 NOTE — PROGRESS NOTES
[facility] ACUTE PAIN SERVICE    [facility]   Daily PAIN Progress Note        Securely message with the [employer] Web Console (learn more here)  (When I saw the patient): 04/24/24  Assessment/Plan:  Machelle Weiner 49-year-old admitted on 4/15/2024 seeing in follow up for   Right shoulder and upper trapezius pain.  She is s/p  day YAMILEX C7-T1 with steroid only. This is in the setting of chronic pain related to epigastric pain and GERD, degenerative disc disease, GRIFFITH.  Repeat of MRI imaging of thoracic spine and cervical spine indicate stable to 3 left-sided facet arthroscopy and T4-5,  C3-4,4-5, C5-6 mild canal stenosis and moderate at C5-6 and 6-7.  Today pain is somewhat improve and more stable  radicular pain continues.  Reports localized tenderness mid scapula area. Worries she will have more pain as nothing has really helped and has had some SE's with MMT  Discussed with patient and hopsitalist present discharge pain plan. Voiced concern that testing/imaging show no root cause or severity of pain sx. Verbally endorsed pain as her personal experience, but with out evidence of significant disease opioids long term or even at current dose is not warranted.  She states she dis tale with the hepatologist at [facility] and ok with use of Lyrica, methocarbamol ( Robaxin), opioids. She also has PCP visit 4/30.  Discussed taper of opioids, feels anxiety is better managed. She is agreeable to schedule with.  Ask her what a pain plan looks like tp her we came up with a taper plan she can do over the next days until she sees her PCP  Initially she was upset, we are not listening to her and she can not go home w/o opioids or something for pain.  She must work and function. Requesting new pain provider.   MRI shoulder and scapula:  -No fracture or concerning marrow replacing lesion.  - The scapula appears normal.  -Normal deltoid muscle bulk.  - Normal visualized chest wall and axilla.                                                                     IMPRESSION:  1.  Minimal subacromial spurring with trace subacromial subdeltoid bursal fluid. Correlate for subacromial impingement.  2.  Mild supraspinatus tendinosis. No rotator cuff tendon tear.  3.  Small amount of fluid in the subscapular recess of the glenohumeral joint.  4.  No acute fracture or dislocation     Xray right shoulder:  IMPRESSION: Mild degenerative arthrosis of the acromioclavicular joint. Glenohumeral joint is normally aligned. No definite fracture.      Ortho Consult:  not finding anything acute to explain ongoing pain, recommending ongoing PT, follow up with shoulder specialist in clinic if pain persist, consult Neurosurgery to evaluate cervical spine recommends YAMILEX f/unit(s) 4-6 weeks.        Opioid risk assessment= moderate due to medication reliant behavior.  Use of polypharmacy, comorbid conditions  Creatinine clearance 77.4 mL/min  [place]  reviewed noting no controlled substances in the past 12 months  Opioid use this past 24 hours= oxycodone 15 mg (4), hydromorphone 0.5 mg (1)= 70 mg morphine equivalent.  Adjuvants: Methocarbamol 750 mg (3), hydroxyzine 50 mg (1), acetaminophen 1000 mg (3). Lyrica 75 mg (2),Buspar 5 mg (3)     PLAN:  Acute pain secondary to muscle spasms with radiculopathy due to cervical spinal degenerative disc disease.    Multimodal Medication Therapy:   Adjuvants: Tylenol 1000 mg every 6 hours, lidocaine patch administer for 12 hours one dose, add lidocaine cream qid,  baclofen 10 mg 3 times daily  continue medrol dosepak, Lorazepam 1 tablets every four hours prn  vistaril 50 mg every 6 hours prn, Lyrica change from  75 mg bid starting 100 mg bid tonight  Opioids: Oral oxycodone 10 -15 every 3 hours as needed change to 10 mg Q4 hrs prn   IV hydromorphone 0.5-1mg every 2 hours prn, stop  today   Non-medication interventions- Ice prn sling to off load trapezius and put in neutal position  Constipation Prophylaxis- daily stool  softener/laxative   Follow up /Discharge Recommendations - We recommend prescribing the following at the time of discharge:    Medications:  Topicals lidocaine patch   Lyrica 100 mg bid,  Methocarbamol ( Robaxin) if patient  finds helpful  Opioids   Taper schedule Oxycodone 10 mg (#24)  reduce by one tablet(s) daily  with f/up with PCP     PT referral, myofascial therapy  Orthopedic referral     No driving           Subjective:  Initially spirit good, pain gradually better, no therapeutics effect from YAMILEX yet although early     Labile mood anxious,tearful when discussing discharge pain plan  Feels she is being treated as drug seeker.       <principal problem not specified>   Patient Active Problem List   Diagnosis    Asthma, mild intermittent    Hyperlipidemia LDL goal <160    Essential hypertension, benign    Acquired hypothyroidism    S/P laparoscopic cholecystectomy    Morbid obesity (H)    Migraine    Facet arthropathy, lumbosacral    DDD (degenerative disc disease), lumbosacral    Leg length discrepancy    Positive LYUDMILA (antinuclear antibody)    Multiple joint pain    Chronic pain of both knees    Hip pain    Mood disorder (H24)    Intractable pain    Acute pain of right shoulder        History   Drug Use No         Tobacco Use      Smoking status: Former        Packs/day: 0.00        Years: 0.3 packs/day for 15.0 years (4.5 ttl pk-yrs)        Types: Cigarettes        Start date: 2000        Quit date: 2015        Years since quittin.2      Smokeless tobacco: Never        Current Facility-Administered Medications   Medication Dose Route Frequency Provider Last Rate Last Admin    acetaminophen (TYLENOL) tablet 1,000 mg  1,000 mg Oral Q8H Tim Chapin MD   1,000 mg at 24 1152    budesonide (PULMICORT) neb solution 2 mg  2 mg Nebulization Daily Yovany Livingston MD   2 mg at 24 1143    busPIRone (BUSPAR) tablet 10 mg  10 mg Oral TID Narayan Arechiga APRN CNP        cetirizine  "(zyrTEC) tablet 10 mg  10 mg Oral Daily Tim Chapin MD   10 mg at 04/24/24 0939    levothyroxine (SYNTHROID/LEVOTHROID) tablet 75 mcg  75 mcg Oral Daily Tim Chapin MD   75 mcg at 04/24/24 0939    Lidocaine (LIDOCARE) 4 % Patch 2 patch  2 patch Transdermal Q24H Franchesca Fuentes MD   2 patch at 04/22/24 0935    losartan (COZAAR) tablet 50 mg  50 mg Oral Daily Tim Chapin MD   50 mg at 04/24/24 0939    magnesium chloride CR tablet 535 mg  535 mg Oral Daily Sharla Márquez APRN CNS   535 mg at 04/24/24 0939    pantoprazole (PROTONIX) EC tablet 40 mg  40 mg Oral QAM AC Tim Chapin MD   40 mg at 04/24/24 0939    polyethylene glycol (MIRALAX) Packet 17 g  17 g Oral BID Narayan Arechiga APRN CNP        pregabalin (LYRICA) capsule 100 mg  100 mg Oral BID Abigail Moscoso APRN CNP        sennosides (SENOKOT) tablet 2 tablet  2 tablet Oral BID Sharla Márquez APRN CNS   2 tablet at 04/24/24 0939    sodium chloride (PF) 0.9% PF flush 3 mL  3 mL Intracatheter Q8H Franchesca Fuentes MD   3 mL at 04/24/24 1452    sodium phosphate (FLEET ENEMA) 1 enema  1 enema Rectal Once Narayan Arechiga APRN CNP           Objective:  Vital signs in last 24 hours:  B/P: 143/95, T: 99, P: 69, R: 20   Blood pressure (!) 143/95, pulse 69, temperature 99  F (37.2  C), temperature source Oral, resp. rate 20, height 1.626 m (5' 4\"), weight 104.9 kg (231 lb 4.8 oz), SpO2 96%, not currently breastfeeding.      Weight:   Wt Readings from Last 3 Encounters:   04/24/24 104.9 kg (231 lb 4.8 oz)   11/07/23 103.5 kg (228 lb 3.2 oz)   10/03/23 107.2 kg (236 lb 6.4 oz)           Intake/Output:    Intake/Output Summary (Last 24 hours) at 4/24/2024 1643  Last data filed at 4/24/2024 1503  Gross per 24 hour   Intake 1400 ml   Output --   Net 1400 ml        Review of Systems:   As per subjective, all others negative.    Physical Exam:     General Appearance:  Alert, cooperative, intermittent emotional distress. " Patient is intermittent tearful grooming is good   Head:  Normocephalic, without obvious abnormality, atraumatic   Eyes:   Conjunctiva/corneas clear, EOM's intact   ENT/Throat: Lips, mouth moist    Lymph/Neck: Symmetrical, trachea midline, no adenopathy, thyroid: not enlarged, symmetric    Lungs:   Clear to auscultation bilaterally, respirations unlabored, even chest rise. Symmetrical movement    Chest Wall:  No tenderness or deformity   Cardiovascular/Heart:  Regular rate and rhythm, S1, S2 normal,no murmur, rub or gallop.     Abdomen:   Soft, non-tender, bowel sounds active all four quadrants,  no masses, no organomegaly   Musculoskeletal: Extremities normal, atraumatic  Incision none    Skin: Skin color good, lesions none   Neurologic: Alert and oriented X 3, Moves all 4 extremities        Psych: Affect is labile aberrant pain behaviors, No             Imaging:  Personally Reviewed.     Results for orders placed or performed during the hospital encounter of 04/15/24   CT Chest Pulmonary Embolism w Contrast    Impression    IMPRESSION:  1. No evidence for pulmonary embolism.  2. Mild mosaic attenuation in both lungs suggests air trapping.  3. Indeterminate 0.5 cm right upper lobe pulmonary nodule. Please  refer to follow-up guidelines below.    Recommendations for one or multiple incidental lung nodules < 6mm :    Low risk patients: No routine follow-up.    High risk patients: Optional follow-up CT at 12 months; if  unchanged, no further follow-up.    *Low Risk: Minimal or absent history of smoking or other known risk  factors.  *Nonsolid (ground glass) or partly solid nodules may require longer  follow-up to exclude indolent adenocarcinoma.  *Recommendations based on Guidelines for the Management of Incidental  Pulmonary Nodules Detected at CT: From the Fleischner Society 2017,  Radiology 2017.  *Guidelines apply to incidental nodules in patients who are 35 years  or older.  *Guidelines do not apply to lung  cancer screening, patients with  immunosuppression, or patients with known primary cancer.    GORDON CLARK MD         SYSTEM ID:  [id number]   MR Cervical Spine w/o Contrast    Impression    IMPRESSION:    1. Degenerative changes in the mid cervical spine as detailed above.  Degenerative findings are progressed since prior MR 3/17/2021.  2. Mild spinal canal narrowings at C3-C4, C4-C5, and C5-C6.  3. Moderate bilateral neural foraminal narrowings at C5-C6. Mild left  neural foraminal narrowing at C6-C7.     RAS FISHER MD         SYSTEM ID:  [id number]   MR Thoracic Spine w/o Contrast    Impression    IMPRESSION:    1.  Multilevel degenerative changes throughout the thoracic spine as  detailed above.  2.  Degenerative disc changes throughout the mid and lower thoracic  spine without significant disc herniation. No significant spinal canal  narrowing.  3.  Asymmetric left-sided facet hypertrophy at T2-T3 and T4-T5 causing  moderate left neural foraminal narrowings.  4.  Indeterminate subcentimeter lesion in the T8 vertebral body. This  does not appear significantly changed since 12/20/2022. Stability is  reassuring.  5.  Degenerative findings are similar to prior MR 12/20/2022.    RAS FISHER MD         SYSTEM ID:  [id number]   MR Scapula Right w/o Contrast    Impression    IMPRESSION:  1.  Minimal subacromial spurring with trace subacromial subdeltoid bursal fluid. Correlate for subacromial impingement.    2.  Mild supraspinatus tendinosis. No rotator cuff tendon tear.    3.  Small amount of fluid in the subscapular recess of the glenohumeral joint.    4.  No acute fracture or dislocation.   MR Shoulder Right w/o Contrast    Impression    IMPRESSION:  1.  Minimal subacromial spurring with trace subacromial subdeltoid bursal fluid. Correlate for subacromial impingement.    2.  Mild supraspinatus tendinosis. No rotator cuff tendon tear.    3.  Small amount of fluid in the subscapular recess of the glenohumeral  joint.    4.  No acute fracture or dislocation.   XR Shoulder Right 2 Views    Impression    IMPRESSION: Mild degenerative arthrosis of the acromioclavicular joint. Glenohumeral joint is normally aligned. No definite fracture.   XR Cervical Thoracic Transforaminal Inj Right    Impression    IMPRESSION:  Technically successful cervical epidural steroid  injection.    EMMY NAVA PA-C         SYSTEM ID:  [id number]          Lab Results:  Personally Reviewed.   Last Comprehensive Metabolic Panel:  Sodium   Date Value Ref Range Status   04/22/2024 139 135 - 145 mmol/L Final     Comment:     Reference intervals for this test were updated on 09/26/2023 to more accurately reflect our healthy population. There may be differences in the flagging of prior results with similar values performed with this method. Interpretation of those prior results can be made in the context of the updated reference intervals.    03/22/2021 137 133 - 144 mmol/L Final     Potassium   Date Value Ref Range Status   04/22/2024 3.6 3.4 - 5.3 mmol/L Final   03/22/2022 4.0 3.4 - 5.3 mmol/L Final   03/22/2021 4.1 3.4 - 5.3 mmol/L Final     Chloride   Date Value Ref Range Status   04/22/2024 106 98 - 107 mmol/L Final   03/22/2022 108 94 - 109 mmol/L Final   03/22/2021 108 94 - 109 mmol/L Final     Carbon Dioxide   Date Value Ref Range Status   03/22/2021 28 20 - 32 mmol/L Final     Carbon Dioxide (CO2)   Date Value Ref Range Status   04/22/2024 22 22 - 29 mmol/L Final   03/22/2022 25 20 - 32 mmol/L Final     Anion Gap   Date Value Ref Range Status   04/22/2024 11 7 - 15 mmol/L Final   03/22/2022 4 3 - 14 mmol/L Final   03/22/2021 1 (L) 3 - 14 mmol/L Final     Glucose   Date Value Ref Range Status   04/22/2024 86 70 - 99 mg/dL Final   03/22/2022 86 70 - 99 mg/dL Final   03/22/2021 68 (L) 70 - 99 mg/dL Final     Urea Nitrogen   Date Value Ref Range Status   04/22/2024 14.8 6.0 - 20.0 mg/dL Final   03/22/2022 10 7 - 30 mg/dL Final   03/22/2021 11 7 - 30  mg/dL Final     Creatinine   Date Value Ref Range Status   04/22/2024 1.05 (H) 0.51 - 0.95 mg/dL Final   03/22/2021 0.94 0.52 - 1.04 mg/dL Final     GFR Estimate   Date Value Ref Range Status   04/22/2024 65 >60 mL/min/1.73m2 Final   03/22/2021 74 >60 mL/min/[1.73_m2] Final     Comment:     Non  GFR Calc  Starting 12/18/2018, serum creatinine based estimated GFR (eGFR) will be   calculated using the Chronic Kidney Disease Epidemiology Collaboration   (CKD-EPI) equation.       Calcium   Date Value Ref Range Status   04/22/2024 8.9 8.6 - 10.0 mg/dL Final   03/22/2021 9.1 8.5 - 10.1 mg/dL Final        UA:   Amphetamines Urine   Date Value Ref Range Status   12/30/2021 Screen Negative Screen Negative Final     Comment:     Cutoff for a negative amphetamine is 500 ng/mL or less.     Barbiturates Qual Urine   Date Value Ref Range Status   03/29/2007 Not Detected  Final     Comment:             Reference Range:  Not Detected     Barbiturates Urine   Date Value Ref Range Status   12/30/2021 Screen Negative Screen Negative Final     Comment:     Cutoff for a negative barbiturate is 200 ng/mL or less.     Benzodiazepine Qual Urine   Date Value Ref Range Status   03/29/2007 Detected, Abnormal Result  Final     Comment:             Reference Range:  Not Detected     Cannabinoids Urine   Date Value Ref Range Status   12/30/2021 Screen Negative Screen Negative Final     Comment:     Cutoff for a negative cannabinoid is 50 ng/mL or less.     Cocaine Urine   Date Value Ref Range Status   12/30/2021 Screen Negative Screen Negative Final     Comment:     Cutoff for a negative cocaine is 300 ng/mL or less.     Opiates Qualitative Urine   Date Value Ref Range Status   03/29/2007 Not Detected  Final     Comment:             Reference Range:  Not Detected     Opiates Urine   Date Value Ref Range Status   12/30/2021 Screen Negative Screen Negative Final     Comment:     Cutoff for a negative opiate is 300 ng/mL or less.      PCP Qual Urine   Date Value Ref Range Status   03/29/2007 Not Detected  Final     Comment:             Reference Range:  Not Detected     PCP Urine   Date Value Ref Range Status   12/30/2021 Screen Negative Screen Negative Final     Comment:     Cutoff for a negative PCP is 25 ng/mL or less.            Critical decision making elements:  Use of high risk medications: Yes, Ongoing monitoring for adverse effects of medications by me: Yes, Relevant labs, imaging, notes reviewed by me:  Yes  Please see A&P for additional details of medical decision making.  Medical complexity over the past 24 hours:  - Decision to DE-ESCALATE CARE based on prognosis  - Prescription DRUG MANAGEMENT performed  40  MINUTES SPENT BY ME on the date of service doing chart review, history, exam, documentation & further activities per the note.  Communicated plan with hospitalist managing care? Yes  Communicated plan with bedside nurse?  Yes    Abigail Moscoso, APRN CNP, PGMT-BC, ACHPN  [facility]   Coverage Monday-Friday 8:00-4:30  No weekend coverage contact house officer    Securely message with the Vocera Web Console (learn more here)

## 2024-04-24 NOTE — PROGRESS NOTES
Pt was in Radiology today for a cervical YAMILEX. Pt tolerated ortho procedure well. Pre procedure pain was 9/10 post procedure pain level 5/10.Procedure was completed by MARTA LYNCH. There were no complications during this procedure. Pt verbalized understanding of written and verbal instructions and left department in stable and satisfactory condition with technologist. There is no evidence of bleeding or any other complications upon discharge.

## 2024-04-24 NOTE — PLAN OF CARE
"Goal Outcome Evaluation:       VSS, pt rates pain 9/10 throughout the night. Received oxy x2, IV dilaudid x1. States pain has changed & is now in shoulder & at base of skull. Ice & heat used for pain control. Walked in halls w/ staff for about 15mins last evening, does well, steady on feet. BM on 4/23 w/ pt manually removing stool. Given senokot for help w/ constipation, encouraged pt to increase ambulation & water intake to help w/ bowel issues. Plan for cervical YAMILEX & EMG today. Will cont w/ current plan of care.       Problem: Adult Inpatient Plan of Care  Goal: Plan of Care Review  Description: The Plan of Care Review/Shift note should be completed every shift.  The Outcome Evaluation is a brief statement about your assessment that the patient is improving, declining, or no change.  This information will be displayed automatically on your shift  note.  4/24/2024 0635 by Lisa Rivera, RN  Outcome: Progressing  Flowsheets (Taken 4/24/2024 0635)  Plan of Care Reviewed With: patient  Overall Patient Progress: no change  4/24/2024 0634 by Lisa Rivera, RN  Outcome: Not Progressing  Goal: Patient-Specific Goal (Individualized)  Description: You can add care plan individualizations to a care plan. Examples of Individualization might be:  \"Parent requests to be called daily at 9am for status\", \"I have a hard time hearing out of my right ear\", or \"Do not touch me to wake me up as it startles  me\".  4/24/2024 0635 by Lisa Rivera, RN  Outcome: Progressing  4/24/2024 0634 by Lisa Rivera, RN  Outcome: Not Progressing  Goal: Absence of Hospital-Acquired Illness or Injury  4/24/2024 0635 by Lisa Rivera, RN  Outcome: Progressing  4/24/2024 0634 by Lisa Rivera, RN  Outcome: Not Progressing  Intervention: Identify and Manage Fall Risk  Recent Flowsheet Documentation  Taken 4/23/2024 2100 by Lisa Rivera, RN  Safety Promotion/Fall Prevention:   lighting adjusted   nonskid shoes/slippers when out of bed   " safety round/check completed  Intervention: Prevent Skin Injury  Recent Flowsheet Documentation  Taken 4/23/2024 2059 by Lisa Rivera RN  Body Position: position changed independently  Goal: Optimal Comfort and Wellbeing  4/24/2024 0635 by Lisa Rivera RN  Outcome: Progressing  4/24/2024 0634 by Lisa Rivera RN  Outcome: Not Progressing  Intervention: Monitor Pain and Promote Comfort  Recent Flowsheet Documentation  Taken 4/23/2024 2059 by Lisa Rivera RN  Pain Management Interventions: medication (see MAR)  Goal: Readiness for Transition of Care  4/24/2024 0635 by Lisa Rivera RN  Outcome: Progressing  4/24/2024 0634 by Lisa Rivera RN  Outcome: Not Progressing     Problem: Pain Acute  Goal: Optimal Pain Control and Function  4/24/2024 0635 by Lisa Rivera RN  Outcome: Progressing  4/24/2024 0634 by Lisa Rivera RN  Outcome: Not Progressing  Intervention: Develop Pain Management Plan  Recent Flowsheet Documentation  Taken 4/23/2024 2059 by Lisa Rivera RN  Pain Management Interventions: medication (see MAR)  Intervention: Prevent or Manage Pain  Recent Flowsheet Documentation  Taken 4/23/2024 2100 by Lisa Rivera RN  Medication Review/Management: medications reviewed     Problem: Comorbidity Management  Goal: Blood Pressure in Desired Range  4/24/2024 0635 by Lisa Rivera RN  Outcome: Progressing  4/24/2024 0634 by Lisa Rivera RN  Outcome: Not Progressing  Intervention: Maintain Blood Pressure Management  Recent Flowsheet Documentation  Taken 4/23/2024 2100 by Lisa Rivera RN  Medication Review/Management: medications reviewed  Goal: Maintenance of Asthma Control  4/24/2024 0635 by Lisa Rivera RN  Outcome: Progressing  4/24/2024 0634 by Lisa Rivera RN  Outcome: Not Progressing  Intervention: Maintain Asthma Symptom Control  Recent Flowsheet Documentation  Taken 4/23/2024 2100 by Lisa Rivera RN  Medication Review/Management: medications reviewed

## 2024-04-24 NOTE — PLAN OF CARE
"Vitals: BP (!) 143/95 (BP Location: Left arm)   Pulse 69   Temp 99  F (37.2  C) (Oral)   Resp 20   Ht 1.626 m (5' 4\")   Wt 104.9 kg (231 lb 4.8 oz)   SpO2 96%   BMI 39.70 kg/m      Cardiac: WDL  Resp: WDL  Neuro: A/Ox4, reports some slight numbness in finger tips on right hand  GI: Constipation!! Last BM reported by patient was 4/15 when she was admitted. Scheduled miralax BID, senna BID administered today. Fleet enema ordered- patient wanting to wait until tomorrow to see if she has any BM results overnight. Patient is walking more in the halls and drinking plenty of fluids. Also reporting passing more gas and \"feeling like things are moving more.\"  : WDL  Skin: WDL  Activity: Independent  Diet: regular  Pain: managed with x1 dose of PRN 15 mg PO oxycodone. Had an YAMILEX today. Patient reports her goal pain is 6/10. Currently rating pain in right shoulder/back 7/10.  Plan: Pain management and have a BM. Tentatively discharge tomorrow?      Goal Outcome Evaluation:      Plan of Care Reviewed With: patient    Overall Patient Progress: improvingOverall Patient Progress: improving    Outcome Evaluation: Pain level improving with YAMILEX and x1 dose of PO oxycodone this morning. No BM yet, but reports passing more gas, is more active in the halls, and is drinking plenty of fluids.      Problem: Adult Inpatient Plan of Care  Goal: Plan of Care Review  Description: The Plan of Care Review/Shift note should be completed every shift.  The Outcome Evaluation is a brief statement about your assessment that the patient is improving, declining, or no change.  This information will be displayed automatically on your shift  note.  Outcome: Progressing  Flowsheets (Taken 4/24/2024 6879)  Outcome Evaluation: Pain level improving with YAMILEX and x1 dose of PO oxycodone this morning. No BM yet, but reports passing more gas, is more active in the halls, and is drinking plenty of fluids.  Plan of Care Reviewed With: patient  Overall " "Patient Progress: improving  Goal: Patient-Specific Goal (Individualized)  Description: You can add care plan individualizations to a care plan. Examples of Individualization might be:  \"Parent requests to be called daily at 9am for status\", \"I have a hard time hearing out of my right ear\", or \"Do not touch me to wake me up as it startles  me\".  Outcome: Progressing  Goal: Absence of Hospital-Acquired Illness or Injury  Outcome: Progressing  Intervention: Identify and Manage Fall Risk  Recent Flowsheet Documentation  Taken 4/24/2024 0945 by Octavia Cook RN  Safety Promotion/Fall Prevention:   assistive device/personal items within reach   clutter free environment maintained   nonskid shoes/slippers when out of bed   room near nurse's station   room organization consistent   safety round/check completed   treat reversible contributory factors   treat underlying cause  Intervention: Prevent Skin Injury  Recent Flowsheet Documentation  Taken 4/24/2024 0945 by Octavia Cook RN  Body Position: position changed independently  Goal: Optimal Comfort and Wellbeing  Outcome: Progressing  Intervention: Monitor Pain and Promote Comfort  Recent Flowsheet Documentation  Taken 4/24/2024 0940 by Octavia Cook RN  Pain Management Interventions: medication (see MAR)  Goal: Readiness for Transition of Care  Outcome: Progressing     Problem: Pain Acute  Goal: Optimal Pain Control and Function  Outcome: Progressing  Intervention: Develop Pain Management Plan  Recent Flowsheet Documentation  Taken 4/24/2024 0940 by Octavia Cook RN  Pain Management Interventions: medication (see MAR)  Intervention: Prevent or Manage Pain  Recent Flowsheet Documentation  Taken 4/24/2024 0945 by Octavia Cook RN  Bowel Elimination Promotion:   adequate fluid intake promoted   ambulation promoted  Medication Review/Management: medications reviewed     Problem: Comorbidity Management  Goal: Blood Pressure in Desired " Range  Outcome: Progressing  Intervention: Maintain Blood Pressure Management  Recent Flowsheet Documentation  Taken 4/24/2024 0945 by Octavia Cook, RN  Medication Review/Management: medications reviewed  Goal: Maintenance of Asthma Control  Outcome: Progressing  Intervention: Maintain Asthma Symptom Control  Recent Flowsheet Documentation  Taken 4/24/2024 0945 by Octavia Cook, RN  Medication Review/Management: medications reviewed

## 2024-04-24 NOTE — PROGRESS NOTES
"Neurosurgery Progress     TODAY'S PLAN:     -YAMILEX ordered for today by Medicine.   -EMG ordered by Medicine.  -Advance activity as tolerated.  -Continue supportive and symptomatic treatment.  -Pain control measures.  -We will facilitate a follow-up in 4-6 weeks.     In the event that patient's symptoms worsen or change we would appreciate being contacted. We did discuss signs of a worsening problem that she should seek being evaluated.     We did review the above information with the patient whom agrees with the plan and did verbalize understanding.   ________________________________________________________________     /71 (BP Location: Left arm)   Pulse 60   Temp 98.5  F (36.9  C) (Oral)   Resp 22   Ht 1.626 m (5' 4\")   Wt 104.9 kg (231 lb 4.8 oz)   SpO2 95%   BMI 39.70 kg/m       Pt in bed. Appears comfortable and in no apparent distress, moving all extremities.   CN II-XII intact, alert and appropriate with conversation and following commands.   Bilateral upper and lower extremities with appropriate strength. DTR's WNL. Spine is non tender to palpation throughout. Camp's and Babinski sign neg. Sensation intact throughout with the exception of baseline right thumb, index and middle fingers.   Acceptable ROM.   Calves soft and non-tender bilaterally.     All pertinent labs and updated imaging reviewed in EPIC.     Ruben Puentes PA-C   Neurosurgery       "

## 2024-04-25 VITALS
OXYGEN SATURATION: 95 % | BODY MASS INDEX: 39.49 KG/M2 | TEMPERATURE: 98.3 F | SYSTOLIC BLOOD PRESSURE: 140 MMHG | HEART RATE: 83 BPM | RESPIRATION RATE: 18 BRPM | WEIGHT: 231.3 LBS | DIASTOLIC BLOOD PRESSURE: 78 MMHG | HEIGHT: 64 IN

## 2024-04-25 LAB
ANION GAP SERPL CALCULATED.3IONS-SCNC: 10 MMOL/L (ref 7–15)
BUN SERPL-MCNC: 11.7 MG/DL (ref 6–20)
CALCIUM SERPL-MCNC: 9.5 MG/DL (ref 8.6–10)
CHLORIDE SERPL-SCNC: 101 MMOL/L (ref 98–107)
CREAT SERPL-MCNC: 0.92 MG/DL (ref 0.51–0.95)
DEPRECATED HCO3 PLAS-SCNC: 23 MMOL/L (ref 22–29)
EGFRCR SERPLBLD CKD-EPI 2021: 76 ML/MIN/1.73M2
ERYTHROCYTE [DISTWIDTH] IN BLOOD BY AUTOMATED COUNT: 15.2 % (ref 10–15)
GLUCOSE SERPL-MCNC: 137 MG/DL (ref 70–99)
HCT VFR BLD AUTO: 38.8 % (ref 35–47)
HGB BLD-MCNC: 12.5 G/DL (ref 11.7–15.7)
MCH RBC QN AUTO: 28.3 PG (ref 26.5–33)
MCHC RBC AUTO-ENTMCNC: 32.2 G/DL (ref 31.5–36.5)
MCV RBC AUTO: 88 FL (ref 78–100)
PLATELET # BLD AUTO: 258 10E3/UL (ref 150–450)
POTASSIUM SERPL-SCNC: 4.1 MMOL/L (ref 3.4–5.3)
RBC # BLD AUTO: 4.41 10E6/UL (ref 3.8–5.2)
SODIUM SERPL-SCNC: 134 MMOL/L (ref 135–145)
WBC # BLD AUTO: 13.1 10E3/UL (ref 4–11)

## 2024-04-25 PROCEDURE — 85027 COMPLETE CBC AUTOMATED: CPT | Performed by: NURSE PRACTITIONER

## 2024-04-25 PROCEDURE — 250N000013 HC RX MED GY IP 250 OP 250 PS 637: Performed by: NURSE PRACTITIONER

## 2024-04-25 PROCEDURE — 250N000013 HC RX MED GY IP 250 OP 250 PS 637: Performed by: CLINICAL NURSE SPECIALIST

## 2024-04-25 PROCEDURE — 99239 HOSP IP/OBS DSCHRG MGMT >30: CPT | Performed by: NURSE PRACTITIONER

## 2024-04-25 PROCEDURE — 80048 BASIC METABOLIC PNL TOTAL CA: CPT | Performed by: NURSE PRACTITIONER

## 2024-04-25 PROCEDURE — 250N000013 HC RX MED GY IP 250 OP 250 PS 637: Performed by: INTERNAL MEDICINE

## 2024-04-25 PROCEDURE — 250N000009 HC RX 250: Performed by: STUDENT IN AN ORGANIZED HEALTH CARE EDUCATION/TRAINING PROGRAM

## 2024-04-25 PROCEDURE — 36415 COLL VENOUS BLD VENIPUNCTURE: CPT | Performed by: NURSE PRACTITIONER

## 2024-04-25 RX ORDER — METHOCARBAMOL 750 MG/1
750 TABLET, FILM COATED ORAL EVERY 6 HOURS PRN
Qty: 30 TABLET | Refills: 0 | OUTPATIENT
Start: 2024-04-25 | End: 2024-08-08

## 2024-04-25 RX ORDER — PREGABALIN 100 MG/1
100 CAPSULE ORAL 2 TIMES DAILY
Qty: 60 CAPSULE | Refills: 1 | OUTPATIENT
Start: 2024-04-25 | End: 2024-08-08

## 2024-04-25 RX ORDER — OXYCODONE HYDROCHLORIDE 10 MG/1
TABLET ORAL
Qty: 24 TABLET | Refills: 0 | Status: SHIPPED | OUTPATIENT
Start: 2024-04-25

## 2024-04-25 RX ORDER — POLYETHYLENE GLYCOL 3350 17 G/17G
17 POWDER, FOR SOLUTION ORAL DAILY
Qty: 510 G | Status: SHIPPED | OUTPATIENT
Start: 2024-04-25

## 2024-04-25 RX ORDER — OXYCODONE HYDROCHLORIDE 10 MG/1
10 TABLET ORAL EVERY 4 HOURS PRN
Refills: 0 | OUTPATIENT
Start: 2024-04-25 | End: 2024-08-08

## 2024-04-25 RX ORDER — BUSPIRONE HYDROCHLORIDE 10 MG/1
10 TABLET ORAL 3 TIMES DAILY
Qty: 90 TABLET | Refills: 1 | OUTPATIENT
Start: 2024-04-25 | End: 2024-08-08

## 2024-04-25 RX ORDER — ONDANSETRON 8 MG/1
8 TABLET, ORALLY DISINTEGRATING ORAL EVERY 8 HOURS PRN
Qty: 30 TABLET | Refills: 11 | Status: SHIPPED | OUTPATIENT
Start: 2024-04-25

## 2024-04-25 RX ORDER — BUSPIRONE HYDROCHLORIDE 10 MG/1
10 TABLET ORAL 3 TIMES DAILY
Qty: 90 TABLET | Refills: 0 | Status: SHIPPED | OUTPATIENT
Start: 2024-04-25

## 2024-04-25 RX ORDER — AMOXICILLIN 250 MG
1 CAPSULE ORAL 2 TIMES DAILY PRN
Qty: 60 TABLET | Refills: 0 | Status: SHIPPED | OUTPATIENT
Start: 2024-04-25

## 2024-04-25 RX ORDER — HYDROXYZINE HYDROCHLORIDE 25 MG/1
50 TABLET, FILM COATED ORAL EVERY 6 HOURS PRN
Qty: 60 TABLET | Refills: 11 | Status: SHIPPED | OUTPATIENT
Start: 2024-04-25

## 2024-04-25 RX ORDER — PREGABALIN 100 MG/1
100 CAPSULE ORAL 2 TIMES DAILY
Qty: 30 CAPSULE | Refills: 0 | Status: SHIPPED | OUTPATIENT
Start: 2024-04-25 | End: 2024-04-30

## 2024-04-25 RX ORDER — LIDOCAINE 4 G/G
2 PATCH TOPICAL EVERY 24 HOURS
Status: SHIPPED
Start: 2024-04-25

## 2024-04-25 RX ORDER — ACETAMINOPHEN 500 MG
1000 TABLET ORAL EVERY 8 HOURS
Qty: 100 TABLET | Refills: 11 | Status: SHIPPED | OUTPATIENT
Start: 2024-04-25

## 2024-04-25 RX ADMIN — LOSARTAN POTASSIUM 50 MG: 50 TABLET, FILM COATED ORAL at 09:28

## 2024-04-25 RX ADMIN — LIDOCAINE 2 PATCH: 4 PATCH TOPICAL at 05:51

## 2024-04-25 RX ADMIN — BUSPIRONE HYDROCHLORIDE 10 MG: 10 TABLET ORAL at 09:28

## 2024-04-25 RX ADMIN — LEVOTHYROXINE SODIUM 75 MCG: 0.07 TABLET ORAL at 09:28

## 2024-04-25 RX ADMIN — PANTOPRAZOLE SODIUM 40 MG: 40 TABLET, DELAYED RELEASE ORAL at 09:28

## 2024-04-25 RX ADMIN — ACETAMINOPHEN 1000 MG: 500 TABLET, FILM COATED ORAL at 12:11

## 2024-04-25 RX ADMIN — PREGABALIN 100 MG: 100 CAPSULE ORAL at 09:28

## 2024-04-25 RX ADMIN — CETIRIZINE HYDROCHLORIDE 10 MG: 10 TABLET, FILM COATED ORAL at 09:28

## 2024-04-25 RX ADMIN — MAGNESIUM 64 MG (MAGNESIUM CHLORIDE) TABLET,DELAYED RELEASE 535 MG: at 09:28

## 2024-04-25 RX ADMIN — POLYETHYLENE GLYCOL 3350 17 G: 17 POWDER, FOR SOLUTION ORAL at 11:01

## 2024-04-25 RX ADMIN — BUDESONIDE 2 MG: 0.5 INHALANT RESPIRATORY (INHALATION) at 11:00

## 2024-04-25 RX ADMIN — BUSPIRONE HYDROCHLORIDE 10 MG: 10 TABLET ORAL at 13:13

## 2024-04-25 RX ADMIN — OXYCODONE HYDROCHLORIDE 10 MG: 5 TABLET ORAL at 05:00

## 2024-04-25 RX ADMIN — ACETAMINOPHEN 1000 MG: 500 TABLET, FILM COATED ORAL at 04:58

## 2024-04-25 RX ADMIN — OXYCODONE HYDROCHLORIDE 10 MG: 5 TABLET ORAL at 13:13

## 2024-04-25 RX ADMIN — SENNOSIDES 2 TABLET: 8.6 TABLET, FILM COATED ORAL at 09:28

## 2024-04-25 ASSESSMENT — ACTIVITIES OF DAILY LIVING (ADL)
ADLS_ACUITY_SCORE: 22

## 2024-04-25 NOTE — DISCHARGE SUMMARY
"[facility]  Hospitalist Discharge Summary      Date of Admission:  4/15/2024  Date of Discharge:  4/25/2024  Discharging Provider: BLANCA Joya CNP  Discharge Service: Hospitalist Service    Discharge Diagnoses   See below.     Clinically Significant Risk Factors     # Obesity: Estimated body mass index is 39.7 kg/m  as calculated from the following:    Height as of this encounter: 1.626 m (5' 4\").    Weight as of this encounter: 104.9 kg (231 lb 4.8 oz).       Follow-ups Needed After Discharge   Follow-up Appointments     Follow-up and recommended labs and tests       Follow up with primary care provider, Jean Sauceda, within 7 days   to evaluate medication change, to evaluate treatment change, and for   hospital follow- up.  No follow up labs or test are needed.            Unresulted Labs Ordered in the Past 30 Days of this Admission       No orders found from 3/16/2024 to 4/16/2024.          Discharge Disposition   Discharged to home  Condition at discharge: Stable    Hospital Course   Machelle Weiner is a 49 year old  female with a significant past medical history of chronic pain, GRIFFITH, TIMMY, hypertension, hyperlipidemia, and migraines who presented with right posterior shoulder pain.  She was complaining of severe sharp pain on the right radiating to the right upper extremity.  She has a hx of cervical radiculopathy for which she has been getting epidural steroid injections, also follows with pain clinic. In the ED, pain could not be controlled. Patient was ultimately admitted for further evaluation and pain control.     During her hospitalization, patient had several forms of imaging ordered to evaluate for etiology of pain. Results as below.   CT scan w/ contrast did not reveal any acute intrathoracic pathology.   MR Cervical and Thoracic overall unrevealing aside from chronic findings.   MR Shoulder and MR Scapula -> Minimal subacromial spurring with trace " subacromial subdeltoid bursal fluid. Mild supraspinatus tendinosis. No rotator cuff tendon tear. Small amount of fluid in the subscapular recess of the glenohumeral joint, no acute fracture or dislocation.    Patient was seen by neurosurgery and orthopedics, who did not appreciate a surgical indication at this time. CRPS was considered, but no known trauma or clinical findings to support diagnosis.     During hospitalization, attempted pain management with tylenol, lidocaine patches, pregabalin, PRN oxycodone and dilaudid for breakthrough pain. Discharge delayed 2/2 ongoing pain requiring IV pain medications. Pain management consulted who recommended tapering off of opioids and increasing pregabalin. Patient underwent an epidural steroid injection at C7-T4 on 4/24/2024 with good symptom relief.     Patient also experiencing anxiety and panic attacks during her stay, psychiatry consulted who recommended increase in buspirone to 10 mg TID.     Patient will be discharged with pregabalin, oxycodone taper, hydroxyzine, tylenol and lidocaine patches for home. Continue use of buspirone for anxiety, prescription given. Follow up appointments needed with NS and orthopedics, order placed. Patient will follow up with PCP on 4/30/2024 to discuss new prescriptions and possible need for long-term opioids. Referral to [facility] pain management program given. Discharged in stable condition.       Consultations This Hospital Stay   PAIN MANAGEMENT ADULT IP CONSULT  SPIRITUAL HEALTH SERVICES IP CONSULT  ORTHOPEDIC SURGERY IP CONSULT  PHYSICAL THERAPY ADULT IP CONSULT  SOCIAL WORK IP CONSULT  CARE MANAGEMENT / SOCIAL WORK IP CONSULT  PSYCHIATRY IP CONSULT  ORTHOPEDIC SURGERY IP CONSULT  NEUROSURGERY IP CONSULT    Code Status   Full Code    Time Spent on this Encounter   LORE, Melissa Rodríguez, personally saw the patient today and spent greater than 30 minutes discharging this patient. Patient was seen and evaluated with Narayan Arechiga,  "DIRK Hopper CNP ([facility])  BLANCA Joya CNP,    [facility] OBSERVATION DEPT  [address] [facility] [address]  Phone: [phone]    Attestation: 4/25/2024 3:11 PM   \"I was present with the student who participated in the service and in the documentation of the note. I have verified the history and personally performed the physical exam and medical decision-making. I agree with the assessment and plan of care as documented in the note.\"     BLANCA Joya CNP    ______________________________________________________________________    Physical Exam   Vital Signs: Temp: 98.3  F (36.8  C) Temp src: Oral BP: (!) 140/78 Pulse: 83   Resp: 18 SpO2: 95 % O2 Device: None (Room air)    Weight: 231 lbs 4.8 oz  GEN: Alert, oriented x 3, appears comfortable, NAD.  NECK: Supple, no mass or thyromegaly   HEENT: Normocephalic/atraumatic, no scleral icterus, no nasal discharge, mouth moist.  CV:  Regular rate and rhythm, no murmur or JVD.  S1 + S2 noted, no S3 or S4.  LUNGS: Clear to auscultation bilaterally without rales/rhonchi/wheezing/retractions.  Symmetric chest rise on inhalation noted.  ABD: Active bowel sounds, soft, non-tender/non-distended.  No rebound/guarding/rigidity.  EXT: No edema.  No cyanosis.  No joint synovitis noted.  SKIN:  Dry to touch, no exanthems noted in the visualized areas.  Neurologic: Grossly intact,non focal.  Neuropsychiatric:  General: normal, calm and normal eye contact  Level of consciousness: alert / normal  Affect: normal  Orientation: oriented to self, place, time and situation    Primary Care Physician   Jean Sauceda    Discharge Orders      Physical Therapy  Referral      Orthopedic  Referral      Adult Neurosurgery  Referral      Reason for your hospital stay    Acute on chronic pain  Cervicalgia  Severe anxiety  Morbid obesity  Hypertension  Asthma     Follow-up and recommended " labs and tests     Follow up with primary care provider, Jean Sauceda, within 7 days to evaluate medication change, to evaluate treatment change, and for hospital follow- up.  No follow up labs or test are needed.     Activity    Your activity upon discharge: activity as tolerated and no driving while taking opiate and/or sedative medications.  Work note note was provided.     When to contact your care team    Call your primary doctor if you have any of the following: increased pain.  Call 911 if you have any of the following: Life-threatening concerns.     Discharge Instructions    Summary of your hospital course:  You were admitted on 4/15/2024 for right shoulder and upper trapezius pain. Your acute pain is in part exacerbated by your underlying chronic pain (epigastric pain, GERD, degenerative disc disease, and GRIFFITH).  You underwent an  epidural steroid injection on 4/24/2024 at the level of  C7-T1 with steroid, and your pain somewhat improved but she continues to experience radicular pain, although this to has improved.    Imaging:  MRI imaging of her thoracic and cervical spine indicate stable to 3 left-sided facet arthroscopy and T4-5, C3-4,4-5, C5-6 mild canal stenosis and moderate at C5-6 and 6-7. MRI of her shoulder and scapula show no fracture or concerning marrow replacing lesion, and the scapula appears normal. However, the X-ray of her right shoulder shows mild degenerative arthrosis of the acromioclavicular joint.    Consults:  The orthopedic consultant ([facility]) could not find anything acute to explain the ongoing pain and recommends ongoing PT, follow-up with a shoulder specialist if the pain persists, and a neurosurgery consult to evaluate the cervical spine.  [facility] and no acute surgical issue was identified.  You are being referred back to the comprehensive pain management program at [place], and can follow up with your  primary care and the specialists (Orthopedics and Neurosurgery) in the next several weeks.      Opioid and High Risk Medication (REMS):  Due to your comorbid conditions, and the use of polypharmacy, your opioid risk assessment is moderate.  You are agreeable to a tapering plan for opioids, which will be implemented over the next few days until she sees her PCP.The discharge plan includes the following medications:  Topicals lidocaine patch -- apply 2-3 patches to affected area daily.  Leave on for 12 hours and take off for 12 hours.   Lyrica 100 mg 2 x  a day.  (1 month written. Future prescriptions will need to come from your primary or the pain management team)  Hydroxyzine 25 mg every 6 hours as needed for pain/spasms/anxiety.  Opioids: Taper schedule Oxycodone 10 mg (#24) reduce by one tablet(s) daily with f/up with PCP on 4/30.  Ongoing conversations with your primary provider and/or pain management team regarding the long term use of opiates is recommended.  It is advised that you do not drive while on these medications.         Non-medication interventions include Ice prn sling to off-load trapezius and put in a neutral position and a daily stool softener/laxative for constipation prophylaxis.    We also recommend PT referral, myofascial therapy, and an orthopedic ([facility])/NS ([facility])/Pain Management ([place] per your request) referral. The patient has been advised not to drive.     Monitor and record    You are being discharged out with a prescription for narcan.  Consider this to be similar to an epi-pen one would use for a life threatening allergic reaction.  However, this medication is used instead for inadvertent narcotic/opiate overdose.       If you or a loved one is in a life-threatening situation, please call 911 or go to the nearest emergency room. In non-emergency situations, you can call the following alcohol hotlines for information and support:    Aftercare Plan     [facility]  Phone (free remote counseling): [phone]. Web address:   [email or web address]      If I am feeling unsafe or I am in a crisis, I will:   Contact my established care providers   Call the National Suicide Prevention Lifeline: 656.131.8382   Go to the nearest emergency room   Call 911      Warning signs that I or other people might notice when a crisis is developing for me:     I am having increasing suicidal thoughts that turn to plans with intent or means   I am having additional urges to self-harm    My emotions are of hopelessness; feeling like there's no way out.  Rage or anger.  Engaging in risky activities without thinking  Withdrawing from family/friends  Dramatic mood swings  Drastic personality changes   Use of alcohol or drugs  Postings on social media  Neglect of personal hygiene or cares      Things I am able to do on my own to cope or help me feel better:    Spending quality time with loved ones  Staying hydrated  Eating balanced meals  Going for a walk every day  Take care of daily responsibilities/needs  Focus on positive self-talk vs negative self-talk     Things that I am able to do with others to cope or help me better:   Exercise  Music  Deep breathing  Meditations  Journal  Self-regulate  Self check-in  Ask for help     Things I can use or do for distraction:   Reach out to/spend time with family, friends  Shower  Exercise  Chores or do a project  Listen to music  Watch movie/TV  Listening to music  Journaling  Reading a book  Meditating  Call a friend     Changes I can make to support my mental health and wellness:    -I will abstain from all mood altering chemicals not currently prescribed to me    -I will attend scheduled mental health therapy and psychiatric appointments and follow all   recommendations  -I will commit to 30 minutes of self care daily - this can be as simple as taking a shower, going for a   walk, cooking a meal, read, writing, etc  -I will practice square breathing when I  "begin to feel anxious - in breath through the nose for the count   of 4 and the first line on the square. Out breath through the mouth for the count of 4 for the second line   of the square. Repeat to complete the square. Repeat the square as many times as needed.  - I will use distraction skills of: going for walks, watching TV, spending time outside, calling a friend or   family member  -Use community resources, including [employer] numbers, [facility] crisis and support meetings  -Maintain a daily schedule/routine  -Practice deep breathing skills  -Download a meditation yeison and spend 15-20 minutes per day mediating/relaxing. Some apps to   download include: Calm, Headspace and Insight Timer. All 3 of these apps have free version     Reduce Extreme Emotion  QUICKLY:  Changing Your Body Chemistry      T:  Change your body Temperature to change your autonomic nervous system   Use Ice Water to calm yourself down FAST   Put your face in a bowl of ice water (this is the best way; have the person keep his/her face in ice water for 30-45 seconds - initial research is showing that the longer s/he can hold her/his face in the water, the better the response), or   Splash ice water on your face, or hold an ice pack on your face      I:  Intensely exercise to calm down a body revved up by emotion   Examples: running, walking fast, jumping, playing basketball, weight lifting, swimming, calisthenics, etc.   Engage in exercises that DO NOT include violent behaviors. Exercises that utilize violent behaviors tend to function as \"behavioral rehearsal,\" and rather than calming the person down, may actually \"rev\" the person up more, increasing the likelihood of violence, and lessening the likelihood that they will \"burn off\" energy     P:  Progressively relax your muscles   Starting with your hands, moving to your forearms, upper arms, shoulders, neck, forehead, eyes, cheeks and lips, tongue and teeth, chest, upper back, stomach, buttocks, " thighs, calves, ankles, feet   Tense (10 seconds,   of the way), then relax each muscle (all the way)   Notice the tension   Notice the difference when relaxed (by tensing first, and then relaxing, you are able to get a more thorough relaxation than by simply relaxing)      P: Paced breathing to relax   The standard technique is to begin with counting the number of steps one takes for a typical inhale, then counting the steps one takes for a typical exhale, and then lengthening the amount of steps for the exhalation by one or two steps.  OR  Repeat this pattern for 1-2 minutes  Inhale for four (4) seconds   Exhale for six (6) to eight (8) seconds   Research demonstrated that one can change one's overall level of anxiety by doing this exercise for even a few minutes per day       People in my life that I can ask for help:   Family  Friends  Providers     Your [facility] has a mental health crisis team you can call 24/7:   [facility] Crisis Line Number: [phone]  [facility] Mental Health Crisis: [phone] - Call the crisis line for immediate mental health support, 24 hours a day.   [facility] Crisis Line Number: [phone]  [facility] Crisis Line Number: [phone]  [facility] Crisis Line Number: [phone]   [facility] Crisis Line Number: [phone]  [place]: [phone]  [place]: [phone]  [facility] Crisis Number: [phone]  [facility] Crisis: [phone]     Other things that are important when I'm in crisis:   Ask for help     Additional resources and information:      Mental Health Apps  My3  https://myLocal Market Launchpp.[email or web address]/     VirtualHopeBox  https://Diino Systems.org/apps/virtual-hope-box/        Professionals or Agencies I Can Contact During A Crisis:        Crisis Lines  Call or Text 988 - National Suicide and Crisis Lifeline     Crisis Text Line  Text 254165  You will be connected with a trained live crisis counselor to provide  "support.     The Hi Project (LGBTQ Youth Crisis Line)  9.975.430.0779  text START to 368-695     National Luckey on Mental Illness (GLADIS)  835.970.1693 or 3.666.GLADIS.HELPS     National Suicide Prevention Lifeline at 9-973-876-BRJU (4876)      Throughout  [place]: call **CRISIS (**787607)      Crisis Text Line: is available for free, 24/7 by texting [place] to 100179     Prosetta  Fast Tracker  Linking people to mental health and substance use disorder resources  Adyoulike.sageCrowd      [place] Mental Health Warm Line  Peer to peer support  Monday thru Saturday, 12 pm to 10 pm  779.955.3409 or 1.728.921.5923  Text \"Support\" to 74743     National Luckey on Mental Illness (www.[place].gladis.org): 520.980.7299 or 534-569-3338     [facility] Phone (free remote counseling): [phone] Web address:   https://SLEDVision.org/      www.Broadlink (filter for insurance, gender preference, etc.)     [facility]   [phone]  Intake appointment will be virtual, following appointments can be in person or virtual.   **IMMEDIATE OPENINGS**     [facility]  [phone]  *offers individual therapy, medication management and Mental Health Case Workers; can self refer     [facility]  [phone]  *Immediate Openings     [facility]  [phone]  *Immediate Openings     [facility]  [phone]  *Immediate Openings     Please follow up with scheduled providers to ensure all necessary paperwork is filled out prior to your   scheduled telehealth appointments.      Coordinators from [facility] will be calling within two business days to ensure   that you have the resources you may need or provide assistance with scheduling (Phone number: [phone].).     Remember: give the referrals 3 sessions prior to calling it quits. Do you trust them? Do you feel   understood? Do you think they can " help? Check in with yourself after each session       [place] [facility]: (230) 356-4298?    The National Drug Helpline is a 24/7 alcohol helpline where you can get information about alcohol use disorder and alcohol rehab. This hotline operates around the clock, but your call may occasionally go unanswered due to high call volumes or staff shortage. Please call back another time or call one of the other hotlines listed below.    Alcoholics Anonymous 1-538.842.4940    Find out more about the AA program or fellowship by calling the Alcoholics Anonymous number.    Alcohol Hotline for IAFF Members 1-528.569.3348    The alcohol hotline is in place to support firefighters and paramedics who are having issues with alcohol. It is for IAFF members and their family and friends.    [facility]: 4-524-962-HELP (1632)    The [facility] (Substance Abuse and Mental Health Services Administration) helpline is a U.S. government initiative. This hotline gives you access to nationwide resources for alcohol use disorder treatment, including information and referrals to rehabs near you. The [facility] helpline is available 24/7 and offers services in both English and Yi.    National Suicide Prevention Lifeline: 3-614-201-TALK (4715)    This is a national helpline for people in emotional distress with suicidal thoughts. Calls to the National Suicide Prevention Lifeline are free and confidential. The hotline operates 24/7.    National Poison Control: 1-212.208.2064    The U.S. Poison Control helpline provides information about the prevention and treatment of drug overdoses, including alcohol poisoning.     Diet    Follow this diet upon discharge: Orders Placed This Encounter      Regular Diet Adult    Avoid alcohol beverages while taking narcotic/opiate/sedative medications.    You should be drinking between 60 and 90 ounces of noncarbonated, noncaffeinated, nonalcoholic beverages daily.       Significant Results and Procedures   Most Recent  3 CBC's:  Recent Labs   Lab Test 04/25/24  0533 04/22/24  0515 04/15/24  1134   WBC 13.1* 14.4* 8.7   HGB 12.5 12.1 13.2   MCV 88 87 85    247 305     Most Recent 3 BMP's:  Recent Labs   Lab Test 04/25/24  0533 04/22/24  0515 04/15/24  1134   * 139 138   POTASSIUM 4.1 3.6 3.9   CHLORIDE 101 106 108*   CO2 23 22 15*   BUN 11.7 14.8 11.0   CR 0.92 1.05* 0.96*   ANIONGAP 10 11 15   BETINA 9.5 8.9 9.4   * 86 92     Most Recent 3 Creatinines:  Recent Labs   Lab Test 04/25/24  0533 04/22/24  0515 04/15/24  1134   CR 0.92 1.05* 0.96*       Most Recent D-dimer:  Recent Labs   Lab Test 04/15/24  1134   DD 0.54*   ,   Results for orders placed or performed during the hospital encounter of 04/15/24   CT Chest Pulmonary Embolism w Contrast    Narrative    CT CHEST PULMONARY EMBOLISM WITH CONTRAST April 15, 2024 12:48 PM    CLINICAL HISTORY: Chest pain. Elevated D-dimer.    TECHNIQUE: CT angiogram chest during arterial phase injection IV  contrast. 2D and 3D MIP reconstructions were performed by the CT  technologist. Dose reduction techniques were used.   CONTRAST: 100mL Isovue-370.    COMPARISON: None.    FINDINGS:  ANGIOGRAM CHEST: Pulmonary arteries are normal caliber and negative  for pulmonary emboli. The thoracic aorta is not well opacified. No  evidence for thoracic aortic aneurysm.     LUNGS AND PLEURA: Mild mosaic attenuation bilaterally suggests  air-trapping. Indeterminate 0.5 cm subpleural nodule in the right  upper lobe posteriorly (series 6 image 72). No pleural effusions.    MEDIASTINUM/AXILLAE: No lymphadenopathy in the chest. No pericardial  effusion.    CORONARY ARTERY CALCIFICATION: None.    UPPER ABDOMEN: Cholecystectomy.    MUSCULOSKELETAL: Unremarkable.      Impression    IMPRESSION:  1. No evidence for pulmonary embolism.  2. Mild mosaic attenuation in both lungs suggests air trapping.  3. Indeterminate 0.5 cm right upper lobe pulmonary nodule. Please  refer to follow-up guidelines  below.    Recommendations for one or multiple incidental lung nodules < 6mm :    Low risk patients: No routine follow-up.    High risk patients: Optional follow-up CT at 12 months; if  unchanged, no further follow-up.    *Low Risk: Minimal or absent history of smoking or other known risk  factors.  *Nonsolid (ground glass) or partly solid nodules may require longer  follow-up to exclude indolent adenocarcinoma.  *Recommendations based on Guidelines for the Management of Incidental  Pulmonary Nodules Detected at CT: From the Fleischner Society 2017,  Radiology 2017.  *Guidelines apply to incidental nodules in patients who are 35 years  or older.  *Guidelines do not apply to lung cancer screening, patients with  immunosuppression, or patients with known primary cancer.    GORDON CLARK MD         SYSTEM ID:  [id number]   MR Cervical Spine w/o Contrast    Narrative    MRI CERVICAL SPINE WITHOUT CONTRAST 4/16/2024 2:24 PM     HISTORY: Uncontrolled neck/back pain.    TECHNIQUE: Multiplanar, multisequence MRI of the cervical spine  without contrast.     COMPARISON: Cervical spine MRI 3/27/2021.     FINDINGS: Straightening of the normal cervical lordosis which may be  positional. No loss of vertebral body height. No focal destructive  bony lesions appreciated. No significant STIR hyperintense endplate  edema (Modic type I changes).    No abnormal signal appreciated within the visualized spinal cord.    Level by level as follows:     C2-C3: No loss of disc height. No significant disc herniation. Normal  facets. No spinal canal or neural foraminal narrowing.     C3-C4: Mild loss of disc height. Left central disc extrusion which  appears to extend slightly above and below the disc level. Normal  facets. Mild spinal canal narrowing. No significant neural foraminal  narrowing.     C4-C5: Mild loss of disc height. Central disc extrusion which appears  to extend slightly above and below the disc level. Normal facets.  Mild  spinal canal narrowing. No significant neural foraminal narrowing.     C5-C6: Mild loss of disc height. Circumferential disc bulge with  endplate osteophytic spurring. Normal facets. Mild spinal canal  narrowing. Moderate bilateral neural foraminal narrowing.     C6-C7: Mild loss of disc height. Posterior disc bulge with mild  uncinate spurring. Normal facets. No spinal canal narrowing. No  significant right neural foraminal narrowing. Mild left neural  foraminal narrowing.     C7-T1: No loss of disc height. No significant disc herniation. Normal  facets. No spinal canal or neural foraminal narrowing.     Paraspinous soft tissues are unremarkable.       Impression    IMPRESSION:    1. Degenerative changes in the mid cervical spine as detailed above.  Degenerative findings are progressed since prior MR 3/17/2021.  2. Mild spinal canal narrowings at C3-C4, C4-C5, and C5-C6.  3. Moderate bilateral neural foraminal narrowings at C5-C6. Mild left  neural foraminal narrowing at C6-C7.     RAS FISHER MD         SYSTEM ID:  [id number]   MR Thoracic Spine w/o Contrast    Narrative    MRI THORACIC SPINE WITHOUT CONTRAST April 16, 2024 2:29 PM     HISTORY: Uncontrolled back pain/numbness.    TECHNIQUE: Multiplanar multisequence MRI of the thoracic spine without  contrast.    COMPARISON: Thoracic spine MRI 12/20/2021.    FINDINGS: Thoracic spine alignment is grossly within normal limits. No  significant loss of vertebral body height. Multiple presumed Schmorl's  node deformities throughout the thoracic spine. Indeterminate T2  hyperintense lesion in the posterior aspect of the T8 vertebral body  which appears fairly isointense on T1 and measures approximately 0.7  cm. No significant STIR hyperintense endplate edema (Modic type I  changes).    Mild to moderate degenerative endplate changes and loss of disc height  in the mid and lower thoracic spine. No significant disc herniation  appreciated in the thoracic spine.  Anterior osteophytic spurring in  the mid and lower thoracic spine. Asymmetric left-sided facet  hypertrophy at T2-T3 and T4-T5. No spinal canal narrowing at any  level. Moderate left neural foraminal narrowings at T2-T3 and T4-T5.    Visualized thoracic spinal cord is unremarkable.    Paraspinous soft tissues are unremarkable.      Impression    IMPRESSION:    1.  Multilevel degenerative changes throughout the thoracic spine as  detailed above.  2.  Degenerative disc changes throughout the mid and lower thoracic  spine without significant disc herniation. No significant spinal canal  narrowing.  3.  Asymmetric left-sided facet hypertrophy at T2-T3 and T4-T5 causing  moderate left neural foraminal narrowings.  4.  Indeterminate subcentimeter lesion in the T8 vertebral body. This  does not appear significantly changed since 12/20/2022. Stability is  reassuring.  5.  Degenerative findings are similar to prior MR 12/20/2022.    RAS FISHER MD         SYSTEM ID:  [id number]   MR Scapula Right w/o Contrast    Narrative    EXAM: MR SHOULDER RIGHT W/O CONTRAST, MR SCAPULA RIGHT W/O CONTRAST  LOCATION: [facility]  DATE: 4/19/2024    INDICATION: Severe uncontrolled shoulder and scapular pain; Shoulder pain; Cervical radiculopathy without rotator cuff injury; No known automatically detected potential contraindications to MRI  COMPARISON: None.  TECHNIQUE: Unenhanced.    FINDINGS:    ROTATOR CUFF:  -Supraspinatus: Mild supraspinatus tendinosis. No tendon tear. No fatty atrophy.  -Infraspinatus: No tendon tear, tendinopathy or fatty atrophy.  -Subscapularis: No tendon tear, tendinopathy or fatty atrophy.  -Teres minor: No tendon tear, tendinopathy or fatty atrophy.    CORACOACROMIAL ARCH:  -Morphology: Type II acromion. Minimal subacromial spurring. Subacromial and subcoracoid space are normal.   -Bursa: Trace subacromial subdeltoid bursal fluid. No significant subcoracoid bursal  fluid.    ACROMIOCLAVICULAR JOINT:   -Minimal degenerative changes. No significant effusion.     LONG HEAD OF BICEPS TENDON:   -No tendinopathy or tear. No tenosynovitis or subluxation.    GLENOHUMERAL JOINT:   -Labrum: Smooth linear hyperintensity undercutting the anterosuperior labrum, most likely subsegmental foramen. No evidence for labral tear. No paralabral cyst.   -Cartilage: Normal.   -Joint space: Small amount of fluid in the subscapular recess. Otherwise, no significant joint effusion. No synovitis.  -Glenohumeral ligaments and capsule: No pericapsular inflammation.    BONES:   -No fracture or concerning marrow replacing lesion.  - The scapula appears normal.    SOFT TISSUES:   -Normal deltoid muscle bulk.  - Normal visualized chest wall and axilla.      Impression    IMPRESSION:  1.  Minimal subacromial spurring with trace subacromial subdeltoid bursal fluid. Correlate for subacromial impingement.    2.  Mild supraspinatus tendinosis. No rotator cuff tendon tear.    3.  Small amount of fluid in the subscapular recess of the glenohumeral joint.    4.  No acute fracture or dislocation.   MR Shoulder Right w/o Contrast    Narrative    EXAM: MR SHOULDER RIGHT W/O CONTRAST, MR SCAPULA RIGHT W/O CONTRAST  LOCATION: [facility]  DATE: 4/19/2024    INDICATION: Severe uncontrolled shoulder and scapular pain; Shoulder pain; Cervical radiculopathy without rotator cuff injury; No known automatically detected potential contraindications to MRI  COMPARISON: None.  TECHNIQUE: Unenhanced.    FINDINGS:    ROTATOR CUFF:  -Supraspinatus: Mild supraspinatus tendinosis. No tendon tear. No fatty atrophy.  -Infraspinatus: No tendon tear, tendinopathy or fatty atrophy.  -Subscapularis: No tendon tear, tendinopathy or fatty atrophy.  -Teres minor: No tendon tear, tendinopathy or fatty atrophy.    CORACOACROMIAL ARCH:  -Morphology: Type II acromion. Minimal subacromial spurring. Subacromial and subcoracoid  space are normal.   -Bursa: Trace subacromial subdeltoid bursal fluid. No significant subcoracoid bursal fluid.    ACROMIOCLAVICULAR JOINT:   -Minimal degenerative changes. No significant effusion.     LONG HEAD OF BICEPS TENDON:   -No tendinopathy or tear. No tenosynovitis or subluxation.    GLENOHUMERAL JOINT:   -Labrum: Smooth linear hyperintensity undercutting the anterosuperior labrum, most likely subsegmental foramen. No evidence for labral tear. No paralabral cyst.   -Cartilage: Normal.   -Joint space: Small amount of fluid in the subscapular recess. Otherwise, no significant joint effusion. No synovitis.  -Glenohumeral ligaments and capsule: No pericapsular inflammation.    BONES:   -No fracture or concerning marrow replacing lesion.  - The scapula appears normal.    SOFT TISSUES:   -Normal deltoid muscle bulk.  - Normal visualized chest wall and axilla.      Impression    IMPRESSION:  1.  Minimal subacromial spurring with trace subacromial subdeltoid bursal fluid. Correlate for subacromial impingement.    2.  Mild supraspinatus tendinosis. No rotator cuff tendon tear.    3.  Small amount of fluid in the subscapular recess of the glenohumeral joint.    4.  No acute fracture or dislocation.   XR Shoulder Right 2 Views    Narrative    EXAM: XR SHOULDER RIGHT 2 VIEWS  LOCATION: [facility]  DATE: 4/21/2024    INDICATION: worsening pain  COMPARISON: Right shoulder MRI 04/18/2024      Impression    IMPRESSION: Mild degenerative arthrosis of the acromioclavicular joint. Glenohumeral joint is normally aligned. No definite fracture.   XR Cervical Thoracic Transforaminal Inj Right    Narrative    XR CERVICAL THORACIC TRANSFORAMINAL INJ RIGHT              4/24/2024  11:42 AM       History:  RUE pain.  Hx of DJD cervical and thoracic pain..     PROCEDURE:  The risks (including bleeding, infection, and allergy to  contrast and medications) and benefits of the procedure were explained  to the  patient and consent was obtained.  Using sterile technique,  local anesthesia, and fluoroscopic guidance a 20-gauge Touhy needle  was placed into the dorsal epidural space at the C7-T1 level. Initial  position was partially mixed. The needle was advanced another  millimeter and contrast was seen flowing in the posterior epidural  space. 2 mL Celestone was then injected.  Estimated blood loss during  the procedure was less than 5 mL. No specimens collected. No initial  complication.        Pre-procedure pain level (0 to 10):    9  Post-procedure pain level (0 to 10):    5    Fluoro time: 46 seconds  Air Karma: 23.74 mGy  Images Obtained: 3  Medications: 3 mL 1% lidocaine, 1 mL of Isovue-M 200, 12 mg of  betamethasone      Impression    IMPRESSION:  Technically successful cervical epidural steroid  injection.    EMMY NAVA PA-C         SYSTEM ID:  [id number]       Discharge Medications   Discharge Medication List as of 4/25/2024 12:32 PM        START taking these medications    Details   acetaminophen (TYLENOL) 500 MG tablet Take 2 tablets (1,000 mg) by mouth every 8 hours, Disp-100 tablet, R-11, E-Prescribe      busPIRone (BUSPAR) 10 MG tablet Take 1 tablet (10 mg) by mouth 3 times daily, Disp-90 tablet, R-0, E-Prescribe      Lidocaine (LIDOCARE) 4 % Patch Place 2 patches onto the skin every 24 hours To prevent lidocaine toxicity, patient should be patch free for 12 hrs daily.No Print Out      magnesium chloride 535 (64 Mg) MG TBEC CR tablet Take 1 tablet (535 mg) by mouth daily, Disp-30 tablet, R-11, E-PrescribeUse as a part of the comprehensive pain protocol.      naloxone (NARCAN) 4 MG/0.1ML nasal spray Spray 1 spray (4 mg) into one nostril alternating nostrils as needed for opioid reversal every 2-3 minutes until assistance arrives, Disp-0.2 mL, R-PRN, E-Prescribe      oxyCODONE (ROXICODONE) 10 MG tablet Take 1/2 to 1 tablet (5 to 10 mg ) every 4 hours as needed for pain.  Take 1/2 tab for pain 4-6/10 and 1 tab  for pain 7-10/10 that is unrelieved by other medication.  Taper medication by 10 mg daily (I.e., take 1 tab less daily)., Disp-24 tablet, R-0, E- Prescribe      polyethylene glycol (MIRALAX) 17 GM/Dose powder Take 17 g by mouth daily, Disp-510 g, R-PRN, E-Prescribe      pregabalin (LYRICA) 100 MG capsule Take 1 capsule (100 mg) by mouth 2 times daily, Disp-30 capsule, R-0, E-Prescribe      senna-docusate (SENOKOT-S/PERICOLACE) 8.6-50 MG tablet Take 1 tablet by mouth 2 times daily as needed for constipation, Disp-60 tablet, R-0, E-Prescribe           CONTINUE these medications which have CHANGED    Details   clindamycin (CLEOCIN-T) 1 % external gel Apply topically 2 times dailyHistorical      hydrOXYzine HCl (ATARAX) 25 MG tablet Take 2 tablets (50 mg) by mouth every 6 hours as needed for itching, Disp-60 tablet, R-11, E-Prescribe      ondansetron (ZOFRAN ODT) 8 MG ODT tab Take 1 tablet (8 mg) by mouth every 8 hours as needed for nausea, Disp-30 tablet, R-11, E-Prescribe           CONTINUE these medications which have NOT CHANGED    Details   albuterol (PROAIR HFA/PROVENTIL HFA/VENTOLIN HFA) 108 (90 Base) MCG/ACT inhaler INHALE 2 PUFFS BY MOUTH EVERY 6 HOURS AS NEEDED FOR SHORTNESS OF BREATH OR DIFFICULT BREATHING, Disp-36 g, R-1, E-PrescribePharmacy may dispense brand covered by insurance (Proair, or proventil or ventolin or generic albuterol inhaler)      albuterol (PROVENTIL) (2.5 MG/3ML) 0.083% neb solution Take 1 vial (2.5 mg) by nebulization every 6 hours as needed for shortness of breath / dyspnea or wheezing, Disp-90 mL, R-0, E-Prescribe      Biotin 10 MG CAPS Take 1 capsule by mouth daily at 2 pm, Historical      budesonide (PULMICORT) 0.5 MG/2ML neb solution Mix 2 mg (4 mL) budesonide suspension with 10 splenda packets to create slurry and drink solution slowly over 5-10 minutes twice daily for treatment of eosinophilic esophagitis, Disp-240 mL, R-2, E-Prescribe      cetirizine (ZYRTEC) 10 MG tablet Take 10  mg by mouth daily, Historical      cholecalciferol (VITAMIN D3) 125 mcg (5000 units) capsule Take 250 mcg by mouth daily at 2 pm, Historical      co-enzyme Q-10 100 MG CAPS capsule Take 100 mg by mouth daily at 2 pm, Historical      cyclobenzaprine (FLEXERIL) 10 MG tablet Take 1 tab p.o. qhs (if feeling groggy the following morning, can try taking half a tablet instead or can take earlier the night before)., Disp-30 tablet, R-2, E-Prescribe      esomeprazole (NEXIUM) 20 MG DR capsule Take 1 capsule (20 mg) by mouth every morning (before breakfast) Take 30-60 minutes before eating., Disp-90 capsule, R-1, E-Prescribe      Krill Oil 1000 MG CAPS Take 1-2 capsules by mouth daily at 2 pm, Historical      levothyroxine (SYNTHROID/LEVOTHROID) 75 MCG tablet Take 1 tablet by mouth once daily, Disp-90 tablet, R-3, E-Prescribe      losartan (COZAAR) 50 MG tablet Take 1 tablet by mouth once daily, Disp-90 tablet, R-3, E-Prescribe      MAGNESIUM PO Take 3 tablets by mouth daily at 2 pm [employer] product, Historical      medical cannabis (Patient's own supply) (The purpose of this order is to document that the patient reports taking medical cannabis.  This is not a prescription, and is not used to certify that the patient has a qualifying medical condition.), Historical      Multiple Vitamins-Minerals (MULTIVITAMIN ADULT) CHEW Take 2 chew tab by mouth daily, Historical      tazarotene (TAZORAC) 0.1 % external cream Apply pea-sized amount to the entire face dailyPRNHistorical      Turmeric 400 MG CAPS Take 1 capsule by mouth daily at 2 pm, Historical      vitamin B-Complex Take 1 tablet by mouth daily at 2 pm, Historical           Allergies   Allergies   Allergen Reactions    Codeine     Penicillins Nausea and Vomiting    Seasonal Allergies

## 2024-04-25 NOTE — PLAN OF CARE
"Patient's After Visit Summary was reviewed with patient.   Patient verbalized understanding of After Visit Summary, recommended follow up and was given an opportunity to ask questions.   Discharge medications sent home with patient/family: YES   Discharged with son    OBSERVATION patient END time: 1420 pm    BP (!) 140/78 (BP Location: Left arm)   Pulse 83   Temp 98.3  F (36.8  C) (Oral)   Resp 18   Ht 1.626 m (5' 4\")   Wt 104.9 kg (231 lb 4.8 oz)   SpO2 95%   BMI 39.70 kg/m            Goal Outcome Evaluation:      Plan of Care Reviewed With: patient    Overall Patient Progress: improvingOverall Patient Progress: improving    Outcome Evaluation: Pain level improving with YAMILEX and x1 dose of PO oxycodone this morning. No BM yet, but reports passing more gas, is more active in the halls, and is drinking plenty of fluids.      Problem: Adult Inpatient Plan of Care  Goal: Plan of Care Review  Description: The Plan of Care Review/Shift note should be completed every shift.  The Outcome Evaluation is a brief statement about your assessment that the patient is improving, declining, or no change.  This information will be displayed automatically on your shift  note.  Outcome: Met  Goal: Patient-Specific Goal (Individualized)  Description: You can add care plan individualizations to a care plan. Examples of Individualization might be:  \"Parent requests to be called daily at 9am for status\", \"I have a hard time hearing out of my right ear\", or \"Do not touch me to wake me up as it startles  me\".  Outcome: Met  Goal: Absence of Hospital-Acquired Illness or Injury  Outcome: Met  Intervention: Identify and Manage Fall Risk  Recent Flowsheet Documentation  Taken 4/25/2024 0945 by Octavia Cook, RN  Safety Promotion/Fall Prevention:   clutter free environment maintained   nonskid shoes/slippers when out of bed   room near nurse's station   safety round/check completed  Intervention: Prevent Skin Injury  Recent Flowsheet " Documentation  Taken 4/25/2024 0945 by Octavia Cook RN  Body Position: position changed independently  Goal: Optimal Comfort and Wellbeing  Outcome: Met  Intervention: Monitor Pain and Promote Comfort  Recent Flowsheet Documentation  Taken 4/25/2024 1313 by Octavia Cook RN  Pain Management Interventions: medication (see MAR)  Taken 4/25/2024 1211 by Octavia Cook RN  Pain Management Interventions: medication (see MAR)  Taken 4/25/2024 0945 by Octavia Cook RN  Pain Management Interventions:   declines   rest  Goal: Readiness for Transition of Care  Outcome: Met     Problem: Pain Acute  Goal: Optimal Pain Control and Function  Outcome: Met  Intervention: Develop Pain Management Plan  Recent Flowsheet Documentation  Taken 4/25/2024 1313 by Octavia Cook RN  Pain Management Interventions: medication (see MAR)  Taken 4/25/2024 1211 by Octavia Cook RN  Pain Management Interventions: medication (see MAR)  Taken 4/25/2024 0945 by Octavia Cook RN  Pain Management Interventions:   declines   rest  Intervention: Prevent or Manage Pain  Recent Flowsheet Documentation  Taken 4/25/2024 0945 by Octavia Cook RN  Medication Review/Management: medications reviewed     Problem: Comorbidity Management  Goal: Blood Pressure in Desired Range  Outcome: Met  Intervention: Maintain Blood Pressure Management  Recent Flowsheet Documentation  Taken 4/25/2024 0945 by Octavia Cook RN  Medication Review/Management: medications reviewed  Goal: Maintenance of Asthma Control  Outcome: Met  Intervention: Maintain Asthma Symptom Control  Recent Flowsheet Documentation  Taken 4/25/2024 0945 by Octavia Cook RN  Medication Review/Management: medications reviewed

## 2024-04-25 NOTE — PLAN OF CARE
"Vitals are Temp: 98.7  F (37.1  C) Temp src: Oral BP: (!) 141/75 Pulse: 67   Resp: 18 SpO2: 95 %.  Patient is Alert and Oriented x4. They are independent with no assistive devices .  Pt is a Regular diet.  They are complaining of 8/10 pain in their R shoulder and hip.  Tylenol, Oxycodone, and Atarax given for pain. Lidocaine patches to R hip. Patient has no IV access. Denies nausea/dizziness/SOB. Multiple bowel movements this shift. Plan is to discharge home, possibly today.      Goal Outcome Evaluation:      Plan of Care Reviewed With: patient    Overall Patient Progress: improving    Outcome Evaluation: Pain controlled w/ PO medications. Multiple BMs this shift. Ambulating in halls.      Problem: Adult Inpatient Plan of Care  Goal: Plan of Care Review  Description: The Plan of Care Review/Shift note should be completed every shift.  The Outcome Evaluation is a brief statement about your assessment that the patient is improving, declining, or no change.  This information will be displayed automatically on your shift  note.  Outcome: Progressing  Flowsheets (Taken 4/25/2024 0619)  Outcome Evaluation: Pain controlled w/ PO medications. Multiple BMs this shift. Ambulating in halls.  Plan of Care Reviewed With: patient  Overall Patient Progress: improving  Goal: Patient-Specific Goal (Individualized)  Description: You can add care plan individualizations to a care plan. Examples of Individualization might be:  \"Parent requests to be called daily at 9am for status\", \"I have a hard time hearing out of my right ear\", or \"Do not touch me to wake me up as it startles  me\".  Outcome: Progressing  Goal: Absence of Hospital-Acquired Illness or Injury  Outcome: Progressing  Intervention: Identify and Manage Fall Risk  Recent Flowsheet Documentation  Taken 4/24/2024 1955 by Deepthi Kruse RN  Safety Promotion/Fall Prevention:   clutter free environment maintained   nonskid shoes/slippers when out of bed   room near nurse's " station   safety round/check completed  Intervention: Prevent Skin Injury  Recent Flowsheet Documentation  Taken 4/24/2024 1955 by Deepthi Kruse RN  Body Position: position changed independently  Intervention: Prevent and Manage VTE (Venous Thromboembolism) Risk  Recent Flowsheet Documentation  Taken 4/24/2024 1955 by Deepthi Kruse RN  VTE Prevention/Management: SCDs (sequential compression devices) off  Goal: Optimal Comfort and Wellbeing  Outcome: Progressing  Goal: Readiness for Transition of Care  Outcome: Progressing     Problem: Pain Acute  Goal: Optimal Pain Control and Function  Outcome: Progressing  Intervention: Prevent or Manage Pain  Recent Flowsheet Documentation  Taken 4/24/2024 1955 by Deepthi Kruse RN  Medication Review/Management: medications reviewed     Problem: Comorbidity Management  Goal: Blood Pressure in Desired Range  Outcome: Progressing  Intervention: Maintain Blood Pressure Management  Recent Flowsheet Documentation  Taken 4/24/2024 1955 by Deepthi Kruse RN  Medication Review/Management: medications reviewed  Goal: Maintenance of Asthma Control  Outcome: Progressing  Intervention: Maintain Asthma Symptom Control  Recent Flowsheet Documentation  Taken 4/24/2024 1955 by Deepthi Kruse RN  Medication Review/Management: medications reviewed

## 2024-04-26 NOTE — PLAN OF CARE
Physical Therapy Discharge Summary    Reason for therapy discharge:    Discharged to home.    Progress towards therapy goal(s). See goals on Care Plan in [facility] electronic health record for goal details.  Goals not met.  Barriers to achieving goals:   discharge from facility.    Therapy recommendation(s):    Per treating therapist: Pt will be best served in OPPT environment to address long term strengthening, ROM, pain science and neuro re-ed

## 2024-04-30 ENCOUNTER — OFFICE VISIT (OUTPATIENT)
Dept: FAMILY MEDICINE | Facility: CLINIC | Age: 49
End: 2024-04-30
Payer: COMMERCIAL

## 2024-04-30 VITALS
RESPIRATION RATE: 24 BRPM | SYSTOLIC BLOOD PRESSURE: 129 MMHG | HEIGHT: 64 IN | DIASTOLIC BLOOD PRESSURE: 86 MMHG | HEART RATE: 62 BPM | TEMPERATURE: 99.1 F | BODY MASS INDEX: 39.93 KG/M2 | OXYGEN SATURATION: 99 % | WEIGHT: 233.9 LBS

## 2024-04-30 DIAGNOSIS — R52 INTRACTABLE PAIN: ICD-10-CM

## 2024-04-30 DIAGNOSIS — M25.511 ACUTE PAIN OF RIGHT SHOULDER: ICD-10-CM

## 2024-04-30 DIAGNOSIS — Z09 HOSPITAL DISCHARGE FOLLOW-UP: Primary | ICD-10-CM

## 2024-04-30 DIAGNOSIS — F41.9 ANXIETY: ICD-10-CM

## 2024-04-30 DIAGNOSIS — M89.8X1 PAIN IN SCAPULA: ICD-10-CM

## 2024-04-30 PROCEDURE — 99214 OFFICE O/P EST MOD 30 MIN: CPT | Performed by: PHYSICIAN ASSISTANT

## 2024-04-30 RX ORDER — DIAZEPAM 5 MG
5 TABLET ORAL DAILY PRN
Qty: 5 TABLET | Refills: 0 | Status: SHIPPED | OUTPATIENT
Start: 2024-04-30

## 2024-04-30 ASSESSMENT — ASTHMA QUESTIONNAIRES
QUESTION_1 LAST FOUR WEEKS HOW MUCH OF THE TIME DID YOUR ASTHMA KEEP YOU FROM GETTING AS MUCH DONE AT WORK, SCHOOL OR AT HOME: NONE OF THE TIME
QUESTION_4 LAST FOUR WEEKS HOW OFTEN HAVE YOU USED YOUR RESCUE INHALER OR NEBULIZER MEDICATION (SUCH AS ALBUTEROL): ONCE A WEEK OR LESS
QUESTION_3 LAST FOUR WEEKS HOW OFTEN DID YOUR ASTHMA SYMPTOMS (WHEEZING, COUGHING, SHORTNESS OF BREATH, CHEST TIGHTNESS OR PAIN) WAKE YOU UP AT NIGHT OR EARLIER THAN USUAL IN THE MORNING: NOT AT ALL
QUESTION_5 LAST FOUR WEEKS HOW WOULD YOU RATE YOUR ASTHMA CONTROL: COMPLETELY CONTROLLED
QUESTION_2 LAST FOUR WEEKS HOW OFTEN HAVE YOU HAD SHORTNESS OF BREATH: ONCE OR TWICE A WEEK
ACT_TOTALSCORE: 23
ACT_TOTALSCORE: 23

## 2024-04-30 ASSESSMENT — PATIENT HEALTH QUESTIONNAIRE - PHQ9
SUM OF ALL RESPONSES TO PHQ QUESTIONS 1-9: 13
10. IF YOU CHECKED OFF ANY PROBLEMS, HOW DIFFICULT HAVE THESE PROBLEMS MADE IT FOR YOU TO DO YOUR WORK, TAKE CARE OF THINGS AT HOME, OR GET ALONG WITH OTHER PEOPLE: VERY DIFFICULT
SUM OF ALL RESPONSES TO PHQ QUESTIONS 1-9: 13

## 2024-04-30 ASSESSMENT — PAIN SCALES - GENERAL: PAINLEVEL: SEVERE PAIN (7)

## 2024-04-30 NOTE — PROGRESS NOTES
"  Assessment & Plan     Hospital discharge follow-up  Pain in scapula  Acute pain of right shoulder  Intractable pain  Reviewed hospital course and work up. She is planning to follow up with physical therapy, [facility] and pain control thru [facility]. We discussed her current medication - stopping lyrica (made her feel too cloudy) and she has been tapering the oxycodone prn. Using tylenol/ibu instead. The rest of the work up looked ok during her stay.   - Physical Therapy  Referral; Future    Anxiety  This is ok to add prn. We discussed safety with concomitant use of opioids  - diazepam (VALIUM) 5 MG tablet; Take 1 tablet (5 mg) by mouth daily as needed for anxiety      MED REC REQUIRED  Post Medication Reconciliation Status:     BMI  Estimated body mass index is 40.13 kg/m  as calculated from the following:    Height as of this encounter: 1.626 m (5' 4.02\").    Weight as of this encounter: 106.1 kg (233 lb 14.4 oz).       Depression Screening Follow Up        4/30/2024     9:54 AM   PHQ   PHQ-9 Total Score 13   Q9: Thoughts of better off dead/self-harm past 2 weeks Not at all       Subjective   Machelle is a 49 year old, presenting for the following health issues:  Hospital F/U    HPI         Hospital Follow-up Visit:    Machelle Weiner is a 49 year old female who presents today for HOSPITAL FOLLOW UP   She was in for intractable pain in the shoulder/neck - imaging results from below from the hospital visit   This pain was specifically distinct different from the RUQ pain she had been having previously; ultimately it became to great to manage  CT scan w/ contrast did not reveal any acute intrathoracic pathology.   MR Cervical and Thoracic overall unrevealing aside from chronic findings.   3.   MR Shoulder and MR Scapula -> Minimal subacromial spurring with trace subacromial subdeltoid bursal fluid. Mild supraspinatus tendinosis. No rotator cuff tendon tear. Small amount of fluid in the subscapular recess of " "the glenohumeral joint, no acute fracture or dislocation.    Today is the first day she has not taken any opioid medications; pain has slowly improved following a steroid injection  Had been taking the minimal amount she could to get by; tid and then to twice daily initially after the hospital  Had some constipation in the hospital not as much past few days; taking senna/miralax  Used 3 yesterday (but used extra because needing to lie in MRI tube)      She is planning to follow up with [facility] in the near future; planning on physical therapy    Prior to this had gotten some treatments in the cervical spine (injection therapy)      Hospital/Nursing Home/IP Rehab Facility: [facility]  Date of Admission: 4/15/24  Date of Discharge: 4/25/24  Reason(s) for Admission: Severe Shoulder Pain  Was the patient in the ICU or did the patient experience delirium during hospitalization?  No  Do you have any other stressors you would like to discuss with your provider? No    Problems taking medications regularly:  None  Medication changes since discharge: updated in chart  Problems adhering to non-medication therapy:  None    Summary of hospitalization:  [facility] discharge summary reviewed  Diagnostic Tests/Treatments reviewed.  Follow up needed: none  Other Healthcare Providers Involved in Patient s Care:         Specialist appointment - [facility]; Ro  Update since discharge: stable.         Plan of care communicated with patient               Review of Systems  Constitutional, HEENT, cardiovascular, pulmonary, gi (seeing ro for liver) and gu systems are negative, except as otherwise noted.      Objective    /86 (BP Location: Left arm, Patient Position: Sitting, Cuff Size: Adult Large)   Pulse 62   Temp 99.1  F (37.3  C) (Oral)   Resp 24   Ht 1.626 m (5' 4.02\")   Wt 106.1 kg (233 lb 14.4 oz)   SpO2 99%   BMI 40.13 kg/m    Body mass index is 40.13 kg/m .  Physical Exam "   GENERAL: alert and no distress  MS: shoulder not examined  PSYCH: mentation appears normal, affect normal/bright          Signed Electronically by: Jean Sauceda PA-C    Answers submitted by the patient for this visit:  Patient Health Questionnaire (Submitted on 4/30/2024)  If you checked off any problems, how difficult have these problems made it for you to do your work, take care of things at home, or get along with other people?: Very difficult  PHQ9 TOTAL SCORE: 13

## 2024-04-30 NOTE — PATIENT INSTRUCTIONS
Send me a note when you're running out of buspar to see if we should continue  #5 tablets of valium for acute anxiety; dont use if taking oxycodone that day  Schedule with physical therapy, [facility] and [facility]

## 2024-05-02 ENCOUNTER — MYC MEDICAL ADVICE (OUTPATIENT)
Dept: FAMILY MEDICINE | Facility: CLINIC | Age: 49
End: 2024-05-02
Payer: COMMERCIAL

## 2024-05-02 NOTE — TELEPHONE ENCOUNTER
"Patient calling back. She wanted to clarify she was requesting a referral. RN educated that Yarely was reaching out to rule out any red flags needing immediate care.   Patient denies to be triaged, but does deny any blood in stool, 10/10 pain. States this is non emergent.    -Per chart review, RN saw she had recent hospital follow up. Advised that Juice may be able to use that visit to base his referral off if requested. Patient stated they did not discuss abdominal pain so that would not work.     -RN explained a visit needs to be associated with a order placed. Informed an e-visit request for referrals, or could schedule visit if she would like to further discuss with Juice.     - Patient then stated that she was going to send a message directly to Juice because she was not getting anywhere with the phone call and stated \"does not feel you are helpful\" \"this is a worthless phone call\" then hung up phone.    Leonor MCCRARY RN on 5/2/2024 at 5:18 PM     "

## 2024-05-02 NOTE — TELEPHONE ENCOUNTER
Per chart review, RUQ pain has been ongoing problem. Discussed at OV 11/7/23 with Jean Sauceda PA-C and with [facility] 1/9/24.    Yarely Garcia RN

## 2024-05-08 ENCOUNTER — PATIENT OUTREACH (OUTPATIENT)
Dept: CARE COORDINATION | Facility: CLINIC | Age: 49
End: 2024-05-08
Payer: COMMERCIAL

## 2024-05-13 NOTE — PROGRESS NOTES
PHYSICAL THERAPY EVALUATION  Type of Visit: Evaluation    See electronic medical record for Abuse and Falls Screening details.    Subjective  Pt. complains of R shoulder and neck pain that has been present since 4/14/24.  Mechanism/History of injury/symptoms: unknown, spontaneous onset.   Patient s chief complaints: R shoulder and neck pain.  Symptoms are exacerbated by reaching.  Symptoms are relieved by  Cervical YAMILEX to C7-T1 while in the hospital.   Current function restrictions:  reaching.  Previous functional status as reported by patient: unlimited.      MRI results of cervical spine and right shoulder:  IMPRESSION:  1.  Minimal subacromial spurring with trace subacromial subdeltoid bursal fluid. Correlate for subacromial impingement.     2.  Mild supraspinatus tendinosis. No rotator cuff tendon tear.     3.  Small amount of fluid in the subscapular recess of the glenohumeral joint.     4.  No acute fracture or dislocation.    IMPRESSION:    1. Degenerative changes in the mid cervical spine as detailed above.  Degenerative findings are progressed since prior MR 3/17/2021.  2. Mild spinal canal narrowings at C3-C4, C4-C5, and C5-C6.  3. Moderate bilateral neural foraminal narrowings at C5-C6. Mild left  neural foraminal narrowing at C6-C7.     Note from referring provider:  Machelle Weiner is a 49 year old female who presents today for HOSPITAL FOLLOW UP   She was in for intractable pain in the shoulder/neck - imaging results from below from the hospital visit   This pain was specifically distinct different from the RUQ pain she had been having previously; ultimately it became to great to manage  CT scan w/ contrast did not reveal any acute intrathoracic pathology.   MR Cervical and Thoracic overall unrevealing aside from chronic findings.   3.   MR Shoulder and MR Scapula -> Minimal subacromial spurring with trace subacromial subdeltoid bursal fluid. Mild supraspinatus tendinosis. No rotator cuff tendon  tear. Small amount of fluid in the subscapular recess of the glenohumeral joint, no acute fracture or dislocation.     Today is the first day she has not taken any opioid medications; pain has slowly improved following a steroid injection  Had been taking the minimal amount she could to get by; tid and then to twice daily initially after the hospital  Had some constipation in the hospital not as much past few days; taking senna/miralax  Used 3 yesterday (but used extra because needing to lie in MRI tube)       She is planning to follow up with [facility] in the near future; planning on physical therapy     Prior to this had gotten some treatments in the cervical spine (injection therapy)            Presenting condition or subjective complaint: Pain in my right shoulder and arm; eakness in my right arm and hand.  Date of onset: 04/14/24    Relevant medical history: Arthritis; Asthma; Chest pain; Depression; Dizziness; High blood pressure; Migraines or headaches; Osteoarthritis; Overweight; Severe headaches; Significant weakness; Thyroid problems; Vision problems   Dates & types of surgery: Cholecystectomy approximately 2016    Prior diagnostic imaging/testing results: MRI; X-ray     Prior therapy history for the same diagnosis, illness or injury: No      Prior Level of Function  Transfers: Independent  Ambulation: Independent  ADL: Independent  IADL:     Living Environment  Social support: With a significant other or spouse   Type of home: House; 2-story   Stairs to enter the home: Yes 1 Is there a railing: No   Ramp: No   Stairs inside the home: Yes 16 Is there a railing: Yes   Help at home: Home management tasks (cooking, cleaning); Home and Yard maintenance tasks  Equipment owned:       Employment: Yes   Hobbies/Interests: Gardening, card making, scrapbooking, decorating, cooking, travel    Patient goals for therapy: I just want to have the ability to use my hand and arm normally again, without an  increase in pain.    Pain assessment: 8/10 at worst, 5/10 baseline     Objective   Cervical range of motion: Flexion 100% with endrange tightness, extension 75% with endrange tightness, right rotation 75% with endrange tightness, left rotation 100%, right sidebending 50% with endrange pain and increased radicular symptoms into arm.  Cervical stenosis symptoms evident consistent with MRI findings  Left shoulder active and passive range of motion within normal limits  Right shoulder passive range of motion within normal limits  Right shoulder active range of motion: Flexion 145 degrees with endrange tightness, combined internal rotation and extension to T7 with endrange tightness, combined combined flexion and external rotation to T1 with endrange tightness  Left shoulder strength 5 out of 5 for flexion, abduction, internal rotation and external rotation  Right shoulder strength: Flexion 4 out of 5, abduction 4 out of 5, internal rotation 5 out of 5 and external rotation 4 out of 5  Positive impingement screening today of the right shoulder    Assessment & Plan   CLINICAL IMPRESSIONS  Medical Diagnosis: Acute right shoulder pain    Treatment Diagnosis: Cervical radiculopathy along with r shoulder impingement   Impression/Assessment: Patient is a 49 year old female with right shoulder/neck complaints consistent with cervical radiculopathy and potentially right shoulder impingement.  The following significant findings have been identified: Pain, Decreased ROM/flexibility, Decreased joint mobility, Decreased strength, Impaired muscle performance, Decreased activity tolerance, and Impaired posture. These impairments interfere with their ability to perform self care tasks, work tasks, recreational activities, household chores, driving , household mobility, and community mobility as compared to previous level of function.     Clinical Decision Making (Complexity):  Clinical Presentation: Stable/Uncomplicated  Clinical  Presentation Rationale: based on medical and personal factors listed in PT evaluation  Clinical Decision Making (Complexity): Low complexity    PLAN OF CARE  Treatment Interventions:  Interventions: Manual Therapy, Neuromuscular Re-education, Therapeutic Activity, Therapeutic Exercise    Long Term Goals     PT Goal 1  Goal Description: Pt will be able to reach above shoulder height with pain 2/10  Rationale: to maximize safety and independence with performance of ADLs and functional tasks;to maximize safety and independence within the home;to maximize safety and independence within the community  Target Date: 07/09/24  PT Goal 2  Goal Description: Pt will be able to turn her head in order to drive safely  Rationale: to maximize safety and independence within the community;to maximize safety and independence with transportation  Target Date: 07/09/24      Frequency of Treatment: 1 x week  Duration of Treatment: 8 weeks    Recommended Referrals to Other Professionals:   Education Assessment:   Learner/Method: Patient;Listening;Demonstration;Pictures/Video  Education Comments: Pt educated on plan of care    Risks and benefits of evaluation/treatment have been explained.   Patient/Family/caregiver agrees with Plan of Care.     Evaluation Time:     PT Eval, Low Complexity Minutes (01654): 23       Signing Clinician: Chepe Hearn, PT

## 2024-05-14 ENCOUNTER — THERAPY VISIT (OUTPATIENT)
Dept: PHYSICAL THERAPY | Facility: CLINIC | Age: 49
End: 2024-05-14
Payer: COMMERCIAL

## 2024-05-14 DIAGNOSIS — M25.511 ACUTE PAIN OF RIGHT SHOULDER: ICD-10-CM

## 2024-05-14 DIAGNOSIS — M54.2 NECK PAIN: Primary | ICD-10-CM

## 2024-05-14 PROCEDURE — 97110 THERAPEUTIC EXERCISES: CPT | Mod: GP | Performed by: PHYSICAL THERAPIST

## 2024-05-14 PROCEDURE — 97161 PT EVAL LOW COMPLEX 20 MIN: CPT | Mod: GP | Performed by: PHYSICAL THERAPIST

## 2024-05-14 ASSESSMENT — ACTIVITIES OF DAILY LIVING (ADL)
REMOVING_SOMETHING_FROM_YOUR_BACK_POCKET: 7
PUTTING_ON_YOUR_PANTS: 6
PUTTING_ON_A_SHIRT_THAT_BUTTONS_DOWN_THE_FRONT: 6
REACHING_FOR_SOMETHING_ON_A_HIGH_SHELF: 7
WHEN_LYING_ON_THE_INVOLVED_SIDE: 7
AT_ITS_WORST?: 9
WASHING_YOUR_HAIR?: 6
TOUCHING_THE_BACK_OF_YOUR_NECK: 6
PUSHING_WITH_THE_INVOLVED_ARM: 6
PLEASE_INDICATE_YOR_PRIMARY_REASON_FOR_REFERRAL_TO_THERAPY:: SHOULDER
PUTTING_ON_AN_UNDERSHIRT_OR_A_PULLOVER_SWEATER: 6
WASHING_YOUR_BACK: 8
PLACING_AN_OBJECT_ON_A_HIGH_SHELF: 7
CARRYING_A_HEAVY_OBJECT_OF_10_POUNDS: 7

## 2024-05-18 ENCOUNTER — MYC MEDICAL ADVICE (OUTPATIENT)
Dept: FAMILY MEDICINE | Facility: CLINIC | Age: 49
End: 2024-05-18
Payer: COMMERCIAL

## 2024-05-20 ENCOUNTER — NURSE TRIAGE (OUTPATIENT)
Dept: FAMILY MEDICINE | Facility: CLINIC | Age: 49
End: 2024-05-20
Payer: COMMERCIAL

## 2024-05-20 NOTE — TELEPHONE ENCOUNTER
"S: Lump  B: per [employer] message \"On Thursday night (5/16), which was a day after my period ended, I noticed a painful lump, approximately the size of a domenica onion, on the left side about 1/2 way between the lowest part of the vaginal opening/perineum and the left thigh fold (I hope the location makes sense)... I m trying warm compresses and warm baths for now, but it s still pretty uncomfortable and, occasionally, quite painful...\"    A: Today it is \"very tender\" \"sore when I wipe\".   Normal pain level is always at 6/10 \"for the rest of my body\"    This lump pain is 3-4/10 at rest. Worse with sitting. Walking doesn't aggravate the area but it is .    Does not believe this is an ingrown hair.     Lump is not moveable. Red/inflamed, warm to the touch,     Denies: fever, radiating, bleeding, vaginal symptoms, urinary symptoms, previous history, drainage, pruritus,     R: See in Office Within 2 Weeks     Transferred to OB/GYN for scheduling.    Reason for Disposition   ALL other vulvar symptoms  (Exception: Feels like prior yeast infection, or rash < 24 hour duration.)    Additional Information   Negative: Pain or burning with passing urine (urination) is main symptom   Negative: Vaginal discharge is main symptom   Negative: Pubic lice suspected   Negative: STI exposure and prevention, question about   Negative: Patient sounds very sick or weak to the triager   Negative: SEVERE pain (e.g., excruciating)   Negative: MODERATE-SEVERE itching (i.e., interferes with school, work, or sleep)   Negative: Rash (e.g., redness, tiny bumps, sore) of genital area present > 24 hours   Negative: Tender lump (swelling or 'ball') at vaginal opening   Negative: Vulvar itching and not improved > 3 days following Care Advice   Negative: Symptoms of a 'yeast infection' (i.e., itchy, white discharge, not bad smelling) and not improved > 3 days following Care Advice   Negative: Genital area looks infected (e.g., draining sore, " spreading redness)   Negative: Rash with painful tiny water blisters   Negative: Patient is worried they have a sexually transmitted infection (STI)   Negative: Itching or dryness of genital area and nearing menopause or after menopause   Negative: Pain in genital area is a chronic symptom (recurrent or ongoing AND lasting > 4 weeks)    Protocols used: Vulvar Symptoms-A-[place]

## 2024-05-23 NOTE — TELEPHONE ENCOUNTER
"Per visit notes  \"Reviewed schedule, no openings for over 4 weeks.  Reached out to FP RN and advised, asked if patient could be evaluated by FP first.     FP RN reached out to patient, scheduled for 5/22/24.  Will keep an eye out to see if patient still needs to follow up with GYN.     Kassandra NINA RN\"    Patient had to have visit 5/22 cancelled due to provider availability. RN recommending she be seen sooner by FP.     Leonor MCCRARY RN on 5/23/2024 at 11:31 AM         "

## 2024-05-23 NOTE — TELEPHONE ENCOUNTER
TC, was able to connect with the Pt in regards to scheduling a OV for the concerns listed below.     Per the Pt - Lump in the vag area (lower area, close to the thigh). I week from today; hard irritating lump (Non-motion), deep, itchy and odd sensation. The area of concern, started bleeding today; Hx of Pre-cancerous cells, going back to when she was 16 yrs old; as a result she had to have surgery.     Pt was able to schedule w/ pcp next week to discuss the following concerns.     Grace Brennan     [facility]

## 2024-05-23 NOTE — TELEPHONE ENCOUNTER
MA/BRETT- patient was supposed to have visit 5/22 with Jen Keyes- refused reschedule yesterday. Patient now asking if she should be seen- she should.     Please assist with scheduling patient in the next 2 weeks (earlier the better) with a provider.     Thank you so much!  Leonor MCCRARY, RN on 5/23/2024 at 11:32 AM

## 2024-05-30 ENCOUNTER — OFFICE VISIT (OUTPATIENT)
Dept: FAMILY MEDICINE | Facility: CLINIC | Age: 49
End: 2024-05-30
Payer: COMMERCIAL

## 2024-05-30 VITALS
BODY MASS INDEX: 39.46 KG/M2 | SYSTOLIC BLOOD PRESSURE: 135 MMHG | TEMPERATURE: 98.7 F | HEART RATE: 62 BPM | HEIGHT: 64 IN | RESPIRATION RATE: 16 BRPM | OXYGEN SATURATION: 98 % | DIASTOLIC BLOOD PRESSURE: 87 MMHG | WEIGHT: 231.1 LBS

## 2024-05-30 DIAGNOSIS — Z12.4 CERVICAL CANCER SCREENING: Primary | ICD-10-CM

## 2024-05-30 DIAGNOSIS — N90.89 PERINEAL CYST IN FEMALE: ICD-10-CM

## 2024-05-30 DIAGNOSIS — L72.9 CYST OF BUTTOCKS: ICD-10-CM

## 2024-05-30 PROCEDURE — G0145 SCR C/V CYTO,THINLAYER,RESCR: HCPCS | Performed by: PHYSICIAN ASSISTANT

## 2024-05-30 PROCEDURE — 99213 OFFICE O/P EST LOW 20 MIN: CPT | Performed by: PHYSICIAN ASSISTANT

## 2024-05-30 PROCEDURE — 87624 HPV HI-RISK TYP POOLED RSLT: CPT | Performed by: PHYSICIAN ASSISTANT

## 2024-05-30 RX ORDER — SULFAMETHOXAZOLE/TRIMETHOPRIM 800-160 MG
1 TABLET ORAL 2 TIMES DAILY
Qty: 6 TABLET | Refills: 0 | Status: SHIPPED | OUTPATIENT
Start: 2024-05-30

## 2024-05-30 ASSESSMENT — PAIN SCALES - GENERAL: PAINLEVEL: MILD PAIN (2)

## 2024-05-30 NOTE — PROGRESS NOTES
"  Assessment & Plan     Cervical cancer screening  updating  - Gynecologic Cytology (Pap) and HPV - Recommended Age 30-65 Years  - Gynecologic Cytology (PAP)    Cyst of buttocks  Perineal cyst in female  Overall reassurance. Neither amenable to I&D but will help move forward with short course below. Continue to apply heat/warm bath. Follow up as needed3  - sulfamethoxazole-trimethoprim (BACTRIM DS) 800-160 MG tablet; Take 1 tablet by mouth 2 times daily          BMI  Estimated body mass index is 39.67 kg/m  as calculated from the following:    Height as of this encounter: 1.626 m (5' 4\").    Weight as of this encounter: 104.8 kg (231 lb 1.6 oz).           Giuliano Carty is a 49 year old, presenting for the following health issues:  Mass        5/30/2024    10:15 AM   Additional Questions   Roomed by Rekha DIXON CMA   Accompanied by KEISHA         5/30/2024    10:15 AM   Patient Reported Additional Medications   Patient reports taking the following new medications None     History of Present Illness       Reason for visit:  Suspicious lump  Symptom onset:  1-2 weeks ago  Had these symptoms before:  No  What makes it worse:  Sitting on it; friction from clothing    She eats 4 or more servings of fruits and vegetables daily.She consumes 0 sweetened beverage(s) daily.She exercises with enough effort to increase her heart rate 10 to 19 minutes per day.  She exercises with enough effort to increase her heart rate 4 days per week.   She is taking medications regularly.       Machelle Weiner is a 49 year old female who presents today for two new lumps noted in the vaginal area and closer to buttocks  First lump was not in perineum or near labia   -this was first along the \"lower part of the butt cheek\" closer to the vagina  Pain was quite deep; not necessarily at surface even though she could see/feel a lump the size of domenica onion  This was around two weeks ago  When she'd press it felt like it would dig in to the " "underlying tissue  Pain with sitting but could change positions to alleviate    Then after a few days noted another mass, within the gluteal cleft  This got very painful quick as well  Slowly growing though stable last few days  Still a lot of itching and pain    Tried some warm compress, warm baths    BMs arer ok; not significantl          Review of Systems  Constitutional, HEENT, cardiovascular, pulmonary, gi and gu systems are negative, except as otherwise noted.      Objective    /87 (BP Location: Right arm, Patient Position: Sitting, Cuff Size: Adult Large)   Pulse 62   Temp 98.7  F (37.1  C) (Oral)   Resp 16   Ht 1.626 m (5' 4\")   Wt 104.8 kg (231 lb 1.6 oz)   LMP 05/10/2024 (Approximate)   SpO2 98%   BMI 39.67 kg/m    Body mass index is 39.67 kg/m .  Physical Exam   GENERAL: alert and no distress   (female): performed with chaperone EO in room; normal urethral meatus, normal vaginal mucosa, normal cervix, adnexae, and uterus without masses. Inferior to the left labia majoria along the distal buttock is a small, tender subcutanous nodule with central punctum. There is a similar mass, slightly more tender and erythematous along the left buttock, lateral of the cleft  MS: no gross musculoskeletal defects noted, no edema    Diagnostics: Cervical cancer screen        Signed Electronically by: Jean Sauceda PA-C    "

## 2024-05-31 LAB
HPV HR 12 DNA CVX QL NAA+PROBE: NEGATIVE
HPV16 DNA CVX QL NAA+PROBE: NEGATIVE
HPV18 DNA CVX QL NAA+PROBE: NEGATIVE
HUMAN PAPILLOMA VIRUS FINAL DIAGNOSIS: NORMAL

## 2024-06-04 LAB
BKR LAB AP GYN ADEQUACY: NORMAL
BKR LAB AP GYN INTERPRETATION: NORMAL
BKR LAB AP LMP: NORMAL
BKR LAB AP PREVIOUS ABNORMAL: NORMAL
PATH REPORT.COMMENTS IMP SPEC: NORMAL
PATH REPORT.COMMENTS IMP SPEC: NORMAL
PATH REPORT.RELEVANT HX SPEC: NORMAL

## 2024-07-23 ENCOUNTER — OFFICE VISIT (OUTPATIENT)
Dept: OBGYN | Facility: CLINIC | Age: 49
End: 2024-07-23
Payer: COMMERCIAL

## 2024-07-23 VITALS — HEIGHT: 64 IN | BODY MASS INDEX: 38.07 KG/M2 | WEIGHT: 223 LBS

## 2024-07-23 DIAGNOSIS — L73.9 CHRONIC FOLLICULITIS: Primary | ICD-10-CM

## 2024-07-23 PROCEDURE — 99202 OFFICE O/P NEW SF 15 MIN: CPT | Performed by: OBSTETRICS & GYNECOLOGY

## 2024-07-23 NOTE — PROGRESS NOTES
"  Assessment & Plan     Chronic folliculitis  - All the lesions appear more of a skin d/o rather than Gynecologic. I think they represent chronic folliculitis but recommend Derm referral. She agrees w/ plan.  - Adult Dermatology  Referral; Future      Review of the result(s) of each unique test - 5/30/2024 Pap WNL, HPV Neg        BMI  Estimated body mass index is 38.28 kg/m  as calculated from the following:    Height as of this encounter: 1.626 m (5' 4\").    Weight as of this encounter: 101.2 kg (223 lb).             No follow-ups on file.    Subjective   Machelle is a 49 year old, presenting for the following health issues:  Vaginal Problem (Perineum pain with itching, pain, ulcerated feeling  )    Pt here for recurrent skin bumps that are in the subcutaneous tissues both on her left flank, her right gluteal area, and her right labium major. Her PCP saw her 5/30 and referred her to Gyn, although these lesions are not confined to her vulva or vagina. She has no abnl VB or discharge. She stated that 2 months ago she started having tender lesions first on left labium but it resolved, then she got a non-tender right anterior labium major white lesion that she has tried to poke with a needle with no drainage. It does get larger and smaller. She has tried hot compresses without improvement. She does shave her perineum with a clipper. She also noted a tender lesion on her left flank that seems like it tries to drain but doesn't. She also has a third lesion on the right buttocks away from the anus that has gotten smaller but was tender at some point.                       Objective    Ht 1.626 m (5' 4\")   Wt 101.2 kg (223 lb)   LMP 05/10/2024 (Approximate)   BMI 38.28 kg/m    Body mass index is 38.28 kg/m .  Physical Exam  Genitourinary:      Skin:                          Signed Electronically by: Jose L Enriquez MD    "

## 2024-08-31 ENCOUNTER — HEALTH MAINTENANCE LETTER (OUTPATIENT)
Age: 49
End: 2024-08-31

## 2024-09-25 DIAGNOSIS — M79.18 BILATERAL BUTTOCK PAIN: ICD-10-CM

## 2024-09-25 DIAGNOSIS — M54.50 LUMBAR BACK PAIN: ICD-10-CM

## 2024-09-25 DIAGNOSIS — G89.29 CHRONIC THORACIC BACK PAIN, UNSPECIFIED BACK PAIN LATERALITY: ICD-10-CM

## 2024-09-25 DIAGNOSIS — M54.6 CHRONIC THORACIC BACK PAIN, UNSPECIFIED BACK PAIN LATERALITY: ICD-10-CM

## 2024-09-25 RX ORDER — CYCLOBENZAPRINE HCL 10 MG
TABLET ORAL
Qty: 30 TABLET | Refills: 2 | Status: SHIPPED | OUTPATIENT
Start: 2024-09-25

## 2024-09-26 ENCOUNTER — TRANSFERRED RECORDS (OUTPATIENT)
Dept: HEALTH INFORMATION MANAGEMENT | Facility: CLINIC | Age: 49
End: 2024-09-26
Payer: COMMERCIAL

## 2024-10-08 ENCOUNTER — PATIENT OUTREACH (OUTPATIENT)
Dept: CARE COORDINATION | Facility: CLINIC | Age: 49
End: 2024-10-08
Payer: COMMERCIAL

## 2024-10-10 DIAGNOSIS — E03.9 ACQUIRED HYPOTHYROIDISM: ICD-10-CM

## 2024-10-10 RX ORDER — LEVOTHYROXINE SODIUM 75 UG/1
TABLET ORAL
Qty: 90 TABLET | Refills: 0 | OUTPATIENT
Start: 2024-10-10

## 2024-10-12 DIAGNOSIS — E03.9 ACQUIRED HYPOTHYROIDISM: ICD-10-CM

## 2024-10-14 RX ORDER — LEVOTHYROXINE SODIUM 75 UG/1
TABLET ORAL
Qty: 90 TABLET | Refills: 0 | OUTPATIENT
Start: 2024-10-14

## 2024-10-17 ENCOUNTER — NURSE TRIAGE (OUTPATIENT)
Dept: FAMILY MEDICINE | Facility: CLINIC | Age: 49
End: 2024-10-17
Payer: COMMERCIAL

## 2024-10-17 ENCOUNTER — TELEPHONE (OUTPATIENT)
Dept: FAMILY MEDICINE | Facility: CLINIC | Age: 49
End: 2024-10-17
Payer: COMMERCIAL

## 2024-10-17 NOTE — TELEPHONE ENCOUNTER
See other telephone encounter from 10/17. Addressed concerns there.     Natividad Fitzpatrick, MAXN, RN     [facility]    10/17/2024 at 3:06 PM

## 2024-10-17 NOTE — TELEPHONE ENCOUNTER
"Called dakota to get more info on \"not feeling well\" (see telephone encounter 10/17). Dakota states that she has had a lot of complications since before she can remember and states that \"if I were to tell you everything that's wrong with me we'd be here all day\". Pt states that the symptoms she is experiencing right now are similar to symptoms she's had in the past and reports that the first step is usually labs since she has a history of abnormal liver labs, hx of anemia, and gastroparesis. Pt states there is way more than this but would like to talk to Juice further. Pt states she spent 30 minutes talking to Karen (previous telephone encounter) about this same thing and does not want to repeat herself. Pt did not tell writer current symptoms and would not answer further questions. Pt just wanting labs and to talk to Juice Sauceda. When I was about to ask her if she would like me to see if Juice has any earlier availability to talk to her and order labs, she got a call from work and stated she needed to go.     Routing to Juice. Able to see her sooner? Can labs be placed without visit considering her hx?     Natividad Fitzpatrick, MAXN, RN     [facility]    10/17/2024 at 3:03 PM    "

## 2024-10-18 NOTE — TELEPHONE ENCOUNTER
"Pt calls back and expresses her frustration with this situation in detail for several minutes. Pt speaks rapidly with increase in volume. Writer unable to assist patient in scheduling as pt spoke over writer during the call. Near the end of the call patient is listing reasons why she needs to be seen sooner than Nov 5 and then states she is suicidal before she disconnects call.    Called pt back. Scheduled pt to be seen Monday morning in person per her request.     Pt expresses her recent difficulties and need for future planning and discussion with PCP. Juice, please see the below concerns pt would like to address Monday.   - Thoughts she would be better off dead at times, but denies current SI. Pt states \"I have said I hope have cancer and die\". Pt denies having a plan. Pt notes that her son motivates her to stay alive. Pt states she does not find happiness in things she used to. Pt attributes this to chronic symptoms of pain, nausea and more. Pt states she went to therapy last year, was unsatisfied. Pt states she cries daily.  - Pt states she developed anxiety, and she thinks this may be related to her thyroid.  - Pt reports frustration with her GI pain and gastroparesis. Pt has concerns about which medications to use including when and if she can take ibuprofen and acetaminophen. Pt states she cannot take Reglan d/t side effects. Pt states because she had side effects on Reglan she has concerns about Compazine. Pt states as of now she takes Zofran 8 mg but it wears off too quickly for her, and she has breakthrough nausea 2-4 hours after taking it. Pt reports hx of stomach ulcers.   - Pt states she is overwhelmed with stress and wonders about cognitive issues.   - Pt notes she has narcotics at home but she does not take them unless she is at 9/10 pain level. Pt reports marijuana helps her pain, but cannot \"be high all the time\", pt states she smokes mostly at night which helps her sleep. Wakes up to pain around 3-4 " in the morning.   - Pt worries about which medication she can take with her liver. Pt notes she likes her hepatology provider.   - Intermittent chest pain for the past month usually rated a 3-5/10. Pt notes she has active right sided chest pain and pressure during the phone call rated 5/10, radiating to the shoulder. Pt reports she gets SOB from asthma at times. Pt is speaking in full sentences.      Protocol Recommended Disposition:   Call  Now, Go to ED Now    Recommendation: Recommended pt call 911 EMS now for chest pain per protocol. Pt refuses 911 EMS at this time states the pain is not severe. Pt states she will be seen in ED if chest pain becomes severe. Recommended pt be seen same day for recent thoughts of being better off dead. Pt refuses UC/ED today to address this, pt states she will discuss with PCP Monday. Pt agrees to be seen in ED over the weekend if SI or thoughts of harm occur. Advised calling triage line if she  has concerns and is unsure where to go. Reiterated calling 911 is an option for her with concerns for suicide or severe chest pain. Pt verbalized understanding and agrees with plan.     Routed to provider    Reason for Disposition   Depression and unable to do any of normal activities (e.g., self care, school, work; in comparison to baseline).     Refused.   Chest pain lasting longer than 5 minutes and ANY of the following:         Pain is crushing, pressure-like, or heavy         Over 44 years old          Over 30 years old and one cardiac risk factor (e.g diabetes, high blood pressure, high cholesterol, smoker, or family history of heart disease)         History of heart disease (e.g. angina, heart attack, heart failure, bypass surgery, takes nitroglycerin)     Refused.    Additional Information   Negative: Patient attempted suicide   Negative: Patient is threatening suicide now   Negative: Violent behavior, or threatening to physically hurt or kill someone   Negative: Patient is  very confused (disoriented, slurred speech) and no other adult (e.g., friend or family member) available   Negative: Difficult to awaken or acting very confused (disoriented, slurred speech) and new-onset   Negative: Sounds like a life-threatening emergency to the triager    Protocols used: Depression-A-OH, Chest Pain-A-OH    Gurinder Kevin RN on 10/18/2024 at 5:27 PM

## 2024-10-18 NOTE — TELEPHONE ENCOUNTER
I think we would need to have the appt first and then we can consider which labs to order. She has seen multiple specialists recently so I think virtual ok for now

## 2024-10-18 NOTE — TELEPHONE ENCOUNTER
Routing to TC's. Juice would like pt to set up appt to discuss symptoms and evaluate her to see which labs would be appropriate. Please call pt to schedule a visit with Juice, VV ok, before 11/5.     OSMAN Coffman, RN     [facility]    10/18/2024 at 7:40 AM

## 2024-10-18 NOTE — TELEPHONE ENCOUNTER
LVM message requesting a call back for an appt. Two more attempts will be made.     Grace Brennan   [facility]

## 2024-10-21 ENCOUNTER — ANCILLARY PROCEDURE (OUTPATIENT)
Dept: GENERAL RADIOLOGY | Facility: CLINIC | Age: 49
End: 2024-10-21
Attending: PHYSICIAN ASSISTANT
Payer: COMMERCIAL

## 2024-10-21 ENCOUNTER — OFFICE VISIT (OUTPATIENT)
Dept: FAMILY MEDICINE | Facility: CLINIC | Age: 49
End: 2024-10-21
Payer: COMMERCIAL

## 2024-10-21 VITALS
HEIGHT: 64 IN | DIASTOLIC BLOOD PRESSURE: 78 MMHG | BODY MASS INDEX: 36.84 KG/M2 | OXYGEN SATURATION: 97 % | SYSTOLIC BLOOD PRESSURE: 124 MMHG | WEIGHT: 215.8 LBS | TEMPERATURE: 98.3 F | HEART RATE: 70 BPM | RESPIRATION RATE: 18 BRPM

## 2024-10-21 DIAGNOSIS — R07.1 PAINFUL RESPIRATION: ICD-10-CM

## 2024-10-21 DIAGNOSIS — R41.89 BRAIN FOG: ICD-10-CM

## 2024-10-21 DIAGNOSIS — R10.11 ABDOMINAL PAIN, RIGHT UPPER QUADRANT: ICD-10-CM

## 2024-10-21 DIAGNOSIS — F39 MOOD DISORDER (H): ICD-10-CM

## 2024-10-21 DIAGNOSIS — R79.89 ABNORMAL CBC: ICD-10-CM

## 2024-10-21 DIAGNOSIS — E66.01 MORBID OBESITY (H): ICD-10-CM

## 2024-10-21 DIAGNOSIS — E03.9 ACQUIRED HYPOTHYROIDISM: Primary | ICD-10-CM

## 2024-10-21 DIAGNOSIS — R11.0 NAUSEA: ICD-10-CM

## 2024-10-21 LAB
ALBUMIN SERPL BCG-MCNC: 4.3 G/DL (ref 3.5–5.2)
ALP SERPL-CCNC: 104 U/L (ref 40–150)
ALT SERPL W P-5'-P-CCNC: 20 U/L (ref 0–50)
ANION GAP SERPL CALCULATED.3IONS-SCNC: 11 MMOL/L (ref 7–15)
AST SERPL W P-5'-P-CCNC: 24 U/L (ref 0–45)
BASOPHILS # BLD AUTO: 0.1 10E3/UL (ref 0–0.2)
BASOPHILS NFR BLD AUTO: 1 %
BILIRUB SERPL-MCNC: 0.4 MG/DL
BUN SERPL-MCNC: 11.4 MG/DL (ref 6–20)
CALCIUM SERPL-MCNC: 9.8 MG/DL (ref 8.8–10.4)
CHLORIDE SERPL-SCNC: 106 MMOL/L (ref 98–107)
CREAT SERPL-MCNC: 0.95 MG/DL (ref 0.51–0.95)
EGFRCR SERPLBLD CKD-EPI 2021: 74 ML/MIN/1.73M2
EOSINOPHIL # BLD AUTO: 0.4 10E3/UL (ref 0–0.7)
EOSINOPHIL NFR BLD AUTO: 4 %
ERYTHROCYTE [DISTWIDTH] IN BLOOD BY AUTOMATED COUNT: 14.7 % (ref 10–15)
FERRITIN SERPL-MCNC: 27 NG/ML (ref 6–175)
GLUCOSE SERPL-MCNC: 93 MG/DL (ref 70–99)
HCO3 SERPL-SCNC: 23 MMOL/L (ref 22–29)
HCT VFR BLD AUTO: 42.7 % (ref 35–47)
HGB BLD-MCNC: 13.9 G/DL (ref 11.7–15.7)
IMM GRANULOCYTES # BLD: 0 10E3/UL
IMM GRANULOCYTES NFR BLD: 0 %
IRON BINDING CAPACITY (ROCHE): 342 UG/DL (ref 240–430)
IRON SATN MFR SERPL: 11 % (ref 15–46)
IRON SERPL-MCNC: 36 UG/DL (ref 37–145)
LYMPHOCYTES # BLD AUTO: 1.6 10E3/UL (ref 0.8–5.3)
LYMPHOCYTES NFR BLD AUTO: 15 %
MCH RBC QN AUTO: 28.4 PG (ref 26.5–33)
MCHC RBC AUTO-ENTMCNC: 32.6 G/DL (ref 31.5–36.5)
MCV RBC AUTO: 87 FL (ref 78–100)
MONOCYTES # BLD AUTO: 0.7 10E3/UL (ref 0–1.3)
MONOCYTES NFR BLD AUTO: 7 %
NEUTROPHILS # BLD AUTO: 7.9 10E3/UL (ref 1.6–8.3)
NEUTROPHILS NFR BLD AUTO: 74 %
PLATELET # BLD AUTO: 294 10E3/UL (ref 150–450)
POTASSIUM SERPL-SCNC: 4.4 MMOL/L (ref 3.4–5.3)
PROT SERPL-MCNC: 7.6 G/DL (ref 6.4–8.3)
RBC # BLD AUTO: 4.89 10E6/UL (ref 3.8–5.2)
SODIUM SERPL-SCNC: 140 MMOL/L (ref 135–145)
TSH SERPL DL<=0.005 MIU/L-ACNC: 1.28 UIU/ML (ref 0.3–4.2)
VIT D+METAB SERPL-MCNC: 56 NG/ML (ref 20–50)
WBC # BLD AUTO: 10.7 10E3/UL (ref 4–11)

## 2024-10-21 PROCEDURE — 82728 ASSAY OF FERRITIN: CPT | Performed by: PHYSICIAN ASSISTANT

## 2024-10-21 PROCEDURE — 80053 COMPREHEN METABOLIC PANEL: CPT | Performed by: PHYSICIAN ASSISTANT

## 2024-10-21 PROCEDURE — 83550 IRON BINDING TEST: CPT | Performed by: PHYSICIAN ASSISTANT

## 2024-10-21 PROCEDURE — 83540 ASSAY OF IRON: CPT | Performed by: PHYSICIAN ASSISTANT

## 2024-10-21 PROCEDURE — 82306 VITAMIN D 25 HYDROXY: CPT | Performed by: PHYSICIAN ASSISTANT

## 2024-10-21 PROCEDURE — 99214 OFFICE O/P EST MOD 30 MIN: CPT | Performed by: PHYSICIAN ASSISTANT

## 2024-10-21 PROCEDURE — 84443 ASSAY THYROID STIM HORMONE: CPT | Performed by: PHYSICIAN ASSISTANT

## 2024-10-21 PROCEDURE — 85025 COMPLETE CBC W/AUTO DIFF WBC: CPT | Performed by: PHYSICIAN ASSISTANT

## 2024-10-21 PROCEDURE — 36415 COLL VENOUS BLD VENIPUNCTURE: CPT | Performed by: PHYSICIAN ASSISTANT

## 2024-10-21 PROCEDURE — 71046 X-RAY EXAM CHEST 2 VIEWS: CPT | Mod: TC | Performed by: RADIOLOGY

## 2024-10-21 ASSESSMENT — ASTHMA QUESTIONNAIRES
QUESTION_2 LAST FOUR WEEKS HOW OFTEN HAVE YOU HAD SHORTNESS OF BREATH: THREE TO SIX TIMES A WEEK
QUESTION_5 LAST FOUR WEEKS HOW WOULD YOU RATE YOUR ASTHMA CONTROL: WELL CONTROLLED
QUESTION_1 LAST FOUR WEEKS HOW MUCH OF THE TIME DID YOUR ASTHMA KEEP YOU FROM GETTING AS MUCH DONE AT WORK, SCHOOL OR AT HOME: NONE OF THE TIME
ACT_TOTALSCORE: 21
QUESTION_3 LAST FOUR WEEKS HOW OFTEN DID YOUR ASTHMA SYMPTOMS (WHEEZING, COUGHING, SHORTNESS OF BREATH, CHEST TIGHTNESS OR PAIN) WAKE YOU UP AT NIGHT OR EARLIER THAN USUAL IN THE MORNING: NOT AT ALL
ACT_TOTALSCORE: 21
QUESTION_4 LAST FOUR WEEKS HOW OFTEN HAVE YOU USED YOUR RESCUE INHALER OR NEBULIZER MEDICATION (SUCH AS ALBUTEROL): ONCE A WEEK OR LESS

## 2024-10-21 ASSESSMENT — PAIN SCALES - GENERAL: PAINLEVEL: NO PAIN (0)

## 2024-10-21 NOTE — PROGRESS NOTES
"  Assessment & Plan     Acquired hypothyroidism  Need to update especially with all of her other symptoms   - TSH with free T4 reflex; Future  - TSH with free T4 reflex    Nausea  Abdominal pain, right upper quadrant  Chronic issues; known gastroparesis and follows with multiple GI; still experiencing issues. I broached the idea for cannabis hyperemesis syndrome today and recommended fully stopping her use. She did not suspect this was the etiology at this time    Brain fog  New, worsening. Unclear. Cranial nerves grossly ok. I did question stress and very poor sleep, possible KARLIE adding just Machelle suspects something else may be going on and the sleep/mood could be worsening. Ok for neurology consult  - Adult Neurology  Referral; Future  - Vitamin D Deficiency; Future  - Adult Sleep Eval & Management  Referral; Future  - Vitamin D Deficiency    Abnormal CBC  Updating.   - CBC with platelets and differential; Future  - Comprehensive metabolic panel (BMP + Alb, Alk Phos, ALT, AST, Total. Bili, TP); Future  - Iron & Iron Binding Capacity; Future  - Ferritin; Future  - CBC with platelets and differential  - Comprehensive metabolic panel (BMP + Alb, Alk Phos, ALT, AST, Total. Bili, TP)  - Iron & Iron Binding Capacity  - Ferritin    Painful respiration  Unclear. Lungs normal to exam. Mentions sometimes just the ruq pain and no painful breathing as well. If xray clear, suspect more msk  - XR Chest 2 Views; Future    Mood disorder (H)  At this time she does not want to pursue psych medications. Denies active SI    Morbid obesity (H)  She has really done excellent with weight loss.           BMI  Estimated body mass index is 37.04 kg/m  as calculated from the following:    Height as of this encounter: 1.626 m (5' 4\").    Weight as of this encounter: 97.9 kg (215 lb 12.8 oz).             Giuliano Carty is a 49 year old, presenting for the following health issues:  History of Present Illness        " "10/21/2024    10:01 AM   Additional Questions   Roomed by Rekha DIXON CMA   Accompanied by KEISHA         10/21/2024    10:01 AM   Patient Reported Additional Medications   Patient reports taking the following new medications None     History of Present Illness       Reason for visit:  There s too much to type here.   She is taking medications regularly.     Patient states that she has multiple concerns please see triage note.   She is mentioning some increasing brain fog/dizziness and new difficulties with communication/recall  Also questioning if she is using the right words sometimes  These have progressively been getting worse; but more worrisome lately  Noting more at work      Moods are \"not good\"  Denies true active SI despite recent triage call   -not active but thinking itd be easier not to deal with the symptoms  Sleep is awful   -did not do her sleep apnea screening  Continues to use cannabis nightly for sleep      Upper abd pain persists  Still with chronic nausea  Vomiting on occasion  Using zofran periodically but not always helpful  Still following with multiple GI for these      Review of Systems  See above      Objective    /78 (BP Location: Right arm, Patient Position: Sitting, Cuff Size: Adult Large)   Pulse 70   Temp 98.3  F (36.8  C) (Oral)   Resp 18   Ht 1.626 m (5' 4\")   Wt 97.9 kg (215 lb 12.8 oz)   LMP 10/18/2024 (Approximate)   SpO2 97%   BMI 37.04 kg/m    Body mass index is 37.04 kg/m .  Physical Exam   GENERAL: alert and no distress  RESP: lungs clear to auscultation - no rales, rhonchi or wheezes  CV: regular rates and rhythm; no ectopy  MS: No peripheral edema           Signed Electronically by: Jean Sauceda PA-C    "

## 2024-10-22 ENCOUNTER — PATIENT OUTREACH (OUTPATIENT)
Dept: CARE COORDINATION | Facility: CLINIC | Age: 49
End: 2024-10-22
Payer: COMMERCIAL

## 2024-10-24 ENCOUNTER — HOSPITAL ENCOUNTER (OUTPATIENT)
Dept: MAMMOGRAPHY | Facility: CLINIC | Age: 49
Discharge: HOME | End: 2024-10-24
Attending: PHYSICIAN ASSISTANT | Admitting: PHYSICIAN ASSISTANT
Payer: COMMERCIAL

## 2024-10-24 DIAGNOSIS — Z12.31 VISIT FOR SCREENING MAMMOGRAM: ICD-10-CM

## 2024-10-24 PROCEDURE — 77063 BREAST TOMOSYNTHESIS BI: CPT

## 2024-10-28 ENCOUNTER — TELEPHONE (OUTPATIENT)
Dept: NEUROLOGY | Facility: CLINIC | Age: 49
End: 2024-10-28
Payer: COMMERCIAL

## 2024-10-28 NOTE — TELEPHONE ENCOUNTER
Spoke to patient in regards scheduling an appointment with general neurology.    Pt stating that she has more than just the Brain Fog dx.    Pt stated she has called last week and talked to some one in regards to her diagnoses and systems and was told that there are different provider that she will need to schedule with.   Pt want to make sure she is scheduling with the correct provider for all her other symptoms.    Pt is frustrated- sated that there should be message with all her list of symptoms. Pt stated she will not repeat her self but it was a lot and was told by the person she talked to that this information was going to be sent to a nurse.      Write is not able to view the message patient is talking about.      Lucy Walsh on 10/28/2024 at 10:13 AM

## 2024-12-03 ENCOUNTER — NURSE TRIAGE (OUTPATIENT)
Dept: FAMILY MEDICINE | Facility: CLINIC | Age: 49
End: 2024-12-03
Payer: COMMERCIAL

## 2024-12-03 NOTE — TELEPHONE ENCOUNTER
Patient calling with concerns of low blood sugars.  Patient is not diabetic.  No protocol for low blood sugars other than diabetic and patient is not diabetic.  States she is having hypoglycemia.  States has had since teenager but becoming more frequent.  States around 4:30 pm yesterday states BG- 46.  Could not get herself anything and son had to.  Has seen in 50-60's.  States at 3 pm yesterday she had a bowel of Cheerios Oat Crunch and 2% milk and at 4:30 pm BG- 46.  States there was no reason for blood sugar to drop in a couple of hours like it did.  Has nausea from gastroparesis so does not usually eat breakfast.  Afternoon has cereal, small snack, nuts, etc.  3:45 pm cereal and milk and felt blood sugar a little low.  About 5:15 pm noticed hypoglycemia symptoms and checked BG and was 79.  5:30 pm started to feel worse.  Rechecked BG and it was 71.  States BG going down faster than normal.  Ongoing abdominal pain.  LUQ pain is bothering her more than normal.  Abdominal pain for many years.  Scheduled 12/5/24 with Juice at 10:10 am.  Advised 911, ER sooner if low blood sugar again.  Patient agrees with plan.  Josephine Joya RN

## 2024-12-05 ENCOUNTER — E-CONSULT (OUTPATIENT)
Dept: ENDOCRINOLOGY | Facility: CLINIC | Age: 49
End: 2024-12-05

## 2024-12-05 ENCOUNTER — OFFICE VISIT (OUTPATIENT)
Dept: FAMILY MEDICINE | Facility: CLINIC | Age: 49
End: 2024-12-05
Payer: COMMERCIAL

## 2024-12-05 VITALS
SYSTOLIC BLOOD PRESSURE: 148 MMHG | WEIGHT: 215 LBS | TEMPERATURE: 98.3 F | OXYGEN SATURATION: 98 % | HEIGHT: 64 IN | DIASTOLIC BLOOD PRESSURE: 88 MMHG | HEART RATE: 59 BPM | BODY MASS INDEX: 36.7 KG/M2 | RESPIRATION RATE: 16 BRPM

## 2024-12-05 DIAGNOSIS — E78.5 HYPERLIPIDEMIA LDL GOAL <160: Primary | ICD-10-CM

## 2024-12-05 DIAGNOSIS — E16.2 HYPOGLYCEMIA: Primary | ICD-10-CM

## 2024-12-05 DIAGNOSIS — E16.2 HYPOGLYCEMIA: ICD-10-CM

## 2024-12-05 PROCEDURE — 99214 OFFICE O/P EST MOD 30 MIN: CPT | Performed by: PHYSICIAN ASSISTANT

## 2024-12-05 PROCEDURE — 36415 COLL VENOUS BLD VENIPUNCTURE: CPT | Performed by: PHYSICIAN ASSISTANT

## 2024-12-05 PROCEDURE — 99207 E-CONSULT TO ENDOCRINOLOGY (ADULT OUTPT PROVIDER TO SPECIALIST WRITTEN QUESTION & RESPONSE): CPT | Performed by: PHYSICIAN ASSISTANT

## 2024-12-05 PROCEDURE — 80053 COMPREHEN METABOLIC PANEL: CPT | Performed by: PHYSICIAN ASSISTANT

## 2024-12-05 PROCEDURE — 83525 ASSAY OF INSULIN: CPT | Performed by: PHYSICIAN ASSISTANT

## 2024-12-05 RX ORDER — METOCLOPRAMIDE HYDROCHLORIDE 5 MG/5ML
SOLUTION ORAL
COMMUNITY

## 2024-12-05 NOTE — PROGRESS NOTES
"  {PROVIDER CHARTING PREFERENCE:926004}    Subjective   Machelle is a 49 year old, presenting for the following health issues:  Abdominal Pain and Hypoglycemia        12/5/2024    10:21 AM   Additional Questions   Roomed by jan     History of Present Illness       Reason for visit:  Hypoglycemia  Symptom onset:  3-7 days ago  Symptoms include:  Hypoglycemia  Symptom intensity:  Severe  Symptom progression:  Staying the same  What makes it better:  Drinking/eating high glycemic beverages/food   She is taking medications regularly.       Machelle Weiner is a 49 year old female who presents today for recent low blood sugar episodes  She mentions this would occasionally happen as a teen as well   -mentions taking juice around with her  She does typically avoid breakfast, will have coffee w cream  Often having bowl cereal (cheerios w oats) for lunch   -recently following a lunch of this she felt really around dinner time 2 hours later   -was shaking very badly, weakness; checked sugars with home kit and was 46   -some confusion during that time as well    -had some sugary snacks and there was improvement in symptoms   -then had another similar episode a few days later where her symptoms were nausea, lightheaded, dizzy and slightly shaky; again improved after sugar snack    Has hx of delayed gastric emptying    Since then she has been more conscious of eating more foods, higher glucose foods      Review of Systems  Constitutional, HEENT, cardiovascular, pulmonary, gi and gu systems are negative, except as otherwise noted.      Objective    BP (!) 153/84   Pulse 59   Temp 98.3  F (36.8  C)   Resp 16   Ht 1.626 m (5' 4\")   Wt 97.5 kg (215 lb)   LMP 10/18/2024 (Approximate)   SpO2 98%   BMI 36.90 kg/m    Body mass index is 36.9 kg/m .  Physical Exam   {Exam List (Optional):334518}    {Diagnostic Test Results (Optional):787737}        Signed Electronically by: Jean Sauceda PA-C  {Email feedback regarding this " note to [email or web address]   :[id number]}

## 2024-12-06 LAB
ALBUMIN SERPL BCG-MCNC: 4.2 G/DL (ref 3.5–5.2)
ALP SERPL-CCNC: 101 U/L (ref 40–150)
ALT SERPL W P-5'-P-CCNC: 18 U/L (ref 0–50)
ANION GAP SERPL CALCULATED.3IONS-SCNC: 10 MMOL/L (ref 7–15)
AST SERPL W P-5'-P-CCNC: 22 U/L (ref 0–45)
BILIRUB SERPL-MCNC: 0.4 MG/DL
BUN SERPL-MCNC: 10.2 MG/DL (ref 6–20)
CALCIUM SERPL-MCNC: 10.1 MG/DL (ref 8.8–10.4)
CHLORIDE SERPL-SCNC: 105 MMOL/L (ref 98–107)
CREAT SERPL-MCNC: 0.88 MG/DL (ref 0.51–0.95)
EGFRCR SERPLBLD CKD-EPI 2021: 81 ML/MIN/1.73M2
GLUCOSE SERPL-MCNC: 82 MG/DL (ref 70–99)
HCO3 SERPL-SCNC: 23 MMOL/L (ref 22–29)
INSULIN SERPL-ACNC: 25.8 UU/ML (ref 2.6–24.9)
POTASSIUM SERPL-SCNC: 4.9 MMOL/L (ref 3.4–5.3)
PROT SERPL-MCNC: 7.1 G/DL (ref 6.4–8.3)
SODIUM SERPL-SCNC: 138 MMOL/L (ref 135–145)

## 2024-12-06 NOTE — PROGRESS NOTES
12/5/2024     E-Consult has been denied due to: Doesn't meet criteria for E-Consult - Did not include a specific clinical question, or no question provided.    Interprofessional consultation requested by:  Jean Sauceda PA-C      Clinical Question/Purpose: HYPOGLYCEMIA in a person without diabetes (if person has diabetes treated with insulin or sulfonylurea, send E-Consult about diabetes)     MY CLINICAL QUESTION IS: see note 12/05/24 note; patient with home glucometer readings below 55; updated some labs today     This patient also has hx of gastroparesis but also fasts at some points, 12+ hours, to try to help abd symptoms. These particular episodes did not happen following fasting (had bowl of cereal 2-3 hours prior to these events)     Patient assessment and information reviewed:     Recommendations:   I am attaching general e-consult information for your reference.     What is an e-consult  Physician to physician consultation to address a specific question.    It is billed to the patient  Both the referring and receiving MD get [facility] credit.    You must use the system wide e-consult referral and template to place an e-consult     Requirements for an e-consult  A specific question must be asked   Sending provider approves with the patient that an e-consult is being sent to address a specific question.      Reasons for denial of e-consult  A specific question was not asked  The E-consult is actually a scheduling /triage request  The problem is too complex for e-consult   The patient was seen in endocrinology within the last 3 years or has appt already scheduled within 3 months.      If your e-consult is denied you may resend a new e-consult meeting criteria as outlined above.   Alternatively, you could refer the patient back to their primary care provider to primarily address the problem or determine whether or not specialty consult is needed.     An e-consult is not the following:  An ongoing  conversation between the sending and e-consult physicians . If such a conversation is needed a full consult would be more appropriate.   Placement of orders for the tests recommended by the e-consultant    Follow-up interpretation and secondary advice in response to an an initial e-consult. IF such an action is needed, a full consult would be more appropriate      The recommendations provided in this E-Consult are based on a review of clinical data pertinent to the clinical question presented, without a review of the patient's complete medical record or, the benefit of a comprehensive in-person or virtual patient evaluation. This consultation should not replace the clinical judgement and evaluation of the provider ordering this E-Consult. Any new clinical issues, or changes in patient status since the filing of this E-Consult will need to be taken into account when assessing these recommendations. Please contact me if you have further questions.    My total time spent reviewing clinical information and formulating assessment was 5 minutes.        Brenna Elizalde MD

## 2024-12-09 DIAGNOSIS — E16.2 HYPOGLYCEMIA: Primary | ICD-10-CM

## 2024-12-09 RX ORDER — HYDROCHLOROTHIAZIDE 12.5 MG/1
CAPSULE ORAL
Qty: 6 EACH | Refills: 0 | Status: SHIPPED | OUTPATIENT
Start: 2024-12-09

## 2024-12-09 RX ORDER — KETOROLAC TROMETHAMINE 30 MG/ML
1 INJECTION, SOLUTION INTRAMUSCULAR; INTRAVENOUS ONCE
Qty: 1 EACH | Refills: 0 | Status: SHIPPED | OUTPATIENT
Start: 2024-12-09 | End: 2024-12-09

## 2024-12-10 ENCOUNTER — TELEPHONE (OUTPATIENT)
Dept: FAMILY MEDICINE | Facility: CLINIC | Age: 49
End: 2024-12-10
Payer: COMMERCIAL

## 2024-12-10 NOTE — TELEPHONE ENCOUNTER
Jeaneth from [facility] members services calling.    She informs that:    - pt was prescribed a continuous glucose monitor by PCP Jean Sauceda PA-C.    - continuous glucose monitors are reserved for members who have either taken insulin in the past 6 months or have type 2 diabetes    - as pt has been prescribed this for alternate reasons, a prior auth for continuous glucose monitor will need to be submitted    - the quickest way to get this approved is by calling the phone number for pharmacy administration (they handle approvals and denials for prior auth)    -by calling the phone number ([phone]) this can be submit as urgent (either will tell on the phone if approved or within one business day at maximum)    -pt will need CGM asap due to hypoglycemia    Routing high priority to prior auth team.     Megan MILLER RN

## 2024-12-10 NOTE — LETTER
December 12, 2024      Machelle Sousajerrycarmen  [address] [facility] [address]        To Whom It May Concern,     This patient recently began experiencing symptoms of hypoglycemia (fatigue, sweating, dizziness) that were improved with glucose administration. Prior to this she tested her blood sugars with readings below 55. She is a non-diabetic and not on hypoglycemic medications. She would significantly benefit from CGM to monitor sugars over the next 2 weeks to establish patterns and potentially alert her if any lows are impending. Please reconsider this denial.           Sincerely,        Jean Sauceda PA-C

## 2024-12-10 NOTE — TELEPHONE ENCOUNTER
PA Initiation    Medication: FREESTYLE THAD 3 PLUS SENSOR MISC  Insurance Company: [employer] - Phone [phone] Fax [phone]  Pharmacy Filling the Rx: [facility] [address] [place] [id number] [address]  Filling Pharmacy Phone: [phone]  Filling Pharmacy Fax:    Start Date: 12/10/2024  Retail Pharmacy Prior Authorization Team   Phone: [phone]

## 2024-12-12 ENCOUNTER — MYC MEDICAL ADVICE (OUTPATIENT)
Dept: FAMILY MEDICINE | Facility: CLINIC | Age: 49
End: 2024-12-12

## 2024-12-12 NOTE — TELEPHONE ENCOUNTER
PRIOR AUTHORIZATION DENIED    Medication: FREESTYLE THAD 3 PLUS SENSOR MISC  Insurance Company: [employer] - Phone [phone] Fax [phone]  Denial Date: 12/11/2024  Denial Reason(s):     Appeal Information:     Patient Notified: The clinic should notify the patient of the denial.

## 2024-12-12 NOTE — TELEPHONE ENCOUNTER
Appeal letter was faxed to: [phone] as request by provider.     Grace Brennan   [facility]

## 2024-12-17 NOTE — TELEPHONE ENCOUNTER
Pt was called and was able to get an in person visit scheduled for a further discussion on her leg and if a referral or Xray is needed.     Grace Brennan   [facility]

## 2024-12-30 ENCOUNTER — MYC MEDICAL ADVICE (OUTPATIENT)
Dept: FAMILY MEDICINE | Facility: CLINIC | Age: 49
End: 2024-12-30

## 2024-12-30 DIAGNOSIS — M25.552 HIP PAIN, LEFT: Primary | ICD-10-CM

## 2024-12-30 DIAGNOSIS — E16.2 HYPOGLYCEMIA: ICD-10-CM

## 2024-12-30 NOTE — TELEPHONE ENCOUNTER
Sent [facility] with scheduling information for ortho referral, labs and offered walk-in care.     Monique Johnson  Lead   [facility]

## 2024-12-30 NOTE — TELEPHONE ENCOUNTER
Huddled with provider about patient request. Provider will review and update when decision is made.     Monique Johnson  Lead   [facility]
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
## 2024-12-31 ENCOUNTER — PATIENT OUTREACH (OUTPATIENT)
Dept: CARE COORDINATION | Facility: CLINIC | Age: 49
End: 2024-12-31
Payer: COMMERCIAL

## 2025-01-04 ENCOUNTER — HEALTH MAINTENANCE LETTER (OUTPATIENT)
Age: 50
End: 2025-01-04

## 2025-01-06 ENCOUNTER — LAB (OUTPATIENT)
Dept: LAB | Facility: CLINIC | Age: 50
End: 2025-01-06
Payer: COMMERCIAL

## 2025-01-06 DIAGNOSIS — E16.2 HYPOGLYCEMIA: ICD-10-CM

## 2025-01-06 LAB
FASTING STATUS PATIENT QL REPORTED: YES
GLUCOSE SERPL-MCNC: 82 MG/DL (ref 70–99)
INSULIN SERPL-ACNC: 11.1 UU/ML (ref 2.6–24.9)

## 2025-01-06 PROCEDURE — 82947 ASSAY GLUCOSE BLOOD QUANT: CPT

## 2025-01-06 PROCEDURE — 83525 ASSAY OF INSULIN: CPT

## 2025-01-06 PROCEDURE — 36415 COLL VENOUS BLD VENIPUNCTURE: CPT

## 2025-01-06 NOTE — PROGRESS NOTES
"ASSESSMENT & PLAN    Machelle was seen today for musculoskeletal problem.    Diagnoses and all orders for this visit:    Chronic left hip pain  -     Orthopedic  Referral  -     XR Pelvis and Hip Left 2 Views; Future  -     MR Femur Thigh Left wo & w Contrast; Future    Pain of left femur  -     Orthopedic  Referral  -     XR Femur Left 2 Views; Future  -     MR Femur Thigh Left wo & w Contrast; Future      This issue is chronic and Worsening. Machelle presents our clinic today to discuss her chronic left thigh pain.  She reports insidious onset of pain and has been worsening over the past several months in the posterior, lateral aspect of the thigh.  She states this pain feels like her \"leg is going to break\" and can be significantly worsened when putting a lot of weight on her leg, like when getting out of the car.  On examination, she has tenderness to palpation to the deep structures of the thigh, distal to the greater trochanter.  She has some mild pain with all muscle testing.  She has been exercising more over the past year in an effort to lose weight, but has not begun performing any high impact exercises such as running and is not training for any marathon or other races.  She has not taken any oral corticosteroids or any other over-the-counter supplements.  She has a does endorse some night sweats and pain that wakes her at night, however she has had recent blood work including a CBC CMP vitamin D level and iron and ferritin level that were all unremarkable.  She also had a normal TSH level.  Radiographs taken in clinic today reviewed with the patient do not show any evidence of cortical irregularity.  She does have scattered areas of enthesophytosis throughout the pelvis, including an area in the subtrochanteric region, however this is present bilaterally and the right side appears to be worse.  Overall, her history is reassuring against any stress injury, malignancy, or underlying metabolic " "or medical cause of bone weakening and pathologic fracture.  However, given the patient's history, advanced imaging is warranted for formal evaluation of the left femur to rule out any bony weakening and ensure there is no possible pathologic fracture.  We determined the following plan:  - MRI with contrast ordered today  - She will follow-up with me via video visit or in person after her MRI is complete to review it and determine next steps in treatment      DO ROSALINE Ward [facility] SPORTS MEDICINE [facility]    -----  Chief Complaint   Patient presents with    Musculoskeletal Problem     Left femur       SUBJECTIVE  Machelle Weiner is a/an 49 year old female who is seen in consultation at the request of  Jean Sauceda PA-C for evaluation of left femur.     The patient is seen by themselves.      Onset: 6 month(s) ago. Reports insidious onset without acute precipitating event.  Location of Pain: left upper leg deep in leg pain  Worsened by: getting out of car and pressure on that spot hurts badly  Better with: nothing  Treatments tried: rest/activity avoidance and Tylenol  Associated symptoms: warmth and weakness of leg    Orthopedic/Surgical history: YES - Date: spine and shoulder 2024 April  Social History/Occupation:       REVIEW OF SYSTEMS:  Review of systems negative unless mentioned in HPI     OBJECTIVE:  Ht 1.626 m (5' 4\")   Wt 95.4 kg (210 lb 4.8 oz)   BMI 36.10 kg/m     General: healthy, alert and in no distress  Skin: no suspicious lesions or rash.  CV: distal perfusion intact   Resp: normal respiratory effort without conversational dyspnea   Psych: normal mood and affect  Gait: NORMAL  Neuro: Normal light sensory exam of LL extremity     Hip Exam:    Musculoskeletal Exam   Gait Normal     Left Right   Inspection Grossly Normal  Grossly Normal    Palpation     Tenderness over  Deep tissue/bone of femur, approximately 3-4 cm distal to the greater trochanter None "   Range of Motion     Flexion - Supine  Full to about 90  Full to about 90   ER at 90 of flexion 55 55   IR at 90 of flexion 40 40   Strength 5/5 Flexion  5/5 Abduction in Neutral  5/5 Abduction in Extension    Grossly Nml otherwise  5/5 Flexion  5/5 Abduction in Neutral  5/5 Abduction in Extension    Grossly Nml otherwise    Pain Provocation Tests     FADIR NEG NEG   EDA NEG  NEG    Instability/log roll NEG  NEG    Trochanteric Pain Sign NEG NEG    Straight leg raise (passive) NEG  NEG    Posterior/Ischiofemoral Impingement Provocation NEG  NEG    Neurologic Intact sensation          RADIOLOGY:  Final results and radiologist's interpretation, available in the [facility] health record.  Images were reviewed with the patient in the office today.  My personal interpretation of the performed imaging: Normal joint spacing and alignment of the hip.  No acute fractures.  There are multiple areas of enthesophytosis throughout the pelvis.  Femur show a small, chronic enthesophyte on the lateral margin of the proximal femur in the subtrochanteric region.  This is also present on the right side.

## 2025-01-07 ENCOUNTER — OFFICE VISIT (OUTPATIENT)
Dept: ORTHOPEDICS | Facility: CLINIC | Age: 50
End: 2025-01-07
Attending: PHYSICIAN ASSISTANT
Payer: COMMERCIAL

## 2025-01-07 ENCOUNTER — ANCILLARY PROCEDURE (OUTPATIENT)
Dept: GENERAL RADIOLOGY | Facility: CLINIC | Age: 50
End: 2025-01-07
Attending: STUDENT IN AN ORGANIZED HEALTH CARE EDUCATION/TRAINING PROGRAM
Payer: COMMERCIAL

## 2025-01-07 VITALS — WEIGHT: 210.3 LBS | BODY MASS INDEX: 35.9 KG/M2 | HEIGHT: 64 IN

## 2025-01-07 DIAGNOSIS — M79.605 LEFT LEG PAIN: ICD-10-CM

## 2025-01-07 DIAGNOSIS — M25.552 CHRONIC LEFT HIP PAIN: Primary | ICD-10-CM

## 2025-01-07 DIAGNOSIS — M89.8X5 PAIN OF LEFT FEMUR: ICD-10-CM

## 2025-01-07 DIAGNOSIS — G89.29 CHRONIC LEFT HIP PAIN: Primary | ICD-10-CM

## 2025-01-07 DIAGNOSIS — M25.552 CHRONIC LEFT HIP PAIN: ICD-10-CM

## 2025-01-07 DIAGNOSIS — G89.29 CHRONIC LEFT HIP PAIN: ICD-10-CM

## 2025-01-07 PROCEDURE — G2211 COMPLEX E/M VISIT ADD ON: HCPCS | Performed by: STUDENT IN AN ORGANIZED HEALTH CARE EDUCATION/TRAINING PROGRAM

## 2025-01-07 PROCEDURE — 99214 OFFICE O/P EST MOD 30 MIN: CPT | Performed by: STUDENT IN AN ORGANIZED HEALTH CARE EDUCATION/TRAINING PROGRAM

## 2025-01-07 PROCEDURE — 73552 X-RAY EXAM OF FEMUR 2/>: CPT | Mod: TC | Performed by: RADIOLOGY

## 2025-01-07 NOTE — LETTER
"1/7/2025      Machelle Weiner  [address] [facility] [address]      Dear Colleague,    Thank you for referring your patient, Machelle Weiner, to the [facility]TS [facility]ICINE CLINIC [place]. Please see a copy of my visit note below.    ASSESSMENT & PLAN    Machelle was seen today for musculoskeletal problem.    Diagnoses and all orders for this visit:    Chronic left hip pain  -     Orthopedic  Referral  -     XR Pelvis and Hip Left 2 Views; Future  -     MR Femur Thigh Left wo & w Contrast; Future    Pain of left femur  -     Orthopedic  Referral  -     XR Femur Left 2 Views; Future  -     MR Femur Thigh Left wo & w Contrast; Future      This issue is chronic and Worsening. Machelle presents our clinic today to discuss her chronic left thigh pain.  She reports insidious onset of pain and has been worsening over the past several months in the posterior, lateral aspect of the thigh.  She states this pain feels like her \"leg is going to break\" and can be significantly worsened when putting a lot of weight on her leg, like when getting out of the car.  On examination, she has tenderness to palpation to the deep structures of the thigh, distal to the greater trochanter.  She has some mild pain with all muscle testing.  She has been exercising more over the past year in an effort to lose weight, but has not begun performing any high impact exercises such as running and is not training for any marathon or other races.  She has not taken any oral corticosteroids or any other over-the-counter supplements.  She has a does endorse some night sweats and pain that wakes her at night, however she has had recent blood work including a CBC CMP vitamin D level and iron and ferritin level that were all unremarkable.  She also had a normal TSH level.  Radiographs taken in clinic today reviewed with the patient do not show any evidence of cortical irregularity.  She does have scattered areas " "of enthesophytosis throughout the pelvis, including an area in the subtrochanteric region, however this is present bilaterally and the right side appears to be worse.  Overall, her history is reassuring against any stress injury, malignancy, or underlying metabolic or medical cause of bone weakening and pathologic fracture.  However, given the patient's history, advanced imaging is warranted for formal evaluation of the left femur to rule out any bony weakening and ensure there is no possible pathologic fracture.  We determined the following plan:  - MRI with contrast ordered today  - She will follow-up with me via video visit or in person after her MRI is complete to review it and determine next steps in treatment      Raul Recio [facility] [place]    -----  Chief Complaint   Patient presents with     Musculoskeletal Problem     Left femur       SUBJECTIVE  Machelle Weiner is a/an 49 year old female who is seen in consultation at the request of  Jean Sauceda PA-C for evaluation of left femur.     The patient is seen by themselves.      Onset: 6 month(s) ago. Reports insidious onset without acute precipitating event.  Location of Pain: left upper leg deep in leg pain  Worsened by: getting out of car and pressure on that spot hurts badly  Better with: nothing  Treatments tried: rest/activity avoidance and Tylenol  Associated symptoms: warmth and weakness of leg    Orthopedic/Surgical history: YES - Date: spine and shoulder 2024 April  Social History/Occupation:       REVIEW OF SYSTEMS:  Review of systems negative unless mentioned in HPI     OBJECTIVE:  Ht 1.626 m (5' 4\")   Wt 95.4 kg (210 lb 4.8 oz)   BMI 36.10 kg/m     General: healthy, alert and in no distress  Skin: no suspicious lesions or rash.  CV: distal perfusion intact   Resp: normal respiratory effort without conversational dyspnea   Psych: normal mood and affect  Gait: NORMAL  Neuro: Normal " light sensory exam of LL extremity     Hip Exam:    Musculoskeletal Exam   Gait Normal     Left Right   Inspection Grossly Normal  Grossly Normal    Palpation     Tenderness over  Deep tissue/bone of femur, approximately 3-4 cm distal to the greater trochanter None   Range of Motion     Flexion - Supine  Full to about 90  Full to about 90   ER at 90 of flexion 55 55   IR at 90 of flexion 40 40   Strength 5/5 Flexion  5/5 Abduction in Neutral  5/5 Abduction in Extension    Grossly Nml otherwise  5/5 Flexion  5/5 Abduction in Neutral  5/5 Abduction in Extension    Grossly Nml otherwise    Pain Provocation Tests     FADIR NEG NEG   EDA NEG  NEG    Instability/log roll NEG  NEG    Trochanteric Pain Sign NEG NEG    Straight leg raise (passive) NEG  NEG    Posterior/Ischiofemoral Impingement Provocation NEG  NEG    Neurologic Intact sensation          RADIOLOGY:  Final results and radiologist's interpretation, available in the [facility] health record.  Images were reviewed with the patient in the office today.  My personal interpretation of the performed imaging: Normal joint spacing and alignment of the hip.  No acute fractures.  There are multiple areas of enthesophytosis throughout the pelvis.  Femur show a small, chronic enthesophyte on the lateral margin of the proximal femur in the subtrochanteric region.  This is also present on the right side.             Again, thank you for allowing me to participate in the care of your patient.        Sincerely,        Raul Recio, DO    Electronically signed

## 2025-01-18 ENCOUNTER — HOSPITAL ENCOUNTER (OUTPATIENT)
Dept: MRI IMAGING | Facility: CLINIC | Age: 50
Discharge: HOME | End: 2025-01-18
Attending: STUDENT IN AN ORGANIZED HEALTH CARE EDUCATION/TRAINING PROGRAM | Admitting: STUDENT IN AN ORGANIZED HEALTH CARE EDUCATION/TRAINING PROGRAM
Payer: COMMERCIAL

## 2025-01-18 DIAGNOSIS — M25.552 CHRONIC LEFT HIP PAIN: ICD-10-CM

## 2025-01-18 DIAGNOSIS — M89.8X5 PAIN OF LEFT FEMUR: ICD-10-CM

## 2025-01-18 DIAGNOSIS — G89.29 CHRONIC LEFT HIP PAIN: ICD-10-CM

## 2025-01-18 PROCEDURE — 73720 MRI LWR EXTREMITY W/O&W/DYE: CPT | Mod: LT

## 2025-01-18 PROCEDURE — A9585 GADOBUTROL INJECTION: HCPCS | Performed by: STUDENT IN AN ORGANIZED HEALTH CARE EDUCATION/TRAINING PROGRAM

## 2025-01-18 PROCEDURE — 255N000002 HC RX 255 OP 636: Performed by: STUDENT IN AN ORGANIZED HEALTH CARE EDUCATION/TRAINING PROGRAM

## 2025-01-18 RX ORDER — GADOBUTROL 604.72 MG/ML
9.5 INJECTION INTRAVENOUS ONCE
Status: COMPLETED | OUTPATIENT
Start: 2025-01-18 | End: 2025-01-18

## 2025-01-18 RX ADMIN — GADOBUTROL 9.5 ML: 604.72 INJECTION INTRAVENOUS at 16:18

## 2025-01-23 NOTE — PROGRESS NOTES
ASSESSMENT & PLAN    Machelle was seen today for musculoskeletal problem.    Diagnoses and all orders for this visit:    Tendinopathy of left gluteal region  -     Physical Therapy  Referral; Future      This issue is sub acute and Unchanged. Machelle presents our clinic today to review her MRI and discuss the next Epson treatment.  We reviewed her MRI that showed no abnormalities within the hip or femur, which is reassuring.  It did show some tendinopathy of the left tendons without associated tear as well as some mild degenerative changes in the hip joint themselves.  We discussed that the neck step in treatment would be to begin dedicated physical therapy geared towards the hip and glutes musculature, provided reassurance that there is no evidence of any bone weakening or underlying metastasis or avascular necrosis.  We determined the following plan:  - Physical therapy referral placed  - She can utilize over-the-counter pain medicines, ice, heat as needed  - We briefly discussed utilizing a NSAID or oral steroid burst and taper for pain relief, however the patient feels that she does not need this at this time.  She can let us know via MyChart if her symptoms worsen to a point where she wants to trial this.  -She can follow-up with me in 6 to 8 weeks if not improving    Raul Recio, [facility] SP[facility] MEDICINE CLINIC [place]    SUBJECTIVE- Interim History January 23, 2025    Chief Complaint   Patient presents with    Musculoskeletal Problem     Left femur thigh       Machelle Weiner is a 49 year old female who is seen in f/u up for MRI Femur Thigh left . Since last visit on 01/07/2025  patient has the last couple of days pain has bad was hard to sleep.  - Now ~  more than 6 months from initial onset    Worsened by: Activity that put strain on body ,getting out of bed  Better with: nothing  Treatments tried: rest/activity avoidance, ice, heat, Tylenol, and previous imaging (MRI 01/18/225  and xray 1/7/2025)  Associated symptoms:  numbness, tingling, warmth, fever/chills, and feeling of instability    The patient is seen by themselves.      Orthopedic/Surgical history: YES - Date: Spine and shoulder 2024 April  Social History/Occupation:       REVIEW OF SYSTEMS:  Negative unless mentioned above    OBJECTIVE:  There were no vitals taken for this visit.   General: healthy, alert and in no distress  Skin: no suspicious lesions or rash.  CV: distal perfusion intact   Resp: normal respiratory effort without conversational dyspnea   Psych: normal mood and affect  Gait: NORMAL  Neuro: Normal light sensory exam of LL extremity     Hip Exam:          Musculoskeletal Exam   Gait Normal      Left Right   Inspection Grossly Normal  Grossly Normal    Palpation       Tenderness over  Deep tissue/bone of femur, approximately 3-4 cm distal to the greater trochanter None   Range of Motion       Flexion - Supine  Full to about 90  Full to about 90   ER at 90 of flexion 55 55   IR at 90 of flexion 40 40   Strength 5/5 Flexion  5/5 Abduction in Neutral  5/5 Abduction in Extension     Grossly Nml otherwise  5/5 Flexion  5/5 Abduction in Neutral  5/5 Abduction in Extension     Grossly Nml otherwise    Pain Provocation Tests       FADIR NEG NEG   EDA NEG  NEG    Instability/log roll NEG  NEG    Trochanteric Pain Sign NEG NEG    Straight leg raise (passive) NEG  NEG    Posterior/Ischiofemoral Impingement Provocation NEG  NEG    Neurologic Intact sensation        RADIOLOGY:  Final results and radiologist's interpretation, available in the [facility] health record.  Images were reviewed with the patient in the office today.    Results for orders placed or performed during the hospital encounter of 01/18/25   MR Femur Thigh Left wo & w Contrast    Narrative    EXAM: MR FEMUR THIGH LEFT W/O and W CONTRAST  LOCATION: [facility]  DATE: 1/18/2025    INDICATION: L thigh pain, just distal to greater  "trochanter, feels like bone is \"about to snap\". Rule out malignancy, stress injury, other bony issue that puts patient at risk for pathologic fracture.  COMPARISON: None.  TECHNIQUE: Routine. Additional postgadolinium T1 sequences were obtained.  IV CONTRAST: 9.5 mL Gadavist    FINDINGS:     JOINTS AND BONES:  -Both hips and both femurs are negative for fracture. No evidence for focal bone lesion or T2 signal abnormality to suggest stress reaction. Large field-of-view is suboptimal for evaluation of the hip joint itself. There does appear to be mild thinning   of the articular cartilage which is bilateral and likely symmetric. No effusion. No gross stable tearing identified on large field-of-view imaging.    TENDONS:  -There is left-sided gluteal tendinopathy without evidence for tearing. Edema extends into the extreme upper margin of the iliotibial band. No significant tendinopathy on the right. The hamstring tendon origins are intact bilaterally. The iliopsoas   tendon attachments are intact bilaterally.    MUSCLES AND SOFT TISSUES:  -No muscle atrophy or edema. No soft tissue mass or fluid collection.      Impression    IMPRESSION:  1.  Both hips and both femurs negative for fracture or avascular necrosis. No focal bone lesion.  2.  Mild thinning of the articular cartilage on both sides of both hip joints. Findings appear fairly symmetric on large field-of-view imaging.  3.  Left-sided gluteal tendon tendinopathy. No evidence for tearing or retraction but there is edema surrounding the inferior aspect of the greater trochanter which extends into the extreme upper margin of the iliotibial band.  4.  No additional tendinopathy.  5.  Exam otherwise negative.                  "

## 2025-01-28 ENCOUNTER — OFFICE VISIT (OUTPATIENT)
Dept: ORTHOPEDICS | Facility: CLINIC | Age: 50
End: 2025-01-28
Payer: COMMERCIAL

## 2025-01-28 DIAGNOSIS — M67.952 TENDINOPATHY OF LEFT GLUTEAL REGION: Primary | ICD-10-CM

## 2025-01-28 PROCEDURE — 99214 OFFICE O/P EST MOD 30 MIN: CPT | Performed by: STUDENT IN AN ORGANIZED HEALTH CARE EDUCATION/TRAINING PROGRAM

## 2025-01-28 PROCEDURE — G2211 COMPLEX E/M VISIT ADD ON: HCPCS | Performed by: STUDENT IN AN ORGANIZED HEALTH CARE EDUCATION/TRAINING PROGRAM

## 2025-01-28 NOTE — LETTER
1/28/2025      Machelle Weiner  [address] [facility] [address]      Dear Colleague,    Thank you for referring your patient, Machelle Weiner, to the [facility] SPORTS MEDICINE [facility]. Please see a copy of my visit note below.    ASSESSMENT & PLAN    Machelle was seen today for musculoskeletal problem.    Diagnoses and all orders for this visit:    Tendinopathy of left gluteal region  -     Physical Therapy  Referral; Future      This issue is sub acute and Unchanged. Machelle presents our clinic today to review her MRI and discuss the next Epson treatment.  We reviewed her MRI that showed no abnormalities within the hip or femur, which is reassuring.  It did show some tendinopathy of the left tendons without associated tear as well as some mild degenerative changes in the hip joint themselves.  We discussed that the neck step in treatment would be to begin dedicated physical therapy geared towards the hip and glutes musculature, provided reassurance that there is no evidence of any bone weakening or underlying metastasis or avascular necrosis.  We determined the following plan:  - Physical therapy referral placed  - She can utilize over-the-counter pain medicines, ice, heat as needed  - We briefly discussed utilizing a NSAID or oral steroid burst and taper for pain relief, however the patient feels that she does not need this at this time.  She can let us know via [employer] if her symptoms worsen to a point where she wants to trial this.  -She can follow-up with me in 6 to 8 weeks if not improving    Raul Recio,   [facility]    SUBJECTIVE- Interim History January 23, 2025    Chief Complaint   Patient presents with     Musculoskeletal Problem     Left femur thigh       Machelle Weiner is a 49 year old female who is seen in f/u up for MRI Femur Thigh left . Since last visit on 01/07/2025  patient has the last couple of days pain  has bad was hard to sleep.  - Now ~  more than 6 months from initial onset    Worsened by: Activity that put strain on body ,getting out of bed  Better with: nothing  Treatments tried: rest/activity avoidance, ice, heat, Tylenol, and previous imaging (MRI 01/18/225 and xray 1/7/2025)  Associated symptoms:  numbness, tingling, warmth, fever/chills, and feeling of instability    The patient is seen by themselves.      Orthopedic/Surgical history: YES - Date: Spine and shoulder 2024 April  Social History/Occupation:       REVIEW OF SYSTEMS:  Negative unless mentioned above    OBJECTIVE:  There were no vitals taken for this visit.   General: healthy, alert and in no distress  Skin: no suspicious lesions or rash.  CV: distal perfusion intact   Resp: normal respiratory effort without conversational dyspnea   Psych: normal mood and affect  Gait: NORMAL  Neuro: Normal light sensory exam of LL extremity     Hip Exam:          Musculoskeletal Exam   Gait Normal      Left Right   Inspection Grossly Normal  Grossly Normal    Palpation       Tenderness over  Deep tissue/bone of femur, approximately 3-4 cm distal to the greater trochanter None   Range of Motion       Flexion - Supine  Full to about 90  Full to about 90   ER at 90 of flexion 55 55   IR at 90 of flexion 40 40   Strength 5/5 Flexion  5/5 Abduction in Neutral  5/5 Abduction in Extension     Grossly Nml otherwise  5/5 Flexion  5/5 Abduction in Neutral  5/5 Abduction in Extension     Grossly Nml otherwise    Pain Provocation Tests       FADIR NEG NEG   EDA NEG  NEG    Instability/log roll NEG  NEG    Trochanteric Pain Sign NEG NEG    Straight leg raise (passive) NEG  NEG    Posterior/Ischiofemoral Impingement Provocation NEG  NEG    Neurologic Intact sensation        RADIOLOGY:  Final results and radiologist's interpretation, available in the [facility] health record.  Images were reviewed with the patient in the office today.    Results for orders placed  "or performed during the hospital encounter of 01/18/25   MR Femur Thigh Left wo & w Contrast    Narrative    EXAM: MR FEMUR THIGH LEFT W/O and W CONTRAST  LOCATION: [facility]  DATE: 1/18/2025    INDICATION: L thigh pain, just distal to greater trochanter, feels like bone is \"about to snap\". Rule out malignancy, stress injury, other bony issue that puts patient at risk for pathologic fracture.  COMPARISON: None.  TECHNIQUE: Routine. Additional postgadolinium T1 sequences were obtained.  IV CONTRAST: 9.5 mL Gadavist    FINDINGS:     JOINTS AND BONES:  -Both hips and both femurs are negative for fracture. No evidence for focal bone lesion or T2 signal abnormality to suggest stress reaction. Large field-of-view is suboptimal for evaluation of the hip joint itself. There does appear to be mild thinning   of the articular cartilage which is bilateral and likely symmetric. No effusion. No gross stable tearing identified on large field-of-view imaging.    TENDONS:  -There is left-sided gluteal tendinopathy without evidence for tearing. Edema extends into the extreme upper margin of the iliotibial band. No significant tendinopathy on the right. The hamstring tendon origins are intact bilaterally. The iliopsoas   tendon attachments are intact bilaterally.    MUSCLES AND SOFT TISSUES:  -No muscle atrophy or edema. No soft tissue mass or fluid collection.      Impression    IMPRESSION:  1.  Both hips and both femurs negative for fracture or avascular necrosis. No focal bone lesion.  2.  Mild thinning of the articular cartilage on both sides of both hip joints. Findings appear fairly symmetric on large field-of-view imaging.  3.  Left-sided gluteal tendon tendinopathy. No evidence for tearing or retraction but there is edema surrounding the inferior aspect of the greater trochanter which extends into the extreme upper margin of the iliotibial band.  4.  No additional tendinopathy.  5.  Exam otherwise " negative.                      Again, thank you for allowing me to participate in the care of your patient.        Sincerely,        Raul Recio, DO    Electronically signed

## 2025-02-03 ENCOUNTER — TELEPHONE (OUTPATIENT)
Dept: OBGYN | Facility: CLINIC | Age: 50
End: 2025-02-03
Payer: COMMERCIAL

## 2025-02-03 DIAGNOSIS — R10.2 PELVIC PAIN IN FEMALE: Primary | ICD-10-CM

## 2025-02-03 NOTE — TELEPHONE ENCOUNTER
[facility] Call Center    Phone Message    May a detailed message be left on voicemail: yes     Reason for Call: Symptoms or Concerns     If patient has red-flag symptoms, warm transfer to triage line    Current symptom or concern: On going symptoms     Symptoms have been present for:  + month(s)    Has patient previously been seen for this? No        Are there any new or worsening symptoms? Yes: Pt has worsening symptoms related to pelvic pressure which has significantly worsened. Wants to speak with triage to discuss further.     Action Taken: Other: RI [id number]    Travel Screening: Not Applicable     Date of Service:

## 2025-02-03 NOTE — TELEPHONE ENCOUNTER
Spoke with the pt.   States that the pain has been ongoing since dec.   The pain is worse in the RLQ. But has all over pain in her pelvis.     Pt is still having periods every month.     LMP 1/9/25.     She has an appt with you on 2/7.    Would you like her to have pelvic US prior to that appt?     Ema MCBRIDE RN  [facility]

## 2025-02-04 NOTE — TELEPHONE ENCOUNTER
Luis Benson MD  You1 minute ago (8:31 AM)       Yes.  An ultrasound prior to her appointment would be informative    Dr. Benson

## 2025-02-06 ENCOUNTER — ANCILLARY PROCEDURE (OUTPATIENT)
Dept: ULTRASOUND IMAGING | Facility: CLINIC | Age: 50
End: 2025-02-06
Attending: OBSTETRICS & GYNECOLOGY
Payer: COMMERCIAL

## 2025-02-06 DIAGNOSIS — R10.2 PELVIC PAIN IN FEMALE: ICD-10-CM

## 2025-02-06 PROCEDURE — 76856 US EXAM PELVIC COMPLETE: CPT | Performed by: FAMILY MEDICINE

## 2025-02-06 PROCEDURE — 76830 TRANSVAGINAL US NON-OB: CPT | Performed by: FAMILY MEDICINE

## 2025-02-11 ENCOUNTER — TELEPHONE (OUTPATIENT)
Dept: OBGYN | Facility: CLINIC | Age: 50
End: 2025-02-11

## 2025-02-11 NOTE — TELEPHONE ENCOUNTER
Patient Name: Machelle Weiner   MRN: [id number]   Case#: [id number]   Surgeons and Role:      * Luis Benson MD - Primary   Date requested: * No dates entered *   Location: [facility] OR   Procedure(s):   SALPINGO-OOPHORECTOMY, LAPAROSCOPIC (Right)   SALPINGECTOMY (Left)

## 2025-02-11 NOTE — TELEPHONE ENCOUNTER
Type of surgery: LAPAROSCOPIC RIGHT SALPINGO-OOPHORECTOMY, LEFT SALPINGECTOMY  Location of surgery: [facility]  Date and time of surgery: 2/25/25 @ 10:15 AM  Surgeon: DR SALMON  Pre-Op Appt Date: PATIENT ADVISED TO SCHEDULE.  Post-Op Appt Date: 3/27/25   Packet sent out: Yes  Pre-cert/Authorization completed:  No  Date: 2/11/25     Detail Level: Zone

## 2025-02-18 ENCOUNTER — OFFICE VISIT (OUTPATIENT)
Dept: INTERNAL MEDICINE | Facility: CLINIC | Age: 50
End: 2025-02-18
Payer: COMMERCIAL

## 2025-02-18 VITALS
OXYGEN SATURATION: 96 % | WEIGHT: 210.9 LBS | HEIGHT: 64 IN | SYSTOLIC BLOOD PRESSURE: 136 MMHG | DIASTOLIC BLOOD PRESSURE: 80 MMHG | BODY MASS INDEX: 36 KG/M2 | TEMPERATURE: 98.6 F | RESPIRATION RATE: 18 BRPM | HEART RATE: 96 BPM

## 2025-02-18 DIAGNOSIS — R52 INTRACTABLE PAIN: ICD-10-CM

## 2025-02-18 DIAGNOSIS — N83.201 RIGHT OVARIAN CYST: ICD-10-CM

## 2025-02-18 DIAGNOSIS — E03.9 ACQUIRED HYPOTHYROIDISM: ICD-10-CM

## 2025-02-18 DIAGNOSIS — F39 MOOD DISORDER: ICD-10-CM

## 2025-02-18 DIAGNOSIS — Z01.818 PREOP GENERAL PHYSICAL EXAM: Primary | ICD-10-CM

## 2025-02-18 DIAGNOSIS — I10 ESSENTIAL HYPERTENSION, BENIGN: ICD-10-CM

## 2025-02-18 DIAGNOSIS — R10.2 PELVIC PAIN IN FEMALE: ICD-10-CM

## 2025-02-18 DIAGNOSIS — J45.20 MILD INTERMITTENT ASTHMA WITHOUT COMPLICATION: ICD-10-CM

## 2025-02-18 DIAGNOSIS — E78.5 HYPERLIPIDEMIA LDL GOAL <160: ICD-10-CM

## 2025-02-18 LAB
ERYTHROCYTE [DISTWIDTH] IN BLOOD BY AUTOMATED COUNT: 13.6 % (ref 10–15)
HCT VFR BLD AUTO: 42 % (ref 35–47)
HGB BLD-MCNC: 14.1 G/DL (ref 11.7–15.7)
MCH RBC QN AUTO: 30 PG (ref 26.5–33)
MCHC RBC AUTO-ENTMCNC: 33.6 G/DL (ref 31.5–36.5)
MCV RBC AUTO: 89 FL (ref 78–100)
PLATELET # BLD AUTO: 287 10E3/UL (ref 150–450)
RBC # BLD AUTO: 4.7 10E6/UL (ref 3.8–5.2)
WBC # BLD AUTO: 8.3 10E3/UL (ref 4–11)

## 2025-02-18 PROCEDURE — 99214 OFFICE O/P EST MOD 30 MIN: CPT

## 2025-02-18 PROCEDURE — 85027 COMPLETE CBC AUTOMATED: CPT

## 2025-02-18 PROCEDURE — 36415 COLL VENOUS BLD VENIPUNCTURE: CPT

## 2025-02-18 PROCEDURE — 93000 ELECTROCARDIOGRAM COMPLETE: CPT

## 2025-02-18 NOTE — PROGRESS NOTES
Preoperative Evaluation  [place] [address]  [address]  [facility] [address]  Phone: [phone]  Primary Provider: Jean Sauceda PA-C  Pre-op Performing Provider: BLANCA Goetz CNP  Feb 18, 2025 2/18/2025   Surgical Information   What procedure is being done? Right salpingo-oophorectomy & left salpingectomy   Facility or Hospital where procedure/surgery will be performed: [facility]   Who is doing the procedure / surgery? Dr Benson   Date of surgery / procedure: 2/25/25   Time of surgery / procedure: Arrival 9:15   Where do you plan to recover after surgery? at home with family     Fax number for surgical facility: Note does not need to be faxed, will be available electronically in Epic.    Assessment & Plan     The proposed surgical procedure is considered INTERMEDIATE risk.    Preop general physical exam  Pelvic pain in female  Right ovarian cyst  Patient is scheduled for a right salpingo oophorectomy and left salpingo-oophorectomy on 2/25/2025  - EKG 12-lead complete w/read - Clinics  - CBC with platelets     Acquired hypothyroidism  Patient can have levothyroxine with a sip of water on morning of surgery    Mild intermittent asthma without complication  Patient can take her inhalers or nebulizers on morning of surgery, do not take cetirizine or hydroxyzine the day of surgery    Essential hypertension, benign  Patient is to hold her losartan morning of surgery    Mood disorder  Patient is to hold her marijuana/cannabis 72 hours before surgery    Intractable pain  Patient can take her oxycodone and cyclobenzaprine with a sip of water the morning of surgery    Hold over-the-counter medications. Many may pose a bleeding risk for the day of surgery. You can recommence those medications postoperatively        - No identified additional risk factors other than previously addressed    Preoperative Medication Instructions  Antiplatelet or  Anticoagulation Medication Instructions   - We reviewed the medication list and the patient is not on an antiplatelet or anticoagulation medications.    Additional Medication Instructions   - ACE/ARB/ARNI (lisinopril, enalapril, losartan, valsartan, olmesartan, sacubritril/valsartan) : DO NOT TAKE on day of surgery (minimum 11 hours for general anesthesia).   - fiber, laxatives: DO NOT TAKE day of surgery   - Opioids: Continue without modification.   956}   - cannabis, marijuana: DO NOT TAKE night before surgery. 72 hours   - cetirizine and first generation antihistamines: DO NOT TAKE on day of surgery.   - LABA, inhaled corticosteroid, long-acting anticholinergics: Continue without modification.   - rescue Inhaler: Continue PRN. Bring to hospital on the day of surgery.   - Topicals: DO NOT TAKE day of surgery.    Recommendation  Approval given to proceed with proposed procedure, without further diagnostic evaluation.    Giuliano Carty is a 49 year old, presenting for the following: Patient denies fevers night sweats or chills, no current vaginal bleeding or abdominal pain.  No upper respiratory infection symptoms.  No chief complaint on file.        HPI related to upcoming procedure: Patient had presented on 2/7/2025 for evaluation of pelvic and abdominal pain of longstanding duration and to discuss the recent ultrasound that revealed a right ovarian cyst that measures 1.7 x 1.6 x 0.9 cm. Patient has experienced a cyst on this side before in 2020 that was comparable in size. Patient has an aunt with ovarian cancer who experienced nonspecific abdominal and pelvic pain who urged the patient to have it evaluated.  Patient was reassured that due to the size and appearance of the cyst was strongly suggestive of a benign condition and not a malignancy.  Patient is also concerned about the possibility of endometriosis as another etiology of her pelvic pain or ovarian pathology.  Patient will be experiencing  a  laparoscopic right salpingo oophorectomy, left salpingectomy on 2/25/2025 at 10:15 AM.    Extensively reviewed all of the medications that she is taking one by one and discussed which ones she needs to hold and which ones she can take the morning of   Surgery.  Patient states that she reviewed medication with the surgeon who shared that he did not want her to take any medications the day of surgery except for possibly her losartan.  Discussed with patient about holding her Jardiance for 3 days and not just for the day of surgery.       Reviewed the instructions with pt to be prepared to share her doctor's clinic name and phone number, her medical surgical and anesthesia history, a list of allergies and sensitivities, a list of medicines including herbal treatments and over-the-counter medications.  Advised patient to inform insurance company that she is having surgery and to call the clinic if there is any change in her health such as a scratch or scrape near the surgical site or any signs of a cold such as sore throat, runny nose, cough, rash, fever.     Provided patient with the eating and drinking guidelines and instructed patient to eat and drink as normal until 8 hours before she arrives for surgery and after that no food or milk.  Instructed patient that she can drink clear water liquids until 2 hours before she arrives.  Explained that clear liquids are those you can see through like water Gatorade and propel and includes black coffee and tea as long as there is no cream or milk in it.  Instructed patient to not drink alcohol for 24 hours before she arrives which includes drinks that contain THC.  Instructed patient to shower/bathe the night before and the morning of surgery and to not smoke or vape the morning of surgery.       On the day of surgery instructed patient to bring photo ID and insurance card and a copy of her healthcare directive if she has 1 and to bring cases for glasses and hearing aids  because she cannot wear contacts during surgery.  Instructed patient to remove any jewelry including body piercings and leave jewelry and valuables at home.          2/18/2025   Pre-Op Questionnaire   Have you ever had a heart attack or stroke? No   Have you ever had surgery on your heart or blood vessels, such as a stent placement, a coronary artery bypass, or surgery on an artery in your head, neck, heart, or legs? No   Do you have chest pain with activity? No   Do you have a history of heart failure? No   Do you currently have a cold, bronchitis or symptoms of other infection? No   Do you have a cough, shortness of breath, or wheezing? No   Do you or anyone in your family have previous history of blood clots? No   Do you or does anyone in your family have a serious bleeding problem such as prolonged bleeding following surgeries or cuts? No   Have you ever had problems with anemia or been told to take iron pills? (!) YES chronically   Have you had any abnormal blood loss such as black, tarry or bloody stools, or abnormal vaginal bleeding? No   Have you ever had a blood transfusion? No   Are you willing to have a blood transfusion if it is medically needed before, during, or after your surgery? Yes   Have you or any of your relatives ever had problems with anesthesia? No   Do you have sleep apnea, excessive snoring or daytime drowsiness? No   Do you have any artifical heart valves or other implanted medical devices like a pacemaker, defibrillator, or continuous glucose monitor? No   Do you have artificial joints? No   Are you allergic to latex? No     Health Care Directive  Patient does not have a Health Care Directive: Discussed advance care planning with patient; however, patient declined at this time.    Preoperative Review of    reviewed - controlled substances reflected in medication list.          Patient Active Problem List    Diagnosis Date Noted    Pain in scapula 04/30/2024     Priority: Medium     Intractable pain 04/15/2024     Priority: Medium    Acute pain of right shoulder 04/15/2024     Priority: Medium    Mood disorder 10/03/2023     Priority: Medium    Chronic pain of both knees 06/24/2022     Priority: Medium    Hip pain 06/24/2022     Priority: Medium    Positive LYUDMILA (antinuclear antibody) 04/12/2022     Priority: Medium    Multiple joint pain 04/12/2022     Priority: Medium    Facet arthropathy, lumbosacral 12/10/2021     Priority: Medium    DDD (degenerative disc disease), lumbosacral 12/10/2021     Priority: Medium    Leg length discrepancy 12/10/2021     Priority: Medium    Neck pain 04/15/2021     Priority: Medium    Migraine 03/02/2018     Priority: Medium    Morbid obesity (H) 11/20/2017     Priority: Medium    S/P laparoscopic cholecystectomy 10/14/2016     Priority: Medium    Acquired hypothyroidism 02/04/2016     Priority: Medium    Essential hypertension, benign 09/08/2015     Priority: Medium    Hyperlipidemia LDL goal <160 01/25/2013     Priority: Medium    Asthma, mild intermittent      Priority: Medium      Past Medical History:   Diagnosis Date    Arthritis     Facet Arthropathy    Asthma, mild intermittent     Depression     Depressive disorder     Recent issue, not being treated and not diagnosed by Dr perales    Epigastric pain     Hypertension     Intractable chronic migraine without aura     Thyroid disease      Past Surgical History:   Procedure Laterality Date    BIOPSY  1991    Cervix    COLONOSCOPY N/A 06/21/2022    Procedure: COLONOSCOPY (fv);  Surgeon: Aries Engle MD;  Location:  GI    ESOPHAGOSCOPY, GASTROSCOPY, DUODENOSCOPY (EGD), COMBINED N/A 09/22/2022    Procedure: ESOPHAGOGASTRODUODENOSCOPY, WITH BIOPSY;  Surgeon: Aries Wright MD;  Location:  GI    GYN SURGERY      cryosurgery of cervix at age 16    LAPAROSCOPIC CHOLECYSTECTOMY WITH CHOLANGIOGRAMS N/A 10/14/2016    Procedure: LAPAROSCOPIC CHOLECYSTECTOMY WITH CHOLANGIOGRAMS;  Surgeon: Cornelius Mueller MD;   Location: [facility] OR     Current Outpatient Medications   Medication Sig Dispense Refill    acetaminophen (TYLENOL) 500 MG tablet Take 2 tablets (1,000 mg) by mouth every 8 hours 100 tablet 11    albuterol (PROAIR HFA/PROVENTIL HFA/VENTOLIN HFA) 108 (90 Base) MCG/ACT inhaler INHALE 2 PUFFS BY MOUTH EVERY 6 HOURS AS NEEDED FOR SHORTNESS OF BREATH OR DIFFICULT BREATHING (Patient taking differently: as needed. INHALE 2 PUFFS BY MOUTH EVERY 6 HOURS AS NEEDED FOR SHORTNESS OF BREATH OR DIFFICULT BREATHING) 36 g 1    albuterol (PROVENTIL) (2.5 MG/3ML) 0.083% neb solution Take 1 vial (2.5 mg) by nebulization every 6 hours as needed for shortness of breath / dyspnea or wheezing 90 mL 0    Biotin 10 MG CAPS Take 1 capsule by mouth daily at 2 pm      budesonide (PULMICORT) 0.5 MG/2ML neb solution Mix 2 mg (4 mL) budesonide suspension with 10 splenda packets to create slurry and drink solution slowly over 5-10 minutes twice daily for treatment of eosinophilic esophagitis 240 mL 2    cetirizine (ZYRTEC) 10 MG tablet Take 10 mg by mouth daily      cholecalciferol (VITAMIN D3) 125 mcg (5000 units) capsule Take 250 mcg by mouth daily at 2 pm      clindamycin (CLEOCIN-T) 1 % external gel Apply topically 2 times daily      co-enzyme Q-10 100 MG CAPS capsule Take 100 mg by mouth daily at 2 pm      cyclobenzaprine (FLEXERIL) 10 MG tablet Take 1 tab p.o. qhs (if feeling groggy the following morning, can try taking half a tablet instead or can take earlier the night before). 30 tablet 2    diazepam (VALIUM) 5 MG tablet Take 1 tablet (5 mg) by mouth daily as needed for anxiety 5 tablet 0    esomeprazole (NEXIUM) 20 MG DR capsule Take 1 capsule (20 mg) by mouth every morning (before breakfast) Take 30-60 minutes before eating. 90 capsule 1    hydrOXYzine HCl (ATARAX) 25 MG tablet Take 2 tablets (50 mg) by mouth every 6 hours as needed for itching 60 tablet 11    Krill Oil 1000 MG CAPS Take 1-2 capsules by mouth daily at 2 pm      levothyroxine  (SYNTHROID/LEVOTHROID) 75 MCG tablet Take 1 tablet by mouth once daily 90 tablet 1    Lidocaine (LIDOCARE) 4 % Patch Place 2 patches onto the skin every 24 hours To prevent lidocaine toxicity, patient should be patch free for 12 hrs daily.      losartan (COZAAR) 50 MG tablet Take 1 tablet by mouth once daily 90 tablet 1    MAGNESIUM PO Take 3 tablets by mouth daily at 2 pm [employer] product      medical cannabis (Patient's own supply) (The purpose of this order is to document that the patient reports taking medical cannabis.  This is not a prescription, and is not used to certify that the patient has a qualifying medical condition.) 0 Information only 0    Multiple Vitamins-Minerals (MULTIVITAMIN ADULT) CHEW Take 2 chew tab by mouth daily      naloxone (NARCAN) 4 MG/0.1ML nasal spray Spray 1 spray (4 mg) into one nostril alternating nostrils as needed for opioid reversal every 2-3 minutes until assistance arrives 0.2 mL PRN    ondansetron (ZOFRAN ODT) 8 MG ODT tab Take 1 tablet (8 mg) by mouth every 8 hours as needed for nausea 30 tablet 11    oxyCODONE (ROXICODONE) 10 MG tablet Take 1/2 to 1 tablet (5 to 10 mg ) every 4 hours as needed for pain.  Take 1/2 tab for pain 4-6/10 and 1 tab for pain 7-10/10 that is unrelieved by other medication.  Taper medication by 10 mg daily (I.e., take 1 tab less daily). 24 tablet 0    tazarotene (TAZORAC) 0.1 % external cream Apply pea-sized amount to the entire face dailyPRN      Turmeric 400 MG CAPS Take 1 capsule by mouth daily at 2 pm      vitamin B-Complex Take 1 tablet by mouth daily at 2 pm         Allergies   Allergen Reactions    Reglan [Metoclopramide Hcl]      tremors    Codeine     Penicillins Nausea and Vomiting    Seasonal Allergies         Social History     Tobacco Use    Smoking status: Former     Current packs/day: 0.00     Average packs/day: 0.3 packs/day for 15.0 years (4.5 ttl pk-yrs)     Types: Cigarettes     Start date: 2/13/2000     Quit date: 2/13/2015      "Years since quitting: 10.0    Smokeless tobacco: Never   Substance Use Topics    Alcohol use: No       History   Drug Use No             Review of Systems  Constitutional, HEENT, cardiovascular, pulmonary, gi and gu systems are negative, except as otherwise noted.    Objective    There were no vitals taken for this visit.   Estimated body mass index is 35.7 kg/m  as calculated from the following:    Height as of 1/7/25: 1.626 m (5' 4\").    Weight as of 2/7/25: 94.3 kg (208 lb).  Physical Exam  GENERAL: alert and no distress  EYES: Eyes grossly normal to inspection, PERRL and conjunctivae and sclerae normal  HENT: ear canals and TM's normal, nose and mouth without ulcers or lesions  NECK: no adenopathy, no asymmetry, masses, or scars  RESP: lungs clear to auscultation - no rales, rhonchi or wheezes  CV: regular rate and rhythm, normal S1 S2, no S3 or S4, no murmur, click or rub, no peripheral edema  ABDOMEN: soft, nontender, no hepatosplenomegaly, no masses and bowel sounds normal  MS: no gross musculoskeletal defects noted, no edema  SKIN: no suspicious lesions or rashes  NEURO: Normal strength and tone, mentation intact and speech normal  PSYCH: mentation appears normal, affect normal/bright    Recent Labs   Lab Test 12/05/24  1121 10/21/24  1057 04/25/24  0533   HGB  --  13.9 12.5   PLT  --  294 258    140 134*   POTASSIUM 4.9 4.4 4.1   CR 0.88 0.95 0.92        Diagnostics  CBC   EKG: appears normal, NSR, Normal Sinus Rhythm, normal axis, normal intervals, no acute ST/T changes c/w ischemia, no LVH by voltage criteria, unchanged from previous tracings    Revised Cardiac Risk Index (RCRI)  The patient has the following serious cardiovascular risks for perioperative complications:   - No serious cardiac risks = 0 points     RCRI Interpretation: 0 points: Class I (very low risk - 0.4% complication rate)         Signed Electronically by: BLANCA Goetz CNP  A copy of this evaluation report is provided " to the requesting physician.

## 2025-02-18 NOTE — PATIENT INSTRUCTIONS
How to Take Your Medication Before Surgery  Preoperative Medication Instructions   Antiplatelet or Anticoagulation Medication Instructions   - We reviewed the medication list and the patient is not on an antiplatelet or anticoagulation medications.    Additional Medication Instructions   - ACE/ARB/ARNI (lisinopril, enalapril, losartan, valsartan, olmesartan, sacubritril/valsartan) : DO NOT TAKE on day of surgery (minimum 11 hours for general anesthesia).   - fiber, laxatives: DO NOT TAKE day of surgery   - Opioids: Continue without modification.   956}   - cannabis, marijuana: DO NOT TAKE night before surgery. 72 hours   - cetirizine and first generation antihistamines: DO NOT TAKE on day of surgery.   - LABA, inhaled corticosteroid, long-acting anticholinergics: Continue without modification.   - rescue Inhaler: Continue PRN. Bring to hospital on the day of surgery.   - Topicals: DO NOT TAKE day of surgery.   Can take valium prior to surgery.  You are on many over-the-counter medications that may pose a bleeding risk for the day of surgery.  You are to hold those medications until after surgery when you can recommence those medications    Patient Education   Preparing for Your Surgery  For Adults  Getting started  In most cases, a nurse will call to review your health history and instructions. They will give you an arrival time based on your scheduled surgery time. Please be ready to share:  Your doctor's clinic name and phone number  Your medical, surgical, and anesthesia history  A list of allergies and sensitivities  A list of medicines, including herbal treatments and over-the-counter drugs  Whether the patient has a legal guardian (ask how to send us the papers in advance)  Note: You may not receive a call if you were seen at our PAC (Preoperative Assessment Center).  Please tell us if you're pregnant--or if there's any chance you might be pregnant. Some surgeries may injure a fetus (unborn baby), so they  require a pregnancy test. Surgeries that are safe for a fetus don't always need a test, and you can choose whether to have one.   Preparing for surgery  Within 10 to 30 days of surgery: Have a pre-op exam (sometimes called an H&P, or History and Physical). This can be done at a clinic or pre-operative center.  If you're having a , you may not need this exam. Talk to your care team.  At your pre-op exam, talk to your care team about all medicines you take. (This includes CBD oil and any drugs, such as THC, marijuana, and other forms of cannabis.) If you need to stop any medicine before surgery, ask when to start taking it again.  This is for your safety. Many medicines and drugs can make you bleed too much during surgery. Some change how well surgery (anesthesia) drugs work.  Call your insurance company to let them know you're having surgery. (If you don't have insurance, call [phone].)  Call your clinic if there's any change in your health. This includes a scrape or scratch near the surgery site, or any signs of a cold (sore throat, runny nose, cough, rash, fever).  Eating and drinking guidelines  For your safety: Unless your surgeon tells you otherwise, follow the guidelines below.  Eat and drink as normal until 8 hours before you arrive for surgery. After that, no food or milk. You can spit out gum when you arrive.  Drink clear liquids until 2 hours before you arrive. These are liquids you can see through, like water, Gatorade, and Propel Water. They also include plain black coffee and tea (no cream or milk).  No alcohol for 24 hours before you arrive. The night before surgery, stop any drinks that contain THC.  If your care team tells you to take medicine on the morning of surgery, it's okay to take it with a sip of water. No other medicines or drugs are allowed (including CBD oil)--follow your care team's instructions.  If you have questions the day of surgery, call your hospital or surgery center.    Preventing infection  Shower or bathe the night before and the morning of surgery. Follow the instructions your clinic gave you. (If no instructions, use regular soap.)  Don't shave or clip hair near your surgery site. We'll remove the hair if needed.  Don't smoke or vape the morning of surgery. No chewing tobacco for 6 hours before you arrive. A nicotine patch is okay. You may spit out nicotine gum when you arrive.  For some surgeries, the surgeon will tell you to fully quit smoking and nicotine.  We will make every effort to keep you safe from infection. We will:  Clean our hands often with soap and water (or an alcohol-based hand rub).  Clean the skin at your surgery site with a special soap that kills germs.  Give you a special gown to keep you warm. (Cold raises the risk of infection.)  Wear hair covers, masks, gowns, and gloves during surgery.  Give antibiotic medicine, if prescribed. Not all surgeries need this medicine.  What to bring on the day of surgery  Photo ID and insurance card  Copy of your health care directive, if you have one  Glasses and hearing aids (bring cases)  You can't wear contacts during surgery  Inhaler and eye drops, if you use them (tell us about these when you arrive)  CPAP machine or breathing device, if you use them  A few personal items, if spending the night  If you have . . .  A pacemaker, ICD (cardiac defibrillator), or other implant: Bring the ID card.  An implanted stimulator: Bring the remote control.  A legal guardian: Bring a copy of the certified (court-stamped) guardianship papers.  Please remove any jewelry, including body piercings. Leave jewelry and other valuables at home.  If you're going home the day of surgery  You must have a responsible adult drive you home. They should stay with you overnight as well.  If you don't have someone to stay with you, and you aren't safe to go home alone, we may keep you overnight. Insurance often won't pay for this.  After  surgery  If it's hard to control your pain or you need more pain medicine, please call your surgeon's office.  Questions?   If you have any questions for your care team, list them here:   ____________________________________________________________________________________________________________________________________________________________________________________________________________________________________________________________  For informational purposes only. Not to replace the advice of your health care provider. Copyright   2003, 2019 [facility]. All rights reserved. Clinically reviewed by Shelton Scott MD. [employer] 340513 - REV 08/24.

## 2025-02-24 ENCOUNTER — TELEPHONE (OUTPATIENT)
Dept: NEUROLOGY | Facility: CLINIC | Age: 50
End: 2025-02-24
Payer: COMMERCIAL

## 2025-02-25 ENCOUNTER — LAB REQUISITION (OUTPATIENT)
Dept: LAB | Facility: CLINIC | Age: 50
End: 2025-02-25
Payer: COMMERCIAL

## 2025-02-25 DIAGNOSIS — R10.2 PELVIC AND PERINEAL PAIN: ICD-10-CM

## 2025-02-25 DIAGNOSIS — N83.201 UNSPECIFIED OVARIAN CYST, RIGHT SIDE: ICD-10-CM

## 2025-02-25 PROCEDURE — 88305 TISSUE EXAM BY PATHOLOGIST: CPT | Mod: TC,ORL | Performed by: OBSTETRICS & GYNECOLOGY

## 2025-02-25 PROCEDURE — 88305 TISSUE EXAM BY PATHOLOGIST: CPT | Mod: 26 | Performed by: PATHOLOGY

## 2025-02-25 PROCEDURE — 88304 TISSUE EXAM BY PATHOLOGIST: CPT | Mod: TC,ORL | Performed by: OBSTETRICS & GYNECOLOGY

## 2025-03-02 LAB
PATH REPORT.COMMENTS IMP SPEC: NORMAL
PATH REPORT.COMMENTS IMP SPEC: NORMAL
PATH REPORT.FINAL DX SPEC: NORMAL
PATH REPORT.GROSS SPEC: NORMAL
PATH REPORT.MICROSCOPIC SPEC OTHER STN: NORMAL
PATH REPORT.RELEVANT HX SPEC: NORMAL
PHOTO IMAGE: NORMAL

## 2025-03-03 DIAGNOSIS — M54.50 LUMBAR BACK PAIN: ICD-10-CM

## 2025-03-03 DIAGNOSIS — M79.18 BILATERAL BUTTOCK PAIN: ICD-10-CM

## 2025-03-03 DIAGNOSIS — M54.6 CHRONIC THORACIC BACK PAIN, UNSPECIFIED BACK PAIN LATERALITY: ICD-10-CM

## 2025-03-03 DIAGNOSIS — G89.29 CHRONIC THORACIC BACK PAIN, UNSPECIFIED BACK PAIN LATERALITY: ICD-10-CM

## 2025-03-03 RX ORDER — CYCLOBENZAPRINE HCL 10 MG
TABLET ORAL
Qty: 30 TABLET | Refills: 2 | Status: SHIPPED | OUTPATIENT
Start: 2025-03-03

## 2025-03-27 ENCOUNTER — OFFICE VISIT (OUTPATIENT)
Dept: OBGYN | Facility: CLINIC | Age: 50
End: 2025-03-27
Payer: COMMERCIAL

## 2025-03-27 VITALS
WEIGHT: 213.2 LBS | HEIGHT: 64 IN | SYSTOLIC BLOOD PRESSURE: 130 MMHG | DIASTOLIC BLOOD PRESSURE: 84 MMHG | BODY MASS INDEX: 36.4 KG/M2

## 2025-03-27 DIAGNOSIS — Z09 POSTOP CHECK: Primary | ICD-10-CM

## 2025-03-27 NOTE — NURSING NOTE
"Chief Complaint   Patient presents with    Post-op Visit       Initial LMP 2025 (Exact Date)  Estimated body mass index is 36.2 kg/m  as calculated from the following:    Height as of 25: 1.626 m (5' 4\").    Weight as of 25: 95.7 kg (210 lb 14.4 oz).  BP completed using cuff size: regular    Questioned patient about current smoking habits.        Shobha Vazquez LPN               "

## 2025-03-27 NOTE — PROGRESS NOTES
Post Op Follow Up    Machelle Weiner is here for post op follow up visit following laparoscopic left ovarian cystectomy, RSO and left salpingectomy on 2/25/25.    Procedure was uncomplicated.  Final pathology demonstrated     Final Diagnosis   A.  Left ovarian cyst:  - Negative for malignancy  - Hemorrhagic corpus luteal cyst , fragmented     B.  Right ovary and fallopian tube:  - Negative for malignancy, normal ovary for age with involuting corpus luteal cyst  - Bilateral fallopian tube cross-sections and fimbriated margins, no diagnostic abnormalities identified            Since surgery patient has not had fever or abdominal pain.    Activity, bowel function and bladder function have all returned to normal.    EXAM:  Constitutional: healthy, alert, and no distress   Abdomen: Abdomen soft, non-tender. BS normal. No masses, organomegaly.  Incisions CDI     Follow up as needed.    Moises Benson MD

## 2025-04-13 DIAGNOSIS — K20.0 EOSINOPHILIC ESOPHAGITIS: ICD-10-CM

## 2025-04-14 NOTE — TELEPHONE ENCOUNTER
Clinic RN: Please investigate patient's chart or contact patient if the information cannot be found because patient should have run out of this medication on 3/2024. Confirm patient is taking this medication as prescribed. Document findings and route refill encounter to provider for approval or denial.      Thanks   JOSE J Aguillon

## 2025-04-15 NOTE — TELEPHONE ENCOUNTER
Patient states she gives herself a break from Nexium for 2-6 weeks and then will take it again. Has also used over the counter Nexium.     Has some concerns about possible development of cancer taking the medication every day.    Patient does want to continue taking the medication.

## 2025-05-06 ENCOUNTER — PATIENT OUTREACH (OUTPATIENT)
Dept: CARE COORDINATION | Facility: CLINIC | Age: 50
End: 2025-05-06
Payer: COMMERCIAL

## 2025-05-06 DIAGNOSIS — I10 ESSENTIAL HYPERTENSION, BENIGN: ICD-10-CM

## 2025-05-06 RX ORDER — LOSARTAN POTASSIUM 50 MG/1
TABLET ORAL
Qty: 90 TABLET | Refills: 1 | Status: SHIPPED | OUTPATIENT
Start: 2025-05-06

## 2025-06-30 DIAGNOSIS — G89.29 CHRONIC THORACIC BACK PAIN, UNSPECIFIED BACK PAIN LATERALITY: ICD-10-CM

## 2025-06-30 DIAGNOSIS — M54.6 CHRONIC THORACIC BACK PAIN, UNSPECIFIED BACK PAIN LATERALITY: ICD-10-CM

## 2025-06-30 DIAGNOSIS — M79.18 BILATERAL BUTTOCK PAIN: ICD-10-CM

## 2025-06-30 DIAGNOSIS — M54.50 LUMBAR BACK PAIN: ICD-10-CM

## 2025-06-30 RX ORDER — CYCLOBENZAPRINE HCL 10 MG
TABLET ORAL
Qty: 30 TABLET | Refills: 2 | Status: SHIPPED | OUTPATIENT
Start: 2025-06-30

## 2025-07-07 DIAGNOSIS — E03.9 ACQUIRED HYPOTHYROIDISM: ICD-10-CM

## 2025-07-07 RX ORDER — LEVOTHYROXINE SODIUM 75 UG/1
75 TABLET ORAL DAILY
Qty: 90 TABLET | Refills: 0 | Status: SHIPPED | OUTPATIENT
Start: 2025-07-07

## 2025-08-11 ENCOUNTER — MYC MEDICAL ADVICE (OUTPATIENT)
Dept: FAMILY MEDICINE | Facility: CLINIC | Age: 50
End: 2025-08-11
Payer: COMMERCIAL

## 2025-08-11 DIAGNOSIS — R11.0 NAUSEA: ICD-10-CM

## 2025-08-12 RX ORDER — ONDANSETRON 8 MG/1
8 TABLET, ORALLY DISINTEGRATING ORAL EVERY 8 HOURS PRN
Qty: 15 TABLET | Refills: 0 | Status: SHIPPED | OUTPATIENT
Start: 2025-08-12

## 2025-08-14 ENCOUNTER — E-VISIT (OUTPATIENT)
Dept: FAMILY MEDICINE | Facility: CLINIC | Age: 50
End: 2025-08-14
Payer: COMMERCIAL

## 2025-08-14 DIAGNOSIS — R11.0 NAUSEA: Primary | ICD-10-CM

## 2025-09-04 ENCOUNTER — OFFICE VISIT (OUTPATIENT)
Dept: ORTHOPEDICS | Facility: CLINIC | Age: 50
End: 2025-09-04
Payer: COMMERCIAL

## 2025-09-04 VITALS — DIASTOLIC BLOOD PRESSURE: 87 MMHG | HEART RATE: 63 BPM | SYSTOLIC BLOOD PRESSURE: 139 MMHG | OXYGEN SATURATION: 97 %

## 2025-09-04 DIAGNOSIS — M50.30 DDD (DEGENERATIVE DISC DISEASE), CERVICAL: ICD-10-CM

## 2025-09-04 DIAGNOSIS — M54.12 CERVICAL RADICULOPATHY: Primary | ICD-10-CM

## 2025-09-04 ASSESSMENT — PAIN SCALES - GENERAL: PAINLEVEL_OUTOF10: MODERATE PAIN (6)

## (undated) DEVICE — KIT ENDO TURNOVER/PROCEDURE W/CLEAN A SCOPE LINERS 103888

## (undated) RX ORDER — FENTANYL CITRATE 0.05 MG/ML
INJECTION, SOLUTION INTRAMUSCULAR; INTRAVENOUS
Status: DISPENSED
Start: 2022-06-21

## (undated) RX ORDER — BETAMETHASONE SODIUM PHOSPHATE AND BETAMETHASONE ACETATE 3; 3 MG/ML; MG/ML
INJECTION, SUSPENSION INTRA-ARTICULAR; INTRALESIONAL; INTRAMUSCULAR; SOFT TISSUE
Status: DISPENSED
Start: 2024-04-24

## (undated) RX ORDER — ONDANSETRON 2 MG/ML
INJECTION INTRAMUSCULAR; INTRAVENOUS
Status: DISPENSED
Start: 2022-06-21

## (undated) RX ORDER — FENTANYL CITRATE 50 UG/ML
INJECTION, SOLUTION INTRAMUSCULAR; INTRAVENOUS
Status: DISPENSED
Start: 2024-04-24

## (undated) RX ORDER — LIDOCAINE HYDROCHLORIDE 10 MG/ML
INJECTION, SOLUTION EPIDURAL; INFILTRATION; INTRACAUDAL; PERINEURAL
Status: DISPENSED
Start: 2024-04-24